# Patient Record
Sex: FEMALE | Race: WHITE | NOT HISPANIC OR LATINO | Employment: OTHER | URBAN - METROPOLITAN AREA
[De-identification: names, ages, dates, MRNs, and addresses within clinical notes are randomized per-mention and may not be internally consistent; named-entity substitution may affect disease eponyms.]

---

## 2017-01-12 ENCOUNTER — TELEPHONE (OUTPATIENT)
Dept: ORTHOPEDIC SURGERY | Facility: CLINIC | Age: 72
End: 2017-01-12

## 2017-01-12 RX ORDER — TRIAMCINOLONE ACETONIDE 55 UG/1
SPRAY, METERED NASAL
Refills: 3 | COMMUNITY
Start: 2017-01-03 | End: 2017-01-18 | Stop reason: SDDI

## 2017-01-17 ENCOUNTER — TRANSCRIBE ORDERS (OUTPATIENT)
Dept: ADMINISTRATIVE | Facility: HOSPITAL | Age: 72
End: 2017-01-17

## 2017-01-17 DIAGNOSIS — R06.02 SOB (SHORTNESS OF BREATH) ON EXERTION: ICD-10-CM

## 2017-01-17 DIAGNOSIS — N18.30 ANEMIA DUE TO STAGE 3 CHRONIC KIDNEY DISEASE TREATED WITH ERYTHROPOIETIN (HCC): Primary | ICD-10-CM

## 2017-01-17 DIAGNOSIS — R06.02 SOB (SHORTNESS OF BREATH): Primary | ICD-10-CM

## 2017-01-17 DIAGNOSIS — R09.02 OXYGEN DECREASE: ICD-10-CM

## 2017-01-17 DIAGNOSIS — R07.9 CHEST PAIN, UNSPECIFIED TYPE: ICD-10-CM

## 2017-01-17 DIAGNOSIS — D63.1 ANEMIA DUE TO STAGE 3 CHRONIC KIDNEY DISEASE TREATED WITH ERYTHROPOIETIN (HCC): Primary | ICD-10-CM

## 2017-01-18 ENCOUNTER — APPOINTMENT (OUTPATIENT)
Dept: ONCOLOGY | Facility: HOSPITAL | Age: 72
End: 2017-01-18

## 2017-01-18 ENCOUNTER — OFFICE VISIT (OUTPATIENT)
Dept: ONCOLOGY | Facility: CLINIC | Age: 72
End: 2017-01-18

## 2017-01-18 ENCOUNTER — LAB (OUTPATIENT)
Dept: ONCOLOGY | Facility: HOSPITAL | Age: 72
End: 2017-01-18

## 2017-01-18 VITALS
HEIGHT: 65 IN | WEIGHT: 138.4 LBS | BODY MASS INDEX: 23.06 KG/M2 | DIASTOLIC BLOOD PRESSURE: 61 MMHG | OXYGEN SATURATION: 96 % | TEMPERATURE: 97.9 F | SYSTOLIC BLOOD PRESSURE: 112 MMHG | HEART RATE: 83 BPM | RESPIRATION RATE: 18 BRPM

## 2017-01-18 DIAGNOSIS — D50.9 IRON DEFICIENCY ANEMIA, UNSPECIFIED IRON DEFICIENCY ANEMIA TYPE: ICD-10-CM

## 2017-01-18 DIAGNOSIS — N18.30 ANEMIA DUE TO STAGE 3 CHRONIC KIDNEY DISEASE TREATED WITH ERYTHROPOIETIN (HCC): ICD-10-CM

## 2017-01-18 DIAGNOSIS — D63.1 ANEMIA DUE TO STAGE 3 CHRONIC KIDNEY DISEASE TREATED WITH ERYTHROPOIETIN (HCC): ICD-10-CM

## 2017-01-18 DIAGNOSIS — C50.412 BREAST CANCER OF UPPER-OUTER QUADRANT OF LEFT FEMALE BREAST (HCC): Primary | ICD-10-CM

## 2017-01-18 LAB
BASOPHILS # BLD AUTO: 0.08 10*3/MM3 (ref 0–0.2)
BASOPHILS NFR BLD AUTO: 1.3 % (ref 0–1.5)
DEPRECATED RDW RBC AUTO: 47.4 FL (ref 37–54)
EOSINOPHIL # BLD AUTO: 0.17 10*3/MM3 (ref 0–0.7)
EOSINOPHIL NFR BLD AUTO: 2.7 % (ref 0.3–6.2)
ERYTHROCYTE [DISTWIDTH] IN BLOOD BY AUTOMATED COUNT: 14.2 % (ref 11.7–13)
HCT VFR BLD AUTO: 33.2 % (ref 35.6–45.5)
HGB BLD-MCNC: 11.3 G/DL (ref 11.9–15.5)
HGB RETIC QN: 34.4 PG (ref 32.7–38.6)
IMM GRANULOCYTES # BLD: 0.07 10*3/MM3 (ref 0–0.03)
IMM GRANULOCYTES NFR BLD: 1.1 % (ref 0–0.5)
IMM RETICS NFR: 11 % (ref 0.7–13.7)
LYMPHOCYTES # BLD AUTO: 1.58 10*3/MM3 (ref 0.9–4.8)
LYMPHOCYTES NFR BLD AUTO: 25.4 % (ref 19.6–45.3)
MCH RBC QN AUTO: 31 PG (ref 26.9–32)
MCHC RBC AUTO-ENTMCNC: 34 G/DL (ref 32.4–36.3)
MCV RBC AUTO: 91.2 FL (ref 80.5–98.2)
MONOCYTES # BLD AUTO: 0.61 10*3/MM3 (ref 0.2–1.2)
MONOCYTES NFR BLD AUTO: 9.8 % (ref 5–12)
NEUTROPHILS # BLD AUTO: 3.7 10*3/MM3 (ref 1.9–8.1)
NEUTROPHILS NFR BLD AUTO: 59.7 % (ref 42.7–76)
NRBC BLD MANUAL-RTO: 0.5 /100 WBC (ref 0–0)
PLATELET # BLD AUTO: 188 10*3/MM3 (ref 140–500)
PMV BLD AUTO: 10.5 FL (ref 6–12)
RBC # BLD AUTO: 3.64 10*6/MM3 (ref 3.9–5.2)
RETICS/RBC NFR AUTO: 2.09 % (ref 0.5–1.5)
WBC NRBC COR # BLD: 6.21 10*3/MM3 (ref 4.5–10.7)

## 2017-01-18 PROCEDURE — 85025 COMPLETE CBC W/AUTO DIFF WBC: CPT | Performed by: INTERNAL MEDICINE

## 2017-01-18 PROCEDURE — 99214 OFFICE O/P EST MOD 30 MIN: CPT | Performed by: INTERNAL MEDICINE

## 2017-01-18 PROCEDURE — 36415 COLL VENOUS BLD VENIPUNCTURE: CPT

## 2017-01-18 PROCEDURE — 85046 RETICYTE/HGB CONCENTRATE: CPT | Performed by: INTERNAL MEDICINE

## 2017-01-18 RX ORDER — NEBIVOLOL 5 MG/1
5 TABLET ORAL DAILY
COMMUNITY
End: 2017-02-28

## 2017-01-18 RX ORDER — BUDESONIDE AND FORMOTEROL FUMARATE DIHYDRATE 80; 4.5 UG/1; UG/1
2 AEROSOL RESPIRATORY (INHALATION) AS NEEDED
COMMUNITY
End: 2017-04-04 | Stop reason: HOSPADM

## 2017-01-18 NOTE — PROGRESS NOTES
Subjective .     REASONS FOR FOLLOWUP:  Breast cancer, anemia    HISTORY OF PRESENT ILLNESS:  The patient is a 71 y.o. year old female  who is here for follow-up with the above-mentioned history.    Continues to have issues with shortness of breath.  States yesterday oxygen saturation 73% after walking into her PCP office.  Activities are limited due to shortness of breath.  States her PCP called Dr. Nguyen and Dr. Nguyen recommended a bubble study.  He recommended no follow-up with him.  She states her PCP recommended she get evaluated at either the [facility] or the [facility].  Does not want to drive that far.  However, she is frustrated with her continued shortness of breath.    No complaints of chest pain or bleeding.    Past Medical History   Diagnosis Date   • Anemia      Of chronic renal insufficiency   • Atrial fibrillation    • Mcguire's esophagus    • Breast cancer    • Chronic renal insufficiency      Stage 3   • MCCRAY (dyspnea on exertion)    • GERD (gastroesophageal reflux disease)    • H/O electrocardiogram    • Hx of being hospitalized       in 09/2010 and 10/2010 for atrial fibrillation, Dr. Perla Hurtado   • Hyperlipidemia    • Hypertension    • Hypertension    • IBS (irritable bowel syndrome)    • Lower extremity edema    • Lump or mass in breast    • Osteoarthritis    • Osteopenia    • Paroxysmal atrial fibrillation    • Seizure      AS A TEENAGER, NOT CURRENTLY     Past Surgical History   Procedure Laterality Date   • Foot surgery     • Other surgical history       LYMPHATIC SURGERY   • Hand arthroplasty Right    • Cardiac catheterization Left 4/27/2016     Procedure: Cardiac catheterization;  Surgeon: Kevin Guillen MD;  Location: [facility] INVASIVE LOCATION;  Service:    • Knee arthroscopy     • Breast lumpectomy  1990     For LCIS       HEMATOLOGIC/ONCOLOGIC HISTORY:  (History from previous dates can be found in the separate  document.)    MEDICATIONS    Current Outpatient Prescriptions:   •  alosetron (LOTRONEX) 0.5 MG tablet, Take 0.05 mg by mouth daily., Disp: , Rfl:   •  ALPRAZolam (XANAX) 1 MG tablet, Take 1 mg by mouth every night., Disp: , Rfl:   •  amiodarone (PACERONE) 200 MG tablet, Take 1 tablet by mouth Daily. PRN, Disp: 90 tablet, Rfl: 1  •  bacitracin 500 UNIT/GM ophthalmic ointment, APPLY 1/4 INCH TO BOTH EYELIDS QHS, Disp: , Rfl: 4  •  budesonide-formoterol (SYMBICORT) 80-4.5 MCG/ACT inhaler, Inhale 2 puffs 2 (Two) Times a Day., Disp: , Rfl:   •  bumetanide (BUMEX) 1 MG tablet, Take 1 mg by mouth Daily As Needed., Disp: , Rfl:   •  cetirizine (ZyrTEC) 10 MG tablet, Take 1 tablet by mouth daily., Disp: , Rfl:   •  Chlorhexidine Gluconate (HIBICLENS EX), Apply  topically. AS DIRECTED PREOP, Disp: , Rfl:   •  dabigatran etexilate (PRADAXA) 150 MG capsu, Take 1 capsule by mouth Every 12 (Twelve) Hours., Disp: 180 capsule, Rfl: 3  •  desonide (DESOWEN) 0.05 % ointment, Apply  topically daily., Disp: , Rfl:   •  esomeprazole (NexIUM) 40 MG capsule, Take 40 mg by mouth 2 (two) times a day., Disp: , Rfl:   •  fluconazole (DIFLUCAN) 100 MG tablet, TK 1 T PO AS ONE TIME DOSE AS DIRECTED, Disp: , Rfl: 12  •  glucosamine-chondroitin 500-400 MG per tablet, Take 1 tablet by mouth., Disp: , Rfl:   •  ibuprofen (ADVIL,MOTRIN) 800 MG tablet, , Disp: , Rfl:   •  methocarbamol (ROBAXIN) 750 MG tablet, Take 750 mg by mouth. PRN, Disp: , Rfl:   •  metoprolol tartrate (LOPRESSOR) 25 MG tablet, TK 1 T PO BID, Disp: , Rfl: 3  •  montelukast (SINGULAIR) 10 MG tablet, Take 1 tablet by mouth daily., Disp: , Rfl:   •  Multiple Vitamin (MULTIVITAMIN) tablet, Take 1 tablet by mouth., Disp: , Rfl:   •  nebivolol (BYSTOLIC) 5 MG tablet, Take 5 mg by mouth Daily., Disp: , Rfl:   •  nystatin-triamcinolone (MYCOLOG II) 902826-3.1 UNIT/GM-% cream, , Disp: , Rfl:   •  pseudoephedrine-guaifenesin (MUCINEX D)  MG per 12 hr tablet, Take 1 tablet by mouth  Every 12 (Twelve) Hours., Disp: , Rfl:   •  raloxifene (EVISTA) 60 MG tablet, Take 1 tablet by mouth daily., Disp: , Rfl:   •  RESTASIS 0.05 % ophthalmic emulsion, , Disp: , Rfl:   •  simvastatin (ZOCOR) 40 MG tablet, Take 1 tablet by mouth daily., Disp: , Rfl:     ALLERGIES:     Allergies   Allergen Reactions   • Atropine Hives   • Penicillins      Reactions: syncope and seizures   • Sulfa Antibiotics Nausea Only     STATES SHE DOES FINE WITH CERTAIN SULFA DRUGS   • Epinephrine Palpitations       SOCIAL HISTORY:       Social History     Social History   • Marital status:      Spouse name: Cuate   • Number of children: 1   • Years of education: 1 year of college     Occupational History   •  Retired     Social History Main Topics   • Smoking status: Former Smoker     Quit date: 6/9/1975   • Smokeless tobacco: Never Used      Comment: QUIT SMOKING IN LATE 1970s   • Alcohol use Yes      Comment: social drinker   • Drug use: No   • Sexual activity: Defer     Other Topics Concern   • Not on file     Social History Narrative         FAMILY HISTORY:  Family History   Problem Relation Age of Onset   • Heart disease Other    • Stroke Other    • Stroke Mother    • Heart disease Mother    • Hypertension Mother    • Leukemia Father      Mid 60s   • Hypertension Brother    • Cancer Paternal Aunt    • Cancer Paternal Grandfather        REVIEW OF SYSTEMS:  GENERAL: No change in appetite or weight;   No fevers, chills, sweats.    SKIN: No nonhealing lesions.   No rashes.  HEME/LYMPH: No easy bruising, bleeding.   No swollen nodes.   EYES: No vision changes or diplopia.   ENT: No tinnitus, hearing loss, gum bleeding, epistaxis, hoarseness or dysphagia.   RESPIRATORY: see HPI   CVS: No chest pain, palpitations, orthopnea, dyspnea on exertion or PND.   GI: No melena or hematochezia.   No abdominal pain.  No nausea, vomiting, constipation, diarrhea  : No lower tract obstructive symptoms, dysuria or hematuria.  "  MUSCULOSKELETAL: No bone pain.  No joint stiffness.   NEUROLOGICAL: No global weakness, loss of consciousness or seizures.   PSYCHIATRIC: No increased nervousness, mood changes or depression.          Objective    Vitals:    01/18/17 1014   BP: 112/61   Pulse: 83   Resp: 18   Temp: 97.9 °F (36.6 °C)   SpO2: 96%   Weight: 138 lb 6.4 oz (62.8 kg)   Height: 65\" (165.1 cm)   PainSc: 0-No pain     Current Status 1/18/2017   ECOG score 0      PHYSICAL EXAM:    GENERAL:  Well-developed, well-nourished in no acute distress.   SKIN:  Warm, dry without rashes, purpura or petechiae.  HEAD:  Normocephalic.  EYES:  Pupils equal, round and reactive to light.  EOMs intact.  Conjunctivae normal.  EARS:  Hearing intact.  NOSE:  Septum midline.  No excoriations or nasal discharge.  MOUTH:  Tongue is well-papillated; no stomatitis or ulcers.  Lips normal.  THROAT:  Oropharynx without lesions or exudates.  NECK:  Supple with good range of motion; no thyromegaly or masses, no JVD.  LYMPHATICS:  No cervical, supraclavicular, axillary or inguinal adenopathy.  CHEST:  Lungs clear to percussion and auscultation. Good airflow.  BREAST: no masses by palpation or inspection  Status post left lumpectomy.  Left nipple has been inverted since lumpectomy per patient's report.  Fibrocystic changes noted.  CARDIAC:  Regular rate and rhythm without murmurs, rubs or gallops. Normal S1,S2.  ABDOMEN:  Soft, nontender with no organomegaly or masses.  EXTREMITIES:  No clubbing, cyanosis or edema.  NEUROLOGICAL:  Cranial Nerves II-XII grossly intact.  No focal neurological deficits.  PSYCHIATRIC:  Normal affect and mood.      RECENT LABS:        WBC   Date/Time Value Ref Range Status   01/18/2017 10:02 AM 6.21 4.50 - 10.70 10*3/mm3 Final   03/02/2016 10:51 AM 4.22 (L) 4.50 - 10.70 K/Cumm Final     HEMOGLOBIN   Date/Time Value Ref Range Status   01/18/2017 10:02 AM 11.3 (L) 11.9 - 15.5 g/dL Final   03/02/2016 10:51 AM 11.2 (L) 11.9 - 15.5 g/dL Final "     PLATELETS   Date/Time Value Ref Range Status   01/18/2017 10:02  140 - 500 10*3/mm3 Final   03/02/2016 10:51  140 - 500 K/Cumm Final       Assessment/Plan     ASSESSMENT:  1. Stage IB ([id number]) left breast cancer. Grade 2, 1.9 cm, 1 mm micrometastasis in 1 out of 2 sentinel nodes. ER positive, HER-2 negative. OncoType DX: low risk category.   Tamoxifen 10/11/2013 through November 2013. Just a few days or so of Aromasin in January 2014. Could not tolerate hormonal therapy due to vaginal dryness and irritation and declined any more hormonal therapy.   Evista started for significant osteopenia by Dr. Perla Garza July 2014.  She states Dr. Garza plans to Fosamax.    2. Atrial fibrillation, on Pradaxa through Dr. Perla Hurtado.     3. Anemia of stage 3 chronic renal insufficiency (Dr. Rell Shannon is her nephrologist whom she follows up with). (Evaluation negative for B12 or folate deficiency, hemolysis or multiple myeloma. She has not had a bone marrow biopsy. She responds well to Procrit).   She received Procrit 12/2014 and 1 dose 06/2015 and 1 dose 09/2015 and 1 dose on 10/28/2015.   She responds to Procrit.     4.  Iron deficiency anemia. does not tolerate oral iron due to significant abdominal pain and diarrhea. She refuses any further Benadryl as a pre-medicine because it made her feel very sleepy for over 24-hours.  Await iron labs from today.    5. Fibrocystic changes in both breasts.     6.  Hypoxia with exertion.  Oxygen saturations fall 2 around 73% with walking.  This prompted a hospitalization in early December 2016.  Workup was unremarkable.  I reviewed those hospital records.  If her anemia is contributing to this, I feel it would only be a small contributing factor.   She states her PCP recommended evaluation at the [facility] or [facility].  I told her I agree with this and encouraged her to go.  She is reluctant but frustrated at the continued shortness of  breath.      PLAN:   · Iron labs today  · CBC and iron labs every 2 months  · Procrit if hemoglobin less than 10  · M.D. lab Procrit 6 months  · Last mammogram 7/26/16, BI-RADS 2.

## 2017-01-23 ENCOUNTER — TELEPHONE (OUTPATIENT)
Dept: CARDIOLOGY | Facility: CLINIC | Age: 72
End: 2017-01-23

## 2017-01-23 DIAGNOSIS — R09.02 HYPOXIA: ICD-10-CM

## 2017-01-23 DIAGNOSIS — R06.09 DYSPNEA ON EXERTION: Primary | ICD-10-CM

## 2017-01-23 NOTE — TELEPHONE ENCOUNTER
Pt called, she said that she is feeling like before when you admitted her in the hosp with low O2 and SOA. She said that her primary ordered an echo and she wants to discuss this and some other things with you. C/b is [phone].

## 2017-01-27 ENCOUNTER — LAB (OUTPATIENT)
Dept: LAB | Facility: HOSPITAL | Age: 72
End: 2017-01-27
Attending: OBSTETRICS & GYNECOLOGY

## 2017-01-27 ENCOUNTER — TRANSCRIBE ORDERS (OUTPATIENT)
Dept: ADMINISTRATIVE | Facility: HOSPITAL | Age: 72
End: 2017-01-27

## 2017-01-27 ENCOUNTER — LAB (OUTPATIENT)
Dept: LAB | Facility: HOSPITAL | Age: 72
End: 2017-01-27
Attending: INTERNAL MEDICINE

## 2017-01-27 DIAGNOSIS — D63.0 ANEMIA IN NEOPLASTIC DISEASE: ICD-10-CM

## 2017-01-27 DIAGNOSIS — E20.8 PTH-RELATED FAMILIAL ISOLATED HYPOPARATHYROIDISM, AUTOSOMAL DOMINANT (HCC): ICD-10-CM

## 2017-01-27 DIAGNOSIS — N25.81 SECONDARY HYPERPARATHYROIDISM OF RENAL ORIGIN (HCC): ICD-10-CM

## 2017-01-27 DIAGNOSIS — N18.30 CHRONIC KIDNEY DISEASE, STAGE III (MODERATE) (HCC): ICD-10-CM

## 2017-01-27 DIAGNOSIS — E20.8 PTH-RELATED FAMILIAL ISOLATED HYPOPARATHYROIDISM, AUTOSOMAL DOMINANT (HCC): Primary | ICD-10-CM

## 2017-01-27 DIAGNOSIS — N18.30 CHRONIC KIDNEY DISEASE, STAGE III (MODERATE) (HCC): Primary | ICD-10-CM

## 2017-01-27 LAB
25(OH)D3 SERPL-MCNC: 30.9 NG/ML (ref 30–100)
ANION GAP SERPL CALCULATED.3IONS-SCNC: 12.7 MMOL/L
BACTERIA UR QL AUTO: ABNORMAL /HPF
BASOPHILS # BLD AUTO: 0.04 10*3/MM3 (ref 0–0.2)
BASOPHILS NFR BLD AUTO: 1 % (ref 0–1.5)
BILIRUB UR QL STRIP: NEGATIVE
BUN BLD-MCNC: 26 MG/DL (ref 8–23)
BUN/CREAT SERPL: 20.6 (ref 7–25)
CALCIUM SPEC-SCNC: 8.9 MG/DL (ref 8.6–10.5)
CHLORIDE SERPL-SCNC: 105 MMOL/L (ref 98–107)
CLARITY UR: CLEAR
CO2 SERPL-SCNC: 21.3 MMOL/L (ref 22–29)
COLOR UR: YELLOW
CREAT BLD-MCNC: 1.26 MG/DL (ref 0.57–1)
DEPRECATED RDW RBC AUTO: 52.3 FL (ref 37–54)
EOSINOPHIL # BLD AUTO: 0.12 10*3/MM3 (ref 0–0.7)
EOSINOPHIL NFR BLD AUTO: 3.1 % (ref 0.3–6.2)
ERYTHROCYTE [DISTWIDTH] IN BLOOD BY AUTOMATED COUNT: 14.6 % (ref 11.7–13)
FERRITIN SERPL-MCNC: 71.38 NG/ML (ref 13–150)
GFR SERPL CREATININE-BSD FRML MDRD: 42 ML/MIN/1.73
GLUCOSE BLD-MCNC: 87 MG/DL (ref 65–99)
GLUCOSE UR STRIP-MCNC: NEGATIVE MG/DL
HCT VFR BLD AUTO: 33.5 % (ref 35.6–45.5)
HGB BLD-MCNC: 10.5 G/DL (ref 11.9–15.5)
HGB UR QL STRIP.AUTO: NEGATIVE
HYALINE CASTS UR QL AUTO: ABNORMAL /LPF
IMM GRANULOCYTES # BLD: 0 10*3/MM3 (ref 0–0.03)
IMM GRANULOCYTES NFR BLD: 0 % (ref 0–0.5)
IRON 24H UR-MRATE: 70 MCG/DL (ref 37–145)
IRON SATN MFR SERPL: 20 % (ref 20–50)
KETONES UR QL STRIP: NEGATIVE
LEUKOCYTE ESTERASE UR QL STRIP.AUTO: ABNORMAL
LYMPHOCYTES # BLD AUTO: 1.18 10*3/MM3 (ref 0.9–4.8)
LYMPHOCYTES NFR BLD AUTO: 30.7 % (ref 19.6–45.3)
MCH RBC QN AUTO: 30.9 PG (ref 26.9–32)
MCHC RBC AUTO-ENTMCNC: 31.3 G/DL (ref 32.4–36.3)
MCV RBC AUTO: 98.5 FL (ref 80.5–98.2)
MONOCYTES # BLD AUTO: 0.32 10*3/MM3 (ref 0.2–1.2)
MONOCYTES NFR BLD AUTO: 8.3 % (ref 5–12)
NEUTROPHILS # BLD AUTO: 2.18 10*3/MM3 (ref 1.9–8.1)
NEUTROPHILS NFR BLD AUTO: 56.9 % (ref 42.7–76)
NITRITE UR QL STRIP: NEGATIVE
PH UR STRIP.AUTO: 6 [PH] (ref 5–8)
PHOSPHATE SERPL-MCNC: 3.4 MG/DL (ref 2.5–4.5)
PLATELET # BLD AUTO: 181 10*3/MM3 (ref 140–500)
PMV BLD AUTO: 10.4 FL (ref 6–12)
POTASSIUM BLD-SCNC: 4.1 MMOL/L (ref 3.5–5.2)
PROT UR QL STRIP: NEGATIVE
PTH-INTACT SERPL-MCNC: 63.3 PG/ML (ref 15–65)
RBC # BLD AUTO: 3.4 10*6/MM3 (ref 3.9–5.2)
RBC # UR: ABNORMAL /HPF
REF LAB TEST METHOD: ABNORMAL
SODIUM BLD-SCNC: 139 MMOL/L (ref 136–145)
SP GR UR STRIP: 1.02 (ref 1–1.03)
SQUAMOUS #/AREA URNS HPF: ABNORMAL /HPF
TIBC SERPL-MCNC: 356 MCG/DL (ref 298–536)
TRANSFERRIN SERPL-MCNC: 239 MG/DL (ref 200–360)
UROBILINOGEN UR QL STRIP: ABNORMAL
WBC NRBC COR # BLD: 3.84 10*3/MM3 (ref 4.5–10.7)
WBC UR QL AUTO: ABNORMAL /HPF

## 2017-01-27 PROCEDURE — 85025 COMPLETE CBC W/AUTO DIFF WBC: CPT | Performed by: INTERNAL MEDICINE

## 2017-01-27 PROCEDURE — 83970 ASSAY OF PARATHORMONE: CPT | Performed by: INTERNAL MEDICINE

## 2017-01-27 PROCEDURE — 84466 ASSAY OF TRANSFERRIN: CPT | Performed by: INTERNAL MEDICINE

## 2017-01-27 PROCEDURE — 84100 ASSAY OF PHOSPHORUS: CPT | Performed by: INTERNAL MEDICINE

## 2017-01-27 PROCEDURE — 81001 URINALYSIS AUTO W/SCOPE: CPT | Performed by: INTERNAL MEDICINE

## 2017-01-27 PROCEDURE — 83540 ASSAY OF IRON: CPT | Performed by: INTERNAL MEDICINE

## 2017-01-27 PROCEDURE — 82728 ASSAY OF FERRITIN: CPT | Performed by: INTERNAL MEDICINE

## 2017-01-27 PROCEDURE — 82306 VITAMIN D 25 HYDROXY: CPT | Performed by: INTERNAL MEDICINE

## 2017-01-27 PROCEDURE — 36415 COLL VENOUS BLD VENIPUNCTURE: CPT

## 2017-01-27 PROCEDURE — 80048 BASIC METABOLIC PNL TOTAL CA: CPT | Performed by: INTERNAL MEDICINE

## 2017-02-06 ENCOUNTER — HOSPITAL ENCOUNTER (OUTPATIENT)
Dept: CARDIOLOGY | Facility: HOSPITAL | Age: 72
Discharge: HOME | End: 2017-02-06
Attending: INTERNAL MEDICINE

## 2017-02-06 ENCOUNTER — HOSPITAL ENCOUNTER (OUTPATIENT)
Dept: CARDIOLOGY | Facility: HOSPITAL | Age: 72
Discharge: HOME | End: 2017-02-06
Attending: INTERNAL MEDICINE | Admitting: INTERNAL MEDICINE

## 2017-02-06 VITALS
HEIGHT: 65 IN | SYSTOLIC BLOOD PRESSURE: 120 MMHG | BODY MASS INDEX: 22.99 KG/M2 | DIASTOLIC BLOOD PRESSURE: 64 MMHG | WEIGHT: 138 LBS | HEART RATE: 64 BPM

## 2017-02-06 VITALS — WEIGHT: 138 LBS | BODY MASS INDEX: 22.99 KG/M2 | HEIGHT: 65 IN

## 2017-02-06 DIAGNOSIS — R09.02 HYPOXIA: ICD-10-CM

## 2017-02-06 DIAGNOSIS — R06.09 DYSPNEA ON EXERTION: ICD-10-CM

## 2017-02-06 LAB
BH CV ECHO MEAS - BSA(HAYCOCK): 1.7 M^2
BH CV ECHO MEAS - BSA: 1.7 M^2
BH CV ECHO MEAS - BZI_BMI: 23 KILOGRAMS/M^2
BH CV ECHO MEAS - BZI_METRIC_HEIGHT: 165.1 CM
BH CV ECHO MEAS - BZI_METRIC_WEIGHT: 62.6 KG

## 2017-02-06 PROCEDURE — 94621 CARDIOPULM EXERCISE TESTING: CPT | Performed by: INTERNAL MEDICINE

## 2017-02-06 PROCEDURE — 93308 TTE F-UP OR LMTD: CPT

## 2017-02-06 PROCEDURE — 93018 CV STRESS TEST I&R ONLY: CPT | Performed by: INTERNAL MEDICINE

## 2017-02-06 PROCEDURE — 94621 CARDIOPULM EXERCISE TESTING: CPT

## 2017-02-06 PROCEDURE — 93308 TTE F-UP OR LMTD: CPT | Performed by: INTERNAL MEDICINE

## 2017-02-07 ENCOUNTER — TELEPHONE (OUTPATIENT)
Dept: CARDIOLOGY | Facility: CLINIC | Age: 72
End: 2017-02-07

## 2017-02-14 ENCOUNTER — TELEPHONE (OUTPATIENT)
Dept: CARDIOLOGY | Facility: CLINIC | Age: 72
End: 2017-02-14

## 2017-02-14 LAB
BH CV SE - AT - BREATHING RESERVE: 52.9
BH CV SE - AT - P ETCO2: 119
BH CV SE - AT - PEAK METS: 4.4
BH CV SE - AT - PEAK RER: 1
BH CV SE - AT - PEAK VO2: 15.5
BH CV SE - AT - RESPIRATORY RATE: 36
BH CV SE - AT - RESTING O2 PULSE: 98
BH CV SE - REST - BREATHING RESERVE: 90
BH CV SE - REST - P ETCO2: 114
BH CV SE - REST - PEAK METS: 1
BH CV SE - REST - PEAK RER: 0.81
BH CV SE - REST - PEAK VO2: 3.6
BH CV SE - REST - RESPIRATORY RATE: 13
BH CV SE - REST - RESTING O2 PULSE: 100
BH CV SE - VO2 MAX - BREATHING RESERVE: 47
BH CV SE - VO2 MAX - P ETCO2: 120
BH CV SE - VO2 MAX - PEAK METS: 4.8
BH CV SE - VO2 MAX - PEAK RER: 1
BH CV SE - VO2 MAX - RESPIRATORY RATE: 41
BH CV SE - VO2 MAX - RESTING O2 PULSE: 98
BH CV STRESS BP STAGE 1: NORMAL
BH CV STRESS BP STAGE 2: NORMAL
BH CV STRESS BP STAGE 3: NORMAL
BH CV STRESS DURATION MIN STAGE 1: 3
BH CV STRESS DURATION MIN STAGE 2: 3
BH CV STRESS DURATION MIN STAGE 3: 2
BH CV STRESS DURATION SEC STAGE 1: 0
BH CV STRESS DURATION SEC STAGE 2: 0
BH CV STRESS DURATION SEC STAGE 3: 39
BH CV STRESS HR STAGE 1: 70
BH CV STRESS HR STAGE 2: 85
BH CV STRESS HR STAGE 3: 103
BH CV STRESS O2 STAGE 1: 98
BH CV STRESS O2 STAGE 2: 98
BH CV STRESS O2 STAGE 3: 98
BH CV STRESS PROTOCOL 1: NORMAL
BH CV STRESS STAGE 1: 1
BH CV STRESS STAGE 2: 2
BH CV STRESS STAGE 3: 3
BHC CV SE - VO2 MAX - PEAK VO2: 16.9
MAXIMAL PREDICTED HEART RATE: 148 BPM
PERCENT MAX PREDICTED HR: 69.59 %
STRESS BASELINE BP: NORMAL MMHG
STRESS BASELINE HR: 64 BPM
STRESS O2 SAT REST: 99 %
STRESS PERCENT HR: 82 %
STRESS POST EXERCISE DUR MIN: 8 MIN
STRESS POST EXERCISE DUR SEC: 39 SEC
STRESS POST O2 SAT PEAK: 98 %
STRESS POST PEAK BP: NORMAL MMHG
STRESS POST PEAK HR: 103 BPM
STRESS TARGET HR: 126 BPM

## 2017-02-14 NOTE — TELEPHONE ENCOUNTER
Patient called saying no one had told her of the possible delay with having pulmonary read her test.  I spoke with RM and she says that she received the results today and would be contacting patient.

## 2017-02-16 ENCOUNTER — TRANSCRIBE ORDERS (OUTPATIENT)
Dept: ADMINISTRATIVE | Facility: HOSPITAL | Age: 72
End: 2017-02-16

## 2017-02-16 DIAGNOSIS — R01.1 NEWLY RECOGNIZED HEART MURMUR: ICD-10-CM

## 2017-02-16 DIAGNOSIS — R06.09 DYSPNEA ON EXERTION: Primary | ICD-10-CM

## 2017-02-27 ENCOUNTER — HOSPITAL ENCOUNTER (OUTPATIENT)
Dept: CARDIOLOGY | Facility: HOSPITAL | Age: 72
Discharge: HOME | End: 2017-02-27
Admitting: INTERNAL MEDICINE

## 2017-02-27 VITALS
HEIGHT: 65 IN | WEIGHT: 138 LBS | DIASTOLIC BLOOD PRESSURE: 49 MMHG | SYSTOLIC BLOOD PRESSURE: 122 MMHG | BODY MASS INDEX: 22.99 KG/M2

## 2017-02-27 DIAGNOSIS — R06.09 DYSPNEA ON EXERTION: ICD-10-CM

## 2017-02-27 DIAGNOSIS — R01.1 NEWLY RECOGNIZED HEART MURMUR: ICD-10-CM

## 2017-02-27 LAB
ASCENDING AORTA: 2.4 CM
BH CV ECHO MEAS - ACS: 1.4 CM
BH CV ECHO MEAS - AO MEAN PG (FULL): 4 MMHG
BH CV ECHO MEAS - AO MEAN PG: 8 MMHG
BH CV ECHO MEAS - AO ROOT AREA (BSA CORRECTED): 1.4
BH CV ECHO MEAS - AO ROOT AREA: 4.2 CM^2
BH CV ECHO MEAS - AO ROOT DIAM: 2.3 CM
BH CV ECHO MEAS - AO V2 MAX: 195 CM/SEC
BH CV ECHO MEAS - AO V2 MEAN: 127 CM/SEC
BH CV ECHO MEAS - AO V2 VTI: 47.9 CM
BH CV ECHO MEAS - ASC AORTA: 2.4 CM
BH CV ECHO MEAS - AVA(I,A): 2.2 CM^2
BH CV ECHO MEAS - AVA(I,D): 2.2 CM^2
BH CV ECHO MEAS - BSA(HAYCOCK): 1.7 M^2
BH CV ECHO MEAS - BSA: 1.7 M^2
BH CV ECHO MEAS - BZI_BMI: 23 KILOGRAMS/M^2
BH CV ECHO MEAS - BZI_METRIC_HEIGHT: 165.1 CM
BH CV ECHO MEAS - BZI_METRIC_WEIGHT: 62.6 KG
BH CV ECHO MEAS - CONTRAST EF (2CH): 67.9 ML/M^2
BH CV ECHO MEAS - CONTRAST EF 4CH: 66.7 ML/M^2
BH CV ECHO MEAS - EDV(CUBED): 97.3 ML
BH CV ECHO MEAS - EDV(MOD-SP2): 56 ML
BH CV ECHO MEAS - EDV(MOD-SP4): 57 ML
BH CV ECHO MEAS - EDV(TEICH): 97.3 ML
BH CV ECHO MEAS - EF(CUBED): 79.8 %
BH CV ECHO MEAS - EF(MOD-SP2): 67.9 %
BH CV ECHO MEAS - EF(MOD-SP4): 66.7 %
BH CV ECHO MEAS - EF(TEICH): 72.2 %
BH CV ECHO MEAS - ESV(CUBED): 19.7 ML
BH CV ECHO MEAS - ESV(MOD-SP2): 18 ML
BH CV ECHO MEAS - ESV(MOD-SP4): 19 ML
BH CV ECHO MEAS - ESV(TEICH): 27 ML
BH CV ECHO MEAS - FS: 41.3 %
BH CV ECHO MEAS - IVS/LVPW: 1.1
BH CV ECHO MEAS - IVSD: 0.8 CM
BH CV ECHO MEAS - LAT PEAK E' VEL: 10 CM/SEC
BH CV ECHO MEAS - LV DIASTOLIC VOL/BSA (35-75): 33.7 ML/M^2
BH CV ECHO MEAS - LV MASS(C)D: 108.5 GRAMS
BH CV ECHO MEAS - LV MASS(C)DI: 64.2 GRAMS/M^2
BH CV ECHO MEAS - LV MEAN PG: 4 MMHG
BH CV ECHO MEAS - LV SYSTOLIC VOL/BSA (12-30): 11.2 ML/M^2
BH CV ECHO MEAS - LV V1 MAX: 156 CM/SEC
BH CV ECHO MEAS - LV V1 MEAN: 93.4 CM/SEC
BH CV ECHO MEAS - LV V1 VTI: 37.6 CM
BH CV ECHO MEAS - LVIDD: 4.6 CM
BH CV ECHO MEAS - LVIDS: 2.7 CM
BH CV ECHO MEAS - LVLD AP2: 7.1 CM
BH CV ECHO MEAS - LVLD AP4: 7.2 CM
BH CV ECHO MEAS - LVLS AP2: 5.7 CM
BH CV ECHO MEAS - LVLS AP4: 5.8 CM
BH CV ECHO MEAS - LVOT AREA (M): 2.8 CM^2
BH CV ECHO MEAS - LVOT AREA: 2.8 CM^2
BH CV ECHO MEAS - LVOT DIAM: 1.9 CM
BH CV ECHO MEAS - LVPWD: 0.7 CM
BH CV ECHO MEAS - MED PEAK E' VEL: 8 CM/SEC
BH CV ECHO MEAS - MV A DUR: 0.17 SEC
BH CV ECHO MEAS - MV A MAX VEL: 85.9 CM/SEC
BH CV ECHO MEAS - MV DEC SLOPE: 516.5 CM/SEC^2
BH CV ECHO MEAS - MV DEC TIME: 0.26 SEC
BH CV ECHO MEAS - MV E MAX VEL: 86.4 CM/SEC
BH CV ECHO MEAS - MV E/A: 1
BH CV ECHO MEAS - MV MEAN PG: 1 MMHG
BH CV ECHO MEAS - MV P1/2T MAX VEL: 113 CM/SEC
BH CV ECHO MEAS - MV P1/2T: 64.1 MSEC
BH CV ECHO MEAS - MV V2 MEAN: 48.2 CM/SEC
BH CV ECHO MEAS - MV V2 VTI: 42.3 CM
BH CV ECHO MEAS - MVA P1/2T LCG: 1.9 CM^2
BH CV ECHO MEAS - MVA(P1/2T): 3.4 CM^2
BH CV ECHO MEAS - MVA(VTI): 2.5 CM^2
BH CV ECHO MEAS - PA ACC SLOPE: 22.7 CM/SEC^2
BH CV ECHO MEAS - PA ACC TIME: 0.18 SEC
BH CV ECHO MEAS - PA MAX PG (FULL): 1.9 MMHG
BH CV ECHO MEAS - PA MAX PG: 3.2 MMHG
BH CV ECHO MEAS - PA PR(ACCEL): -2 MMHG
BH CV ECHO MEAS - PA V2 MAX: 89.6 CM/SEC
BH CV ECHO MEAS - PULM A REVS DUR: 0.13 SEC
BH CV ECHO MEAS - PULM A REVS VEL: 30.1 CM/SEC
BH CV ECHO MEAS - PULM DIAS VEL: 51.8 CM/SEC
BH CV ECHO MEAS - PULM S/D: 1.3
BH CV ECHO MEAS - PULM SYS VEL: 67.1 CM/SEC
BH CV ECHO MEAS - PVA(V,A): 3.7 CM^2
BH CV ECHO MEAS - PVA(V,D): 3.7 CM^2
BH CV ECHO MEAS - QP/QS: 0.78
BH CV ECHO MEAS - RAP SYSTOLE: 3 MMHG
BH CV ECHO MEAS - RV MAX PG: 1.3 MMHG
BH CV ECHO MEAS - RV MEAN PG: 1 MMHG
BH CV ECHO MEAS - RV V1 MAX: 57.9 CM/SEC
BH CV ECHO MEAS - RV V1 MEAN: 36.4 CM/SEC
BH CV ECHO MEAS - RV V1 VTI: 14.5 CM
BH CV ECHO MEAS - RVOT AREA: 5.7 CM^2
BH CV ECHO MEAS - RVOT DIAM: 2.7 CM
BH CV ECHO MEAS - RVSP: 33.9 MMHG
BH CV ECHO MEAS - SI(AO): 117.8 ML/M^2
BH CV ECHO MEAS - SI(CUBED): 46 ML/M^2
BH CV ECHO MEAS - SI(LVOT): 63.1 ML/M^2
BH CV ECHO MEAS - SI(MOD-SP2): 22.5 ML/M^2
BH CV ECHO MEAS - SI(MOD-SP4): 22.5 ML/M^2
BH CV ECHO MEAS - SI(TEICH): 41.6 ML/M^2
BH CV ECHO MEAS - SV(AO): 199 ML
BH CV ECHO MEAS - SV(CUBED): 77.7 ML
BH CV ECHO MEAS - SV(LVOT): 106.6 ML
BH CV ECHO MEAS - SV(MOD-SP2): 38 ML
BH CV ECHO MEAS - SV(MOD-SP4): 38 ML
BH CV ECHO MEAS - SV(RVOT): 83 ML
BH CV ECHO MEAS - SV(TEICH): 70.3 ML
BH CV ECHO MEAS - TAPSE (>1.6): 2.1 CM2
BH CV ECHO MEAS - TR MAX VEL: 278 CM/SEC
BH CV XLRA - RV BASE: 3.2 CM
BH CV XLRA - TDI S': 11 CM/SEC
E/E' RATIO: 10
LEFT ATRIUM VOLUME INDEX: 35 ML/M2

## 2017-02-27 PROCEDURE — 93306 TTE W/DOPPLER COMPLETE: CPT

## 2017-02-27 PROCEDURE — 93306 TTE W/DOPPLER COMPLETE: CPT | Performed by: INTERNAL MEDICINE

## 2017-02-28 ENCOUNTER — OFFICE VISIT (OUTPATIENT)
Dept: CARDIOLOGY | Facility: CLINIC | Age: 72
End: 2017-02-28

## 2017-02-28 VITALS
BODY MASS INDEX: 23.63 KG/M2 | HEART RATE: 51 BPM | DIASTOLIC BLOOD PRESSURE: 60 MMHG | WEIGHT: 138.4 LBS | HEIGHT: 64 IN | SYSTOLIC BLOOD PRESSURE: 110 MMHG

## 2017-02-28 DIAGNOSIS — C50.412 BREAST CANCER OF UPPER-OUTER QUADRANT OF LEFT FEMALE BREAST (HCC): ICD-10-CM

## 2017-02-28 DIAGNOSIS — E78.5 HYPERLIPIDEMIA, UNSPECIFIED HYPERLIPIDEMIA TYPE: ICD-10-CM

## 2017-02-28 DIAGNOSIS — I48.0 PAROXYSMAL ATRIAL FIBRILLATION (HCC): Primary | ICD-10-CM

## 2017-02-28 DIAGNOSIS — I10 ESSENTIAL HYPERTENSION: ICD-10-CM

## 2017-02-28 PROCEDURE — 99214 OFFICE O/P EST MOD 30 MIN: CPT | Performed by: INTERNAL MEDICINE

## 2017-02-28 PROCEDURE — 93000 ELECTROCARDIOGRAM COMPLETE: CPT | Performed by: INTERNAL MEDICINE

## 2017-02-28 RX ORDER — LOSARTAN POTASSIUM 50 MG/1
50 TABLET ORAL 2 TIMES DAILY
Qty: 60 TABLET | Refills: 3 | Status: SHIPPED | OUTPATIENT
Start: 2017-02-28 | End: 2017-04-14

## 2017-02-28 RX ORDER — DILTIAZEM HYDROCHLORIDE 120 MG/1
120 CAPSULE, EXTENDED RELEASE ORAL DAILY
Qty: 90 CAPSULE | Refills: 3 | Status: SHIPPED | OUTPATIENT
Start: 2017-02-28 | End: 2017-03-07 | Stop reason: SDUPTHER

## 2017-02-28 RX ORDER — ALENDRONATE SODIUM 70 MG/1
70 TABLET ORAL
Refills: 12 | COMMUNITY
Start: 2017-02-20

## 2017-02-28 NOTE — PROGRESS NOTES
Date of Office Visit: 2017  Encounter Provider: Perla Hurtado MD  Place of Service: [facility] CARDIOLOGY  Patient Name: Mary Kay Saab  :1945      Patient ID:  Mary Kay Saab is a 71 y.o. female is here for  followup for dyspnea and hypoxia.         History of Present Illness  I first saw her in consultation at [facility] for atrial fibrillation in  2010. She converted to normal sinus rhythm with amiodarone. She had a stress  echocardiogram in our office. The stress images were poor, but the rest of the  echocardiogram was normal. She had dual isotope nuclear perfusion study showing no  Ischemia.      She continued to have intermittent atrial fibrillation on amiodarone but then had problems  with bradycardia into the forties. Her blood pressure was high. Her TSH and free T4 were  normal. She was using Mucinex-D which we stopped, but she continued to still have episodes  of intermittent atrial fibrillation.      She came in for preoperative evaluation in 2012 for surgery on her knee. She had had  a normal PET stress study done on 2012, for fatigue and dyspnea. She had an  echocardiogram done on the same day which showed an ejection fraction of 64% with grade 1  diastolic dysfunction and normal heart valves. I did clear her for surgery.      She saw Dr. Vizcarra for PAF and he felt that she would be a good  candidate for atrial fibrillation ablation. She was scheduled to have this done but her  , Ovidio, had a bad fall with an intracranial bleed so she canceled  that procedure.       She had cataract surgery on 2014 with Dr. Mahmood at the [facility] [place]. She had  right knee surgery, a replacement with Dr. Jeremías Mosley on 2014. She has had breast surgery for breast  cancer done with Dr. Ramirez and she is doing well with that. She follows with Dr. Cornejo  for anemia.       She had a pretty complete  workup for palpitations and dyspnea. She had a carotid duplex study  performed on 07/07/2014 which was normal. She had an echocardiogram performed on  07/07/2014 which revealed an ejection fraction of 57%, grade II diastolic dysfunction,  moderate left atrial dilation and normal mitral and aortic valves. She had a Holter  monitor on 04/11/2014 which showed no evidence of atrial fibrillation and really was  Unremarkable.      At her last visit, she was having a lot of short-windedness and fatigue, and then began developing some intermittent chest discomfort. We set her up for several studies. She had a stress nuclear perfusion study done in 04/2016 which revealed a small to medium sized area of severe ischemia in the inferior wall and septal wall. She had a Holter recording done on 04/05/2016 which showed sinus rhythm but she said she had no symptoms while she wore it. She had an echocardiogram which was done on 04/05/2016 which revealed an ejection fraction of 53%, normal diastolic function, mildly dilated left atrium, mild-to-moderate tricuspid insufficiency. She had a carotid duplex study on 04/08/2016 for a right carotid bruit. This revealed mild atherosclerosis in the right internal carotid artery and less than 15% stenosis in the left internal carotid artery. She then went on to have a cardiac catheterization which was done on 05/16/2016. Her coronary arteries were normal. The stress study was deemed a false positive.       She follows with Dr. Kevin Daily for her kidney dysfunction and Dr. Mario Cornejo for anemia.       She was admitted in 12/2016 for hypoxia with walking and dyspnea.  Her evaluation there was unremarkable as noted below.            She had a limited echo study with saline contrast today which showed no evidence of intracardiac shunting.  She had a cardiopulmonary stress study done 02/06/2017 which just showed the patient did not reach her maximal effort and was suboptimal for diagnosis.  It  appeared she was mostly deconditioned.   She had pulmonary function testing done 12/07/2016 which showed minimal airway obstruction in the peripheral airways.  This was all done in response to dyspnea and hypoxia for which she was admitted to the hospital.  During her admission to the hospital, she had inpatient consultation with Hematology/Oncology, and that was negative.    Her metahemoglobin and her sulfhemoglobin were pending.   Her carboxyhemoglobin was mildly elevated at 5.4%.  She did have her home checked and there was no issue with carbon monoxide in her home.  She recently had her PTH checked which was normal. Her phosphorous level was normal. Her vitamin D level was normal.  Her iron saturation studies were normal.  This was all ordered by Dr. Cornejo.  He saw her 01/18/2017.  Her felt that her anemia was mostly due to her chronic kidney disease, for which she follows with Dr. Shannon.      Her oxygen saturations seem to fall when she walks.  The lowest she fell was 72%.  Her primary care doctor, Dr. Rojas, has recommended she go to the [facility] for further workup for this.       She had an echocardiogram done 02/27/2017 which showed ejection fraction 67% with normal diastolic function, mild left atrial dilation, aortic valve sclerosis without stenosis or insufficiency.  All the other valves appeared normal.        At this time, she still has short-windedness with activity.  She has had no further hypoxia.  They went out to her house to try and check her oxygen level, and it was not low enough to require a 24 hour recording oximetry test.  Her blood pressure is still fairly labile, and gets high in the afternoon, up to 150s-160s   She feels her heart skipping more and not quite as much atrial fibrillation.  She has had no syncope or falls.  She just does not feel 100%, and is not sure why.            Past Medical History   Diagnosis Date   • Anemia      Of chronic renal insufficiency   • Atrial  fibrillation    • Mcguire's esophagus    • Breast cancer    • Chronic renal insufficiency      Stage 3   • MCCRAY (dyspnea on exertion)    • Dyspnea    • GERD (gastroesophageal reflux disease)    • H/O electrocardiogram    • Hx of being hospitalized      [facility] in 09/2010 and 10/2010 for atrial fibrillation, Dr. Perla Hurtado   • Hyperlipidemia    • Hypertension    • Hypoxia    • IBS (irritable bowel syndrome)    • Kidney dysfunction    • Lower extremity edema    • Lump or mass in breast    • Malignant neoplasm of breast    • Osteoarthritis    • Osteopenia    • Paroxysmal atrial fibrillation    • Seizure      AS A TEENAGER, NOT CURRENTLY   • SOB (shortness of breath)          Past Surgical History   Procedure Laterality Date   • Foot surgery     • Other surgical history       LYMPHATIC SURGERY   • Hand arthroplasty Right    • Cardiac catheterization Left 4/27/2016     Procedure: Cardiac catheterization;  Surgeon: Kevin Guillen MD;  Location: [facility] INVASIVE LOCATION;  Service:    • Knee arthroscopy     • Breast lumpectomy  1990     For LCIS       Current Outpatient Prescriptions on File Prior to Visit   Medication Sig Dispense Refill   • alosetron (LOTRONEX) 0.5 MG tablet Take 0.05 mg by mouth daily.     • ALPRAZolam (XANAX) 1 MG tablet Take 1 mg by mouth every night.     • amiodarone (PACERONE) 200 MG tablet Take 1 tablet by mouth Daily. PRN 90 tablet 1   • bacitracin 500 UNIT/GM ophthalmic ointment APPLY 1/4 INCH TO BOTH EYELIDS QHS  4   • budesonide-formoterol (SYMBICORT) 80-4.5 MCG/ACT inhaler Inhale 2 puffs As Needed.     • bumetanide (BUMEX) 1 MG tablet Take 1 mg by mouth Daily As Needed.     • cetirizine (ZyrTEC) 10 MG tablet Take 1 tablet by mouth daily.     • dabigatran etexilate (PRADAXA) 150 MG capsu Take 1 capsule by mouth Every 12 (Twelve) Hours. 180 capsule 3   • desonide (DESOWEN) 0.05 % ointment Apply  topically daily.     • esomeprazole (NexIUM) 40 MG capsule Take 40 mg by  mouth 2 (two) times a day.     • fluconazole (DIFLUCAN) 100 MG tablet TK 1 T PO AS ONE TIME DOSE AS DIRECTED  12   • glucosamine-chondroitin 500-400 MG per tablet Take 1 tablet by mouth.     • ibuprofen (ADVIL,MOTRIN) 800 MG tablet      • methocarbamol (ROBAXIN) 750 MG tablet Take 750 mg by mouth. PRN     • montelukast (SINGULAIR) 10 MG tablet Take 1 tablet by mouth daily.     • Multiple Vitamin (MULTIVITAMIN) tablet Take 1 tablet by mouth.     • nystatin-triamcinolone (MYCOLOG II) 984847-6.1 UNIT/GM-% cream      • pseudoephedrine-guaifenesin (MUCINEX D)  MG per 12 hr tablet Take 1 tablet by mouth Every 12 (Twelve) Hours.     • raloxifene (EVISTA) 60 MG tablet Take 1 tablet by mouth daily.     • RESTASIS 0.05 % ophthalmic emulsion      • simvastatin (ZOCOR) 40 MG tablet Take 1 tablet by mouth daily.     • [DISCONTINUED] Chlorhexidine Gluconate (HIBICLENS EX) Apply  topically. AS DIRECTED PREOP     • [DISCONTINUED] metoprolol tartrate (LOPRESSOR) 25 MG tablet TK 1 T PO BID  3   • [DISCONTINUED] nebivolol (BYSTOLIC) 5 MG tablet Take 5 mg by mouth Daily.       No current facility-administered medications on file prior to visit.        Social History     Social History   • Marital status:      Spouse name: Cuate   • Number of children: 1   • Years of education: 1 year of college     Occupational History   •  Retired     Social History Main Topics   • Smoking status: Former Smoker     Quit date: 6/9/1975   • Smokeless tobacco: Never Used      Comment: QUIT SMOKING IN LATE 1970s   • Alcohol use Yes      Comment: social drinker   • Drug use: No   • Sexual activity: Defer     Other Topics Concern   • Not on file     Social History Narrative           ROS    Procedures    ECG 12 Lead  Date/Time: 2/28/2017 12:59 PM  Performed by: GAVINO CRAMER  Authorized by: GAVINO CRAMER   Comparison: compared with previous ECG   Similar to previous ECG  Rhythm: sinus rhythm  Clinical impression: normal  "ECG               Objective:      Vitals:    02/28/17 1242   BP: 110/60   BP Location: Right arm   Patient Position: Sitting   Pulse: 51   Weight: 138 lb 6.4 oz (62.8 kg)   Height: 64\" (162.6 cm)     Body mass index is 23.76 kg/(m^2).    Physical Exam   Constitutional: She is oriented to person, place, and time. She appears well-developed and well-nourished. No distress.   HENT:   Head: Normocephalic and atraumatic.   Eyes: Conjunctivae are normal. No scleral icterus.   Neck: Neck supple. No JVD present. Carotid bruit is not present. No thyromegaly present.   Cardiovascular: Normal rate, regular rhythm, S1 normal, S2 normal and intact distal pulses.   No extrasystoles are present. PMI is not displaced.  Exam reveals no gallop.    Murmur heard.   Harsh midsystolic murmur is present with a grade of 3/6  at the upper right sternal border radiating to the neck  Pulses:       Carotid pulses are 2+ on the right side, and 2+ on the left side.       Radial pulses are 2+ on the right side, and 2+ on the left side.        Dorsalis pedis pulses are 2+ on the right side, and 2+ on the left side.        Posterior tibial pulses are 2+ on the right side, and 2+ on the left side.   Pulmonary/Chest: Effort normal and breath sounds normal. No respiratory distress. She has no wheezes. She has no rhonchi. She has no rales. She exhibits no tenderness.   Abdominal: Soft. Bowel sounds are normal. She exhibits no distension, no abdominal bruit and no mass. There is no tenderness.   Musculoskeletal: She exhibits no edema or deformity.   Lymphadenopathy:     She has no cervical adenopathy.   Neurological: She is alert and oriented to person, place, and time. No cranial nerve deficit.   Skin: Skin is warm and dry. No rash noted. She is not diaphoretic. No cyanosis. No pallor. Nails show no clubbing.   Psychiatric: She has a normal mood and affect. Judgment normal.   Vitals reviewed.      Lab Review:       Assessment:      Diagnosis Plan   1. " Paroxysmal atrial fibrillation     2. Essential hypertension     3. Hyperlipidemia, unspecified hyperlipidemia type     4. Breast cancer of upper-outer quadrant of left female breast          1. Paroxysmal atrial fibrillation. Currently, she is in sinus rhythm and has been  controlling it with Toprol and she is on Pradaxa and prn amiodarone  2. Labile blood pressure. Her blood pressure has been under better control and now is low  3. Normal echocardiogram done in 3/2016.  4. Dyspnea on exertion, severe with hypoxia  5. Hyperlipidemia on statin therapy.  6. S/p breast cancer.  7. Anemia, follows with Dr. Cornejo.  8. CKD, follows with Dr. Shannon - will get labs.   9. Normal coronaries on cath 5/16/16.   10. Right carotid bruit - mild disease 4/2016  11. Edema - likely due to amlodipine. She is on Bumex for this.      Plan:       Stop amiodarone and amlodipine, start diltiazem.      See back in 1 month.    Atrial Fibrillation and Atrial Flutter  Assessment  • The patient has paroxysmal atrial fibrillation  • This is non-valvular in etiology  • The patient's CHADS2-VASc score is 3  • A [id number] score of 2 or more is considered a high risk for a thromboembolic event  • Dabigatran prescribed    Plan  • Attempt to maintain sinus rhythm  • Continue dabigatran for antithrombotic therapy, bleeding issues discussed  • Add beta blocker for rhythm control  • Add diltiazem for rate control

## 2017-03-07 ENCOUNTER — TELEPHONE (OUTPATIENT)
Dept: CARDIOLOGY | Facility: CLINIC | Age: 72
End: 2017-03-07

## 2017-03-07 ENCOUNTER — TRANSCRIBE ORDERS (OUTPATIENT)
Dept: ADMINISTRATIVE | Facility: HOSPITAL | Age: 72
End: 2017-03-07

## 2017-03-07 DIAGNOSIS — R06.02 SOB (SHORTNESS OF BREATH): Primary | ICD-10-CM

## 2017-03-07 RX ORDER — DILTIAZEM HYDROCHLORIDE 120 MG/1
120 CAPSULE, EXTENDED RELEASE ORAL 2 TIMES DAILY
Qty: 90 CAPSULE | Refills: 3 | Status: SHIPPED | OUTPATIENT
Start: 2017-03-07 | End: 2017-04-04 | Stop reason: HOSPADM

## 2017-03-07 NOTE — TELEPHONE ENCOUNTER
Pt called and states that the diltiazem that she's been taking for a week now help her with irregularity but her BP sometimes elevated. Her BP reading averaging in 150's in the afternoon diastolic number are in normal range HR:45-55 BPM. Pt tried to take losartan 50 mg AM and losartan 50 mg with diltiazem 150mg at nigh. Didn't help then pt tried to take it losartan 50mg am and diltiazem 150 mg at 5PM then losartan at bedtime did not help either. Pt states diltiazem lowered her BP for an hour and goes up again. Pt wants to know what you want her to do next? Please advise      Thanks  Sujata JOHN

## 2017-03-14 ENCOUNTER — HOSPITAL ENCOUNTER (OUTPATIENT)
Dept: MRI IMAGING | Facility: HOSPITAL | Age: 72
End: 2017-03-14

## 2017-03-15 ENCOUNTER — APPOINTMENT (OUTPATIENT)
Dept: ONCOLOGY | Facility: HOSPITAL | Age: 72
End: 2017-03-15

## 2017-03-15 ENCOUNTER — LAB (OUTPATIENT)
Dept: OTHER | Facility: HOSPITAL | Age: 72
End: 2017-03-15

## 2017-03-15 ENCOUNTER — INFUSION (OUTPATIENT)
Dept: ONCOLOGY | Facility: HOSPITAL | Age: 72
End: 2017-03-15

## 2017-03-15 DIAGNOSIS — D63.1 ANEMIA DUE TO STAGE 3 CHRONIC KIDNEY DISEASE TREATED WITH ERYTHROPOIETIN (HCC): Primary | ICD-10-CM

## 2017-03-15 DIAGNOSIS — N18.30 ANEMIA DUE TO STAGE 3 CHRONIC KIDNEY DISEASE TREATED WITH ERYTHROPOIETIN (HCC): Primary | ICD-10-CM

## 2017-03-15 LAB
BASOPHILS # BLD AUTO: 0.06 10*3/MM3 (ref 0–0.2)
BASOPHILS NFR BLD AUTO: 1 % (ref 0–1.5)
DEPRECATED RDW RBC AUTO: 52 FL (ref 37–54)
EOSINOPHIL # BLD AUTO: 0.12 10*3/MM3 (ref 0–0.7)
EOSINOPHIL NFR BLD AUTO: 2.1 % (ref 0.3–6.2)
ERYTHROCYTE [DISTWIDTH] IN BLOOD BY AUTOMATED COUNT: 14.8 % (ref 11.7–13)
FERRITIN SERPL-MCNC: 61.1 NG/ML (ref 13–150)
HCT VFR BLD AUTO: 31.8 % (ref 35.6–45.5)
HGB BLD-MCNC: 10.3 G/DL (ref 11.9–15.5)
HGB RETIC QN: 33.8 PG (ref 32.7–38.6)
HOLD SPECIMEN: NORMAL
IMM GRANULOCYTES # BLD: 0.03 10*3/MM3 (ref 0–0.03)
IMM GRANULOCYTES NFR BLD: 0.5 % (ref 0–0.5)
IMM RETICS NFR: 12.5 % (ref 0.7–13.7)
IRON 24H UR-MRATE: 78 MCG/DL (ref 37–145)
IRON SATN MFR SERPL: 20 % (ref 20–50)
LYMPHOCYTES # BLD AUTO: 1.84 10*3/MM3 (ref 0.9–4.8)
LYMPHOCYTES NFR BLD AUTO: 31.6 % (ref 19.6–45.3)
MCH RBC QN AUTO: 30.9 PG (ref 26.9–32)
MCHC RBC AUTO-ENTMCNC: 32.4 G/DL (ref 32.4–36.3)
MCV RBC AUTO: 95.5 FL (ref 80.5–98.2)
MONOCYTES # BLD AUTO: 0.46 10*3/MM3 (ref 0.2–1.2)
MONOCYTES NFR BLD AUTO: 7.9 % (ref 5–12)
NEUTROPHILS # BLD AUTO: 3.32 10*3/MM3 (ref 1.9–8.1)
NEUTROPHILS NFR BLD AUTO: 56.9 % (ref 42.7–76)
NRBC BLD MANUAL-RTO: 0 /100 WBC (ref 0–0)
PLATELET # BLD AUTO: 184 10*3/MM3 (ref 140–500)
PMV BLD AUTO: 10 FL (ref 6–12)
RBC # BLD AUTO: 3.33 10*6/MM3 (ref 3.9–5.2)
RETICS/RBC NFR AUTO: 2.1 % (ref 0.5–1.5)
TIBC SERPL-MCNC: 389 MCG/DL (ref 298–536)
TRANSFERRIN SERPL-MCNC: 261 MG/DL (ref 200–360)
WBC NRBC COR # BLD: 5.83 10*3/MM3 (ref 4.5–10.7)

## 2017-03-15 PROCEDURE — 83540 ASSAY OF IRON: CPT | Performed by: INTERNAL MEDICINE

## 2017-03-15 PROCEDURE — 36415 COLL VENOUS BLD VENIPUNCTURE: CPT

## 2017-03-15 PROCEDURE — 82728 ASSAY OF FERRITIN: CPT | Performed by: INTERNAL MEDICINE

## 2017-03-15 PROCEDURE — 84466 ASSAY OF TRANSFERRIN: CPT | Performed by: INTERNAL MEDICINE

## 2017-03-15 PROCEDURE — 85025 COMPLETE CBC W/AUTO DIFF WBC: CPT | Performed by: INTERNAL MEDICINE

## 2017-03-15 PROCEDURE — 85046 RETICYTE/HGB CONCENTRATE: CPT | Performed by: INTERNAL MEDICINE

## 2017-04-04 ENCOUNTER — OFFICE VISIT (OUTPATIENT)
Dept: CARDIOLOGY | Facility: CLINIC | Age: 72
End: 2017-04-04

## 2017-04-04 VITALS
BODY MASS INDEX: 23.73 KG/M2 | WEIGHT: 139 LBS | DIASTOLIC BLOOD PRESSURE: 84 MMHG | SYSTOLIC BLOOD PRESSURE: 146 MMHG | HEART RATE: 56 BPM | HEIGHT: 64 IN

## 2017-04-04 DIAGNOSIS — I48.0 PAROXYSMAL ATRIAL FIBRILLATION (HCC): Primary | ICD-10-CM

## 2017-04-04 DIAGNOSIS — I10 ESSENTIAL HYPERTENSION: ICD-10-CM

## 2017-04-04 DIAGNOSIS — R61 DIAPHORESIS: ICD-10-CM

## 2017-04-04 DIAGNOSIS — E78.5 HYPERLIPIDEMIA, UNSPECIFIED HYPERLIPIDEMIA TYPE: ICD-10-CM

## 2017-04-04 PROCEDURE — 93000 ELECTROCARDIOGRAM COMPLETE: CPT | Performed by: INTERNAL MEDICINE

## 2017-04-04 PROCEDURE — 99214 OFFICE O/P EST MOD 30 MIN: CPT | Performed by: INTERNAL MEDICINE

## 2017-04-04 RX ORDER — AMLODIPINE BESYLATE 5 MG/1
5 TABLET ORAL DAILY
Qty: 90 TABLET | Refills: 3 | Status: SHIPPED | OUTPATIENT
Start: 2017-04-04 | End: 2017-06-20

## 2017-04-04 NOTE — PROGRESS NOTES
Date of Office Visit: 2017  Encounter Provider: Perla Hurtado MD  Place of Service: [facility] CARDIOLOGY  Patient Name: Mary Kay Saab  :1945      Patient ID:  Mary Kay Saab is a 72 y.o. female is here for  followup for         History of Present Illness  I first saw her in consultation at [facility] for atrial fibrillation in  2010. She converted to normal sinus rhythm with amiodarone. She had a stress  echocardiogram in our office. The stress images were poor, but the rest of the  echocardiogram was normal. She had dual isotope nuclear perfusion study showing no  Ischemia.      She continued to have intermittent atrial fibrillation on amiodarone but then had problems  with bradycardia into the forties. Her blood pressure was high. Her TSH and free T4 were  normal. She was using Mucinex-D which we stopped, but she continued to still have episodes  of intermittent atrial fibrillation.      She came in for preoperative evaluation in 2012 for surgery on her knee. She had had  a normal PET stress study done on 2012, for fatigue and dyspnea. She had an  echocardiogram done on the same day which showed an ejection fraction of 64% with grade 1  diastolic dysfunction and normal heart valves. I did clear her for surgery.      She saw Dr. Vizcarra for PAF and he felt that she would be a good  candidate for atrial fibrillation ablation. She was scheduled to have this done but her  , Ovidio, had a bad fall with an intracranial bleed so she canceled  that procedure.       She had cataract surgery on 2014 with Dr. Mahmood at the [facility]. She had  right knee surgery, a replacement with Dr. Jeremías Mosley on 2014. She has had breast surgery for breast  cancer done with Dr. Ramirez and she is doing well with that. She follows with Dr. Cornejo  for anemia.       She had a pretty complete workup for palpitations  and dyspnea. She had a carotid duplex study  performed on 07/07/2014 which was normal. She had an echocardiogram performed on  07/07/2014 which revealed an ejection fraction of 57%, grade II diastolic dysfunction,  moderate left atrial dilation and normal mitral and aortic valves. She had a Holter  monitor on 04/11/2014 which showed no evidence of atrial fibrillation and really was  Unremarkable.      She had a stress nuclear perfusion study done in 04/2016 which revealed a small to medium sized area of severe ischemia in the inferior wall and septal wall. She had a Holter recording done on 04/05/2016 which showed sinus rhythm but she said she had no symptoms while she wore it. She had an echocardiogram which was done on 04/05/2016 which revealed an ejection fraction of 53%, normal diastolic function, mildly dilated left atrium, mild-to-moderate tricuspid insufficiency. She had a carotid duplex study on 04/08/2016 for a right carotid bruit. This revealed mild atherosclerosis in the right internal carotid artery and less than 15% stenosis in the left internal carotid artery. She then went on to have a cardiac catheterization which was done on 05/16/2016. Her coronary arteries were normal. The stress study was deemed a false positive.       She follows with Dr. Kevin Daily for her kidney dysfunction and Dr. Mario Cornejo for anemia.       She was admitted in 12/2016 for hypoxia with walking and dyspnea. Her evaluation there was unremarkable as noted below.             She had a limited echo study with saline contrast today which showed no evidence of intracardiac shunting. She had a cardiopulmonary stress study done 02/06/2017 which just showed the patient did not reach her maximal effort and was suboptimal for diagnosis. It appeared she was mostly deconditioned. She had pulmonary function testing done 12/07/2016 which showed minimal airway obstruction in the peripheral airways. This was all done in response to dyspnea and  hypoxia for which she was admitted to the hospital. During her admission to the hospital, she had inpatient consultation with Hematology/Oncology, and that was negative. Her metahemoglobin and her sulfhemoglobin were pending. Her carboxyhemoglobin was mildly elevated at 5.4%. She did have her home checked and there was no issue with carbon monoxide in her home. She recently had her PTH checked which was normal. Her phosphorous level was normal. Her vitamin D level was normal. Her iron saturation studies were normal. This was all ordered by Dr. Cornejo. He saw her 01/18/2017. Her felt that her anemia was mostly due to her chronic kidney disease, for which she follows with Dr. Shannon.      Her oxygen saturations seem to fall when she walks. The lowest she fell was 72%. Her primary care doctor, Dr. Rojas, has recommended she go to the [facility] for further workup for this.      She had an echocardiogram done 02/27/2017 which showed ejection fraction 67% with normal diastolic function, mild left atrial dilation, aortic valve sclerosis without stenosis or insufficiency. All the other valves appeared normal.       Her blood pressure has been very labile.  In fact, on Friday, she said it was 190/80 mmHg.  She said, all of a sudden, she will be hot and sweating and flushing and her blood pressure will be high.  She is having no chest pain or difficulty breathing but she just does not feel well.  She said that within a couple of hours of taking her blood pressure medications this seems to happen, but the only blood pressure medication she is on currently is losartan.  She has had no tachycardia, dizziness, or syncope.  She has no chest pain or pressure.            Past Medical History:   Diagnosis Date   • Anemia     Of chronic renal insufficiency   • Atrial fibrillation    • Mcguire's esophagus    • Breast cancer    • Chronic renal insufficiency     Stage 3   • MCCRAY (dyspnea on exertion)    • Dyspnea    • GERD  (gastroesophageal reflux disease)    • H/O electrocardiogram    • Hx of being hospitalized     [facility] in 09/2010 and 10/2010 for atrial fibrillation, Dr. Perla Hurtado   • Hyperlipidemia    • Hypertension    • Hypoxia    • IBS (irritable bowel syndrome)    • Kidney dysfunction    • Lower extremity edema    • Lump or mass in breast    • Malignant neoplasm of breast    • Osteoarthritis    • Osteopenia    • Paroxysmal atrial fibrillation    • Seizure     AS A TEENAGER, NOT CURRENTLY   • SOB (shortness of breath)          Past Surgical History:   Procedure Laterality Date   • BREAST LUMPECTOMY  1990    For LCIS   • CARDIAC CATHETERIZATION Left 4/27/2016    Procedure: Cardiac catheterization;  Surgeon: Kevin Guillen MD;  Location: [facility] INVASIVE LOCATION;  Service:    • FOOT SURGERY     • HAND ARTHROPLASTY Right    • KNEE ARTHROSCOPY     • OTHER SURGICAL HISTORY      LYMPHATIC SURGERY       Current Outpatient Prescriptions on File Prior to Visit   Medication Sig Dispense Refill   • alendronate (FOSAMAX) 70 MG tablet Take 1 tablet by mouth Every 7 (Seven) Days.  12   • alosetron (LOTRONEX) 0.5 MG tablet Take 0.05 mg by mouth daily.     • ALPRAZolam (XANAX) 1 MG tablet Take 1 mg by mouth every night.     • bacitracin 500 UNIT/GM ophthalmic ointment APPLY 1/4 INCH TO BOTH EYELIDS QHS  4   • bumetanide (BUMEX) 1 MG tablet Take 1 mg by mouth Daily As Needed.     • cetirizine (ZyrTEC) 10 MG tablet Take 1 tablet by mouth daily.     • dabigatran etexilate (PRADAXA) 150 MG capsu Take 1 capsule by mouth Every 12 (Twelve) Hours. 180 capsule 3   • desonide (DESOWEN) 0.05 % ointment Apply  topically daily.     • esomeprazole (NexIUM) 40 MG capsule Take 40 mg by mouth 2 (two) times a day.     • fluconazole (DIFLUCAN) 100 MG tablet TK 1 T PO AS ONE TIME DOSE AS DIRECTED  12   • glucosamine-chondroitin 500-400 MG per tablet Take 1 tablet by mouth.     • ibuprofen (ADVIL,MOTRIN) 800 MG tablet      •  losartan (COZAAR) 50 MG tablet Take 1 tablet by mouth 2 (Two) Times a Day. 60 tablet 3   • methocarbamol (ROBAXIN) 750 MG tablet Take 750 mg by mouth. PRN     • montelukast (SINGULAIR) 10 MG tablet Take 1 tablet by mouth daily.     • Multiple Vitamin (MULTIVITAMIN) tablet Take 1 tablet by mouth.     • nystatin-triamcinolone (MYCOLOG II) 057326-4.1 UNIT/GM-% cream      • pseudoephedrine-guaifenesin (MUCINEX D)  MG per 12 hr tablet Take 1 tablet by mouth Every 12 (Twelve) Hours.     • raloxifene (EVISTA) 60 MG tablet Take 1 tablet by mouth daily.     • RESTASIS 0.05 % ophthalmic emulsion      • simvastatin (ZOCOR) 40 MG tablet Take 1 tablet by mouth daily.     • [DISCONTINUED] budesonide-formoterol (SYMBICORT) 80-4.5 MCG/ACT inhaler Inhale 2 puffs As Needed.     • [DISCONTINUED] diltiazem XR (DILACOR XR) 120 MG 24 hr capsule Take 1 capsule by mouth 2 (Two) Times a Day. 90 capsule 3     No current facility-administered medications on file prior to visit.        Social History     Social History   • Marital status:      Spouse name: Cuate   • Number of children: 1   • Years of education: 1 year of college     Occupational History   •  Retired     Social History Main Topics   • Smoking status: Former Smoker     Quit date: 6/9/1975   • Smokeless tobacco: Never Used      Comment: QUIT SMOKING IN LATE 1970s   • Alcohol use Yes      Comment: social drinker   • Drug use: No   • Sexual activity: Defer     Other Topics Concern   • Not on file     Social History Narrative           Review of Systems   Constitution: Positive for malaise/fatigue.   HENT: Negative for congestion and headaches.    Eyes: Negative for blurred vision, vision loss in left eye and vision loss in right eye.   Cardiovascular: Positive for orthopnea and palpitations. Negative for chest pain and paroxysmal nocturnal dyspnea.   Respiratory: Positive for shortness of breath. Negative for cough, hemoptysis, sleep disturbances due to breathing,  "snoring, sputum production and wheezing.    Endocrine: Negative.    Hematologic/Lymphatic: Negative.    Skin: Negative for poor wound healing and rash.   Musculoskeletal: Negative for falls, gout, muscle cramps, myalgias and neck pain.   Gastrointestinal: Negative for abdominal pain, diarrhea, dysphagia, hematemesis, melena, nausea and vomiting.   Neurological: Negative for excessive daytime sleepiness, dizziness, light-headedness, loss of balance, seizures and vertigo.   Psychiatric/Behavioral: Negative for depression and substance abuse. The patient is not nervous/anxious.        Procedures    ECG 12 Lead  Date/Time: 4/4/2017 12:49 PM  Performed by: GAVINO CRAMER  Authorized by: GAVINO CRAMER   Comparison: compared with previous ECG   Similar to previous ECG  Rhythm: sinus rhythm  Clinical impression: normal ECG               Objective:      Vitals:    04/04/17 1238   BP: 146/84   BP Location: Left arm   Pulse: 56   Weight: 139 lb (63 kg)   Height: 64\" (162.6 cm)     Body mass index is 23.86 kg/(m^2).    Physical Exam   Constitutional: She is oriented to person, place, and time. She appears well-developed and well-nourished. No distress.   HENT:   Head: Normocephalic and atraumatic.   Eyes: Conjunctivae are normal. No scleral icterus.   Neck: Neck supple. No JVD present. Carotid bruit is not present. No thyromegaly present.   Cardiovascular: Normal rate, regular rhythm, S1 normal, S2 normal and intact distal pulses.   No extrasystoles are present. PMI is not displaced.  Exam reveals no gallop.    Murmur heard.   Medium-pitched holosystolic murmur is present with a grade of 2/6  at the apex  Pulses:       Carotid pulses are 2+ on the right side, and 2+ on the left side.       Radial pulses are 2+ on the right side, and 2+ on the left side.        Dorsalis pedis pulses are 2+ on the right side, and 2+ on the left side.        Posterior tibial pulses are 2+ on the right side, and 2+ on the left side. "   Pulmonary/Chest: Effort normal and breath sounds normal. No respiratory distress. She has no wheezes. She has no rhonchi. She has no rales. She exhibits no tenderness.   Abdominal: Soft. Bowel sounds are normal. She exhibits no distension, no abdominal bruit and no mass. There is no tenderness.   Musculoskeletal: She exhibits no edema or deformity.   Lymphadenopathy:     She has no cervical adenopathy.   Neurological: She is alert and oriented to person, place, and time. No cranial nerve deficit.   Skin: Skin is warm and dry. No rash noted. She is not diaphoretic. No cyanosis. No pallor. Nails show no clubbing.   Psychiatric: She has a normal mood and affect. Judgment normal.   Vitals reviewed.      Lab Review:       Assessment:      Diagnosis Plan   1. Paroxysmal atrial fibrillation     2. Essential hypertension     3. Hyperlipidemia, unspecified hyperlipidemia type       1. Paroxysmal atrial fibrillation. Currently, she is in sinus rhythm and has been  controlling it with Toprol and she is on Pradaxa and prn amiodarone  2. Labile blood pressure. Her blood pressure is still very labile.  She also has a lot of anxiety.   3. Normal echocardiogram done in 3/2016.  4. Dyspnea on exertion, severe with hypoxia  5. Hyperlipidemia on statin therapy.  6. S/p breast cancer.  7. Anemia, follows with Dr. Cornejo.  8. CKD, follows with Dr. Shannon - will get labs.   9. Normal coronaries on cath 5/16/16.   10. Right carotid bruit - mild disease 4/2016  11. Edema - likely due to amlodipine. She is on Bumex for this.      Plan:        I set up a 24-hour urine for VMA metanephrines, cortisol, and aldosterone.  She is having sudden increases in her blood pressure with sweating and flushing.  She is not having any diarrhea, but I am concerned that maybe this is something adrenal in nature.  In the meantime, I am going to put her on Bystolic 20 mg at night.  I really cannot use anything else to lower her heart rate because her heart  rate gets too low.  We will try amlodipine 5 mg in the morning and I want to see her back in the office in one months.          Answers for HPI/ROS submitted by the patient on 4/3/2017   Hypertension  Chronicity: recurrent  Onset: 1 to 4 weeks ago  Progression since onset: rapidly worsening  Condition status: uncontrolled  anxiety: No  peripheral edema: Yes  sweats: No  Agents associated with hypertension: decongestants  CAD risks: dyslipidemia, family history, post-menopausal state  Compliance problems: no compliance problems

## 2017-04-10 ENCOUNTER — TELEPHONE (OUTPATIENT)
Dept: CARDIOLOGY | Facility: CLINIC | Age: 72
End: 2017-04-10

## 2017-04-10 NOTE — TELEPHONE ENCOUNTER
Pt was seen on 4/4 and you put her on a new b/p regimen of amlodipine 5mg and bystolic 20. She states that  Her b/p is fine but her pulse has dropped to low 40's and high 30's. So on thurs she took 1/2 a bystolic and her pulse continued to stay low. She stopped the bystolic, continued on the amlodipine but her pulse is still low and making her feel terrible. Her b/p is normal. She wants you to call her regarding this. [phone]

## 2017-04-14 ENCOUNTER — OFFICE VISIT (OUTPATIENT)
Dept: ORTHOPEDIC SURGERY | Facility: CLINIC | Age: 72
End: 2017-04-14

## 2017-04-14 VITALS — WEIGHT: 135 LBS | TEMPERATURE: 97.6 F | HEIGHT: 64 IN | BODY MASS INDEX: 23.05 KG/M2

## 2017-04-14 DIAGNOSIS — M25.561 CHRONIC PAIN OF RIGHT KNEE: Primary | ICD-10-CM

## 2017-04-14 DIAGNOSIS — G89.29 HIP PAIN, CHRONIC, RIGHT: ICD-10-CM

## 2017-04-14 DIAGNOSIS — G89.29 CHRONIC PAIN OF RIGHT KNEE: Primary | ICD-10-CM

## 2017-04-14 DIAGNOSIS — M25.551 HIP PAIN, CHRONIC, RIGHT: ICD-10-CM

## 2017-04-14 PROCEDURE — 73502 X-RAY EXAM HIP UNI 2-3 VIEWS: CPT | Performed by: NURSE PRACTITIONER

## 2017-04-14 PROCEDURE — 99213 OFFICE O/P EST LOW 20 MIN: CPT | Performed by: NURSE PRACTITIONER

## 2017-04-14 PROCEDURE — 73562 X-RAY EXAM OF KNEE 3: CPT | Performed by: NURSE PRACTITIONER

## 2017-04-14 PROCEDURE — 20610 DRAIN/INJ JOINT/BURSA W/O US: CPT | Performed by: NURSE PRACTITIONER

## 2017-04-14 RX ORDER — METHYLPREDNISOLONE ACETATE 80 MG/ML
80 INJECTION, SUSPENSION INTRA-ARTICULAR; INTRALESIONAL; INTRAMUSCULAR; SOFT TISSUE
Status: COMPLETED | OUTPATIENT
Start: 2017-04-14 | End: 2017-04-14

## 2017-04-14 RX ORDER — BUPIVACAINE HYDROCHLORIDE 5 MG/ML
2 INJECTION, SOLUTION PERINEURAL
Status: COMPLETED | OUTPATIENT
Start: 2017-04-14 | End: 2017-04-14

## 2017-04-14 RX ORDER — LIDOCAINE HYDROCHLORIDE 10 MG/ML
2 INJECTION, SOLUTION INFILTRATION; PERINEURAL
Status: COMPLETED | OUTPATIENT
Start: 2017-04-14 | End: 2017-04-14

## 2017-04-14 RX ADMIN — LIDOCAINE HYDROCHLORIDE 2 ML: 10 INJECTION, SOLUTION INFILTRATION; PERINEURAL at 14:54

## 2017-04-14 RX ADMIN — BUPIVACAINE HYDROCHLORIDE 2 ML: 5 INJECTION, SOLUTION PERINEURAL at 14:54

## 2017-04-14 RX ADMIN — METHYLPREDNISOLONE ACETATE 80 MG: 80 INJECTION, SUSPENSION INTRA-ARTICULAR; INTRALESIONAL; INTRAMUSCULAR; SOFT TISSUE at 14:54

## 2017-04-14 NOTE — PROGRESS NOTES
Patient: Mary Kay Saab  YOB: 1945 72 y.o. female  Medical Record Number: [id number]    Chief Complaints:   Chief Complaint   Patient presents with   • Right Knee - Follow-up   • Right Hip - Follow-up       History of Present Illness:Mary Kay Saab is a 72 y.o. female who presentsFor follow-up of right hip and knee pain.  Patient has known bone-on-bone end-stage osteoarthritis of the right hip but is currently having some issues with hypertension and her heart.  Both her cardiologist in her primary care physician are working on some different medications to see if they can get that under control.  She is having increased right hip pain and would like to have hip replacement as soon as she is cleared.    Allergies:   Allergies   Allergen Reactions   • Atropine Hives   • Penicillins      Reactions: syncope and seizures   • Sulfa Antibiotics Nausea Only     STATES SHE DOES FINE WITH CERTAIN SULFA DRUGS   • Epinephrine Palpitations       Medications:   Current Outpatient Prescriptions   Medication Sig Dispense Refill   • alendronate (FOSAMAX) 70 MG tablet Take 1 tablet by mouth Every 7 (Seven) Days.  12   • alosetron (LOTRONEX) 0.5 MG tablet Take 0.05 mg by mouth daily.     • ALPRAZolam (XANAX) 1 MG tablet Take 1 mg by mouth every night.     • amLODIPine (NORVASC) 5 MG tablet Take 1 tablet by mouth Daily. 90 tablet 3   • bacitracin 500 UNIT/GM ophthalmic ointment APPLY 1/4 INCH TO BOTH EYELIDS QHS  4   • bumetanide (BUMEX) 1 MG tablet Take 1 mg by mouth Daily As Needed.     • cetirizine (ZyrTEC) 10 MG tablet Take 1 tablet by mouth daily.     • dabigatran etexilate (PRADAXA) 150 MG capsu Take 1 capsule by mouth Every 12 (Twelve) Hours. 180 capsule 3   • desonide (DESOWEN) 0.05 % ointment Apply  topically daily.     • esomeprazole (NexIUM) 40 MG capsule Take 40 mg by mouth 2 (two) times a day.     • fluconazole (DIFLUCAN) 100 MG tablet TK 1 T PO AS ONE TIME DOSE AS DIRECTED  12   •  "glucosamine-chondroitin 500-400 MG per tablet Take 1 tablet by mouth.     • ibuprofen (ADVIL,MOTRIN) 800 MG tablet      • methocarbamol (ROBAXIN) 750 MG tablet Take 750 mg by mouth. PRN     • montelukast (SINGULAIR) 10 MG tablet Take 1 tablet by mouth daily.     • Multiple Vitamin (MULTIVITAMIN) tablet Take 1 tablet by mouth.     • nystatin-triamcinolone (MYCOLOG II) 846919-2.1 UNIT/GM-% cream      • pseudoephedrine-guaifenesin (MUCINEX D)  MG per 12 hr tablet Take 1 tablet by mouth Every 12 (Twelve) Hours.     • raloxifene (EVISTA) 60 MG tablet Take 1 tablet by mouth daily.     • RESTASIS 0.05 % ophthalmic emulsion      • simvastatin (ZOCOR) 40 MG tablet Take 1 tablet by mouth daily.       No current facility-administered medications for this visit.          The following portions of the patient's history were reviewed and updated as appropriate: allergies, current medications, past family history, past medical history, past social history, past surgical history and problem list.    Review of Systems:   A 14 point review of systems was performed. All systems negative except pertinent positives/negative listed in HPI above    Physical Exam:   Vitals:    04/14/17 1425   Temp: 97.6 °F (36.4 °C)   TempSrc: Temporal Artery    Weight: 135 lb (61.2 kg)   Height: 64\" (162.6 cm)       General: A and O x 3, ASA, NAD    SCLERA:    Normal    DENTITION:   Normal  Right hip patient is very tender over right hip greater trochanteric bursa has pain upon internal/external rotation with a positive central positive logroll calf is soft and nontender right knee no appreciable effusion 115° flexion neutralextension with a negative Lockman Randy calf is soft nontender pedal pulses normal bilaterally her logic intact    Radiology:  Xrays 3views (ap,lateral, sunrise) right knee were ordered and reviewed today and show some mild arthritic changes.  In addition to views the right hip ordered and reviewed today show bone-on-bone " end-stage osteoarthritis with cyst and spur formation comparative views show definite progression in arthritis.    Assessment/Plan:  End-stage osteoarthritis of the right hip resulting in right hip bursitis as well as right knee pain    It appears as though the majority of the patient's symptoms are all related to her hip osteoarthritis.  She will let us know as soon as possible when she is cleared with can get her scheduled for right total hip replacement anterior approach.  She has discussed this previously with Dr. Mcguire.  In the meantime she would like a right hip bursa injection and we did do that today.  I did offer also right knee injection although I feel as though the majority of her symptoms are coming from her hip and it's referred pain which she declines any injection today.  She will try and office in 6 weeks for follow-up with Dr. Mcguire    Large Joint Arthrocentesis  Date/Time: 4/14/2017 2:54 PM  Consent given by: patient  Site marked: site marked  Timeout: Immediately prior to procedure a time out was called to verify the correct patient, procedure, equipment, support staff and site/side marked as required   Supporting Documentation  Indications: pain   Procedure Details  Location: hip - R greater trochanteric bursa  Preparation: Patient was prepped and draped in the usual sterile fashion  Needle size: 18 G  Approach: lateral  Medications administered: 2 mL lidocaine 1 %; 80 mg methylPREDNISolone acetate 80 MG/ML; 2 mL bupivacaine

## 2017-05-02 ENCOUNTER — TELEPHONE (OUTPATIENT)
Dept: CARDIOLOGY | Facility: CLINIC | Age: 72
End: 2017-05-02

## 2017-05-10 ENCOUNTER — APPOINTMENT (OUTPATIENT)
Dept: OTHER | Facility: HOSPITAL | Age: 72
End: 2017-05-10

## 2017-05-10 ENCOUNTER — APPOINTMENT (OUTPATIENT)
Dept: ONCOLOGY | Facility: HOSPITAL | Age: 72
End: 2017-05-10

## 2017-05-16 ENCOUNTER — LAB (OUTPATIENT)
Dept: OTHER | Facility: HOSPITAL | Age: 72
End: 2017-05-16

## 2017-05-16 ENCOUNTER — APPOINTMENT (OUTPATIENT)
Dept: ONCOLOGY | Facility: HOSPITAL | Age: 72
End: 2017-05-16

## 2017-05-16 ENCOUNTER — INFUSION (OUTPATIENT)
Dept: ONCOLOGY | Facility: HOSPITAL | Age: 72
End: 2017-05-16

## 2017-05-16 DIAGNOSIS — N18.30 ANEMIA DUE TO STAGE 3 CHRONIC KIDNEY DISEASE TREATED WITH ERYTHROPOIETIN (HCC): ICD-10-CM

## 2017-05-16 DIAGNOSIS — D63.1 ANEMIA DUE TO STAGE 3 CHRONIC KIDNEY DISEASE TREATED WITH ERYTHROPOIETIN (HCC): ICD-10-CM

## 2017-05-16 DIAGNOSIS — D50.9 IRON DEFICIENCY ANEMIA, UNSPECIFIED IRON DEFICIENCY ANEMIA TYPE: ICD-10-CM

## 2017-05-16 DIAGNOSIS — C50.412 BREAST CANCER OF UPPER-OUTER QUADRANT OF LEFT FEMALE BREAST (HCC): ICD-10-CM

## 2017-05-16 LAB
BASOPHILS # BLD AUTO: 0.05 10*3/MM3 (ref 0–0.2)
BASOPHILS NFR BLD AUTO: 1 % (ref 0–1.5)
DEPRECATED RDW RBC AUTO: 51.7 FL (ref 37–54)
EOSINOPHIL # BLD AUTO: 0.14 10*3/MM3 (ref 0–0.7)
EOSINOPHIL NFR BLD AUTO: 2.9 % (ref 0.3–6.2)
ERYTHROCYTE [DISTWIDTH] IN BLOOD BY AUTOMATED COUNT: 14.8 % (ref 11.7–13)
FERRITIN SERPL-MCNC: 61.2 NG/ML (ref 13–150)
HCT VFR BLD AUTO: 32.6 % (ref 35.6–45.5)
HGB BLD-MCNC: 10.7 G/DL (ref 11.9–15.5)
HGB RETIC QN: 33.3 PG (ref 32.7–38.6)
IMM GRANULOCYTES # BLD: 0.02 10*3/MM3 (ref 0–0.03)
IMM GRANULOCYTES NFR BLD: 0.4 % (ref 0–0.5)
IMM RETICS NFR: 11.4 % (ref 0.7–13.7)
IRON 24H UR-MRATE: 88 MCG/DL (ref 37–145)
IRON SATN MFR SERPL: 23 % (ref 20–50)
LYMPHOCYTES # BLD AUTO: 1.75 10*3/MM3 (ref 0.9–4.8)
LYMPHOCYTES NFR BLD AUTO: 36.2 % (ref 19.6–45.3)
MCH RBC QN AUTO: 31.4 PG (ref 26.9–32)
MCHC RBC AUTO-ENTMCNC: 32.8 G/DL (ref 32.4–36.3)
MCV RBC AUTO: 95.6 FL (ref 80.5–98.2)
MONOCYTES # BLD AUTO: 0.5 10*3/MM3 (ref 0.2–1.2)
MONOCYTES NFR BLD AUTO: 10.4 % (ref 5–12)
NEUTROPHILS # BLD AUTO: 2.37 10*3/MM3 (ref 1.9–8.1)
NEUTROPHILS NFR BLD AUTO: 49.1 % (ref 42.7–76)
NRBC BLD MANUAL-RTO: 0 /100 WBC (ref 0–0)
PLATELET # BLD AUTO: 173 10*3/MM3 (ref 140–500)
PMV BLD AUTO: 10.3 FL (ref 6–12)
RBC # BLD AUTO: 3.41 10*6/MM3 (ref 3.9–5.2)
RETICS/RBC NFR AUTO: 1.9 % (ref 0.5–1.5)
TIBC SERPL-MCNC: 386 MCG/DL (ref 298–536)
TRANSFERRIN SERPL-MCNC: 259 MG/DL (ref 200–360)
WBC NRBC COR # BLD: 4.83 10*3/MM3 (ref 4.5–10.7)

## 2017-05-16 PROCEDURE — 85046 RETICYTE/HGB CONCENTRATE: CPT | Performed by: INTERNAL MEDICINE

## 2017-05-16 PROCEDURE — 83540 ASSAY OF IRON: CPT | Performed by: INTERNAL MEDICINE

## 2017-05-16 PROCEDURE — 85025 COMPLETE CBC W/AUTO DIFF WBC: CPT | Performed by: INTERNAL MEDICINE

## 2017-05-16 PROCEDURE — 84466 ASSAY OF TRANSFERRIN: CPT | Performed by: INTERNAL MEDICINE

## 2017-05-16 PROCEDURE — 36415 COLL VENOUS BLD VENIPUNCTURE: CPT

## 2017-05-16 PROCEDURE — 82728 ASSAY OF FERRITIN: CPT | Performed by: INTERNAL MEDICINE

## 2017-05-18 ENCOUNTER — LAB (OUTPATIENT)
Dept: LAB | Facility: HOSPITAL | Age: 72
End: 2017-05-18

## 2017-05-18 DIAGNOSIS — R61 DIAPHORESIS: ICD-10-CM

## 2017-05-18 DIAGNOSIS — I10 ESSENTIAL HYPERTENSION: ICD-10-CM

## 2017-05-18 DIAGNOSIS — I48.0 PAROXYSMAL ATRIAL FIBRILLATION (HCC): ICD-10-CM

## 2017-05-18 PROCEDURE — 82530 CORTISOL FREE: CPT | Performed by: INTERNAL MEDICINE

## 2017-05-18 PROCEDURE — 84585 ASSAY OF URINE VMA: CPT | Performed by: INTERNAL MEDICINE

## 2017-05-18 PROCEDURE — 81050 URINALYSIS VOLUME MEASURE: CPT | Performed by: INTERNAL MEDICINE

## 2017-05-18 PROCEDURE — 83835 ASSAY OF METANEPHRINES: CPT | Performed by: INTERNAL MEDICINE

## 2017-05-24 LAB
CORTIS F 24H UR-MRATE: 8 UG/24 HR (ref 0–50)
CORTIS F UR-MCNC: 4 UG/L
METANEPHS 24H UR-MRATE: 40 UG/24 HR (ref 45–290)
METANEPHS UR-MCNC: 19 UG/L
NORMETANEPHRINE 24H UR-MCNC: 196 UG/L
NORMETANEPHRINE 24H UR-MRATE: 412 UG/24 HR (ref 82–500)
VMA 24H UR-MRATE: 1.5 MG/24 HR (ref 0–7.5)
VMA UR-MCNC: 0.7 MG/L

## 2017-05-25 ENCOUNTER — TELEPHONE (OUTPATIENT)
Dept: CARDIOLOGY | Facility: CLINIC | Age: 72
End: 2017-05-25

## 2017-05-25 LAB
ALDOST 24H UR-MRATE: <5.25 UG/24 HR (ref 0–19)
ALDOST UR-MCNC: <2.5 UG/L

## 2017-06-13 ENCOUNTER — TELEPHONE (OUTPATIENT)
Dept: CARDIOLOGY | Facility: CLINIC | Age: 72
End: 2017-06-13

## 2017-06-13 ENCOUNTER — OFFICE VISIT (OUTPATIENT)
Dept: ORTHOPEDIC SURGERY | Facility: CLINIC | Age: 72
End: 2017-06-13

## 2017-06-13 VITALS — HEIGHT: 65 IN | TEMPERATURE: 97.6 F | WEIGHT: 132 LBS | BODY MASS INDEX: 21.99 KG/M2

## 2017-06-13 DIAGNOSIS — M16.11 PRIMARY OSTEOARTHRITIS OF RIGHT HIP: Primary | ICD-10-CM

## 2017-06-13 PROCEDURE — 99214 OFFICE O/P EST MOD 30 MIN: CPT | Performed by: ORTHOPAEDIC SURGERY

## 2017-06-13 RX ORDER — VANCOMYCIN HYDROCHLORIDE 1 G/200ML
15 INJECTION, SOLUTION INTRAVENOUS ONCE
Status: CANCELLED | OUTPATIENT
Start: 2017-06-13 | End: 2017-06-13

## 2017-06-13 RX ORDER — PREGABALIN 25 MG/1
150 CAPSULE ORAL ONCE
Status: CANCELLED | OUTPATIENT
Start: 2017-06-13 | End: 2017-06-13

## 2017-06-13 RX ORDER — NEBIVOLOL 5 MG/1
5 TABLET ORAL DAILY
COMMUNITY
End: 2017-06-20

## 2017-06-13 NOTE — TELEPHONE ENCOUNTER
Pt called and states that she is having a hip surgery on July 12, 2017 and pt need a cardiac clearance. Pt last seen was 4/2017? I  Scheduled the  pt to come in on June 20, 2017 for surgical clearance.  Is that ok?    FYI: pt is taking Bystolic 20 mg at night  and Norvasc 5 mg am and she feels great...    Sujata JOHN

## 2017-06-13 NOTE — PROGRESS NOTES
Patient: Mary Kay Saab  YOB: 1945 72 y.o. female  Medical Record Number: [id number]    Chief Complaint:   Chief Complaint   Patient presents with   • Right Knee - Follow-up   • Right Hip - Follow-up, Pain       History of Present Illness:Mary Kay Saab is a 72 y.o. female who presents for follow-up of  C/o right hip pain Which is severe and constant in nature.  It is limiting her basic activities of daily living.  She can only walk short distances.  The pain has progressed.  She is still set to see Dr. hernandez her hematologist and also set to see Dr. Hurtado.    Allergies:   Allergies   Allergen Reactions   • Atropine Hives   • Penicillins      Reactions: syncope and seizures   • Sulfa Antibiotics Nausea Only     STATES SHE DOES FINE WITH CERTAIN SULFA DRUGS   • Epinephrine Palpitations       Medications:   Current Outpatient Prescriptions   Medication Sig Dispense Refill   • nebivolol (BYSTOLIC) 5 MG tablet Take 5 mg by mouth Daily.     • alendronate (FOSAMAX) 70 MG tablet Take 1 tablet by mouth Every 7 (Seven) Days.  12   • alosetron (LOTRONEX) 0.5 MG tablet Take 0.05 mg by mouth daily.     • ALPRAZolam (XANAX) 1 MG tablet Take 1 mg by mouth every night.     • amLODIPine (NORVASC) 5 MG tablet Take 1 tablet by mouth Daily. 90 tablet 3   • bacitracin 500 UNIT/GM ophthalmic ointment APPLY 1/4 INCH TO BOTH EYELIDS QHS  4   • bumetanide (BUMEX) 1 MG tablet Take 1 mg by mouth Daily As Needed.     • cetirizine (ZyrTEC) 10 MG tablet Take 1 tablet by mouth daily.     • dabigatran etexilate (PRADAXA) 150 MG capsu Take 1 capsule by mouth Every 12 (Twelve) Hours. 180 capsule 3   • desonide (DESOWEN) 0.05 % ointment Apply  topically daily.     • esomeprazole (NexIUM) 40 MG capsule Take 40 mg by mouth 2 (two) times a day.     • fluconazole (DIFLUCAN) 100 MG tablet TK 1 T PO AS ONE TIME DOSE AS DIRECTED  12   • glucosamine-chondroitin 500-400 MG per tablet Take 1 tablet by mouth.     • ibuprofen (ADVIL,MOTRIN)  "800 MG tablet      • methocarbamol (ROBAXIN) 750 MG tablet Take 750 mg by mouth. PRN     • montelukast (SINGULAIR) 10 MG tablet Take 1 tablet by mouth daily.     • Multiple Vitamin (MULTIVITAMIN) tablet Take 1 tablet by mouth.     • nystatin-triamcinolone (MYCOLOG II) 403951-9.1 UNIT/GM-% cream      • pseudoephedrine-guaifenesin (MUCINEX D)  MG per 12 hr tablet Take 1 tablet by mouth Every 12 (Twelve) Hours.     • raloxifene (EVISTA) 60 MG tablet Take 1 tablet by mouth daily.     • RESTASIS 0.05 % ophthalmic emulsion      • simvastatin (ZOCOR) 40 MG tablet Take 1 tablet by mouth daily.       No current facility-administered medications for this visit.          The following portions of the patient's history were reviewed and updated as appropriate: allergies, current medications, past family history, past medical history, past social history, past surgical history and problem list.    Review of Systems:   A 14 point review of systems was performed. All systems negative except pertinent positives/negative listed in HPI above    Physical Exam:   Vitals:    06/13/17 0809   Temp: 97.6 °F (36.4 °C)   Weight: 132 lb (59.9 kg)   Height: 64.5\" (163.8 cm)       General: A and O x 3, ASA, NAD    SCLERA:    Normal    DENTITION:   Normal  Hip:  right    LEG ALIGNMENT:     Neutral        LEG LENGTH DISCREPANCY   :    none    GAIT:     Antalgic    SKIN:     No abnormality    RANGE OF MOTION:     Limited by joint irritability    STRENGTH:     Limited by joint irratibility    DISTAL PULSES:    Paplable    DISTAL SENSATION :   Intact    LYMPHATICS:     No   lymphadenopathy    OTHER:          +   Stinchfeld test      -    log roll      -   Tenderness to palpation trochanteric bursa     Radiology:    Xrays 2views (AP bilateral hips and lateral hip) taken previously demonstrating advanced, end-satge osteoarthritis with bone on bone articluation, periarticular osteophytes, and subchondral cysts  Comparison views: todays xrays were " compared to previous xrays and show new findings as described above    Assessment/Plan:  Severe end stage right hip OA. Has failed the full compliment of conservative measures. Need cardiac clearance.  Continuation of conservative management vs. PREETHI discussed.  The patient wishes to proceed with total hip replacement.  At this point the patient has failed the full gamut of conservative treatment and stating complete understanding of the risks/benefits/ anternatives wishes to proceed with surgical treatment.    Risk and benefits of surgery were reviewed.  Including, but not limited to, blood clots, anesthesia risk, infection, leg length discrepancy, fracture, skin/leg numbness, failure of the implant, need for future surgeries, continued pain, hematoma, need for transfusion, and death, among others.  The patient understands and wishes to proceed.     The spectrum of treatment options were discussed with the patient in detail including both the nonoperative and operative treatment modalities and their respective risks and benefits.  After thorough discussion, the patient has elected to undergo surgical treatment.  The details of the surgical procedure were explained including the location of probable incisions and a description of the likely implants to be used.  Models and diagrams were used as educational resources. The patient understands the likely convalescence after surgery, as well as the rehabilitation required.  We thoroughly discussed the risks, benefits, and alternatives to surgery.  The risks include but are not limited to the risk of infection, joint stiffness, blood clots (including DVT and/or pulmonary embolus along with the risk of death), neurologic and/or vascular injury, fracture, dislocation, nonunion, malunion, need for further surgery including hardware failure requiring revision, and continued pain.  It was explained that if tissue has been repaired or reconstructed, there is also a chance of  failure which may require further management.  Following the completion of the discussion, the patient expressed understanding of this planned course of care, all their questions were answered and consent will be obtained preoperatively.    Operative Plan: Anterior approach Total Hip Replacement 2 day stay with home health rehab.  Her last hemoglobin was 10.7 and I am recommending that she discuss with Dr. Cornejo the possibility of Procrit injections prior to surgery.  I think this would lower the stress on her heart and markedly reduce her risk of transfusion which is high going into elective hip replacement surgery with a hemoglobin of 10.          Van Mcguire MD  6/13/2017

## 2017-06-20 ENCOUNTER — OFFICE VISIT (OUTPATIENT)
Dept: CARDIOLOGY | Facility: CLINIC | Age: 72
End: 2017-06-20

## 2017-06-20 VITALS
HEIGHT: 64 IN | DIASTOLIC BLOOD PRESSURE: 80 MMHG | BODY MASS INDEX: 23.22 KG/M2 | WEIGHT: 136 LBS | SYSTOLIC BLOOD PRESSURE: 110 MMHG | HEART RATE: 50 BPM

## 2017-06-20 DIAGNOSIS — I48.0 PAROXYSMAL ATRIAL FIBRILLATION (HCC): Primary | ICD-10-CM

## 2017-06-20 DIAGNOSIS — E78.5 HYPERLIPIDEMIA, UNSPECIFIED HYPERLIPIDEMIA TYPE: ICD-10-CM

## 2017-06-20 DIAGNOSIS — I10 ESSENTIAL HYPERTENSION: ICD-10-CM

## 2017-06-20 PROCEDURE — 93000 ELECTROCARDIOGRAM COMPLETE: CPT | Performed by: INTERNAL MEDICINE

## 2017-06-20 PROCEDURE — 99214 OFFICE O/P EST MOD 30 MIN: CPT | Performed by: INTERNAL MEDICINE

## 2017-06-20 RX ORDER — NEBIVOLOL 2.5 MG/1
2.5 TABLET ORAL NIGHTLY
Qty: 90 TABLET | Refills: 3 | Status: SHIPPED | OUTPATIENT
Start: 2017-06-20 | End: 2017-06-29 | Stop reason: SDUPTHER

## 2017-06-20 RX ORDER — NEBIVOLOL 5 MG/1
5 TABLET ORAL DAILY
Qty: 90 TABLET | Refills: 3 | Status: SHIPPED | OUTPATIENT
Start: 2017-06-20 | End: 2017-06-29 | Stop reason: SDUPTHER

## 2017-06-20 RX ORDER — NEBIVOLOL 5 MG/1
TABLET ORAL
COMMUNITY
End: 2017-06-20 | Stop reason: SDUPTHER

## 2017-06-20 NOTE — PROGRESS NOTES
Date of Office Visit: 2017  Encounter Provider: Perla Hurtado MD  Place of Service: [facility] CARDIOLOGY  Patient Name: Mary Kay Saab  :1945      Patient ID:  Mary Kay Saab is a 72 y.o. female is here for  followup for PAF and HTN.         History of Present Illness    I first saw her in consultation at [facility] for atrial fibrillation in  2010. She converted to normal sinus rhythm with amiodarone. She had a stress  echocardiogram in our office. The stress images were poor, but the rest of the  echocardiogram was normal. She had dual isotope nuclear perfusion study showing no  Ischemia.      She continued to have intermittent atrial fibrillation on amiodarone but then had problems  with bradycardia into the forties. Her blood pressure was high. Her TSH and free T4 were  normal. She was using Mucinex-D which we stopped, but she continued to still have episodes  of intermittent atrial fibrillation.      She came in for preoperative evaluation in 2012 for surgery on her knee. She had had  a normal PET stress study done on 2012, for fatigue and dyspnea. She had an  echocardiogram done on the same day which showed an ejection fraction of 64% with grade 1  diastolic dysfunction and normal heart valves. I did clear her for surgery.      She saw Dr. Vizcarra for PAF and he felt that she would be a good  candidate for atrial fibrillation ablation. She was scheduled to have this done but her  , Ovidio, had a bad fall with an intracranial bleed so she canceled  that procedure.       She had cataract surgery on 2014 with Dr. Mahmood at the [facility] [place]. She had  right knee surgery, a replacement with Dr. Jeremías Mosley on 2014. She has had breast surgery for breast  cancer done with Dr. Ramirez and she is doing well with that. She follows with Dr. Cornejo  for anemia.       She had a pretty complete workup for  palpitations and dyspnea. She had a carotid duplex study  performed on 07/07/2014 which was normal. She had an echocardiogram performed on  07/07/2014 which revealed an ejection fraction of 57%, grade II diastolic dysfunction,  moderate left atrial dilation and normal mitral and aortic valves. She had a Holter  monitor on 04/11/2014 which showed no evidence of atrial fibrillation and really was  Unremarkable.      She had a stress nuclear perfusion study done in 04/2016 which revealed a small to medium sized area of severe ischemia in the inferior wall and septal wall. She had a Holter recording done on 04/05/2016 which showed sinus rhythm but she said she had no symptoms while she wore it. She had an echocardiogram which was done on 04/05/2016 which revealed an ejection fraction of 53%, normal diastolic function, mildly dilated left atrium, mild-to-moderate tricuspid insufficiency. She had a carotid duplex study on 04/08/2016 for a right carotid bruit. This revealed mild atherosclerosis in the right internal carotid artery and less than 15% stenosis in the left internal carotid artery. She then went on to have a cardiac catheterization which was done on 05/16/2016. Her coronary arteries were normal. The stress study was deemed a false positive.       She follows with Dr. Kevin Daily for her kidney dysfunction and Dr. Mario Cornejo for anemia.       She was admitted in 12/2016 for hypoxia with walking and dyspnea. Her evaluation there was unremarkable as noted below.               She had a limited echo study with saline contrast today which showed no evidence of intracardiac shunting. She had a cardiopulmonary stress study done 02/06/2017 which just showed the patient did not reach her maximal effort and was suboptimal for diagnosis. It appeared she was mostly deconditioned. She had pulmonary function testing done 12/07/2016 which showed minimal airway obstruction in the peripheral airways. This was all done in response  to dyspnea and hypoxia for which she was admitted to the hospital. During her admission to the hospital, she had inpatient consultation with Hematology/Oncology, and that was negative. Her metahemoglobin and her sulfhemoglobin were pending. Her carboxyhemoglobin was mildly elevated at 5.4%. She did have her home checked and there was no issue with carbon monoxide in her home. She recently had her PTH checked which was normal. Her phosphorous level was normal. Her vitamin D level was normal. Her iron saturation studies were normal. This was all ordered by Dr. Cornejo. He saw her 01/18/2017. Her felt that her anemia was mostly due to her chronic kidney disease, for which she follows with Dr. Shannon.       Her oxygen saturations seem to fall when she walks. The lowest she fell was 72%. Her primary care doctor, Dr. Rojas, has recommended she go to the [facility] for further workup for this.       She had an echocardiogram done 02/27/2017 which showed ejection fraction 67% with normal diastolic function, mild left atrial dilation, aortic valve sclerosis without stenosis or insufficiency. All the other valves appeared normal.         Because of the patients labile blood pressure, we did check urinary studies looking for metanephrines, VMA, cortisol, and aldosterone, and all of these were normal.       Ms. Saab is doing much better at this time.  Her blood pressure is well controlled on Bystolic 5 mg in the morning and 2.5 mg in the evening.  She is scheduled to have right hip surgery with Dr. Mcguire in 07/2017.  She says her energy level is improved and her breathing is good.  She said the amlodipine did not work and she felt like she really was having a reaction to it.  Overall, she feels much, much better.          Past Medical History:   Diagnosis Date   • Anemia     Of chronic renal insufficiency   • Atrial fibrillation    • Mcguire's esophagus    • Breast cancer    • Chronic renal insufficiency     Stage 3    • MCCRAY (dyspnea on exertion)    • Dyspnea    • GERD (gastroesophageal reflux disease)    • H/O electrocardiogram    • Hx of being hospitalized     [facility] in 09/2010 and 10/2010 for atrial fibrillation, Dr. Perla Hurtado   • Hyperlipidemia    • Hypertension    • Hypoxia    • IBS (irritable bowel syndrome)    • Kidney dysfunction    • Lower extremity edema    • Lump or mass in breast    • Malignant neoplasm of breast    • Osteoarthritis    • Osteopenia    • Paroxysmal atrial fibrillation    • Seizure     AS A TEENAGER, NOT CURRENTLY   • SOB (shortness of breath)          Past Surgical History:   Procedure Laterality Date   • BREAST LUMPECTOMY  1990    For LCIS   • CARDIAC CATHETERIZATION Left 4/27/2016    Procedure: Cardiac catheterization;  Surgeon: Kevin Guillen MD;  Location: [facility] INVASIVE LOCATION;  Service:    • FOOT SURGERY     • HAND ARTHROPLASTY Right    • KNEE ARTHROSCOPY     • OTHER SURGICAL HISTORY      LYMPHATIC SURGERY       Current Outpatient Prescriptions on File Prior to Visit   Medication Sig Dispense Refill   • alendronate (FOSAMAX) 70 MG tablet Take 1 tablet by mouth Every 7 (Seven) Days.  12   • alosetron (LOTRONEX) 0.5 MG tablet Take 0.05 mg by mouth daily.     • ALPRAZolam (XANAX) 1 MG tablet Take 1 mg by mouth every night.     • bacitracin 500 UNIT/GM ophthalmic ointment APPLY 1/4 INCH TO BOTH EYELIDS QHS  4   • bumetanide (BUMEX) 1 MG tablet Take 1 mg by mouth Daily As Needed.     • cetirizine (ZyrTEC) 10 MG tablet Take 1 tablet by mouth daily.     • dabigatran etexilate (PRADAXA) 150 MG capsu Take 1 capsule by mouth Every 12 (Twelve) Hours. 180 capsule 3   • desonide (DESOWEN) 0.05 % ointment Apply  topically daily.     • esomeprazole (NexIUM) 40 MG capsule Take 40 mg by mouth 2 (two) times a day.     • fluconazole (DIFLUCAN) 100 MG tablet TK 1 T PO AS ONE TIME DOSE AS DIRECTED  12   • glucosamine-chondroitin 500-400 MG per tablet Take 1 tablet by mouth.     •  ibuprofen (ADVIL,MOTRIN) 800 MG tablet      • methocarbamol (ROBAXIN) 750 MG tablet Take 750 mg by mouth. PRN     • montelukast (SINGULAIR) 10 MG tablet Take 1 tablet by mouth daily.     • Multiple Vitamin (MULTIVITAMIN) tablet Take 1 tablet by mouth.     • nystatin-triamcinolone (MYCOLOG II) 681310-8.1 UNIT/GM-% cream      • pseudoephedrine-guaifenesin (MUCINEX D)  MG per 12 hr tablet Take 1 tablet by mouth As Needed.     • raloxifene (EVISTA) 60 MG tablet Take 1 tablet by mouth daily.     • RESTASIS 0.05 % ophthalmic emulsion      • simvastatin (ZOCOR) 40 MG tablet Take 1 tablet by mouth daily.     • [DISCONTINUED] amLODIPine (NORVASC) 5 MG tablet Take 1 tablet by mouth Daily. 90 tablet 3   • [DISCONTINUED] nebivolol (BYSTOLIC) 5 MG tablet Take 5 mg by mouth Daily.       No current facility-administered medications on file prior to visit.        Social History     Social History   • Marital status:      Spouse name: Cuate   • Number of children: 1   • Years of education: 1 year of college     Occupational History   •  Retired     Social History Main Topics   • Smoking status: Former Smoker     Quit date: 6/9/1975   • Smokeless tobacco: Never Used      Comment: QUIT SMOKING IN LATE 1970s   • Alcohol use Yes      Comment: social drinker   • Drug use: No   • Sexual activity: Defer     Other Topics Concern   • Not on file     Social History Narrative           Review of Systems   Constitution: Negative.   HENT: Negative for congestion and headaches.    Eyes: Negative for vision loss in left eye and vision loss in right eye.   Respiratory: Negative.  Negative for cough, hemoptysis, shortness of breath, sleep disturbances due to breathing, snoring, sputum production and wheezing.    Endocrine: Negative.    Hematologic/Lymphatic: Negative.    Skin: Negative for poor wound healing and rash.   Musculoskeletal: Positive for joint pain and joint swelling. Negative for falls, gout, muscle cramps and myalgias.  "  Gastrointestinal: Negative for abdominal pain, diarrhea, dysphagia, hematemesis, melena, nausea and vomiting.   Neurological: Negative for excessive daytime sleepiness, dizziness, light-headedness, loss of balance, seizures and vertigo.   Psychiatric/Behavioral: Negative for depression and substance abuse. The patient is not nervous/anxious.        Procedures    ECG 12 Lead  Date/Time: 6/20/2017 1:04 PM  Performed by: GAVINO CRAMER  Authorized by: GAVINO CRAMER   Comparison: compared with previous ECG   Similar to previous ECG  Rhythm: sinus rhythm  Clinical impression: normal ECG               Objective:      Vitals:    06/20/17 1248   BP: 110/80   BP Location: Right arm   Pulse: 50   Weight: 136 lb (61.7 kg)   Height: 64\" (162.6 cm)     Body mass index is 23.34 kg/(m^2).    Physical Exam   Constitutional: She is oriented to person, place, and time. She appears well-developed and well-nourished. No distress.   HENT:   Head: Normocephalic and atraumatic.   Eyes: Conjunctivae are normal. No scleral icterus.   Neck: Neck supple. No JVD present. Carotid bruit is not present. No thyromegaly present.   Cardiovascular: Normal rate, regular rhythm, S1 normal, S2 normal and intact distal pulses.   No extrasystoles are present. PMI is not displaced.  Exam reveals no gallop.    Murmur heard.   Medium-pitched systolic murmur is present with a grade of 2/6  at the upper right sternal border, upper left sternal border  Pulses:       Carotid pulses are 2+ on the right side, and 2+ on the left side.       Radial pulses are 2+ on the right side, and 2+ on the left side.        Dorsalis pedis pulses are 2+ on the right side, and 2+ on the left side.        Posterior tibial pulses are 2+ on the right side, and 2+ on the left side.   Pulmonary/Chest: Effort normal and breath sounds normal. No respiratory distress. She has no wheezes. She has no rhonchi. She has no rales. She exhibits no tenderness.   Abdominal: Soft. " Bowel sounds are normal. She exhibits no distension, no abdominal bruit and no mass. There is no tenderness.   Musculoskeletal: She exhibits no edema or deformity.   Lymphadenopathy:     She has no cervical adenopathy.   Neurological: She is alert and oriented to person, place, and time. No cranial nerve deficit.   Skin: Skin is warm and dry. No rash noted. She is not diaphoretic. No cyanosis. No pallor. Nails show no clubbing.   Psychiatric: She has a normal mood and affect. Judgment normal.   Vitals reviewed.      Lab Review:       Assessment:      Diagnosis Plan   1. Paroxysmal atrial fibrillation     2. Essential hypertension     3. Hyperlipidemia, unspecified hyperlipidemia type       1. Paroxysmal atrial fibrillation. Currently, she is in sinus rhythm and has been  controlling it with Toprol and she is on Pradaxa and prn amiodarone  2. Labile blood pressure. Her blood pressure is controlled on bystolic.   3. Normal echocardiogram done in 3/2016.  4. Dyspnea on exertion, severe with hypoxia  5. Hyperlipidemia on statin therapy.  6. S/p breast cancer.  7. Anemia, follows with Dr. Cornejo.  8. CKD, follows with Dr. Shannon - will get labs.   9. Normal coronaries on cath 5/16/16.   10. Right carotid bruit - mild disease 4/2016  11. Intolerance to amlodipine.      Plan:       See back in 6 months, she is low risk for right hip surgery.      Atrial Fibrillation and Atrial Flutter  Assessment  • The patient has paroxysmal atrial fibrillation  • This is non-valvular in etiology  • The patient's CHADS2-VASc score is 3  • A [id number]-VIGc score of 2 or more is considered a high risk for a thromboembolic event  • Dabigatran prescribed    Plan  • Attempt to maintain sinus rhythm  • Continue dabigatran for antithrombotic therapy, bleeding issues discussed  • Continue beta blocker for rate control

## 2017-06-21 ENCOUNTER — LAB (OUTPATIENT)
Dept: OTHER | Facility: HOSPITAL | Age: 72
End: 2017-06-21

## 2017-06-21 ENCOUNTER — OFFICE VISIT (OUTPATIENT)
Dept: ONCOLOGY | Facility: CLINIC | Age: 72
End: 2017-06-21

## 2017-06-21 ENCOUNTER — APPOINTMENT (OUTPATIENT)
Dept: ONCOLOGY | Facility: HOSPITAL | Age: 72
End: 2017-06-21

## 2017-06-21 VITALS
TEMPERATURE: 98 F | HEIGHT: 65 IN | OXYGEN SATURATION: 100 % | HEART RATE: 55 BPM | BODY MASS INDEX: 22.16 KG/M2 | DIASTOLIC BLOOD PRESSURE: 57 MMHG | SYSTOLIC BLOOD PRESSURE: 115 MMHG | RESPIRATION RATE: 12 BRPM | WEIGHT: 133 LBS

## 2017-06-21 DIAGNOSIS — D63.1 ANEMIA DUE TO STAGE 3 CHRONIC KIDNEY DISEASE TREATED WITH ERYTHROPOIETIN (HCC): ICD-10-CM

## 2017-06-21 DIAGNOSIS — C50.412 BREAST CANCER OF UPPER-OUTER QUADRANT OF LEFT FEMALE BREAST (HCC): Primary | ICD-10-CM

## 2017-06-21 DIAGNOSIS — D50.9 IRON DEFICIENCY ANEMIA, UNSPECIFIED IRON DEFICIENCY ANEMIA TYPE: ICD-10-CM

## 2017-06-21 DIAGNOSIS — N18.30 ANEMIA DUE TO STAGE 3 CHRONIC KIDNEY DISEASE TREATED WITH ERYTHROPOIETIN (HCC): ICD-10-CM

## 2017-06-21 DIAGNOSIS — C50.412 BREAST CANCER OF UPPER-OUTER QUADRANT OF LEFT FEMALE BREAST (HCC): ICD-10-CM

## 2017-06-21 LAB
BASOPHILS # BLD AUTO: 0.07 10*3/MM3 (ref 0–0.2)
BASOPHILS NFR BLD AUTO: 1.3 % (ref 0–1.5)
DEPRECATED RDW RBC AUTO: 49.2 FL (ref 37–54)
EOSINOPHIL # BLD AUTO: 0.18 10*3/MM3 (ref 0–0.7)
EOSINOPHIL NFR BLD AUTO: 3.3 % (ref 0.3–6.2)
ERYTHROCYTE [DISTWIDTH] IN BLOOD BY AUTOMATED COUNT: 14.3 % (ref 11.7–13)
FERRITIN SERPL-MCNC: 50.2 NG/ML (ref 13–150)
HCT VFR BLD AUTO: 33.1 % (ref 35.6–45.5)
HGB BLD-MCNC: 10.9 G/DL (ref 11.9–15.5)
HGB RETIC QN: 32.8 PG (ref 32.7–38.6)
IMM GRANULOCYTES # BLD: 0.02 10*3/MM3 (ref 0–0.03)
IMM GRANULOCYTES NFR BLD: 0.4 % (ref 0–0.5)
IMM RETICS NFR: 11 % (ref 0.7–13.7)
IRON 24H UR-MRATE: 76 MCG/DL (ref 37–145)
IRON SATN MFR SERPL: 19 % (ref 20–50)
LYMPHOCYTES # BLD AUTO: 1.99 10*3/MM3 (ref 0.9–4.8)
LYMPHOCYTES NFR BLD AUTO: 36.6 % (ref 19.6–45.3)
MCH RBC QN AUTO: 31.2 PG (ref 26.9–32)
MCHC RBC AUTO-ENTMCNC: 32.9 G/DL (ref 32.4–36.3)
MCV RBC AUTO: 94.8 FL (ref 80.5–98.2)
MONOCYTES # BLD AUTO: 0.5 10*3/MM3 (ref 0.2–1.2)
MONOCYTES NFR BLD AUTO: 9.2 % (ref 5–12)
NEUTROPHILS # BLD AUTO: 2.68 10*3/MM3 (ref 1.9–8.1)
NEUTROPHILS NFR BLD AUTO: 49.2 % (ref 42.7–76)
NRBC BLD MANUAL-RTO: 0 /100 WBC (ref 0–0)
PLATELET # BLD AUTO: 200 10*3/MM3 (ref 140–500)
PMV BLD AUTO: 10.3 FL (ref 6–12)
RBC # BLD AUTO: 3.49 10*6/MM3 (ref 3.9–5.2)
RETICS/RBC NFR AUTO: 1.77 % (ref 0.5–1.5)
TIBC SERPL-MCNC: 396 MCG/DL (ref 298–536)
TRANSFERRIN SERPL-MCNC: 266 MG/DL (ref 200–360)
WBC NRBC COR # BLD: 5.44 10*3/MM3 (ref 4.5–10.7)

## 2017-06-21 PROCEDURE — 36415 COLL VENOUS BLD VENIPUNCTURE: CPT

## 2017-06-21 PROCEDURE — 99215 OFFICE O/P EST HI 40 MIN: CPT | Performed by: INTERNAL MEDICINE

## 2017-06-21 PROCEDURE — 82728 ASSAY OF FERRITIN: CPT | Performed by: INTERNAL MEDICINE

## 2017-06-21 PROCEDURE — 83540 ASSAY OF IRON: CPT | Performed by: INTERNAL MEDICINE

## 2017-06-21 PROCEDURE — 84466 ASSAY OF TRANSFERRIN: CPT | Performed by: INTERNAL MEDICINE

## 2017-06-21 PROCEDURE — 85046 RETICYTE/HGB CONCENTRATE: CPT | Performed by: INTERNAL MEDICINE

## 2017-06-21 PROCEDURE — 85025 COMPLETE CBC W/AUTO DIFF WBC: CPT | Performed by: INTERNAL MEDICINE

## 2017-06-21 NOTE — PROGRESS NOTES
Subjective .     REASONS FOR FOLLOWUP:  Breast cancer, anemia    HISTORY OF PRESENT ILLNESS:  The patient is a 72 y.o. year old female  who is here for follow-up with the above-mentioned history.    She's here today earlier than initially planned.  She is planning a hip replacement and her orthopedic surgeon asked her to discuss additional Procrit to boost up her hemoglobin prior to surgery.    Denies chest pain.  Denies dizziness.  Denies weakness.  Denies bleeding from any location.      Past Medical History:   Diagnosis Date   • Anemia     Of chronic renal insufficiency   • Atrial fibrillation    • Mcguire's esophagus    • Breast cancer    • Chronic renal insufficiency     Stage 3   • MCCRAY (dyspnea on exertion)    • Dyspnea    • GERD (gastroesophageal reflux disease)    • H/O electrocardiogram    • Hx of being hospitalized     [facility] in 09/2010 and 10/2010 for atrial fibrillation, Dr. Perla Hurtado   • Hyperlipidemia    • Hypertension    • Hypoxia    • IBS (irritable bowel syndrome)    • Kidney dysfunction    • Lower extremity edema    • Lump or mass in breast    • Malignant neoplasm of breast    • Osteoarthritis    • Osteopenia    • Paroxysmal atrial fibrillation    • Seizure     AS A TEENAGER, NOT CURRENTLY   • SOB (shortness of breath)      Past Surgical History:   Procedure Laterality Date   • BREAST LUMPECTOMY  1990    For LCIS   • CARDIAC CATHETERIZATION Left 4/27/2016    Procedure: Cardiac catheterization;  Surgeon: Kevin Guillen MD;  Location: [facility] INVASIVE LOCATION;  Service:    • FOOT SURGERY     • HAND ARTHROPLASTY Right    • KNEE ARTHROSCOPY     • OTHER SURGICAL HISTORY      LYMPHATIC SURGERY       HEMATOLOGIC/ONCOLOGIC HISTORY:  (History from previous dates can be found in the separate document.)    MEDICATIONS    Current Outpatient Prescriptions:   •  alendronate (FOSAMAX) 70 MG tablet, Take 1 tablet by mouth Every 7 (Seven) Days., Disp: , Rfl: 12  •  alosetron  (LOTRONEX) 0.5 MG tablet, Take 0.05 mg by mouth daily., Disp: , Rfl:   •  ALPRAZolam (XANAX) 1 MG tablet, Take 1 mg by mouth every night., Disp: , Rfl:   •  bacitracin 500 UNIT/GM ophthalmic ointment, APPLY 1/4 INCH TO BOTH EYELIDS QHS, Disp: , Rfl: 4  •  bumetanide (BUMEX) 1 MG tablet, Take 1 mg by mouth Daily As Needed., Disp: , Rfl:   •  cetirizine (ZyrTEC) 10 MG tablet, Take 1 tablet by mouth daily., Disp: , Rfl:   •  dabigatran etexilate (PRADAXA) 150 MG capsu, Take 1 capsule by mouth Every 12 (Twelve) Hours., Disp: 180 capsule, Rfl: 3  •  desonide (DESOWEN) 0.05 % ointment, Apply  topically daily., Disp: , Rfl:   •  esomeprazole (NexIUM) 40 MG capsule, Take 40 mg by mouth 2 (two) times a day., Disp: , Rfl:   •  fluconazole (DIFLUCAN) 100 MG tablet, TK 1 T PO AS ONE TIME DOSE AS DIRECTED, Disp: , Rfl: 12  •  glucosamine-chondroitin 500-400 MG per tablet, Take 1 tablet by mouth., Disp: , Rfl:   •  ibuprofen (ADVIL,MOTRIN) 800 MG tablet, , Disp: , Rfl:   •  methocarbamol (ROBAXIN) 750 MG tablet, Take 750 mg by mouth. PRN, Disp: , Rfl:   •  montelukast (SINGULAIR) 10 MG tablet, Take 1 tablet by mouth daily., Disp: , Rfl:   •  Multiple Vitamin (MULTIVITAMIN) tablet, Take 1 tablet by mouth., Disp: , Rfl:   •  nebivolol (BYSTOLIC) 2.5 MG tablet, Take 1 tablet by mouth Every Night., Disp: 90 tablet, Rfl: 3  •  nebivolol (BYSTOLIC) 5 MG tablet, Take 1 tablet by mouth Daily., Disp: 90 tablet, Rfl: 3  •  nystatin-triamcinolone (MYCOLOG II) 233246-4.1 UNIT/GM-% cream, , Disp: , Rfl:   •  pseudoephedrine-guaifenesin (MUCINEX D)  MG per 12 hr tablet, Take 1 tablet by mouth As Needed., Disp: , Rfl:   •  raloxifene (EVISTA) 60 MG tablet, Take 1 tablet by mouth daily., Disp: , Rfl:   •  RESTASIS 0.05 % ophthalmic emulsion, , Disp: , Rfl:   •  simvastatin (ZOCOR) 40 MG tablet, Take 1 tablet by mouth daily., Disp: , Rfl:     ALLERGIES:     Allergies   Allergen Reactions   • Atropine Hives   • Penicillins      Reactions:  syncope and seizures   • Sulfa Antibiotics Nausea Only     STATES SHE DOES FINE WITH CERTAIN SULFA DRUGS   • Epinephrine Palpitations       SOCIAL HISTORY:       Social History     Social History   • Marital status:      Spouse name: Cuate   • Number of children: 1   • Years of education: 1 year of college     Occupational History   •  Retired     Social History Main Topics   • Smoking status: Former Smoker     Quit date: 6/9/1975   • Smokeless tobacco: Never Used      Comment: QUIT SMOKING IN LATE 1970s   • Alcohol use Yes      Comment: social drinker   • Drug use: No   • Sexual activity: Defer     Other Topics Concern   • Not on file     Social History Narrative         FAMILY HISTORY:  Family History   Problem Relation Age of Onset   • Heart disease Other    • Stroke Other    • Stroke Mother    • Heart disease Mother    • Hypertension Mother    • Leukemia Father      Mid 60s   • Hypertension Brother    • Cancer Paternal Aunt    • Cancer Paternal Grandfather        REVIEW OF SYSTEMS:    GENERAL: No change in appetite or weight;   No fevers, chills, sweats.    SKIN: No nonhealing lesions.   No rashes.  HEME/LYMPH: No easy bruising, bleeding.   No swollen nodes.   EYES: No vision changes or diplopia.   ENT: No tinnitus, hearing loss, gum bleeding, epistaxis, hoarseness or dysphagia.   RESPIRATORY: No cough, shortness of breath, hemoptysis or wheezing.   CVS: No chest pain, palpitations, orthopnea, dyspnea on exertion or PND.   GI: No melena or hematochezia.   No abdominal pain.  No nausea, vomiting, constipation, diarrhea  : No lower tract obstructive symptoms, dysuria or hematuria.   MUSCULOSKELETAL: No bone pain.  No joint stiffness.   NEUROLOGICAL: No global weakness, loss of consciousness or seizures.   PSYCHIATRIC: No increased nervousness, mood changes or depression.          Objective    Vitals:    06/21/17 0742   BP: 115/57   Pulse: 55   Resp: 12   Temp: 98 °F (36.7 °C)   SpO2: 100%   Weight: 133  "lb (60.3 kg)   Height: 65.35\" (166 cm)  Comment: new ht   PainSc:   6   PainLoc: Hip     Current Status 6/21/2017   ECOG score 0      PHYSICAL EXAM:    GENERAL:  Well-developed, well-nourished in no acute distress.   SKIN:  Warm, dry without rashes, purpura or petechiae.  HEAD:  Normocephalic.  EYES:  Pupils equal, round and reactive to light.  EOMs intact.  Conjunctivae normal.  EARS:  Hearing intact.  NOSE:  Septum midline.  No excoriations or nasal discharge.  MOUTH:  Tongue is well-papillated; no stomatitis or ulcers.  Lips normal.  THROAT:  Oropharynx without lesions or exudates.  NECK:  Supple with good range of motion; no thyromegaly or masses, no JVD.  LYMPHATICS:  No cervical, supraclavicular, axillary or inguinal adenopathy.  CHEST:  Lungs clear to percussion and auscultation. Good airflow.  BREAST: no masses by palpation or inspection  Status post left lumpectomy.  Left nipple has been inverted since lumpectomy per patient's report.  Fibrocystic changes noted.  CARDIAC:  Regular rate and rhythm without murmurs, rubs or gallops. Normal S1,S2.  ABDOMEN:  Soft, nontender with no organomegaly or masses.  EXTREMITIES:  No clubbing, cyanosis or edema.  NEUROLOGICAL:  Cranial Nerves II-XII grossly intact.  No focal neurological deficits.  PSYCHIATRIC:  Normal affect and mood.      RECENT LABS:        WBC   Date/Time Value Ref Range Status   05/16/2017 09:05 AM 4.83 4.50 - 10.70 10*3/mm3 Final     Hemoglobin   Date/Time Value Ref Range Status   05/16/2017 09:05 AM 10.7 (L) 11.9 - 15.5 g/dL Final     Platelets   Date/Time Value Ref Range Status   05/16/2017 09:05  140 - 500 10*3/mm3 Final       Assessment/Plan     ASSESSMENT:  1. Stage IB ([id number]) left breast cancer. Grade 2, 1.9 cm, 1 mm micrometastasis in 1 out of 2 sentinel nodes. ER positive, HER-2 negative. OncoType DX: low risk category.   Tamoxifen 10/11/2013 through November 2013. Just a few days or so of Aromasin in January 2014. Could not " tolerate hormonal therapy due to vaginal dryness and irritation and declined any more hormonal therapy.   Evista started for significant osteopenia by Dr. Perla Garza July 2014.  She states Dr. Garza plans to Fosamax.    2. Atrial fibrillation, on Pradaxa through Dr. Perla Hurtado.     3. Anemia of stage 3 chronic renal insufficiency (Dr. Rell Shannon is her nephrologist whom she follows up with). (Evaluation negative for B12 or folate deficiency, hemolysis or multiple myeloma. She has not had a bone marrow biopsy. She responds well to Procrit).   She received Procrit 12/2014 and 1 dose 06/2015 and 1 dose 09/2015 and 1 dose on 10/28/2015.   She responds to Procrit.     4.  Iron deficiency anemia. does not tolerate oral iron due to significant abdominal pain and diarrhea. She refuses any further Benadryl as a pre-medicine because it made her feel very sleepy for over 24-hours.  Await iron labs from today.    5. Fibrocystic changes in both breasts.     6.  On the 1/18/17 visit, she complained of Hypoxia with exertion.  See that note for details.  She did not complain of this today.    7.  Planning total hip replacement by Dr. Van Mcguire.  Notably Dr. Mcguire recommended Procrit injections prior to surgery to improve her Hb.  Her Hb is 10.9 today.  She receives Procrit if Hb <10.  I explained to the patient the purpose of Procrit is not to achieve a normal Hb, but rather to decrease transfusion requirements (i.e. maintain a hemoglobin in the nines or so).  I explained if Procrit is used to boost the Hb to a high level, it increases the risk of clotting events including strokes, heart attacks, DVTs.  Therefore, I do not think Procrit is appropriate in the setting as her hemoglobin is not below 10.  I explained she may require transfusion postoperatively, but no additional supplementation as indicated preoperatively.  This was a new issue for me to address today.      PLAN:   · CBC and iron labs every 2 months (she  requests the next check in 3 months to allow additional time to recover from her upcoming hip replacement)  · Procrit if hemoglobin less than 10  · M.D. lab Procrit 6 months  · Last mammogram 7/26/16, BI-RADS 2.    I discussed this case with Dr. Herberth Neal of our practice and he agrees with my thoughts.

## 2017-06-28 ENCOUNTER — TELEPHONE (OUTPATIENT)
Dept: ORTHOPEDIC SURGERY | Facility: CLINIC | Age: 72
End: 2017-06-28

## 2017-06-29 ENCOUNTER — APPOINTMENT (OUTPATIENT)
Dept: PREADMISSION TESTING | Facility: HOSPITAL | Age: 72
End: 2017-06-29

## 2017-06-29 ENCOUNTER — TELEPHONE (OUTPATIENT)
Dept: CARDIOLOGY | Facility: CLINIC | Age: 72
End: 2017-06-29

## 2017-06-29 VITALS
HEIGHT: 65 IN | BODY MASS INDEX: 22.42 KG/M2 | WEIGHT: 134.6 LBS | SYSTOLIC BLOOD PRESSURE: 136 MMHG | HEART RATE: 62 BPM | TEMPERATURE: 97.7 F | DIASTOLIC BLOOD PRESSURE: 55 MMHG | RESPIRATION RATE: 16 BRPM | OXYGEN SATURATION: 94 %

## 2017-06-29 DIAGNOSIS — M16.11 PRIMARY OSTEOARTHRITIS OF RIGHT HIP: ICD-10-CM

## 2017-06-29 LAB
ANION GAP SERPL CALCULATED.3IONS-SCNC: 12.8 MMOL/L
BILIRUB UR QL STRIP: NEGATIVE
BUN BLD-MCNC: 31 MG/DL (ref 8–23)
BUN/CREAT SERPL: 26.3 (ref 7–25)
CALCIUM SPEC-SCNC: 9.4 MG/DL (ref 8.6–10.5)
CHLORIDE SERPL-SCNC: 101 MMOL/L (ref 98–107)
CLARITY UR: CLEAR
CO2 SERPL-SCNC: 22.2 MMOL/L (ref 22–29)
COLOR UR: YELLOW
CREAT BLD-MCNC: 1.18 MG/DL (ref 0.57–1)
DEPRECATED RDW RBC AUTO: 48 FL (ref 37–54)
ERYTHROCYTE [DISTWIDTH] IN BLOOD BY AUTOMATED COUNT: 14.3 % (ref 11.7–13)
GFR SERPL CREATININE-BSD FRML MDRD: 45 ML/MIN/1.73
GLUCOSE BLD-MCNC: 89 MG/DL (ref 65–99)
GLUCOSE UR STRIP-MCNC: NEGATIVE MG/DL
HCT VFR BLD AUTO: 33 % (ref 35.6–45.5)
HGB BLD-MCNC: 11.1 G/DL (ref 11.9–15.5)
HGB UR QL STRIP.AUTO: NEGATIVE
KETONES UR QL STRIP: NEGATIVE
LEUKOCYTE ESTERASE UR QL STRIP.AUTO: NEGATIVE
MCH RBC QN AUTO: 30.7 PG (ref 26.9–32)
MCHC RBC AUTO-ENTMCNC: 33.6 G/DL (ref 32.4–36.3)
MCV RBC AUTO: 91.4 FL (ref 80.5–98.2)
NITRITE UR QL STRIP: NEGATIVE
PH UR STRIP.AUTO: 5.5 [PH] (ref 5–8)
PLATELET # BLD AUTO: 184 10*3/MM3 (ref 140–500)
PMV BLD AUTO: 10.3 FL (ref 6–12)
POTASSIUM BLD-SCNC: 4.3 MMOL/L (ref 3.5–5.2)
PROT UR QL STRIP: NEGATIVE
RBC # BLD AUTO: 3.61 10*6/MM3 (ref 3.9–5.2)
SODIUM BLD-SCNC: 136 MMOL/L (ref 136–145)
SP GR UR STRIP: 1.02 (ref 1–1.03)
UROBILINOGEN UR QL STRIP: NORMAL
WBC NRBC COR # BLD: 6.86 10*3/MM3 (ref 4.5–10.7)

## 2017-06-29 PROCEDURE — 36415 COLL VENOUS BLD VENIPUNCTURE: CPT

## 2017-06-29 PROCEDURE — 80048 BASIC METABOLIC PNL TOTAL CA: CPT | Performed by: ORTHOPAEDIC SURGERY

## 2017-06-29 PROCEDURE — 81003 URINALYSIS AUTO W/O SCOPE: CPT | Performed by: ORTHOPAEDIC SURGERY

## 2017-06-29 PROCEDURE — 85027 COMPLETE CBC AUTOMATED: CPT | Performed by: ORTHOPAEDIC SURGERY

## 2017-06-29 RX ORDER — RALOXIFENE HYDROCHLORIDE 60 MG/1
60 TABLET, FILM COATED ORAL DAILY
COMMUNITY
End: 2018-11-06

## 2017-06-29 RX ORDER — METOPROLOL SUCCINATE 25 MG
25 TABLET, EXTENDED RELEASE 24 HR ORAL DAILY PRN
COMMUNITY
End: 2018-01-10 | Stop reason: SDUPTHER

## 2017-06-29 RX ORDER — DABIGATRAN ETEXILATE 150 MG/1
150 CAPSULE ORAL 2 TIMES DAILY
COMMUNITY
End: 2017-10-23 | Stop reason: SDUPTHER

## 2017-06-29 RX ORDER — NEBIVOLOL 2.5 MG/1
2.5 TABLET ORAL NIGHTLY
COMMUNITY
End: 2017-11-21 | Stop reason: ALTCHOICE

## 2017-06-29 RX ORDER — NEBIVOLOL 5 MG/1
5 TABLET ORAL DAILY
COMMUNITY
End: 2017-11-21 | Stop reason: ALTCHOICE

## 2017-06-29 RX ORDER — CHLORHEXIDINE GLUCONATE 500 MG/1
CLOTH TOPICAL
COMMUNITY
End: 2017-07-13 | Stop reason: HOSPADM

## 2017-06-29 NOTE — TELEPHONE ENCOUNTER
Per RBB, he wanted me to give patient a call and see what questions she has  Patient needed a form filled out for her daughter to go and give blood in case Mrs. Saab needed it with her sx.  I have given the form to RBB to fill out and then I will call the patient back letting her know it is ready.

## 2017-06-29 NOTE — TELEPHONE ENCOUNTER
Pt called and wants to know when she can stop her pradaxa. The dr wants her to stop 5-7 days prior to her surgery...please advise      Baldo JOHN

## 2017-07-03 NOTE — PROGRESS NOTES
Pre-Operative Orthopedic Assessment      Patient: Mary Kay Saab    YOB: 1945      Age/Gender: 72 y.o. female  Medical Record Number: [id number]  Surgical Procedure Planned: TOTAL HIP ARTHROPLASTY ANTERIOR WITH HANA TABLE     Surgeon: Van Mcguire MD    Date of Surgery Planned: 7/12/2017    Question Value Score    Patient Score   1. What is your age group? 50-65 years  66-75 years  >75 years =2  =1  =0 1   2. Gender? Male  Female =2  =1 1   3. How far on average can you walk? (a block is 200 meters) Two blocks or more (+\- rest)  1-2 blocks (+\- rest)  Housebound (most of time) =2  =1  =0 1   4. Which gait aid to you use? (more often than not) None  Single-Point Stick  Crutches/Frame =2  =1  =0 2   5. Do you use community  supports? (home help, meals on wheels, district nursing) None or one per week  Two or more per week =1  =0 1   6. Will you live with someone who can care for you after your operation? Yes  No =3  =0 3      Your Score (out of 12)  9     Key Destination at Discharge from acute care predicted by score   Scores < 6  -- extended inpatient rehabilitation   Scores 6-9 -- additional intervention to discharge directly home (Rehabilitation in home)   Scores > 9 -- directly home         Discharge Disposition/Planning:     Patient Response   Discussed the Predicted discharge disposition needed based on RAPT Assessment with the patient.    yes   Patient selected discharge disposition:   home   Out Patient Rehabilitation Facility of Choice:    n/a   Home Health Services Preferred:   Prefers [facility]   Has the patient completed or been scheduled to complete pre-operative testing? Completed on 6/29/17   Post-Operative Care Giver Name and Phone Number:    Cuate () [phone] cell     Subacute Inpatient Rehabilitation:  Complete this section only if planning inpatient services at a Subacute Facility     Patient Response   Subacute Facility Preferred (Please  list 2 facilities:   n/a   Requires pre-certification for inpatient rehabilitation services?      n/a   Planned source of transportation to inpatient rehabilitation facility?   n/a    If choosing inpatient services at an Acute or Subacute Facility please list a subsequent back-up plan (in case patient fails to qualify for inpatient rehabilitation). Back-up plans should include caregiver (family member or friend) for first 24-48 post- -operatively.    n/a   Home Equipment Patient Response   Does patient have a walker for home use?    Has one regular walker, and one with wheels and a seat   Does patient have a 3 in 1 commode for home use?    no   Does patient have a shower chair for home use?    Built in bench, grab bars, and non slip floor   Does patient have an elevated commode seat for home use? Has higher commode   Does patient have a cane for home use?    no   Is there any other medical equipment in the home? If so,  List in comment section below no   Pre-Operative Class Attendance Patient Response   Attended or scheduled to attend the pre-operative class within 1 year of total joint replacement? Attended on day of PAT   Not planning to attend the pre-operative class    n/a   Has attended the pre-operative class > 1 year ago    n/a   Does not want to attend the class    n/a   Was misinformed that did not need to attend the class    n/a   Class time is not convenient    n/a   Other reasons for refusing to attend the pre-operative class (if yes, see comments below)   n/a   Patient Education  Completed   Expected time of discharge discussed yes   Encouraged to attend Pre-Operative Class    attended   Education re: Back-up plan for patients planning discharge to subacute facilities n/a   Education re: Predicted Discharge Disposition based on RAPT score    yes   Patient receptive and voiced understanding of information given    yes                                                                                                             Comments:    Spoke with patient Mary Kay Saab at [phone].  Verified home address to be correct on face sheet of [address]   [address]    [place] [address].  Lives in a 2nd level condo with spouse.  Has a flight of stairs to go up to get into the apartment, and once in apartment everything is one level.  She informs that she isn't currently using any assistive devices.  Stated that her spouse, dtr, sister in law and neighbor are all going to be available to help patient once [place].  She informs that she prefers to use [facility].  Patients current discharge plan is to return back home at discharge with assist of family/friends and prefers [facility] to follow.  Karley Rene RN                                           Signature: Karley Rene RN    Date:  7/3/2017

## 2017-07-06 ENCOUNTER — OFFICE VISIT (OUTPATIENT)
Dept: ORTHOPEDIC SURGERY | Facility: CLINIC | Age: 72
End: 2017-07-06

## 2017-07-06 VITALS
DIASTOLIC BLOOD PRESSURE: 60 MMHG | HEIGHT: 65 IN | SYSTOLIC BLOOD PRESSURE: 100 MMHG | BODY MASS INDEX: 22.96 KG/M2 | TEMPERATURE: 97.7 F | WEIGHT: 137.8 LBS

## 2017-07-06 DIAGNOSIS — M25.551 HIP PAIN, RIGHT: Primary | ICD-10-CM

## 2017-07-06 PROCEDURE — S0260 H&P FOR SURGERY: HCPCS | Performed by: NURSE PRACTITIONER

## 2017-07-06 RX ORDER — AMIODARONE HYDROCHLORIDE 200 MG/1
TABLET ORAL
Refills: 1 | COMMUNITY
Start: 2017-07-02 | End: 2018-11-06

## 2017-07-06 RX ORDER — CYCLOSPORINE 0.5 MG/ML
EMULSION OPHTHALMIC
Refills: 2 | COMMUNITY
Start: 2017-07-02 | End: 2018-11-06

## 2017-07-06 RX ORDER — TRIAMCINOLONE ACETONIDE 55 UG/1
2 SPRAY, METERED NASAL 2 TIMES DAILY
COMMUNITY
End: 2017-08-31

## 2017-07-06 NOTE — H&P
Patient: Mary Kay Saab    Date of Admission: 7/12/17    YOB: 1945    Medical Record Number: [id number]    Admitting Physician: Dr. Van Mcguire    Reason for Admission: End Stage Right Hip OA    History of Present Illness: 72 y.o. female presents with severe end stage hip osteoarthritis which has not been responsive to the full compliment of conservative measures. Despite conservative attempts, there is still severe, constant activity limiting hip pain. Given the severity of the pain, the patient has elected to proceed with hip replacement.    Allergies:   Allergies   Allergen Reactions   • Atropine Hives   • Penicillins      Reactions: syncope and seizures   • Sulfa Antibiotics Nausea Only     STATES SHE DOES FINE WITH CERTAIN SULFA DRUGS   • Epinephrine Palpitations         Current Medications:  Scheduled Meds:  PRN Meds:.    PMH:  Past Medical History:   Diagnosis Date   • Anemia     Of chronic renal insufficiency   • Atrial fibrillation    • Mcguire's esophagus    • Breast cancer    • Chronic renal insufficiency     Stage 3   • MCCRAY (dyspnea on exertion)    • Dyspnea    • GERD (gastroesophageal reflux disease)    • H/O electrocardiogram    • Hx of being hospitalized     [facility] in 09/2010 and 10/2010 for atrial fibrillation, Dr. Perla Hurtado   • Hyperlipidemia    • Hypertension    • Hypoxia    • IBS (irritable bowel syndrome)    • Kidney dysfunction    • Lower extremity edema    • Lump or mass in breast    • Malignant neoplasm of breast    • Osteoarthritis    • Osteopenia    • Paroxysmal atrial fibrillation    • Seizure     AS A TEENAGER, NOT CURRENTLY   • SOB (shortness of breath)         PSURGH:  Past Surgical History:   Procedure Laterality Date   • BREAST LUMPECTOMY  1990    For LCIS   • CARDIAC CATHETERIZATION Left 4/27/2016    Procedure: Cardiac catheterization;  Surgeon: Kevin Guillen MD;  Location: [facility] INVASIVE LOCATION;  Service:    • FOOT SURGERY     •  "HAND ARTHROPLASTY Right    • KNEE ARTHROSCOPY     • OTHER SURGICAL HISTORY      LYMPHATIC SURGERY       SocialHx:  Social History     Occupational History   •  Retired     Social History Main Topics   • Smoking status: Former Smoker     Quit date: 6/9/1975   • Smokeless tobacco: Never Used      Comment: QUIT SMOKING IN LATE 1970s   • Alcohol use Yes      Comment: social drinker   • Drug use: No   • Sexual activity: Defer    Social History     Social History Narrative       FamHx:  Family History   Problem Relation Age of Onset   • Heart disease Other    • Stroke Other    • Stroke Mother    • Heart disease Mother    • Hypertension Mother    • Leukemia Father      Mid 60s   • Hypertension Brother    • Cancer Paternal Aunt    • Cancer Paternal Grandfather    • Malig Hyperthermia Neg Hx          Review of Systems:   A 14 point review of systems was performed, pertinent positives discussed above, all other systems are negative    Physical Exam: 72 y.o. female  Vital Signs :   Vitals:    07/06/17 1447   BP: 100/60   BP Location: Left arm   Patient Position: Sitting   Temp: 97.7 °F (36.5 °C)   TempSrc: Temporal Artery    Weight: 137 lb 12.8 oz (62.5 kg)   Height: 65\" (165.1 cm)     General: Alert and Oriented x 3, No acute distress.  Psych: mood and affect appropriate; recent and remote memory intact  Eyes: conjunctiva clear; pupils equally round and reactive, sclera nonicteric  CV: no peripheral edema  Resp: normal respiratory effort  Skin: no rashes or wounds; normal turgor  Musculosketetal; pain with hip range of motion. Positive stinchfeld test. No trochanteric  Tenderness.  Vascular: palpable distal pulses    Labs:    Appointment on 06/29/2017   Component Date Value Ref Range Status   • Glucose 06/29/2017 89  65 - 99 mg/dL Final   • BUN 06/29/2017 31* 8 - 23 mg/dL Final   • Creatinine 06/29/2017 1.18* 0.57 - 1.00 mg/dL Final   • Sodium 06/29/2017 136  136 - 145 mmol/L Final   • Potassium 06/29/2017 4.3  3.5 - 5.2 " mmol/L Final   • Chloride 06/29/2017 101  98 - 107 mmol/L Final   • CO2 06/29/2017 22.2  22.0 - 29.0 mmol/L Final   • Calcium 06/29/2017 9.4  8.6 - 10.5 mg/dL Final   • eGFR Non African Amer 06/29/2017 45* >60 mL/min/1.73 Final   • BUN/Creatinine Ratio 06/29/2017 26.3* 7.0 - 25.0 Final   • Anion Gap 06/29/2017 12.8  mmol/L Final   • WBC 06/29/2017 6.86  4.50 - 10.70 10*3/mm3 Final   • RBC 06/29/2017 3.61* 3.90 - 5.20 10*6/mm3 Final   • Hemoglobin 06/29/2017 11.1* 11.9 - 15.5 g/dL Final   • Hematocrit 06/29/2017 33.0* 35.6 - 45.5 % Final   • MCV 06/29/2017 91.4  80.5 - 98.2 fL Final   • MCH 06/29/2017 30.7  26.9 - 32.0 pg Final   • MCHC 06/29/2017 33.6  32.4 - 36.3 g/dL Final   • RDW 06/29/2017 14.3* 11.7 - 13.0 % Final   • RDW-SD 06/29/2017 48.0  37.0 - 54.0 fl Final   • MPV 06/29/2017 10.3  6.0 - 12.0 fL Final   • Platelets 06/29/2017 184  140 - 500 10*3/mm3 Final   • Color, UA 06/29/2017 Yellow  Yellow, Straw Final   • Appearance, UA 06/29/2017 Clear  Clear Final   • pH, UA 06/29/2017 5.5  5.0 - 8.0 Final   • Specific Gravity, UA 06/29/2017 1.018  1.005 - 1.030 Final   • Glucose, UA 06/29/2017 Negative  Negative Final   • Ketones, UA 06/29/2017 Negative  Negative Final   • Bilirubin, UA 06/29/2017 Negative  Negative Final   • Blood, UA 06/29/2017 Negative  Negative Final   • Protein, UA 06/29/2017 Negative  Negative Final   • Leuk Esterase, UA 06/29/2017 Negative  Negative Final   • Nitrite, UA 06/29/2017 Negative  Negative Final   • Urobilinogen, UA 06/29/2017 0.2 E.U./dL  0.2 - 1.0 E.U./dL Final   Lab on 06/21/2017   Component Date Value Ref Range Status   • Immature Reticulocyte Fraction 06/21/2017 11.0  0.7 - 13.7 % Final   • Reticulocyte % 06/21/2017 1.77* 0.50 - 1.50 % Final   • Reticulocyte Hgb 06/21/2017 32.8  32.7 - 38.6 pg Final   • WBC 06/21/2017 5.44  4.50 - 10.70 10*3/mm3 Final   • RBC 06/21/2017 3.49* 3.90 - 5.20 10*6/mm3 Final   • Hemoglobin 06/21/2017 10.9* 11.9 - 15.5 g/dL Final   • Hematocrit  06/21/2017 33.1* 35.6 - 45.5 % Final   • MCV 06/21/2017 94.8  80.5 - 98.2 fL Final   • MCH 06/21/2017 31.2  26.9 - 32.0 pg Final   • MCHC 06/21/2017 32.9  32.4 - 36.3 g/dL Final   • RDW 06/21/2017 14.3* 11.7 - 13.0 % Final   • RDW-SD 06/21/2017 49.2  37.0 - 54.0 fl Final   • MPV 06/21/2017 10.3  6.0 - 12.0 fL Final   • Platelets 06/21/2017 200  140 - 500 10*3/mm3 Final   • Neutrophil % 06/21/2017 49.2  42.7 - 76.0 % Final   • Lymphocyte % 06/21/2017 36.6  19.6 - 45.3 % Final   • Monocyte % 06/21/2017 9.2  5.0 - 12.0 % Final   • Eosinophil % 06/21/2017 3.3  0.3 - 6.2 % Final   • Basophil % 06/21/2017 1.3  0.0 - 1.5 % Final   • Immature Grans % 06/21/2017 0.4  0.0 - 0.5 % Final   • Neutrophils, Absolute 06/21/2017 2.68  1.90 - 8.10 10*3/mm3 Final   • Lymphocytes, Absolute 06/21/2017 1.99  0.90 - 4.80 10*3/mm3 Final   • Monocytes, Absolute 06/21/2017 0.50  0.20 - 1.20 10*3/mm3 Final   • Eosinophils, Absolute 06/21/2017 0.18  0.00 - 0.70 10*3/mm3 Final   • Basophils, Absolute 06/21/2017 0.07  0.00 - 0.20 10*3/mm3 Final   • Immature Grans, Absolute 06/21/2017 0.02  0.00 - 0.03 10*3/mm3 Final   • nRBC 06/21/2017 0.0  0.0 - 0.0 /100 WBC Final     No results found.    Xrays:  Xrays AP pelvis and a lateral of the Right hip were reviewed demonstrating  End stage hip OA with bone on bone articulation, subchondral cysts and periarticular osteophytes.    Assessment:  End-stage Right hip osteoarthritis. Conservative measures have failed.      Plan:  The plan is to proceed with Right Total Hip Replacement. The patient voiced understanding of the risks, benefits, and alternative forms of treatment that were discussed with Dr Mcguire at the time of scheduling.  Patient is planning on going home with home health after 1-2 day stay.  She does have a history of atrial fibrillation and we will resume her pradaxa post operatively.  In addition BUN/creatinine and creatinine are elevated so no anti-inflammatories    Sabrina Vicente,  APRN  7/6/2017

## 2017-07-07 ENCOUNTER — OFFICE VISIT (OUTPATIENT)
Dept: ORTHOPEDIC SURGERY | Facility: CLINIC | Age: 72
End: 2017-07-07

## 2017-07-07 ENCOUNTER — TELEPHONE (OUTPATIENT)
Dept: ORTHOPEDIC SURGERY | Facility: CLINIC | Age: 72
End: 2017-07-07

## 2017-07-07 VITALS — WEIGHT: 135.8 LBS | BODY MASS INDEX: 22.63 KG/M2 | HEIGHT: 65 IN | TEMPERATURE: 98 F

## 2017-07-07 DIAGNOSIS — M79.674 TOE PAIN, RIGHT: Primary | ICD-10-CM

## 2017-07-07 DIAGNOSIS — M79.89 SWELLING OF TOE OF RIGHT FOOT: ICD-10-CM

## 2017-07-07 PROCEDURE — 73630 X-RAY EXAM OF FOOT: CPT | Performed by: ORTHOPAEDIC SURGERY

## 2017-07-07 PROCEDURE — 99212 OFFICE O/P EST SF 10 MIN: CPT | Performed by: ORTHOPAEDIC SURGERY

## 2017-07-07 RX ORDER — CEPHALEXIN 500 MG/1
500 CAPSULE ORAL EVERY 6 HOURS
Qty: 20 CAPSULE | Refills: 0 | Status: SHIPPED | OUTPATIENT
Start: 2017-07-07 | End: 2017-07-13 | Stop reason: HOSPADM

## 2017-07-07 NOTE — PROGRESS NOTES
Shannen came in today she has a hip replacement scheduled for next Wednesday.  She's had some issues with her toes over the years and is seen Dr. Huddleston for this.  She's develop some redness around the third and fourth toes where they overlap.  She took a Keflex and it's actually gotten a little bit better.  She denies any fevers.  The skin is mildly erythematous around the nailbed is not tender to palpation there is no fluctuance or proximal streaking she's able move her toes fine and can ambulate without pain.    Nailbed irritation I'm not sure if this is infection or just inflammation.  Given her upcoming surgery were going to be fairly aggressive in treating this.  I will place her on Keflex for the next 5 days and we'll plan on proceeding as scheduled on Wednesday however if the redness increases or she has increasing pain or fevers she's to call me.    I ordered and reviewed x-rays 3 views of the right foot AP lateral and oblique for evaluation of her foot problem.  She has some arthritis of the first MTP joint and some mild malalignment of her toes but otherwise I see no fracture or skeletal abnormality.  There are no previous films for comparison.

## 2017-07-12 ENCOUNTER — APPOINTMENT (OUTPATIENT)
Dept: GENERAL RADIOLOGY | Facility: HOSPITAL | Age: 72
End: 2017-07-12

## 2017-07-12 ENCOUNTER — ANESTHESIA EVENT (OUTPATIENT)
Dept: PERIOP | Facility: HOSPITAL | Age: 72
End: 2017-07-12

## 2017-07-12 ENCOUNTER — HOSPITAL ENCOUNTER (INPATIENT)
Facility: HOSPITAL | Age: 72
LOS: 1 days | Discharge: HOME HEALTH | End: 2017-07-13
Attending: ORTHOPAEDIC SURGERY | Admitting: ORTHOPAEDIC SURGERY

## 2017-07-12 ENCOUNTER — ANESTHESIA (OUTPATIENT)
Dept: PERIOP | Facility: HOSPITAL | Age: 72
End: 2017-07-12

## 2017-07-12 DIAGNOSIS — M16.11 PRIMARY OSTEOARTHRITIS OF RIGHT HIP: ICD-10-CM

## 2017-07-12 LAB
ABO GROUP BLD: NORMAL
BLD GP AB SCN SERPL QL: NEGATIVE
RH BLD: NEGATIVE

## 2017-07-12 PROCEDURE — 25010000002 FENTANYL CITRATE (PF) 100 MCG/2ML SOLUTION: Performed by: NURSE ANESTHETIST, CERTIFIED REGISTERED

## 2017-07-12 PROCEDURE — 86900 BLOOD TYPING SEROLOGIC ABO: CPT | Performed by: ORTHOPAEDIC SURGERY

## 2017-07-12 PROCEDURE — 76000 FLUOROSCOPY <1 HR PHYS/QHP: CPT

## 2017-07-12 PROCEDURE — 25010000002 FENTANYL CITRATE (PF) 100 MCG/2ML SOLUTION: Performed by: ANESTHESIOLOGY

## 2017-07-12 PROCEDURE — 0SR90JZ REPLACEMENT OF RIGHT HIP JOINT WITH SYNTHETIC SUBSTITUTE, OPEN APPROACH: ICD-10-PCS | Performed by: ORTHOPAEDIC SURGERY

## 2017-07-12 PROCEDURE — 25010000002 ONDANSETRON PER 1 MG: Performed by: NURSE ANESTHETIST, CERTIFIED REGISTERED

## 2017-07-12 PROCEDURE — 25010000002 NEOSTIGMINE PER 0.5 MG: Performed by: NURSE ANESTHETIST, CERTIFIED REGISTERED

## 2017-07-12 PROCEDURE — 27130 TOTAL HIP ARTHROPLASTY: CPT | Performed by: ORTHOPAEDIC SURGERY

## 2017-07-12 PROCEDURE — 25010000002 PHENYLEPHRINE PER 1 ML: Performed by: NURSE ANESTHETIST, CERTIFIED REGISTERED

## 2017-07-12 PROCEDURE — 73501 X-RAY EXAM HIP UNI 1 VIEW: CPT

## 2017-07-12 PROCEDURE — 25010000002 DEXAMETHASONE PER 1 MG: Performed by: NURSE ANESTHETIST, CERTIFIED REGISTERED

## 2017-07-12 PROCEDURE — 25010000002 PROPOFOL 10 MG/ML EMULSION: Performed by: NURSE ANESTHETIST, CERTIFIED REGISTERED

## 2017-07-12 PROCEDURE — 86901 BLOOD TYPING SEROLOGIC RH(D): CPT | Performed by: ORTHOPAEDIC SURGERY

## 2017-07-12 PROCEDURE — 86850 RBC ANTIBODY SCREEN: CPT | Performed by: ORTHOPAEDIC SURGERY

## 2017-07-12 PROCEDURE — 25010000002 MIDAZOLAM PER 1 MG: Performed by: ANESTHESIOLOGY

## 2017-07-12 PROCEDURE — 25010000002 VANCOMYCIN PER 500 MG: Performed by: ORTHOPAEDIC SURGERY

## 2017-07-12 PROCEDURE — C1776 JOINT DEVICE (IMPLANTABLE): HCPCS | Performed by: ORTHOPAEDIC SURGERY

## 2017-07-12 DEVICE — REFLECTION SPHERICAL HEAD SCREW 25MM
Type: IMPLANTABLE DEVICE | Site: HIP | Status: FUNCTIONAL
Brand: REFLECTION

## 2017-07-12 DEVICE — POLARSTEM STANDARD NON-CEMENTED                                    WITH TI/HA 1
Type: IMPLANTABLE DEVICE | Site: HIP | Status: FUNCTIONAL
Brand: POLARSTEM

## 2017-07-12 DEVICE — R3 0 DEGREE XLPE ACETABULAR LINER                                    36MM INNER DIAMETER X OUTER DIAMETER 52MM
Type: IMPLANTABLE DEVICE | Site: HIP | Status: FUNCTIONAL
Brand: R3

## 2017-07-12 DEVICE — IMPLANTABLE DEVICE: Type: IMPLANTABLE DEVICE | Site: HIP | Status: FUNCTIONAL

## 2017-07-12 DEVICE — OXINIUM FEMORAL HEAD 12/14 TAPER                                    36 MM +0
Type: IMPLANTABLE DEVICE | Site: HIP | Status: FUNCTIONAL
Brand: OXINIUM

## 2017-07-12 DEVICE — R3 3 HOLE ACETABULAR SHELL 52MM
Type: IMPLANTABLE DEVICE | Site: HIP | Status: FUNCTIONAL
Brand: R3 ACETABULAR

## 2017-07-12 RX ORDER — ACETAMINOPHEN 325 MG/1
650 TABLET ORAL EVERY 4 HOURS PRN
Status: DISCONTINUED | OUTPATIENT
Start: 2017-07-12 | End: 2017-07-13 | Stop reason: HOSPADM

## 2017-07-12 RX ORDER — OXYCODONE AND ACETAMINOPHEN 7.5; 325 MG/1; MG/1
1 TABLET ORAL ONCE AS NEEDED
Status: DISCONTINUED | OUTPATIENT
Start: 2017-07-12 | End: 2017-07-12 | Stop reason: HOSPADM

## 2017-07-12 RX ORDER — HYDROMORPHONE HYDROCHLORIDE 1 MG/ML
0.5 INJECTION, SOLUTION INTRAMUSCULAR; INTRAVENOUS; SUBCUTANEOUS
Status: DISCONTINUED | OUTPATIENT
Start: 2017-07-12 | End: 2017-07-12 | Stop reason: HOSPADM

## 2017-07-12 RX ORDER — RALOXIFENE HYDROCHLORIDE 60 MG/1
60 TABLET, FILM COATED ORAL DAILY
Status: DISCONTINUED | OUTPATIENT
Start: 2017-07-12 | End: 2017-07-13 | Stop reason: HOSPADM

## 2017-07-12 RX ORDER — PREGABALIN 25 MG/1
150 CAPSULE ORAL ONCE
Status: COMPLETED | OUTPATIENT
Start: 2017-07-12 | End: 2017-07-12

## 2017-07-12 RX ORDER — FENTANYL CITRATE 50 UG/ML
50 INJECTION, SOLUTION INTRAMUSCULAR; INTRAVENOUS
Status: DISCONTINUED | OUTPATIENT
Start: 2017-07-12 | End: 2017-07-12 | Stop reason: HOSPADM

## 2017-07-12 RX ORDER — SODIUM CHLORIDE 0.9 % (FLUSH) 0.9 %
1-10 SYRINGE (ML) INJECTION AS NEEDED
Status: DISCONTINUED | OUTPATIENT
Start: 2017-07-12 | End: 2017-07-12 | Stop reason: HOSPADM

## 2017-07-12 RX ORDER — ONDANSETRON 4 MG/1
4 TABLET, FILM COATED ORAL EVERY 6 HOURS PRN
Status: DISCONTINUED | OUTPATIENT
Start: 2017-07-12 | End: 2017-07-13 | Stop reason: HOSPADM

## 2017-07-12 RX ORDER — DABIGATRAN ETEXILATE 150 MG/1
150 CAPSULE ORAL 2 TIMES DAILY
Status: DISCONTINUED | OUTPATIENT
Start: 2017-07-13 | End: 2017-07-13 | Stop reason: HOSPADM

## 2017-07-12 RX ORDER — CLINDAMYCIN PHOSPHATE 600 MG/50ML
INJECTION INTRAVENOUS AS NEEDED
Status: DISCONTINUED | OUTPATIENT
Start: 2017-07-12 | End: 2017-07-12 | Stop reason: SURG

## 2017-07-12 RX ORDER — HYDRALAZINE HYDROCHLORIDE 20 MG/ML
5 INJECTION INTRAMUSCULAR; INTRAVENOUS
Status: DISCONTINUED | OUTPATIENT
Start: 2017-07-12 | End: 2017-07-12 | Stop reason: HOSPADM

## 2017-07-12 RX ORDER — VANCOMYCIN HYDROCHLORIDE 1 G/200ML
15 INJECTION, SOLUTION INTRAVENOUS ONCE
Status: COMPLETED | OUTPATIENT
Start: 2017-07-12 | End: 2017-07-12

## 2017-07-12 RX ORDER — NALOXONE HCL 0.4 MG/ML
0.2 VIAL (ML) INJECTION AS NEEDED
Status: DISCONTINUED | OUTPATIENT
Start: 2017-07-12 | End: 2017-07-12 | Stop reason: HOSPADM

## 2017-07-12 RX ORDER — LIDOCAINE HYDROCHLORIDE 20 MG/ML
INJECTION, SOLUTION INFILTRATION; PERINEURAL AS NEEDED
Status: DISCONTINUED | OUTPATIENT
Start: 2017-07-12 | End: 2017-07-12 | Stop reason: SURG

## 2017-07-12 RX ORDER — ACETAMINOPHEN 10 MG/ML
INJECTION, SOLUTION INTRAVENOUS AS NEEDED
Status: DISCONTINUED | OUTPATIENT
Start: 2017-07-12 | End: 2017-07-12 | Stop reason: SURG

## 2017-07-12 RX ORDER — EPHEDRINE SULFATE 50 MG/ML
INJECTION, SOLUTION INTRAVENOUS AS NEEDED
Status: DISCONTINUED | OUTPATIENT
Start: 2017-07-12 | End: 2017-07-12 | Stop reason: SURG

## 2017-07-12 RX ORDER — MONTELUKAST SODIUM 10 MG/1
10 TABLET ORAL DAILY
Status: DISCONTINUED | OUTPATIENT
Start: 2017-07-12 | End: 2017-07-13 | Stop reason: HOSPADM

## 2017-07-12 RX ORDER — MIDAZOLAM HYDROCHLORIDE 1 MG/ML
2 INJECTION INTRAMUSCULAR; INTRAVENOUS
Status: DISCONTINUED | OUTPATIENT
Start: 2017-07-12 | End: 2017-07-12 | Stop reason: HOSPADM

## 2017-07-12 RX ORDER — FLUMAZENIL 0.1 MG/ML
0.2 INJECTION INTRAVENOUS AS NEEDED
Status: DISCONTINUED | OUTPATIENT
Start: 2017-07-12 | End: 2017-07-12 | Stop reason: HOSPADM

## 2017-07-12 RX ORDER — NEBIVOLOL 5 MG/1
5 TABLET ORAL DAILY
Status: DISCONTINUED | OUTPATIENT
Start: 2017-07-12 | End: 2017-07-13 | Stop reason: HOSPADM

## 2017-07-12 RX ORDER — TRANEXAMIC ACID 100 MG/ML
INJECTION, SOLUTION INTRAVENOUS AS NEEDED
Status: DISCONTINUED | OUTPATIENT
Start: 2017-07-12 | End: 2017-07-12 | Stop reason: SURG

## 2017-07-12 RX ORDER — DIPHENHYDRAMINE HYDROCHLORIDE 50 MG/ML
12.5 INJECTION INTRAMUSCULAR; INTRAVENOUS
Status: DISCONTINUED | OUTPATIENT
Start: 2017-07-12 | End: 2017-07-12 | Stop reason: HOSPADM

## 2017-07-12 RX ORDER — POLYETHYLENE GLYCOL 3350 17 G/17G
17 POWDER, FOR SOLUTION ORAL 2 TIMES DAILY
Status: DISCONTINUED | OUTPATIENT
Start: 2017-07-12 | End: 2017-07-13 | Stop reason: HOSPADM

## 2017-07-12 RX ORDER — PANTOPRAZOLE SODIUM 40 MG/1
40 TABLET, DELAYED RELEASE ORAL
Status: DISCONTINUED | OUTPATIENT
Start: 2017-07-13 | End: 2017-07-13 | Stop reason: HOSPADM

## 2017-07-12 RX ORDER — PROPOFOL 10 MG/ML
VIAL (ML) INTRAVENOUS AS NEEDED
Status: DISCONTINUED | OUTPATIENT
Start: 2017-07-12 | End: 2017-07-12 | Stop reason: SURG

## 2017-07-12 RX ORDER — ONDANSETRON 2 MG/ML
4 INJECTION INTRAMUSCULAR; INTRAVENOUS EVERY 6 HOURS PRN
Status: DISCONTINUED | OUTPATIENT
Start: 2017-07-12 | End: 2017-07-13 | Stop reason: HOSPADM

## 2017-07-12 RX ORDER — DOCUSATE SODIUM 100 MG/1
100 CAPSULE, LIQUID FILLED ORAL 2 TIMES DAILY PRN
Status: DISCONTINUED | OUTPATIENT
Start: 2017-07-12 | End: 2017-07-13 | Stop reason: HOSPADM

## 2017-07-12 RX ORDER — PROMETHAZINE HYDROCHLORIDE 25 MG/1
25 TABLET ORAL ONCE AS NEEDED
Status: DISCONTINUED | OUTPATIENT
Start: 2017-07-12 | End: 2017-07-12 | Stop reason: HOSPADM

## 2017-07-12 RX ORDER — ROCURONIUM BROMIDE 10 MG/ML
INJECTION, SOLUTION INTRAVENOUS AS NEEDED
Status: DISCONTINUED | OUTPATIENT
Start: 2017-07-12 | End: 2017-07-12 | Stop reason: SURG

## 2017-07-12 RX ORDER — DEXAMETHASONE SODIUM PHOSPHATE 10 MG/ML
INJECTION INTRAMUSCULAR; INTRAVENOUS AS NEEDED
Status: DISCONTINUED | OUTPATIENT
Start: 2017-07-12 | End: 2017-07-12 | Stop reason: SURG

## 2017-07-12 RX ORDER — NEBIVOLOL 5 MG/1
2.5 TABLET ORAL NIGHTLY
Status: DISCONTINUED | OUTPATIENT
Start: 2017-07-12 | End: 2017-07-13 | Stop reason: HOSPADM

## 2017-07-12 RX ORDER — LABETALOL HYDROCHLORIDE 5 MG/ML
5 INJECTION, SOLUTION INTRAVENOUS
Status: DISCONTINUED | OUTPATIENT
Start: 2017-07-12 | End: 2017-07-12 | Stop reason: HOSPADM

## 2017-07-12 RX ORDER — MAGNESIUM HYDROXIDE 1200 MG/15ML
LIQUID ORAL AS NEEDED
Status: DISCONTINUED | OUTPATIENT
Start: 2017-07-12 | End: 2017-07-12 | Stop reason: HOSPADM

## 2017-07-12 RX ORDER — HYDROCODONE BITARTRATE AND ACETAMINOPHEN 7.5; 325 MG/1; MG/1
1 TABLET ORAL ONCE AS NEEDED
Status: DISCONTINUED | OUTPATIENT
Start: 2017-07-12 | End: 2017-07-12 | Stop reason: HOSPADM

## 2017-07-12 RX ORDER — PROMETHAZINE HYDROCHLORIDE 25 MG/1
12.5 TABLET ORAL ONCE AS NEEDED
Status: DISCONTINUED | OUTPATIENT
Start: 2017-07-12 | End: 2017-07-12 | Stop reason: HOSPADM

## 2017-07-12 RX ORDER — PROMETHAZINE HYDROCHLORIDE 25 MG/ML
12.5 INJECTION, SOLUTION INTRAMUSCULAR; INTRAVENOUS ONCE AS NEEDED
Status: DISCONTINUED | OUTPATIENT
Start: 2017-07-12 | End: 2017-07-12 | Stop reason: HOSPADM

## 2017-07-12 RX ORDER — MIDAZOLAM HYDROCHLORIDE 1 MG/ML
1 INJECTION INTRAMUSCULAR; INTRAVENOUS
Status: DISCONTINUED | OUTPATIENT
Start: 2017-07-12 | End: 2017-07-12 | Stop reason: HOSPADM

## 2017-07-12 RX ORDER — ONDANSETRON 2 MG/ML
INJECTION INTRAMUSCULAR; INTRAVENOUS AS NEEDED
Status: DISCONTINUED | OUTPATIENT
Start: 2017-07-12 | End: 2017-07-12 | Stop reason: SURG

## 2017-07-12 RX ORDER — SODIUM CHLORIDE, SODIUM LACTATE, POTASSIUM CHLORIDE, CALCIUM CHLORIDE 600; 310; 30; 20 MG/100ML; MG/100ML; MG/100ML; MG/100ML
9 INJECTION, SOLUTION INTRAVENOUS CONTINUOUS
Status: DISCONTINUED | OUTPATIENT
Start: 2017-07-12 | End: 2017-07-13 | Stop reason: HOSPADM

## 2017-07-12 RX ORDER — ONDANSETRON 2 MG/ML
4 INJECTION INTRAMUSCULAR; INTRAVENOUS ONCE AS NEEDED
Status: COMPLETED | OUTPATIENT
Start: 2017-07-12 | End: 2017-07-12

## 2017-07-12 RX ORDER — HYDROCODONE BITARTRATE AND ACETAMINOPHEN 7.5; 325 MG/1; MG/1
2 TABLET ORAL EVERY 4 HOURS PRN
Status: DISCONTINUED | OUTPATIENT
Start: 2017-07-12 | End: 2017-07-13

## 2017-07-12 RX ORDER — PROMETHAZINE HYDROCHLORIDE 25 MG/1
25 SUPPOSITORY RECTAL ONCE AS NEEDED
Status: DISCONTINUED | OUTPATIENT
Start: 2017-07-12 | End: 2017-07-12 | Stop reason: HOSPADM

## 2017-07-12 RX ORDER — EPHEDRINE SULFATE 50 MG/ML
5 INJECTION, SOLUTION INTRAVENOUS ONCE AS NEEDED
Status: DISCONTINUED | OUTPATIENT
Start: 2017-07-12 | End: 2017-07-12 | Stop reason: HOSPADM

## 2017-07-12 RX ORDER — HYDROCODONE BITARTRATE AND ACETAMINOPHEN 7.5; 325 MG/1; MG/1
1 TABLET ORAL EVERY 4 HOURS PRN
Status: DISCONTINUED | OUTPATIENT
Start: 2017-07-12 | End: 2017-07-13

## 2017-07-12 RX ORDER — GLYCOPYRROLATE 0.2 MG/ML
INJECTION INTRAMUSCULAR; INTRAVENOUS AS NEEDED
Status: DISCONTINUED | OUTPATIENT
Start: 2017-07-12 | End: 2017-07-12 | Stop reason: SURG

## 2017-07-12 RX ORDER — ONDANSETRON 4 MG/1
4 TABLET, ORALLY DISINTEGRATING ORAL EVERY 6 HOURS PRN
Status: DISCONTINUED | OUTPATIENT
Start: 2017-07-12 | End: 2017-07-13 | Stop reason: HOSPADM

## 2017-07-12 RX ORDER — METOPROLOL SUCCINATE 25 MG/1
25 TABLET, EXTENDED RELEASE ORAL DAILY PRN
Status: DISCONTINUED | OUTPATIENT
Start: 2017-07-12 | End: 2017-07-13 | Stop reason: HOSPADM

## 2017-07-12 RX ORDER — AMIODARONE HYDROCHLORIDE 200 MG/1
200 TABLET ORAL
Status: DISCONTINUED | OUTPATIENT
Start: 2017-07-12 | End: 2017-07-13 | Stop reason: HOSPADM

## 2017-07-12 RX ORDER — ALPRAZOLAM 0.5 MG/1
1 TABLET ORAL NIGHTLY
Status: DISCONTINUED | OUTPATIENT
Start: 2017-07-12 | End: 2017-07-13 | Stop reason: HOSPADM

## 2017-07-12 RX ORDER — ACETAMINOPHEN 325 MG/1
325 TABLET ORAL EVERY 4 HOURS PRN
Status: DISCONTINUED | OUTPATIENT
Start: 2017-07-12 | End: 2017-07-13 | Stop reason: HOSPADM

## 2017-07-12 RX ORDER — FAMOTIDINE 10 MG/ML
20 INJECTION, SOLUTION INTRAVENOUS ONCE
Status: COMPLETED | OUTPATIENT
Start: 2017-07-12 | End: 2017-07-12

## 2017-07-12 RX ADMIN — PROPOFOL 200 MG: 10 INJECTION, EMULSION INTRAVENOUS at 11:40

## 2017-07-12 RX ADMIN — SODIUM CHLORIDE, POTASSIUM CHLORIDE, SODIUM LACTATE AND CALCIUM CHLORIDE: 600; 310; 30; 20 INJECTION, SOLUTION INTRAVENOUS at 11:33

## 2017-07-12 RX ADMIN — EPHEDRINE SULFATE 10 MG: 50 INJECTION INTRAMUSCULAR; INTRAVENOUS; SUBCUTANEOUS at 12:15

## 2017-07-12 RX ADMIN — PHENYLEPHRINE HYDROCHLORIDE 100 MCG: 10 INJECTION INTRAVENOUS at 12:28

## 2017-07-12 RX ADMIN — VANCOMYCIN HYDROCHLORIDE 1000 MG: 1 INJECTION, SOLUTION INTRAVENOUS at 20:56

## 2017-07-12 RX ADMIN — PREGABALIN 150 MG: 25 CAPSULE ORAL at 10:01

## 2017-07-12 RX ADMIN — CLINDAMYCIN PHOSPHATE 600 MG: 12 INJECTION, SOLUTION INTRAVENOUS at 11:56

## 2017-07-12 RX ADMIN — TRANEXAMIC ACID 1000 MG: 100 INJECTION, SOLUTION INTRAVENOUS at 11:54

## 2017-07-12 RX ADMIN — ROCURONIUM BROMIDE 35 MG: 10 INJECTION INTRAVENOUS at 11:40

## 2017-07-12 RX ADMIN — MIDAZOLAM 2 MG: 1 INJECTION INTRAMUSCULAR; INTRAVENOUS at 10:05

## 2017-07-12 RX ADMIN — MUPIROCIN: 20 OINTMENT TOPICAL at 21:06

## 2017-07-12 RX ADMIN — FENTANYL CITRATE 50 MCG: 50 INJECTION INTRAMUSCULAR; INTRAVENOUS at 15:46

## 2017-07-12 RX ADMIN — ONDANSETRON 4 MG: 2 INJECTION INTRAMUSCULAR; INTRAVENOUS at 14:39

## 2017-07-12 RX ADMIN — SODIUM CHLORIDE, POTASSIUM CHLORIDE, SODIUM LACTATE AND CALCIUM CHLORIDE 9 ML/HR: 600; 310; 30; 20 INJECTION, SOLUTION INTRAVENOUS at 19:22

## 2017-07-12 RX ADMIN — ONDANSETRON 4 MG: 2 INJECTION INTRAMUSCULAR; INTRAVENOUS at 13:21

## 2017-07-12 RX ADMIN — GLYCOPYRROLATE 0.6 MG: 0.2 INJECTION INTRAMUSCULAR; INTRAVENOUS at 13:47

## 2017-07-12 RX ADMIN — ROCURONIUM BROMIDE 15 MG: 10 INJECTION INTRAVENOUS at 12:22

## 2017-07-12 RX ADMIN — ACETAMINOPHEN 1000 MG: 10 INJECTION, SOLUTION INTRAVENOUS at 11:48

## 2017-07-12 RX ADMIN — FENTANYL CITRATE 50 MCG: 50 INJECTION INTRAMUSCULAR; INTRAVENOUS at 10:08

## 2017-07-12 RX ADMIN — SODIUM CHLORIDE, POTASSIUM CHLORIDE, SODIUM LACTATE AND CALCIUM CHLORIDE 500 ML: 600; 310; 30; 20 INJECTION, SOLUTION INTRAVENOUS at 09:36

## 2017-07-12 RX ADMIN — PHENYLEPHRINE HYDROCHLORIDE 100 MCG: 10 INJECTION INTRAVENOUS at 11:57

## 2017-07-12 RX ADMIN — TRANEXAMIC ACID 1000 MG: 100 INJECTION, SOLUTION INTRAVENOUS at 13:31

## 2017-07-12 RX ADMIN — PHENYLEPHRINE HYDROCHLORIDE 0.01 MCG/KG/MIN: 10 INJECTION INTRAVENOUS at 12:39

## 2017-07-12 RX ADMIN — DEXAMETHASONE SODIUM PHOSPHATE 4 MG: 10 INJECTION INTRAMUSCULAR; INTRAVENOUS at 12:30

## 2017-07-12 RX ADMIN — HYDROCODONE BITARTRATE AND ACETAMINOPHEN 2 TABLET: 7.5; 325 TABLET ORAL at 21:06

## 2017-07-12 RX ADMIN — SODIUM CHLORIDE, POTASSIUM CHLORIDE, SODIUM LACTATE AND CALCIUM CHLORIDE: 600; 310; 30; 20 INJECTION, SOLUTION INTRAVENOUS at 11:59

## 2017-07-12 RX ADMIN — PHENYLEPHRINE HYDROCHLORIDE 100 MCG: 10 INJECTION INTRAVENOUS at 12:15

## 2017-07-12 RX ADMIN — FENTANYL CITRATE 50 MCG: 50 INJECTION INTRAMUSCULAR; INTRAVENOUS at 18:47

## 2017-07-12 RX ADMIN — FAMOTIDINE 20 MG: 10 INJECTION INTRAVENOUS at 10:08

## 2017-07-12 RX ADMIN — PHENYLEPHRINE HYDROCHLORIDE 100 MCG: 10 INJECTION INTRAVENOUS at 12:00

## 2017-07-12 RX ADMIN — FENTANYL CITRATE 50 MCG: 50 INJECTION INTRAMUSCULAR; INTRAVENOUS at 12:22

## 2017-07-12 RX ADMIN — EPHEDRINE SULFATE 10 MG: 50 INJECTION INTRAMUSCULAR; INTRAVENOUS; SUBCUTANEOUS at 11:45

## 2017-07-12 RX ADMIN — FENTANYL CITRATE 50 MCG: 50 INJECTION INTRAMUSCULAR; INTRAVENOUS at 11:11

## 2017-07-12 RX ADMIN — LIDOCAINE HYDROCHLORIDE 40 MG: 20 INJECTION, SOLUTION INFILTRATION; PERINEURAL at 11:40

## 2017-07-12 RX ADMIN — MIDAZOLAM 2 MG: 1 INJECTION INTRAMUSCULAR; INTRAVENOUS at 11:16

## 2017-07-12 RX ADMIN — PHENYLEPHRINE HYDROCHLORIDE 100 MCG: 10 INJECTION INTRAVENOUS at 11:45

## 2017-07-12 RX ADMIN — NEOSTIGMINE METHYLSULFATE 3 MG: 1 INJECTION INTRAMUSCULAR; INTRAVENOUS; SUBCUTANEOUS at 13:47

## 2017-07-12 RX ADMIN — VANCOMYCIN HYDROCHLORIDE 1000 MG: 1 INJECTION, SOLUTION INTRAVENOUS at 09:36

## 2017-07-12 RX ADMIN — SODIUM CHLORIDE, POTASSIUM CHLORIDE, SODIUM LACTATE AND CALCIUM CHLORIDE 9 ML/HR: 600; 310; 30; 20 INJECTION, SOLUTION INTRAVENOUS at 16:00

## 2017-07-12 NOTE — PERIOPERATIVE NURSING NOTE
Dr. Mcguire returned phone call, advised local maybe to close to nerve and may take a couple days to go away.

## 2017-07-12 NOTE — H&P (VIEW-ONLY)
Patient: Mary Kay Saab    Date of Admission: 7/12/17    YOB: 1945    Medical Record Number: [id number]    Admitting Physician: Dr. Van Mcguire    Reason for Admission: End Stage Right Hip OA    History of Present Illness: 72 y.o. female presents with severe end stage hip osteoarthritis which has not been responsive to the full compliment of conservative measures. Despite conservative attempts, there is still severe, constant activity limiting hip pain. Given the severity of the pain, the patient has elected to proceed with hip replacement.    Allergies:   Allergies   Allergen Reactions   • Atropine Hives   • Penicillins      Reactions: syncope and seizures   • Sulfa Antibiotics Nausea Only     STATES SHE DOES FINE WITH CERTAIN SULFA DRUGS   • Epinephrine Palpitations         Current Medications:  Scheduled Meds:  PRN Meds:.    PMH:  Past Medical History:   Diagnosis Date   • Anemia     Of chronic renal insufficiency   • Atrial fibrillation    • Mcguire's esophagus    • Breast cancer    • Chronic renal insufficiency     Stage 3   • MCCRAY (dyspnea on exertion)    • Dyspnea    • GERD (gastroesophageal reflux disease)    • H/O electrocardiogram    • Hx of being hospitalized     [facility] in 09/2010 and 10/2010 for atrial fibrillation, Dr. Perla Hurtado   • Hyperlipidemia    • Hypertension    • Hypoxia    • IBS (irritable bowel syndrome)    • Kidney dysfunction    • Lower extremity edema    • Lump or mass in breast    • Malignant neoplasm of breast    • Osteoarthritis    • Osteopenia    • Paroxysmal atrial fibrillation    • Seizure     AS A TEENAGER, NOT CURRENTLY   • SOB (shortness of breath)         PSURGH:  Past Surgical History:   Procedure Laterality Date   • BREAST LUMPECTOMY  1990    For LCIS   • CARDIAC CATHETERIZATION Left 4/27/2016    Procedure: Cardiac catheterization;  Surgeon: Kevin Guillen MD;  Location: [facility] INVASIVE LOCATION;  Service:    • FOOT SURGERY     •  "HAND ARTHROPLASTY Right    • KNEE ARTHROSCOPY     • OTHER SURGICAL HISTORY      LYMPHATIC SURGERY       SocialHx:  Social History     Occupational History   •  Retired     Social History Main Topics   • Smoking status: Former Smoker     Quit date: 6/9/1975   • Smokeless tobacco: Never Used      Comment: QUIT SMOKING IN LATE 1970s   • Alcohol use Yes      Comment: social drinker   • Drug use: No   • Sexual activity: Defer    Social History     Social History Narrative       FamHx:  Family History   Problem Relation Age of Onset   • Heart disease Other    • Stroke Other    • Stroke Mother    • Heart disease Mother    • Hypertension Mother    • Leukemia Father      Mid 60s   • Hypertension Brother    • Cancer Paternal Aunt    • Cancer Paternal Grandfather    • Malig Hyperthermia Neg Hx          Review of Systems:   A 14 point review of systems was performed, pertinent positives discussed above, all other systems are negative    Physical Exam: 72 y.o. female  Vital Signs :   Vitals:    07/06/17 1447   BP: 100/60   BP Location: Left arm   Patient Position: Sitting   Temp: 97.7 °F (36.5 °C)   TempSrc: Temporal Artery    Weight: 137 lb 12.8 oz (62.5 kg)   Height: 65\" (165.1 cm)     General: Alert and Oriented x 3, No acute distress.  Psych: mood and affect appropriate; recent and remote memory intact  Eyes: conjunctiva clear; pupils equally round and reactive, sclera nonicteric  CV: no peripheral edema  Resp: normal respiratory effort  Skin: no rashes or wounds; normal turgor  Musculosketetal; pain with hip range of motion. Positive stinchfeld test. No trochanteric  Tenderness.  Vascular: palpable distal pulses    Labs:    Appointment on 06/29/2017   Component Date Value Ref Range Status   • Glucose 06/29/2017 89  65 - 99 mg/dL Final   • BUN 06/29/2017 31* 8 - 23 mg/dL Final   • Creatinine 06/29/2017 1.18* 0.57 - 1.00 mg/dL Final   • Sodium 06/29/2017 136  136 - 145 mmol/L Final   • Potassium 06/29/2017 4.3  3.5 - 5.2 " mmol/L Final   • Chloride 06/29/2017 101  98 - 107 mmol/L Final   • CO2 06/29/2017 22.2  22.0 - 29.0 mmol/L Final   • Calcium 06/29/2017 9.4  8.6 - 10.5 mg/dL Final   • eGFR Non African Amer 06/29/2017 45* >60 mL/min/1.73 Final   • BUN/Creatinine Ratio 06/29/2017 26.3* 7.0 - 25.0 Final   • Anion Gap 06/29/2017 12.8  mmol/L Final   • WBC 06/29/2017 6.86  4.50 - 10.70 10*3/mm3 Final   • RBC 06/29/2017 3.61* 3.90 - 5.20 10*6/mm3 Final   • Hemoglobin 06/29/2017 11.1* 11.9 - 15.5 g/dL Final   • Hematocrit 06/29/2017 33.0* 35.6 - 45.5 % Final   • MCV 06/29/2017 91.4  80.5 - 98.2 fL Final   • MCH 06/29/2017 30.7  26.9 - 32.0 pg Final   • MCHC 06/29/2017 33.6  32.4 - 36.3 g/dL Final   • RDW 06/29/2017 14.3* 11.7 - 13.0 % Final   • RDW-SD 06/29/2017 48.0  37.0 - 54.0 fl Final   • MPV 06/29/2017 10.3  6.0 - 12.0 fL Final   • Platelets 06/29/2017 184  140 - 500 10*3/mm3 Final   • Color, UA 06/29/2017 Yellow  Yellow, Straw Final   • Appearance, UA 06/29/2017 Clear  Clear Final   • pH, UA 06/29/2017 5.5  5.0 - 8.0 Final   • Specific Gravity, UA 06/29/2017 1.018  1.005 - 1.030 Final   • Glucose, UA 06/29/2017 Negative  Negative Final   • Ketones, UA 06/29/2017 Negative  Negative Final   • Bilirubin, UA 06/29/2017 Negative  Negative Final   • Blood, UA 06/29/2017 Negative  Negative Final   • Protein, UA 06/29/2017 Negative  Negative Final   • Leuk Esterase, UA 06/29/2017 Negative  Negative Final   • Nitrite, UA 06/29/2017 Negative  Negative Final   • Urobilinogen, UA 06/29/2017 0.2 E.U./dL  0.2 - 1.0 E.U./dL Final   Lab on 06/21/2017   Component Date Value Ref Range Status   • Immature Reticulocyte Fraction 06/21/2017 11.0  0.7 - 13.7 % Final   • Reticulocyte % 06/21/2017 1.77* 0.50 - 1.50 % Final   • Reticulocyte Hgb 06/21/2017 32.8  32.7 - 38.6 pg Final   • WBC 06/21/2017 5.44  4.50 - 10.70 10*3/mm3 Final   • RBC 06/21/2017 3.49* 3.90 - 5.20 10*6/mm3 Final   • Hemoglobin 06/21/2017 10.9* 11.9 - 15.5 g/dL Final   • Hematocrit  06/21/2017 33.1* 35.6 - 45.5 % Final   • MCV 06/21/2017 94.8  80.5 - 98.2 fL Final   • MCH 06/21/2017 31.2  26.9 - 32.0 pg Final   • MCHC 06/21/2017 32.9  32.4 - 36.3 g/dL Final   • RDW 06/21/2017 14.3* 11.7 - 13.0 % Final   • RDW-SD 06/21/2017 49.2  37.0 - 54.0 fl Final   • MPV 06/21/2017 10.3  6.0 - 12.0 fL Final   • Platelets 06/21/2017 200  140 - 500 10*3/mm3 Final   • Neutrophil % 06/21/2017 49.2  42.7 - 76.0 % Final   • Lymphocyte % 06/21/2017 36.6  19.6 - 45.3 % Final   • Monocyte % 06/21/2017 9.2  5.0 - 12.0 % Final   • Eosinophil % 06/21/2017 3.3  0.3 - 6.2 % Final   • Basophil % 06/21/2017 1.3  0.0 - 1.5 % Final   • Immature Grans % 06/21/2017 0.4  0.0 - 0.5 % Final   • Neutrophils, Absolute 06/21/2017 2.68  1.90 - 8.10 10*3/mm3 Final   • Lymphocytes, Absolute 06/21/2017 1.99  0.90 - 4.80 10*3/mm3 Final   • Monocytes, Absolute 06/21/2017 0.50  0.20 - 1.20 10*3/mm3 Final   • Eosinophils, Absolute 06/21/2017 0.18  0.00 - 0.70 10*3/mm3 Final   • Basophils, Absolute 06/21/2017 0.07  0.00 - 0.20 10*3/mm3 Final   • Immature Grans, Absolute 06/21/2017 0.02  0.00 - 0.03 10*3/mm3 Final   • nRBC 06/21/2017 0.0  0.0 - 0.0 /100 WBC Final     No results found.    Xrays:  Xrays AP pelvis and a lateral of the Right hip were reviewed demonstrating  End stage hip OA with bone on bone articulation, subchondral cysts and periarticular osteophytes.    Assessment:  End-stage Right hip osteoarthritis. Conservative measures have failed.      Plan:  The plan is to proceed with Right Total Hip Replacement. The patient voiced understanding of the risks, benefits, and alternative forms of treatment that were discussed with Dr Mcguire at the time of scheduling.  Patient is planning on going home with home health after 1-2 day stay.  She does have a history of atrial fibrillation and we will resume her pradaxa post operatively.  In addition BUN/creatinine and creatinine are elevated so no anti-inflammatories    Sabrina Vicente,  APRN  7/6/2017

## 2017-07-12 NOTE — ANESTHESIA PROCEDURE NOTES
Airway  Urgency: elective    Date/Time: 7/12/2017 11:43 AM  Airway not difficult    General Information and Staff    Patient location during procedure: OR  Anesthesiologist: SUSAN ANDERSON  CRNA: FLORENCIA PEACOCK    Indications and Patient Condition  Indications for airway management: airway protection    Preoxygenated: yes  Mask difficulty assessment: 1 - vent by mask    Final Airway Details  Final airway type: endotracheal airway      Successful airway: ETT  Cuffed: yes   Successful intubation technique: direct laryngoscopy  Facilitating devices/methods: intubating stylet  Endotracheal tube insertion site: oral  Blade: Araujo  Blade size: #2  ETT size: 7.0 mm  Cormack-Lehane Classification: grade I - full view of glottis  Placement verified by: chest auscultation and capnometry   Cuff volume (mL): 6  Measured from: teeth  ETT to teeth (cm): 21  Number of attempts at approach: 1

## 2017-07-12 NOTE — OP NOTE
Name: Mary Kay Saba  YOB: 1945    DATE OF SURGERY: 7/12/2017    PREOPERATIVE DIAGNOSIS: Right hip end-stage osteoarthritis    POSTOPERATIVE DIAGNOSIS: Right hip end-stage osteoarthritis    PROCEDURE PERFORMED: Right anterior total hip replacement    SURGEON: Van Mcguire M.D.    ASSISTANT: JOSE DE JESUS RIOS    IMPLANTS: [employer] Polar stem, R3 cup:    Implant Name Type Inv. Item Serial No.  Lot No. LRB No. Used   SHLL ACET R3 3H STD 52MM - [id number] Implant SHLL ACET R3 3H STD 52MM  [employer] [id number] Right 1   LINER ACET R3 XLPE 0D [id number] - [id number] Implant LINER ACET R3 XLPE 0D [id number]  [employer] [id number] Right 1   SCRW SPH HD REFLECTION 6.5X25MM - [id number] Implant SCRW SPH HD REFLECTION 6.5X25MM  [employer] [id number] Right 2   HD FEM OXINIUM TPR 12 14 36MM PLS0 - [id number] Implant HD FEM OXINIUM TPR 12 14 36MM PLS0  [employer] [id number] Right 1   POLARSTEM STEM STANDARD W TI/HA    sz 1 std   [employer] [id number] Right 1       Estimated Blood Loss: 200cc  Specimens : none  Complications: none    DESCRIPTION OF PROCEDURE: The patient was taken to the operating room and placed in the supine position. A sequential compression device was carefully placed on the non-operative leg. Preoperative antibiotics were administered. Surgical time out was performed. After adequate induction of anesthesia, the feet were padded and placed in the [place] table boots. The patient ws then transferred onto the [place] table and positioned appropriately. C-arm image intensification was then used to take images of the pelvis and operative hip to be used for later comparison. The hip was then prepped and draped in the usual sterile fashion.   An incision was then made starting 2 cm lateral to the ASIS heading distal and lateral at approximately 30 degrees. The subcutaneous fat was then divided down to the fascia overlying the tensor fascia sterling (TFL) muscle. This was  "sharply divided staying a few cm lateral to the interval between the sartorius and the TFL muscle. This interval was then bluntly dissected. The circumflex vessels were then identified, cauterized, and divided. There was excellent hemostasis. Cobra retractors were then placed around the femoral neck capsule. The capsule was then divided using a \"T\" capsulotomy. The retractors were then placed intracapsular the the neck osteotomy was performed. A napkin ring neck fragment was then removed and then the head was removed using a corkscrew. There were end-stage arthritic findings.   The acetabulum was then exposed with \"number 7\" retractors. The labrum and pulvinar were excised. The starting reamer was then used to medialize the cup and then the acetabular reaming proceeded in 2mm increments. The cup was reamed line to line. The cup was then partially seated and c-arm was used to confirm the final cup position before final seating occurred. There was excellent position and stability of the cup.  The hip was then injected with anesthetic cocktail and the cup was anchored with 2 screws in the superior and posterior quadrants. The final liner was placed.   Attention was turned to the femur.Traction was removed form the hip and the leg was brought to the neutral position. The femoral elevator hook was placed. The leg was then moved to the external rotation, extension, and adduction position. Retractors were placed around the proximal femur and then the posterior capsule and conjoined tendon was the  Released. The box osteotome was then used to creat the starting hole. The femur was then prepared using the rat tail broach, followed by the chili-pepper broach and then we progressively broached up the final broach which fit nicely with excellent rotational and axial stability. The hip was then reduced and c-arm images were taken which confirmed appropriate fit and position on the implants. There were no complicating factors " noted, and flouro images were taken which confirmed proper restoration of leg length and offset. The trial components were removed, the hip was copiously irrigated and the final implants were then seated. C-arm images again confirmed appropriate anatomy restoration without complicating factors noted. The final head was then placed on a clean dry taper and the hip reduced.   The hip was then copiously irrigated.  There was excellent hemostasis. We placed a one-eighth inch Hemovac drain. We closed the hip in multiple layers in standard fashion. Sterile dressing were applied. At the end of the case, the sponge and needle counts were reported as being correct. There were no known complications. The patient was then transported to the recovery room.      Van Mcguire M.D.  7/12/2017

## 2017-07-12 NOTE — ANESTHESIA POSTPROCEDURE EVALUATION
Patient: Mary Kay Saab    Procedure Summary     Date Anesthesia Start Anesthesia Stop Room / Location    07/12/17 [id number]  [facility] /  [facility]       Procedure Diagnosis Surgeon Provider    TOTAL HIP ARTHROPLASTY ANTERIOR WITH HANA TABLE (Right Hip) Primary osteoarthritis of right hip  (Primary osteoarthritis of right hip [M16.11]) MD Theodore Sandra MD          Anesthesia Type: general  Last vitals  BP 98/42 (07/12/17 1600)    Temp      Pulse (!) 46 (07/12/17 1600)   Resp 12 (07/12/17 1600)    SpO2 100 % (07/12/17 1600)      Post Anesthesia Care and Evaluation    Patient location during evaluation: bedside  Patient participation: complete - patient participated  Level of consciousness: awake and alert  Pain management: adequate  Airway patency: patent  Anesthetic complications: No anesthetic complications    Cardiovascular status: acceptable  Respiratory status: acceptable  Hydration status: acceptable

## 2017-07-12 NOTE — ANESTHESIA PREPROCEDURE EVALUATION
Anesthesia Evaluation     Patient summary reviewed   NPO Solid Status: > 6 hours  NPO Liquid Status: > 6 hours     Airway   Mallampati: II  TM distance: >3 FB  Dental      Pulmonary    (+) shortness of breath,   Cardiovascular     Rhythm: regular  Rate: normal    (+) hypertension, dysrhythmias Atrial Fib, MCCRAY, hyperlipidemia      Neuro/Psych  GI/Hepatic/Renal/Endo    (+)  GERD, renal disease CRI,     Musculoskeletal     Abdominal    Substance History      OB/GYN          Other   (+) arthritis   history of cancer remission                                    Anesthesia Plan    ASA 3     general     intravenous induction   Anesthetic plan and risks discussed with patient.

## 2017-07-13 ENCOUNTER — TELEPHONE (OUTPATIENT)
Dept: ORTHOPEDIC SURGERY | Facility: CLINIC | Age: 72
End: 2017-07-13

## 2017-07-13 VITALS
BODY MASS INDEX: 21.05 KG/M2 | HEART RATE: 54 BPM | TEMPERATURE: 96.8 F | HEIGHT: 66 IN | RESPIRATION RATE: 16 BRPM | OXYGEN SATURATION: 99 % | SYSTOLIC BLOOD PRESSURE: 123 MMHG | DIASTOLIC BLOOD PRESSURE: 42 MMHG | WEIGHT: 131 LBS

## 2017-07-13 LAB
HCT VFR BLD AUTO: 27.8 % (ref 35.6–45.5)
HGB BLD-MCNC: 9.2 G/DL (ref 11.9–15.5)

## 2017-07-13 PROCEDURE — 97110 THERAPEUTIC EXERCISES: CPT

## 2017-07-13 PROCEDURE — 94799 UNLISTED PULMONARY SVC/PX: CPT

## 2017-07-13 PROCEDURE — 97161 PT EVAL LOW COMPLEX 20 MIN: CPT

## 2017-07-13 PROCEDURE — 97150 GROUP THERAPEUTIC PROCEDURES: CPT

## 2017-07-13 PROCEDURE — 85018 HEMOGLOBIN: CPT | Performed by: ORTHOPAEDIC SURGERY

## 2017-07-13 PROCEDURE — 85014 HEMATOCRIT: CPT | Performed by: ORTHOPAEDIC SURGERY

## 2017-07-13 RX ORDER — ACETAMINOPHEN 325 MG/1
650 TABLET ORAL EVERY 4 HOURS PRN
Qty: 60 TABLET | Refills: 0 | Status: SHIPPED | OUTPATIENT
Start: 2017-07-13 | End: 2017-07-28

## 2017-07-13 RX ORDER — ONDANSETRON 4 MG/1
4 TABLET, FILM COATED ORAL EVERY 6 HOURS PRN
Qty: 10 TABLET | Refills: 0 | Status: SHIPPED | OUTPATIENT
Start: 2017-07-13 | End: 2017-08-31

## 2017-07-13 RX ORDER — PSEUDOEPHEDRINE HCL 30 MG
100 TABLET ORAL 2 TIMES DAILY PRN
Qty: 25 CAPSULE | Refills: 0 | Status: SHIPPED | OUTPATIENT
Start: 2017-07-13 | End: 2017-07-26

## 2017-07-13 RX ORDER — RALOXIFENE HYDROCHLORIDE 60 MG/1
60 TABLET, FILM COATED ORAL ONCE
Status: DISCONTINUED | OUTPATIENT
Start: 2017-07-13 | End: 2017-07-13 | Stop reason: HOSPADM

## 2017-07-13 RX ORDER — TRAMADOL HYDROCHLORIDE 50 MG/1
50 TABLET ORAL EVERY 6 HOURS PRN
Status: DISCONTINUED | OUTPATIENT
Start: 2017-07-13 | End: 2017-07-13 | Stop reason: HOSPADM

## 2017-07-13 RX ORDER — TRAMADOL HYDROCHLORIDE 50 MG/1
50 TABLET ORAL EVERY 4 HOURS PRN
Qty: 60 TABLET | Refills: 0 | Status: SHIPPED | OUTPATIENT
Start: 2017-07-13 | End: 2017-08-31

## 2017-07-13 RX ORDER — POLYETHYLENE GLYCOL 3350 17 G/17G
17 POWDER, FOR SOLUTION ORAL 2 TIMES DAILY
Qty: 476 G | Refills: 0 | Status: SHIPPED | OUTPATIENT
Start: 2017-07-13 | End: 2017-07-27

## 2017-07-13 RX ADMIN — HYDROCODONE BITARTRATE AND ACETAMINOPHEN 2 TABLET: 7.5; 325 TABLET ORAL at 06:18

## 2017-07-13 RX ADMIN — DABIGATRAN ETEXILATE MESYLATE 150 MG: 150 CAPSULE ORAL at 09:03

## 2017-07-13 RX ADMIN — MONTELUKAST 10 MG: 10 TABLET, FILM COATED ORAL at 09:03

## 2017-07-13 RX ADMIN — PANTOPRAZOLE SODIUM 40 MG: 40 TABLET, DELAYED RELEASE ORAL at 06:21

## 2017-07-13 RX ADMIN — HYDROCODONE BITARTRATE AND ACETAMINOPHEN 2 TABLET: 7.5; 325 TABLET ORAL at 01:10

## 2017-07-13 RX ADMIN — TRAMADOL HYDROCHLORIDE 50 MG: 50 TABLET, FILM COATED ORAL at 12:20

## 2017-07-13 NOTE — PLAN OF CARE
Problem: Patient Care Overview (Adult)  Goal: Plan of Care Review    07/13/17 0900   Coping/Psychosocial Response Interventions   Plan Of Care Reviewed With patient   Outcome Evaluation   Outcome Summary/Follow up Plan Pt presents s/p R anterior PREETHI demonstrating impaired R hip strength, gait pattern, and balance. Ambulating 150' w/ RWX and contact assist. Anticipate ready for DC to home w/ home PT after PT this afternoon. No equipment needs.          Problem: Inpatient Physical Therapy  Goal: Transfer Training Goal 1 LTG- PT    07/13/17 0900   Transfer Training PT LTG   Transfer Training PT LTG, Date Established 07/13/17   Transfer Training PT LTG, Time to Achieve 1 wk   Transfer Training PT LTG, Activity Type sit to stand/stand to sit   Transfer Training PT LTG, [place] Level supervision required   Transfer Training PT LTG, Assist Device walker, rolling       Goal: Gait Training Goal LTG- PT    07/13/17 0900   Gait Training PT LTG   Gait Training Goal PT LTG, Date Established 07/13/17   Gait Training Goal PT LTG, Time to Achieve 1 wk   Gait Training Goal PT LTG, [place] Level supervision required   Gait Training Goal PT LTG, Assist Device walker, rolling   Gait Training Goal PT LTG, Distance to Achieve 150       Goal: Stair Training Goal LTG- PT    07/13/17 0900   Stair Training PT LTG   Stair Training Goal PT LTG, Date Established 07/13/17   Stair Training Goal PT LTG, Time to Achieve 1 wk   Stair Training Goal PT LTG, Number of Steps 14   Stair Training Goal PT LTG, [place] Level contact guard assist   Stair Training Goal PT LTG, Assist Device 2 handrails

## 2017-07-13 NOTE — PLAN OF CARE
Problem: Inpatient Physical Therapy  Goal: Transfer Training Goal 1 LTG- PT    07/13/17 1349   Transfer Training PT LTG   Transfer Training PT LTG, Outcome goal met       Goal: Gait Training Goal LTG- PT    07/13/17 1349   Gait Training PT LTG   Gait Training Goal PT LTG, Outcome goal met       Goal: Stair Training Goal LTG- PT    07/13/17 1349   Stair Training PT LTG   Stair Training Goal PT LTG, Outcome goal partially met   Stair Training Goal PT LTG, Reason Goal Not Met discharged from facility

## 2017-07-13 NOTE — THERAPY EVALUATION
Acute Care - Physical Therapy Initial Evaluation  [facility]     Patient Name: Mary Kay Saab  : 1945  MRN: [id number]  Today's Date: 2017   Onset of Illness/Injury or Date of Surgery Date: 17     Referring Physician: Maynor      Admit Date: 2017     Visit Dx:    ICD-10-CM ICD-9-CM   1. Primary osteoarthritis of right hip M16.11 715.15     Patient Active Problem List   Diagnosis   • Anemia due to stage 3 chronic kidney disease treated with erythropoietin   • Breast cancer of upper-outer quadrant of left female breast   • Atrial fibrillation   • Iron deficiency anemia   • Osteopenia   • Mcguire esophagus   • HLD (hyperlipidemia)   • BP (high blood pressure)   • Hypoxia   • Primary osteoarthritis of right hip     Past Medical History:   Diagnosis Date   • Anemia     Of chronic renal insufficiency   • Atrial fibrillation    • Mcguire's esophagus    • Breast cancer    • Chronic renal insufficiency     Stage 3   • MCCRAY (dyspnea on exertion)    • Dyspnea    • GERD (gastroesophageal reflux disease)    • H/O electrocardiogram    • Hx of being hospitalized     [facility] in 2010 and 10/2010 for atrial fibrillation, Dr. Perla Hurtado   • Hyperlipidemia    • Hypertension    • Hypoxia    • IBS (irritable bowel syndrome)    • Kidney dysfunction    • Lower extremity edema    • Lump or mass in breast    • Malignant neoplasm of breast    • Osteoarthritis    • Osteopenia    • Paroxysmal atrial fibrillation    • Seizure     AS A TEENAGER, NOT CURRENTLY   • SOB (shortness of breath)      Past Surgical History:   Procedure Laterality Date   • BREAST LUMPECTOMY      For LCIS   • CARDIAC CATHETERIZATION Left 2016    Procedure: Cardiac catheterization;  Surgeon: Kevin Guillen MD;  Location: [facility] INVASIVE LOCATION;  Service:    • FOOT SURGERY     • HAND ARTHROPLASTY Right    • KNEE ARTHROSCOPY     • OTHER SURGICAL HISTORY      LYMPHATIC SURGERY          PT ASSESSMENT (last  72 hours)      PT Evaluation       07/13/17 0816 07/13/17 0608    Rehab Evaluation    Document Type evaluation  -AR     Subjective Information agree to therapy  -AR     Patient Effort, Rehab Treatment good  -AR     Symptoms Noted During/After Treatment fatigue  -AR     General Information    Patient Profile Review yes  -AR     Onset of Illness/Injury or Date of Surgery Date 07/12/17  -AR     Referring Physician Brown  -AR     Precautions/Limitations fall precautions;anterior hip precautions- right  -AR     Prior Level of Function independent:  -AR     Equipment Currently Used at Home none  -AR none  -NW    Barriers to Rehab none identified  -AR     Living Environment    Lives With spouse  -AR     Living Arrangements condominium  -AR     Home Accessibility stairs to enter home  -AR     Number of Stairs to Enter Home 14  -AR     Stair Railings at Home inside, present at both sides  -AR     Clinical Impression    Patient/Family Goals Statement DC home tday  -AR     Criteria for Skilled Therapeutic Interventions Met yes  -AR     Pathology/Pathophysiology Noted (Describe Specifically for Each System) musculoskeletal  -AR     Impairments Found (describe specific impairments) aerobic capacity/endurance;gait, locomotion, and balance  -AR     Rehab Potential good, to achieve stated therapy goals  -AR     Vital Signs    O2 Delivery Pre Treatment room air  -AR     Pain Assessment    Pain Assessment 0-10  -AR     Pain Score 6  -AR     Post Pain Score 6  -AR     Pain Location Hip  -AR     Pain Orientation Right  -AR     Pain Intervention(s) Repositioned  -AR     Cognitive Assessment/Intervention    Current Cognitive/Communication Assessment functional  -AR     Orientation Status oriented x 4  -AR     Follows Commands/Answers Questions 100% of the time  -AR     Personal Safety WNL/WFL  -AR     Personal Safety Interventions fall prevention program maintained;gait belt;muscle strengthening facilitated  -AR     ROM (Range of Motion)     General ROM --   WFL except R hip  -AR     MMT (Manual Muscle Testing)    General MMT Assessment --   WFL except R hip  -AR     Bed Mobility, Assessment/Treatment    Bed Mob, Supine to Sit, [place] supervision required  -AR     Bed Mob, Sit to Supine, [place] not tested  -AR     Bed Mobility, Comment HOB flat, no bed rail  -AR     Transfer Assessment/Treatment    Transfers, Sit-Stand [place] contact guard assist  -AR     Transfers, Stand-Sit [place] contact guard assist  -AR     Transfers, Sit-Stand-Sit, Assist Device rolling walker  -AR     Toilet Transfer, [place] contact guard assist  -AR     Toilet Transfer, Assistive Device rolling walker  -AR     Gait Assessment/Treatment    Gait, [place] Level contact guard assist  -AR     Gait, Assistive Device rolling walker  -AR     Gait, Distance (Feet) 150  -AR     Gait, Gait Pattern Analysis swing-through gait  -AR     Gait, Gait Deviations sara decreased;decreased heel strike  -AR     Gait, Safety Issues step length decreased;weight-shifting ability decreased  -AR     Gait, Impairments strength decreased;impaired balance  -AR     Gait, Comment limited by report of dizziness  -AR     Therapy Exercises    Exercise Protocols total hip  -AR     Total Hip Exercises right:;10 reps;completed protocol  -AR     Positioning and Restraints    Pre-Treatment Position in bed  -AR     Post Treatment Position chair  -AR     In Chair sitting;call light within reach;encouraged to call for assist;exit alarm on;with family/caregiver;reclined  -AR       07/12/17 8995       General Information    Equipment Currently Used at Home none  -TG     Living Environment    Lives With spouse  -TG     Living Arrangements condominium  -TG     Home Accessibility no concerns  -TG     Stair Railings at Home none  -TG     Type of Financial/Environmental Concern none  -TG     Transportation Available car  -TG       User Key  (r) = Recorded By, (t) = Taken By, (c) =  Cosigned By    Initials Name Provider Type    TG Merlin Eldridge, RN Registered Nurse    NW Sarahi Baig RN Registered Nurse    ARACELI Russell PT Physical Therapist          Physical Therapy Education     Title: PT OT SLP Therapies (Active)     Topic: Physical Therapy (Active)     Point: Mobility training (Active)    Learning Progress Summary    Learner Readiness Method Response Comment Documented by Status   Patient Acceptance E NR  AR 07/13/17 0904 Active               Point: Home exercise program (Active)    Learning Progress Summary    Learner Readiness Method Response Comment Documented by Status   Patient Acceptance E NR  AR 07/13/17 0904 Active               Point: Body mechanics (Active)    Learning Progress Summary    Learner Readiness Method Response Comment Documented by Status   Patient Acceptance E NR  AR 07/13/17 0904 Active               Point: Precautions (Active)    Learning Progress Summary    Learner Readiness Method Response Comment Documented by Status   Patient Acceptance E NR  AR 07/13/17 0904 Active                      User Key     Initials Effective Dates Name Provider Type Discipline    AR 06/27/16 -  Deboarh Russell PT Physical Therapist PT                PT Recommendation and Plan  Anticipated Equipment Needs At Discharge:  (no equipment needs)  Anticipated Discharge Disposition: home with assist, home with home health  Planned Therapy Interventions: balance training, bed mobility training, gait training, home exercise program, patient/family education, ROM (Range of Motion), stair training, strengthening  PT Frequency: 2 times/day  Plan of Care Review  Plan Of Care Reviewed With: patient  Outcome Summary/Follow up Plan: Pt presents s/p R anterior PREETHI demonstrating impaired R hip strength, gait pattern, and balance.  Ambulating 150' w/ RWX and contact assist.  Anticipate ready for DC to home w/ home PT after PT this afternoon.  No equipment needs.            IP PT Goals        07/13/17 0900          Transfer Training PT LTG    Transfer Training PT LTG, Date Established 07/13/17  -AR      Transfer Training PT LTG, Time to Achieve 1 wk  -AR      Transfer Training PT LTG, Activity Type sit to stand/stand to sit  -AR      Transfer Training PT LTG, Nance Level supervision required  -AR      Transfer Training PT LTG, Assist Device walker, rolling  -AR      Gait Training PT LTG    Gait Training Goal PT LTG, Date Established 07/13/17  -AR      Gait Training Goal PT LTG, Time to Achieve 1 wk  -AR      Gait Training Goal PT LTG, Nance Level supervision required  -AR      Gait Training Goal PT LTG, Assist Device walker, rolling  -AR      Gait Training Goal PT LTG, Distance to Achieve 150  -AR      Stair Training PT LTG    Stair Training Goal PT LTG, Date Established 07/13/17  -AR      Stair Training Goal PT LTG, Time to Achieve 1 wk  -AR      Stair Training Goal PT LTG, Number of Steps 14  -AR      Stair Training Goal PT LTG, Nance Level contact guard assist  -AR      Stair Training Goal PT LTG, Assist Device 2 handrails  -AR        User Key  (r) = Recorded By, (t) = Taken By, (c) = Cosigned By    Initials Name Provider Type    AR Deborah Russell PT Physical Therapist                Outcome Measures       07/13/17 0900          How much help from another person do you currently need...    Turning from your back to your side while in flat bed without using bedrails? 4  -AR      Moving from lying on back to sitting on the side of a flat bed without bedrails? 4  -AR      Moving to and from a bed to a chair (including a wheelchair)? 3  -AR      Standing up from a chair using your arms (e.g., wheelchair, bedside chair)? 3  -AR      Climbing 3-5 steps with a railing? 3  -AR      To walk in hospital room? 3  -AR      AM-PAC 6 Clicks Score 20  -AR      Functional Assessment    Outcome Measure Options AM-PAC 6 Clicks Basic Mobility (PT)  -AR        User Key  (r) = Recorded By, (t) =  Taken By, (c) = Cosigned By    Initials Name Provider Type    AR Deborah Russell PT Physical Therapist           Time Calculation:         PT Charges       07/13/17 0904          Time Calculation    Start Time 0828  -AR      Stop Time 0905  -AR      Time Calculation (min) 37 min  -AR      PT Received On 07/13/17  -AR      PT - Next Appointment 07/13/17  -AR      PT Goal Re-Cert Due Date 07/20/17  -AR        User Key  (r) = Recorded By, (t) = Taken By, (c) = Cosigned By    Initials Name Provider Type    ARACELI Russell PT Physical Therapist          Therapy Charges for Today     Code Description Service Date Service Provider Modifiers Qty    66786638507 HC PT EVAL LOW COMPLEXITY 2 7/13/2017 Deborah Russell, PT GP 1    98773063054 HC PT THER PROC EA 15 MIN 7/13/2017 Deborah Russell PT GP 1          PT G-Codes  Outcome Measure Options: AM-PAC 6 Clicks Basic Mobility (PT)      Deborah Russell PT  7/13/2017

## 2017-07-13 NOTE — DISCHARGE SUMMARY
Patient Name: Mary Kay Saab  Patient YOB: 1945    Date of Admission:  7/12/2017  Date of Discharge:  7/13/2017  Discharge Diagnosis: TOTAL HIP ARTHROPLASTY ANTERIOR WITH HANA TABLE  Presenting Problem/History of Present Illness: Primary osteoarthritis of right hip [M16.11]  Primary osteoarthritis of right hip [M16.11]  Admitting Physician: Dr Van Mcguire  Consults:   Consults     No orders found for last 30 day(s).          DETAILS OF HOSPITAL STAY:  Patient is a 72 y.o. female was admitted to the floor following the above procedure and underwent an uncomplicated hospital stay.  Patient did well with physical therapy and was ambulating well without problems.  On the day of discharge the wound was clean, dry and intact and calf was soft and non tender and Homans sign was negative.  Patient was tolerating  without problems. She was changed from NOrco to Tramadol and tylenol.  Patient will be discharged home. We will check an H&H tomorrow with home health.    Condition on Discharge:  Stable    Vital Signs  Temp:  [94.5 °F (34.7 °C)-97.7 °F (36.5 °C)] 97.7 °F (36.5 °C)  Heart Rate:  [46-63] 56  Resp:  [10-20] 16  BP: ()/(27-61) 101/49    LABS:   Admission on 07/12/2017   Component Date Value Ref Range Status   • ABO Type 07/12/2017 A   Final   • RH type 07/12/2017 Negative   Final   • Antibody Screen 07/12/2017 Negative   Final   • Hemoglobin 07/13/2017 9.2* 11.9 - 15.5 g/dL Final   • Hematocrit 07/13/2017 27.8* 35.6 - 45.5 % Final       Xr Hip 1 View Without Pelvis Right (surgery Only)    Result Date: 7/12/2017  Narrative: STAT PORTABLE VIEW OF THE RIGHT HIP 07/12/2017  CLINICAL HISTORY: Postop right hip arthroplasty  FINDINGS: Single coned-down portable view of the right hip submitted for interpretation. There is total right hip arthroplasty prosthesis in place with good alignment of the acetabular cup and native acetabulum fixated by 2 vertical screws inferior right iliac bone, the femoral stem  is well aligned in the proximal femur and there is good alignment of the femoral and acetabular components right hip prosthesis. Cutaneous staples are in place and postoperative air is present adjacent to proximal right femur and right hip, no acute fracture or malalignment is seen.      Impression: Status post total right hip arthroplasty with good alignment of the prosthesis and no complicating features seen.  This report was finalized on 7/12/2017 6:34 PM by Dr. Quentin Sorensen MD.      Xr Foot 3+ View Right    Impression: Ordering physician's impression is located in the Encounter Note dated 07/07/17.     Fl C Arm During Surgery    Result Date: 7/12/2017  Narrative: This procedure was auto-finalized with no dictation required.    Xr Hip With Or Without Pelvis 1 View Right    Result Date: 7/12/2017  Narrative: 1 VIEW PORTABLE RIGHT HIP IN OR  HISTORY: Hip replacement for osteoarthritis  Imaging in the operating room was performed at the time of total hip replacement and the alignment appears satisfactory. 6 images were obtained and the fluoroscopy time measures 24 seconds.  This report was finalized on 7/12/2017 5:22 PM by Dr. Yobany Farah MD.            Discharge Medications   Mary Kay Saab   Home Medication Instructions YENNI:[id number]    Printed on:07/13/17 0820   Medication Information                      acetaminophen (TYLENOL) 325 MG tablet  Take 2 tablets by mouth Every 4 (Four) Hours As Needed for Mild Pain (1-3) for up to 15 days.             alendronate (FOSAMAX) 70 MG tablet  Take 70 mg by mouth Every 7 (Seven) Days.             alosetron (LOTRONEX) 0.5 MG tablet  Take 0.05 mg by mouth Daily As Needed.             ALPRAZolam (XANAX) 1 MG tablet  Take 1 mg by mouth every night.             amiodarone (PACERONE) 200 MG tablet  TK 1 T PO D PRN             bumetanide (BUMEX) 1 MG tablet  Take 1 mg by mouth Daily As Needed.             cetirizine (ZyrTEC) 10 MG tablet  Take 10 mg by mouth Daily.              dabigatran etexilate (PRADAXA) 150 MG capsu  Take 150 mg by mouth 2 (Two) Times a Day.             desonide (DESOWEN) 0.05 % ointment  Apply  topically daily.             docusate sodium 100 MG capsule  Take 100 mg by mouth 2 (Two) Times a Day As Needed for Constipation for up to 13 days.             esomeprazole (NexIUM) 40 MG capsule  Take 40 mg by mouth 2 (two) times a day.             metoprolol succinate XL (TOPROL-XL) 12.5 MG 24 hr half tablet  Take 25 mg by mouth Daily As Needed.             montelukast (SINGULAIR) 10 MG tablet  Take 10 mg by mouth Daily.             nebivolol (BYSTOLIC) 2.5 MG tablet  Take 2.5 mg by mouth Every Night.             nebivolol (BYSTOLIC) 5 MG tablet  Take 5 mg by mouth Daily. MID AFTERNOON             nystatin-triamcinolone (MYCOLOG II) 817158-6.1 UNIT/GM-% cream               ondansetron (ZOFRAN) 4 MG tablet  Take 1 tablet by mouth Every 6 (Six) Hours As Needed for Nausea or Vomiting for up to 10 doses.             polyethylene glycol (MIRALAX) packet  Take 17 g by mouth 2 (Two) Times a Day for 14 days. Indications: Treatment for the Prevention of Constipation             pseudoephedrine-guaifenesin (MUCINEX D)  MG per 12 hr tablet  Take 1 tablet by mouth Every 12 (Twelve) Hours.             raloxifene (EVISTA) 60 MG tablet  Take 60 mg by mouth Daily.             RESTASIS 0.05 % ophthalmic emulsion  INT 1 DROP IN BOTH EYES BID             simvastatin (ZOCOR) 40 MG tablet  Take 40 mg by mouth Every Night.             traMADol (ULTRAM) 50 MG tablet  Take 1 tablet by mouth Every 4 (Four) Hours As Needed for Moderate Pain (4-6).             Triamcinolone Acetonide (NASACORT) 55 MCG/ACT nasal inhaler  2 sprays into each nostril 2 (Two) Times a Day.                 Activity at Discharge:     Discharge Instructions:   1)  Patient is to continue with physical therapy exercises daily and continue working with the physical therapist as ordered.  2)  Follow NO hip precautions.    3)  Patient may weight bear as tolerated.   4)  Continue RIGO hose daily and ice regularly. Patient instructed on frequent calf pumping exercises.  Patient also instructed on incentive spirometer during hospitalization and encouraged to continue to use at home regularly.   5)  The dressing should be left in place. If waterproof dressing is intact the patient may shower immediately following discharge. If the dressing becomes disloged or saturated it should be changed and patient must wait until POD #5 to shower. If dressing is changed, apply dry sterile dressing after showering.  6)  Follow up appointment in 2 weeks - patient to call the office at [phone] to schedule. 7)  Patient will be discharged on Pradaxa    Complete Discharge Diagnosis:    Patient Active Problem List   Diagnosis   • Anemia due to stage 3 chronic kidney disease treated with erythropoietin   • Breast cancer of upper-outer quadrant of left female breast   • Atrial fibrillation   • Iron deficiency anemia   • Osteopenia   • Mcguire esophagus   • HLD (hyperlipidemia)   • BP (high blood pressure)   • Hypoxia   • Primary osteoarthritis of right hip           Follow-up Appointments  Future Appointments  Date Time Provider Department Center   7/27/2017 8:00 AM MD LADAN [facility] None   9/13/2017 9:00 AM LAB CHAIR [facility] None   9/13/2017 9:30 AM INJECTION CHAIR [facility]   11/8/2017 9:00 AM LAB CHAIR [facility] None   11/8/2017 9:30 AM INJECTION CHAIR [facility]   1/10/2018 9:00 AM LAB CHAIR [facility] None   1/10/2018 9:40 AM MD LADAN [facility]   1/10/2018 10:00 [facility]     Additional Instructions for the Follow-ups that You Need to Schedule     Referral to home health    As directed    Anterior hip replacement - no hip precautions   Face to Face Visit Date:  7/13/2017    Follow-up Provider for Plan of Care?:  I will be treating the patient on an ongoing basis.  Please send me the Plan of Care for signature.   Follow-up Provider:  LEVI MCGUIRE   Reason/Clinical Findings:  post op total hip replacement   Describe mobility limitations that make leaving home difficult:  pain, swelling, weakness, difficulty ambulating without assistive decices   Nursing/Therapeutic Services Requested:   Skilled Nursing  Physicial Therapy      Skilled nursing orders:   Other Comment - draw H&H in Am and call to [phone]  Cardiopulmonary assessments                         Levi Mcguire MD  07/13/17  8:20 AM

## 2017-07-13 NOTE — THERAPY DISCHARGE NOTE
Acute Care - Physical Therapy Treatment Note/Discharge  [facility]     Patient Name: Mary Kay Saab  : 1945  MRN: [id number]  Today's Date: 2017  Onset of Illness/Injury or Date of Surgery Date: 17     Referring Physician: Maynor    Admit Date: 2017    Visit Dx:    ICD-10-CM ICD-9-CM   1. Primary osteoarthritis of right hip M16.11 715.15     Patient Active Problem List   Diagnosis   • Anemia due to stage 3 chronic kidney disease treated with erythropoietin   • Breast cancer of upper-outer quadrant of left female breast   • Atrial fibrillation   • Iron deficiency anemia   • Osteopenia   • Mcguire esophagus   • HLD (hyperlipidemia)   • BP (high blood pressure)   • Hypoxia   • Primary osteoarthritis of right hip       Physical Therapy Education     Title: PT OT SLP Therapies (Resolved)     Topic: Physical Therapy (Resolved)     Point: Mobility training (Resolved)    Learning Progress Summary    Learner Readiness Method Response Comment Documented by Status   Patient Acceptance E NR  AR 17 0904 Active               Point: Home exercise program (Resolved)    Learning Progress Summary    Learner Readiness Method Response Comment Documented by Status   Patient Acceptance E NR  AR 17 0904 Active               Point: Body mechanics (Resolved)    Learning Progress Summary    Learner Readiness Method Response Comment Documented by Status   Patient Acceptance E NR  AR 17 0904 Active               Point: Precautions (Resolved)    Learning Progress Summary    Learner Readiness Method Response Comment Documented by Status   Patient Acceptance E NR  AR 17 0904 Active                      User Key     Initials Effective Dates Name Provider Type Discipline    AR 16 -  Deborah Russell PT Physical Therapist PT                    IP PT Goals       17 1349 17 0900       Transfer Training PT LTG    Transfer Training PT LTG, Date Established  17  -AR     Transfer  Training PT LTG, Time to Achieve  1 wk  -AR     Transfer Training PT LTG, Activity Type  sit to stand/stand to sit  -AR     Transfer Training PT LTG, Nolan Level  supervision required  -AR     Transfer Training PT LTG, Assist Device  walker, rolling  -AR     Transfer Training PT LTG, Outcome goal met  -MS      Gait Training PT LTG    Gait Training Goal PT LTG, Date Established  07/13/17  -AR     Gait Training Goal PT LTG, Time to Achieve  1 wk  -AR     Gait Training Goal PT LTG, Nolan Level  supervision required  -AR     Gait Training Goal PT LTG, Assist Device  walker, rolling  -AR     Gait Training Goal PT LTG, Distance to Achieve  150  -AR     Gait Training Goal PT LTG, Outcome goal met  -MS      Stair Training PT LTG    Stair Training Goal PT LTG, Date Established  07/13/17  -AR     Stair Training Goal PT LTG, Time to Achieve  1 wk  -AR     Stair Training Goal PT LTG, Number of Steps  14  -AR     Stair Training Goal PT LTG, Nolan Level  contact guard assist  -AR     Stair Training Goal PT LTG, Assist Device  2 handrails  -AR     Stair Training Goal PT LTG, Outcome goal partially met  -MS      Stair Training Goal PT LTG, Reason Goal Not Met discharged from facility  -MS        User Key  (r) = Recorded By, (t) = Taken By, (c) = Cosigned By    Initials Name Provider Type    MS Tip Mccall, PT Physical Therapist    ARACELI Russell, PT Physical Therapist              Adult Rehabilitation Note       07/13/17 1347          Rehab Assessment/Intervention    Discipline physical therapist  -MS      Document Type therapy note (daily note);discharge summary  -MS      Subjective Information agree to therapy;complains of;pain  -MS      Patient Effort, Rehab Treatment good  -MS      Precautions/Limitations anterior hip precautions- right  -MS      Recorded by [MS] Tip Mccall, PT      Pain Assessment    Pain Assessment 0-10  -MS      Pain Score 3  -MS      Post Pain Score 3  -MS      Pain Type  Acute pain;Surgical pain  -MS      Pain Location Hip  -MS      Pain Orientation Right  -MS      Recorded by [MS] Tip Mccall PT      Bed Mobility, Assessment/Treatment    Bed Mobility, Comment Up in chair this PM.  -MS      Recorded by [MS] Tip Mccall PT      Transfer Assessment/Treatment    Transfers, Sit-Stand [place] independent  -MS      Transfers, Stand-Sit [place] independent  -MS      Transfers, Sit-Stand-Sit, Assist Device rolling walker  -MS      Recorded by [MS] Tip Mccall PT      Gait Assessment/Treatment    Gait, [place] Level independent  -MS      Gait, Assistive Device rolling walker  -MS      Gait, Distance (Feet) 150  -MS      Gait, Gait Pattern Analysis swing-through gait  -MS      Gait, Gait Deviations right:;antalgic;sara decreased  -MS      Recorded by [MS] Tip Mccall PT      Stairs Assessment/Treatment    Number of Stairs 10  -MS      Stairs, Handrail Location both sides  -MS      Stairs, [place] Level supervision required  -MS      Recorded by [MS] Tip Mccall PT      Therapy Exercises    Exercise Protocols total hip  -MS      Total Hip Exercises right:;15 reps;completed protocol  -MS      Recorded by [MS] Tip Mccall PT      Positioning and Restraints    Pre-Treatment Position sitting in chair/recliner  -MS      Post Treatment Position chair  -MS      In Chair notified nsg;reclined;sitting;call light within reach;encouraged to call for assist;with family/caregiver  -MS      Recorded by [MS] Tip Mccall PT        User Key  (r) = Recorded By, (t) = Taken By, (c) = Cosigned By    Initials Name Effective Dates    MS Tip Mccall PT 12/01/15 -           PT Recommendation and Plan  Anticipated Equipment Needs At Discharge:  (no equipment needs)  Anticipated Discharge Disposition: home with assist, home with home health  Planned Therapy Interventions: balance training, bed mobility training, gait training, home exercise program,  patient/family education, ROM (Range of Motion), stair training, strengthening  PT Frequency: 2 times/day             Outcome Measures       07/13/17 1300 07/13/17 0900       How much help from another person do you currently need...    Turning from your back to your side while in flat bed without using bedrails? 4  -MS 4  -AR     Moving from lying on back to sitting on the side of a flat bed without bedrails? 4  -MS 4  -AR     Moving to and from a bed to a chair (including a wheelchair)? 4  -MS 3  -AR     Standing up from a chair using your arms (e.g., wheelchair, bedside chair)? 4  -MS 3  -AR     Climbing 3-5 steps with a railing? 3  -MS 3  -AR     To walk in hospital room? 4  -MS 3  -AR     AM-PAC 6 Clicks Score 23  -MS 20  -AR     Functional Assessment    Outcome Measure Options AM-PAC 6 Clicks Basic Mobility (PT)  -MS AM-PAC 6 Clicks Basic Mobility (PT)  -AR       User Key  (r) = Recorded By, (t) = Taken By, (c) = Cosigned By    Initials Name Provider Type    MS Tip DONATO Cal, PT Physical Therapist    AR Deborah Russell, PT Physical Therapist           Time Calculation:         PT Charges       07/13/17 1349 07/13/17 0904       Time Calculation    Start Time 1300  -MS 0828  -AR     Stop Time 1324  -MS 0905  -AR     Time Calculation (min) 24 min  -MS 37 min  -AR     PT Received On 07/13/17  -MS 07/13/17  -AR     PT - Next Appointment  07/13/17  -AR     PT Goal Re-Cert Due Date  07/20/17  -AR       User Key  (r) = Recorded By, (t) = Taken By, (c) = Cosigned By    Initials Name Provider Type    MS Tip KINGA Mccall, PT Physical Therapist    AR Deborah Russell, PT Physical Therapist          Therapy Charges for Today     Code Description Service Date Service Provider Modifiers Qty    51568764432 HC PT THER PROC EA 15 MIN 7/13/2017 Tip Mccall, PT GP 1    05842033114 HC PT THER PROC GROUP 7/13/2017 Tip Mccall, PT GP 1          PT G-Codes  Outcome Measure Options: AM-PAC 6 Clicks Basic Mobility  (PT)    PT Discharge Summary  Reason for Discharge: Discharge from facility  Outcomes Achieved: Refer to plan of care for updates on goals achieved  Discharge Destination: Home with assist, Home with home health    Tip Mccall, PT  7/13/2017

## 2017-07-13 NOTE — PLAN OF CARE
Problem: Patient Care Overview (Adult)  Goal: Plan of Care Review  Outcome: Ongoing (interventions implemented as appropriate)    07/13/17 0521   Coping/Psychosocial Response Interventions   Plan Of Care Reviewed With patient   Patient Care Overview   Progress improving   Outcome Evaluation   Outcome Summary/Follow up Plan Arrived from surgery last night. Pain controlled with pain meds. VSS Educated pt about BP controll and monitoring.  Will continue to monitor       Goal: Adult Individualization and Mutuality  Outcome: Ongoing (interventions implemented as appropriate)  Goal: Discharge Needs Assessment  Outcome: Ongoing (interventions implemented as appropriate)    Problem: Perioperative Period (Adult)  Goal: Signs and Symptoms of Listed Potential Problems Will be Absent or Manageable (Perioperative Period)  Outcome: Ongoing (interventions implemented as appropriate)    Problem: Pain, Acute (Adult)  Goal: Identify Related Risk Factors and Signs and Symptoms  Outcome: Ongoing (interventions implemented as appropriate)  Goal: Acceptable Pain Control/Comfort Level  Outcome: Ongoing (interventions implemented as appropriate)    Problem: Fall Risk (Adult)  Goal: Identify Related Risk Factors and Signs and Symptoms  Outcome: Outcome(s) achieved Date Met:  07/13/17  Goal: Absence of Falls  Outcome: Ongoing (interventions implemented as appropriate)    Problem: Arrhythmia/Dysrhythmia (Symptomatic) (Adult)  Goal: Signs and Symptoms of Listed Potential Problems Will be Absent or Manageable (Arrhythmia/Dysrhythmia)  Outcome: Ongoing (interventions implemented as appropriate)    Problem: Hypertensive Disease/Crisis (Arterial) (Adult)  Goal: Signs and Symptoms of Listed Potential Problems Will be Absent or Manageable (Hypertensive Disease/Crisis)  Outcome: Ongoing (interventions implemented as appropriate)

## 2017-07-14 ENCOUNTER — TELEPHONE (OUTPATIENT)
Dept: ORTHOPEDIC SURGERY | Facility: CLINIC | Age: 72
End: 2017-07-14

## 2017-07-14 ENCOUNTER — TELEPHONE (OUTPATIENT)
Dept: CARDIOLOGY | Facility: CLINIC | Age: 72
End: 2017-07-14

## 2017-07-14 ENCOUNTER — LAB REQUISITION (OUTPATIENT)
Dept: LAB | Facility: HOSPITAL | Age: 72
End: 2017-07-14

## 2017-07-14 DIAGNOSIS — M16.0 PRIMARY OSTEOARTHRITIS OF BOTH HIPS: ICD-10-CM

## 2017-07-14 LAB
HCT VFR BLD AUTO: 25.3 % (ref 37–47)
HGB BLD-MCNC: 8.3 G/DL (ref 12–16)

## 2017-07-14 PROCEDURE — 85014 HEMATOCRIT: CPT | Performed by: ORTHOPAEDIC SURGERY

## 2017-07-14 PROCEDURE — 85018 HEMOGLOBIN: CPT | Performed by: ORTHOPAEDIC SURGERY

## 2017-07-14 NOTE — TELEPHONE ENCOUNTER
Santa from formerly [facility] called and states that pt is taking metoprolol and bystolic. She just want to make sure we are aware of it. Called and informed Santa that in the last visit notes   1. Paroxysmal atrial fibrillation. Currently, she is in sinus rhythm and has been  controlling it with Toprol and she is on Pradaxa and prn amiodarone  2. Labile blood pressure. Her blood pressure is controlled on bystolic. Per ADRY JOHN

## 2017-07-14 NOTE — TELEPHONE ENCOUNTER
Received a call from [facility].  Patient's nurse has several questions regarding her blood pressure and cardiac meds.  She also wanted to report her hemoglobin was 8.3 hematocrit was 25.3.  Patient's hemoglobin yesterday was 9.2.  Preop hemoglobin was 10.9.  She does have a history of chronic renal insufficiency and anemia.  Patient is asymptomatic.  Have left orders to repeat the hemoglobin and hematocrit on Monday.  All questions and orders regarding her cardiac and blood pressure medicine should be at dressed to either her PCP or her cardiologist

## 2017-07-17 ENCOUNTER — APPOINTMENT (OUTPATIENT)
Dept: ONCOLOGY | Facility: HOSPITAL | Age: 72
End: 2017-07-17

## 2017-07-17 ENCOUNTER — APPOINTMENT (OUTPATIENT)
Dept: LAB | Facility: HOSPITAL | Age: 72
End: 2017-07-17

## 2017-07-17 ENCOUNTER — TELEPHONE (OUTPATIENT)
Dept: ONCOLOGY | Facility: CLINIC | Age: 72
End: 2017-07-17

## 2017-07-17 ENCOUNTER — TELEPHONE (OUTPATIENT)
Dept: ONCOLOGY | Facility: HOSPITAL | Age: 72
End: 2017-07-17

## 2017-07-17 ENCOUNTER — INFUSION (OUTPATIENT)
Dept: ONCOLOGY | Facility: HOSPITAL | Age: 72
End: 2017-07-17

## 2017-07-17 ENCOUNTER — TELEPHONE (OUTPATIENT)
Dept: GENERAL RADIOLOGY | Facility: HOSPITAL | Age: 72
End: 2017-07-17

## 2017-07-17 DIAGNOSIS — D50.9 IRON DEFICIENCY ANEMIA, UNSPECIFIED IRON DEFICIENCY ANEMIA TYPE: Primary | ICD-10-CM

## 2017-07-17 DIAGNOSIS — N18.30 ANEMIA DUE TO STAGE 3 CHRONIC KIDNEY DISEASE TREATED WITH ERYTHROPOIETIN (HCC): ICD-10-CM

## 2017-07-17 DIAGNOSIS — D63.1 ANEMIA DUE TO STAGE 3 CHRONIC KIDNEY DISEASE TREATED WITH ERYTHROPOIETIN (HCC): ICD-10-CM

## 2017-07-17 LAB
BASOPHILS # BLD AUTO: 0.03 10*3/MM3 (ref 0–0.1)
BASOPHILS NFR BLD AUTO: 0.3 % (ref 0–1.1)
DEPRECATED RDW RBC AUTO: 53 FL (ref 37–49)
EOSINOPHIL # BLD AUTO: 0.19 10*3/MM3 (ref 0–0.36)
EOSINOPHIL NFR BLD AUTO: 2.1 % (ref 1–5)
ERYTHROCYTE [DISTWIDTH] IN BLOOD BY AUTOMATED COUNT: 14.6 % (ref 11.7–14.5)
FERRITIN SERPL-MCNC: 233.4 NG/ML
HCT VFR BLD AUTO: 26.3 % (ref 34–45)
HGB BLD-MCNC: 8.3 G/DL (ref 11.5–14.9)
IMM GRANULOCYTES # BLD: 0.14 10*3/MM3 (ref 0–0.03)
IMM GRANULOCYTES NFR BLD: 1.5 % (ref 0–0.5)
IRON 24H UR-MRATE: 48 MCG/DL (ref 37–145)
IRON SATN MFR SERPL: 21 % (ref 14–48)
LYMPHOCYTES # BLD AUTO: 1.4 10*3/MM3 (ref 1–3.5)
LYMPHOCYTES NFR BLD AUTO: 15.3 % (ref 20–49)
MCH RBC QN AUTO: 31.6 PG (ref 27–33)
MCHC RBC AUTO-ENTMCNC: 31.6 G/DL (ref 32–35)
MCV RBC AUTO: 100 FL (ref 83–97)
MONOCYTES # BLD AUTO: 1.13 10*3/MM3 (ref 0.25–0.8)
MONOCYTES NFR BLD AUTO: 12.3 % (ref 4–12)
NEUTROPHILS # BLD AUTO: 6.27 10*3/MM3 (ref 1.5–7)
NEUTROPHILS NFR BLD AUTO: 68.5 % (ref 39–75)
NRBC BLD MANUAL-RTO: 0 /100 WBC (ref 0–0)
PLATELET # BLD AUTO: 182 10*3/MM3 (ref 150–375)
PMV BLD AUTO: 10.5 FL (ref 8.9–12.1)
RBC # BLD AUTO: 2.63 10*6/MM3 (ref 3.9–5)
TIBC SERPL-MCNC: 231 MCG/DL (ref 249–505)
TRANSFERRIN SERPL-MCNC: 165 MG/DL (ref 200–360)
WBC NRBC COR # BLD: 9.16 10*3/MM3 (ref 4–10)

## 2017-07-17 PROCEDURE — 63510000001 EPOETIN ALFA PER 1000 UNITS: Performed by: INTERNAL MEDICINE

## 2017-07-17 PROCEDURE — 84466 ASSAY OF TRANSFERRIN: CPT | Performed by: NURSE PRACTITIONER

## 2017-07-17 PROCEDURE — 36415 COLL VENOUS BLD VENIPUNCTURE: CPT | Performed by: NURSE PRACTITIONER

## 2017-07-17 PROCEDURE — 96372 THER/PROPH/DIAG INJ SC/IM: CPT | Performed by: INTERNAL MEDICINE

## 2017-07-17 PROCEDURE — 83540 ASSAY OF IRON: CPT | Performed by: NURSE PRACTITIONER

## 2017-07-17 PROCEDURE — 82728 ASSAY OF FERRITIN: CPT | Performed by: NURSE PRACTITIONER

## 2017-07-17 PROCEDURE — 85025 COMPLETE CBC W/AUTO DIFF WBC: CPT | Performed by: NURSE PRACTITIONER

## 2017-07-17 RX ADMIN — ERYTHROPOIETIN 20000 UNITS: 20000 INJECTION, SOLUTION INTRAVENOUS; SUBCUTANEOUS at 12:37

## 2017-07-17 NOTE — TELEPHONE ENCOUNTER
Pt calling because she had hip replacement on 7/12 and her Hgb was checked on Friday and it was 8.2. She has previously received EPO when her Hgb is low although she has not received it in over a year. She feels very fatigued and weak. She is wondering if she could come in to have her labs checked and see if she could get epo.     Reviewed with Keila Shrestha NP. Per Keila, pt can come in today for CBC, Ferritin and iron panel with RN review to see if she requires EPO. Informed pt and she v/u. Message sent to scheduling and orders placed.

## 2017-07-17 NOTE — TELEPHONE ENCOUNTER
----- Message from Heather Rudd RN sent at 7/17/2017  9:10 AM EDT -----  Please schedule pt for cbc, ferritin and iron and RN review today. Please call pt to schedule.

## 2017-07-17 NOTE — TELEPHONE ENCOUNTER
----- Message from Laura Shafer RN sent at 7/17/2017 12:36 PM EDT -----  Please call pt and schedule weekly cbc and poss procrit starting next week. thank

## 2017-07-24 ENCOUNTER — LAB (OUTPATIENT)
Dept: LAB | Facility: HOSPITAL | Age: 72
End: 2017-07-24

## 2017-07-24 ENCOUNTER — INFUSION (OUTPATIENT)
Dept: ONCOLOGY | Facility: HOSPITAL | Age: 72
End: 2017-07-24

## 2017-07-24 VITALS — TEMPERATURE: 98.2 F | WEIGHT: 137.8 LBS | BODY MASS INDEX: 22.24 KG/M2

## 2017-07-24 DIAGNOSIS — D63.1 ANEMIA DUE TO STAGE 3 CHRONIC KIDNEY DISEASE TREATED WITH ERYTHROPOIETIN (HCC): ICD-10-CM

## 2017-07-24 DIAGNOSIS — D63.1 ANEMIA DUE TO STAGE 3 CHRONIC KIDNEY DISEASE TREATED WITH ERYTHROPOIETIN (HCC): Primary | ICD-10-CM

## 2017-07-24 DIAGNOSIS — C50.412 BREAST CANCER OF UPPER-OUTER QUADRANT OF LEFT FEMALE BREAST (HCC): ICD-10-CM

## 2017-07-24 DIAGNOSIS — D50.9 IRON DEFICIENCY ANEMIA, UNSPECIFIED IRON DEFICIENCY ANEMIA TYPE: ICD-10-CM

## 2017-07-24 DIAGNOSIS — N18.30 ANEMIA DUE TO STAGE 3 CHRONIC KIDNEY DISEASE TREATED WITH ERYTHROPOIETIN (HCC): Primary | ICD-10-CM

## 2017-07-24 DIAGNOSIS — N18.30 ANEMIA DUE TO STAGE 3 CHRONIC KIDNEY DISEASE TREATED WITH ERYTHROPOIETIN (HCC): ICD-10-CM

## 2017-07-24 LAB
BASOPHILS # BLD AUTO: 0.08 10*3/MM3 (ref 0–0.1)
BASOPHILS NFR BLD AUTO: 0.6 % (ref 0–1.1)
DEPRECATED RDW RBC AUTO: 50.1 FL (ref 37–49)
EOSINOPHIL # BLD AUTO: 0.08 10*3/MM3 (ref 0–0.36)
EOSINOPHIL NFR BLD AUTO: 0.6 % (ref 1–5)
ERYTHROCYTE [DISTWIDTH] IN BLOOD BY AUTOMATED COUNT: 14.6 % (ref 11.7–14.5)
HCT VFR BLD AUTO: 28.1 % (ref 34–45)
HGB BLD-MCNC: 9.2 G/DL (ref 11.5–14.9)
IMM GRANULOCYTES # BLD: 0.18 10*3/MM3 (ref 0–0.03)
IMM GRANULOCYTES NFR BLD: 1.4 % (ref 0–0.5)
LYMPHOCYTES # BLD AUTO: 1.48 10*3/MM3 (ref 1–3.5)
LYMPHOCYTES NFR BLD AUTO: 11.4 % (ref 20–49)
MCH RBC QN AUTO: 31 PG (ref 27–33)
MCHC RBC AUTO-ENTMCNC: 32.7 G/DL (ref 32–35)
MCV RBC AUTO: 94.6 FL (ref 83–97)
MONOCYTES # BLD AUTO: 1.08 10*3/MM3 (ref 0.25–0.8)
MONOCYTES NFR BLD AUTO: 8.3 % (ref 4–12)
NEUTROPHILS # BLD AUTO: 10.1 10*3/MM3 (ref 1.5–7)
NEUTROPHILS NFR BLD AUTO: 77.7 % (ref 39–75)
NRBC BLD MANUAL-RTO: 0 /100 WBC (ref 0–0)
PLATELET # BLD AUTO: 256 10*3/MM3 (ref 150–375)
PMV BLD AUTO: 10.2 FL (ref 8.9–12.1)
RBC # BLD AUTO: 2.97 10*6/MM3 (ref 3.9–5)
WBC NRBC COR # BLD: 13 10*3/MM3 (ref 4–10)

## 2017-07-24 PROCEDURE — 96372 THER/PROPH/DIAG INJ SC/IM: CPT | Performed by: INTERNAL MEDICINE

## 2017-07-24 PROCEDURE — 36416 COLLJ CAPILLARY BLOOD SPEC: CPT

## 2017-07-24 PROCEDURE — 63510000001 EPOETIN ALFA PER 1000 UNITS: Performed by: INTERNAL MEDICINE

## 2017-07-24 PROCEDURE — 85025 COMPLETE CBC W/AUTO DIFF WBC: CPT

## 2017-07-24 RX ADMIN — ERYTHROPOIETIN 20000 UNITS: 20000 INJECTION, SOLUTION INTRAVENOUS; SUBCUTANEOUS at 12:23

## 2017-07-24 NOTE — PROGRESS NOTES
Pt here today for Procrit injection.  CBC reviewed with patient.  WBC 13.  Pt is post-op and follows up with her surgeon on Thursday.  Instructed patient to bring CBC results with her.  Temp 98.2 and pt denies s/s of infection.  Pt will receive Procrit today and return again in 1 week.  Pt v/u.

## 2017-07-26 ENCOUNTER — TELEPHONE (OUTPATIENT)
Dept: ORTHOPEDIC SURGERY | Facility: CLINIC | Age: 72
End: 2017-07-26

## 2017-07-27 ENCOUNTER — OFFICE VISIT (OUTPATIENT)
Dept: ORTHOPEDIC SURGERY | Facility: CLINIC | Age: 72
End: 2017-07-27

## 2017-07-27 ENCOUNTER — TELEPHONE (OUTPATIENT)
Dept: ORTHOPEDIC SURGERY | Facility: CLINIC | Age: 72
End: 2017-07-27

## 2017-07-27 VITALS — TEMPERATURE: 98.9 F | WEIGHT: 132 LBS | BODY MASS INDEX: 21.99 KG/M2 | HEIGHT: 65 IN

## 2017-07-27 DIAGNOSIS — Z96.641 S/P HIP REPLACEMENT, RIGHT: Primary | ICD-10-CM

## 2017-07-27 PROCEDURE — 73502 X-RAY EXAM HIP UNI 2-3 VIEWS: CPT | Performed by: ORTHOPAEDIC SURGERY

## 2017-07-27 PROCEDURE — 99024 POSTOP FOLLOW-UP VISIT: CPT | Performed by: ORTHOPAEDIC SURGERY

## 2017-07-27 NOTE — TELEPHONE ENCOUNTER
Ok to drive when she feels ready- needs to practice first. OK to do either OP or PT exercises on her own

## 2017-07-27 NOTE — PROGRESS NOTES
Mary Kay Saab : 1945 MRN: [id number] DATE: 2017    DIAGNOSIS: 2 week follow up right total hip     SUBJECTIVE:Patient returns today for 2 week follow up of right total hip replacement. Patient reports doing well with no unusual complaints. Appears to be progressing appropriately.    OBJECTIVE:   Exam:. The incision is healing appropriately. No sign of infection. Range of motion is progressing as expected. The calf is soft and nontender with a negative Homans sign.    DIAGNOSTIC STUDIES  Xrays: 2 views of the right hip (AP pelvis and lateral right hip) were ordered and reviewed for evaluation of recent hip replacement. They demonstrate a well positioned, well aligned hip replacement without complicating factors noted. In comparison with previous films there has been interval implant placement.    ASSESSMENT: 2 week status post right hip replacement.    PLAN: 1) Staples removed and steri strips applied   2) PT exercises   3) Discontinue RIGO hose   4) Continue ice PRN   5) WBAT   6) pradaxa for lifetime   7) Follow up in 6 weeks with repeat Xrays of right hip (2views)    Van Mcguire MD  2017

## 2017-07-31 ENCOUNTER — LAB (OUTPATIENT)
Dept: LAB | Facility: HOSPITAL | Age: 72
End: 2017-07-31

## 2017-07-31 ENCOUNTER — INFUSION (OUTPATIENT)
Dept: ONCOLOGY | Facility: HOSPITAL | Age: 72
End: 2017-07-31

## 2017-07-31 DIAGNOSIS — D50.9 IRON DEFICIENCY ANEMIA, UNSPECIFIED IRON DEFICIENCY ANEMIA TYPE: ICD-10-CM

## 2017-07-31 DIAGNOSIS — D63.1 ANEMIA DUE TO STAGE 3 CHRONIC KIDNEY DISEASE TREATED WITH ERYTHROPOIETIN (HCC): Primary | ICD-10-CM

## 2017-07-31 DIAGNOSIS — N18.30 ANEMIA DUE TO STAGE 3 CHRONIC KIDNEY DISEASE TREATED WITH ERYTHROPOIETIN (HCC): ICD-10-CM

## 2017-07-31 DIAGNOSIS — D63.1 ANEMIA DUE TO STAGE 3 CHRONIC KIDNEY DISEASE TREATED WITH ERYTHROPOIETIN (HCC): ICD-10-CM

## 2017-07-31 DIAGNOSIS — N18.30 ANEMIA DUE TO STAGE 3 CHRONIC KIDNEY DISEASE TREATED WITH ERYTHROPOIETIN (HCC): Primary | ICD-10-CM

## 2017-07-31 DIAGNOSIS — C50.412 BREAST CANCER OF UPPER-OUTER QUADRANT OF LEFT FEMALE BREAST (HCC): ICD-10-CM

## 2017-07-31 LAB
BASOPHILS # BLD AUTO: 0.07 10*3/MM3 (ref 0–0.1)
BASOPHILS NFR BLD AUTO: 1.1 % (ref 0–1.1)
DEPRECATED RDW RBC AUTO: 49 FL (ref 37–49)
EOSINOPHIL # BLD AUTO: 0.13 10*3/MM3 (ref 0–0.36)
EOSINOPHIL NFR BLD AUTO: 2 % (ref 1–5)
ERYTHROCYTE [DISTWIDTH] IN BLOOD BY AUTOMATED COUNT: 14.3 % (ref 11.7–14.5)
HCT VFR BLD AUTO: 30.2 % (ref 34–45)
HGB BLD-MCNC: 9.9 G/DL (ref 11.5–14.9)
IMM GRANULOCYTES # BLD: 0.07 10*3/MM3 (ref 0–0.03)
IMM GRANULOCYTES NFR BLD: 1.1 % (ref 0–0.5)
LYMPHOCYTES # BLD AUTO: 0.95 10*3/MM3 (ref 1–3.5)
LYMPHOCYTES NFR BLD AUTO: 14.4 % (ref 20–49)
MCH RBC QN AUTO: 30.5 PG (ref 27–33)
MCHC RBC AUTO-ENTMCNC: 32.8 G/DL (ref 32–35)
MCV RBC AUTO: 92.9 FL (ref 83–97)
MONOCYTES # BLD AUTO: 0.54 10*3/MM3 (ref 0.25–0.8)
MONOCYTES NFR BLD AUTO: 8.2 % (ref 4–12)
NEUTROPHILS # BLD AUTO: 4.85 10*3/MM3 (ref 1.5–7)
NEUTROPHILS NFR BLD AUTO: 73.2 % (ref 39–75)
NRBC BLD MANUAL-RTO: 0 /100 WBC (ref 0–0)
PLATELET # BLD AUTO: 293 10*3/MM3 (ref 150–375)
PMV BLD AUTO: 9.6 FL (ref 8.9–12.1)
RBC # BLD AUTO: 3.25 10*6/MM3 (ref 3.9–5)
WBC NRBC COR # BLD: 6.61 10*3/MM3 (ref 4–10)

## 2017-07-31 PROCEDURE — 96372 THER/PROPH/DIAG INJ SC/IM: CPT

## 2017-07-31 PROCEDURE — 63510000001 EPOETIN ALFA PER 1000 UNITS: Performed by: NURSE PRACTITIONER

## 2017-07-31 PROCEDURE — 36416 COLLJ CAPILLARY BLOOD SPEC: CPT | Performed by: INTERNAL MEDICINE

## 2017-07-31 PROCEDURE — 85025 COMPLETE CBC W/AUTO DIFF WBC: CPT | Performed by: INTERNAL MEDICINE

## 2017-07-31 RX ADMIN — ERYTHROPOIETIN 20000 UNITS: 20000 INJECTION, SOLUTION INTRAVENOUS; SUBCUTANEOUS at 12:41

## 2017-08-01 ENCOUNTER — APPOINTMENT (OUTPATIENT)
Dept: PHYSICAL THERAPY | Facility: HOSPITAL | Age: 72
End: 2017-08-01
Attending: ORTHOPAEDIC SURGERY

## 2017-08-01 ENCOUNTER — TELEPHONE (OUTPATIENT)
Dept: CARDIOLOGY | Facility: CLINIC | Age: 72
End: 2017-08-01

## 2017-08-01 NOTE — TELEPHONE ENCOUNTER
Pt called and states that her BP is elevated. Systolic 150-170 / 60's. Pt feels hot (flash)at night. Pt is taking bystolic 5mg at day time and 2.5 mg at night. Pt takes extra pill at middle at the night. Pt don't check it all the time but she check it 2-3 times daily. Pt no other symptoms. Pt wants to know what she can do to lower her BP.....please advise      Thanks  Sujata JOHN

## 2017-08-03 ENCOUNTER — TREATMENT (OUTPATIENT)
Dept: PHYSICAL THERAPY | Facility: CLINIC | Age: 72
End: 2017-08-03

## 2017-08-03 DIAGNOSIS — Z96.641 STATUS POST TOTAL REPLACEMENT OF RIGHT HIP: Primary | ICD-10-CM

## 2017-08-03 PROCEDURE — G8978 MOBILITY CURRENT STATUS: HCPCS | Performed by: PHYSICAL THERAPIST

## 2017-08-03 PROCEDURE — G8979 MOBILITY GOAL STATUS: HCPCS | Performed by: PHYSICAL THERAPIST

## 2017-08-03 PROCEDURE — 97110 THERAPEUTIC EXERCISES: CPT | Performed by: PHYSICAL THERAPIST

## 2017-08-03 PROCEDURE — 97161 PT EVAL LOW COMPLEX 20 MIN: CPT | Performed by: PHYSICAL THERAPIST

## 2017-08-03 NOTE — PROGRESS NOTES
Physical Therapy Initial Evaluation and Plan of Care    Patient: Mary Kay Saab   : 1945  Diagnosis/ICD-10 Code:  Status post total replacement of right hip [Z96.641]  Referring practitioner: Van Mcguire MD  Date of Initial Visit: 8/3/2017  Today's Date: 8/3/2017    Subjective Evaluation    History of Present Illness  Date of surgery: 2017  Mechanism of injury: Pt with right anterior approach 2017.  Had home health PT.  Currently using a cane some, no AD at home.  Is anxious to get back to active lifestyle.        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    Treatments  Previous treatment: physical therapy and medication  Current treatment: medication  Discharged from (in last 30 days): home health care  Patient Goals  Patient goals for therapy: decreased pain, independence with ADLs/IADLs, return to sport/leisure activities, increased strength, improved balance and return to work             Objective     Observations   Left Hip  Positive for incision. Negative for abrasion, atrophy, deformity, drainage and edema.     Additional Observation Details  Incision healing well    Lumbar Screen  Lumbar range of motion within normal limits.    Neurological Testing   Sensation     Hip   Left Hip   Intact: light touch    Right Hip   Intact: light touch    Active Range of Motion   Left Hip   Normal active range of motion    Right Hip   Flexion: 110 degrees   Abduction: 20 degrees     Strength/Myotome Testing     Left Hip   Planes of Motion   Flexion: 4+  Extension: 4  Abduction:  4  Adduction: 4+    Right Hip   Planes of Motion   Flexion: 3-  Extension: 3-  Abduction: 3-  Adduction: 3-    Tests     Additional Tests Details  Deferred secondary to post op status    Ambulation   Weight-Bearing Status   Weight-Bearing Status (Left): weight-bearing as tolerated   Weight-Bearing Status (Right): weight-bearing as tolerated    Assistive device used: single point cane (occasionally)    Ambulation: Level Surfaces   Ambulation with assistive device: independent  Ambulation without assistive device: independent    Observational Gait   Gait: antalgic and asymmetric   Increased left stance time and right swing time. Decreased walking speed, stride length, right stance time and left swing time.   Left foot contact pattern: heel to toe  Right foot contact pattern: heel to toe  Left arm swing: within functional limits  Right arm swing: within functional limits  Base of support: normal         Assessment & Plan     Assessment  Impairments: abnormal gait, abnormal or restricted ROM, activity intolerance, impaired balance, impaired physical strength, lacks appropriate home exercise program and pain with function  Assessment details: Patient will benefit from skilled PT services in order to address impairments and facilitate return to normal daily activities including ADL's, work and recreational activities.      Prognosis: good  Prognosis details: Short Term Goals: 2-4 weeks. Patient will:  1. Be independent with initial HEP  2. Be instructed in posture and body mechanics    Long Term Goals: 4-6 weeks. Patient will:  1. Demonstrate improved Bilateral lower extremity/core MMT of >/= 4+/5 to allow for return to recreational/daily activities without increased pain.  2. Demonstrate normal lower extremity flexibility to allow for walking/ADL's/performance of household tasks without pain.  3. Ambulate with normal, symmetrical gait for 10 minutes on treadmill at 2.0 MPH.  4. Demo improved balance as evidenced by SLS  >/= 20 seconds bilaterally for decreased risk of falls.  5. Report pain of </= 1 with all daily activities.  6. Perceived disability improved by 20% as measured on LEFS  7. Be independent with long term HEP    Plan  Therapy options: will be seen for skilled physical therapy services  Planned modality interventions: cryotherapy, thermotherapy (hydrocollator packs) and TENS  Planned therapy interventions: flexibility, functional ROM exercises, gait training, home exercise program, strengthening, stretching and neuromuscular re-education  Frequency: 3x week  Duration in weeks: 8  Treatment plan discussed with: patient  Functional Limitations: walking, uncomfortable because of pain and standing      Manual Therapy:    0     mins  71631;  Therapeutic Exercise:    20     mins  01726;     Neuromuscular Lenny:    0    mins  41793;    Therapeutic Activity:     0     mins  66900;     Gait Trainin     mins  01072;     Ultrasound:     0     mins  73621;    Electrical Stimulation:    0     mins  64138 ( );    Timed Treatment:   20   mins   Total Treatment:     60   mins    PT SIGNATURE: Mariah Dove PT, DPT          Physical Therapist                               [place] License #[id number]    DATE TREATMENT INITIATED: 8/3/2017    Initial Certification Certification Period: 2017  I certify that the therapy services are furnished while this patient is under my care.  The services outlined above are required by this patient, and will be reviewed every 90 days.     PHYSICIAN: Van Mcguire MD      DATE:     Please sign and return via fax to [phone].. Thank you, [facility].

## 2017-08-04 NOTE — PATIENT INSTRUCTIONS
Pt was educated regarding relevant anatomy/physiology and plan of care.  Advised pt to take short walks at home a couple times a day to increase endurance.

## 2017-08-07 ENCOUNTER — INFUSION (OUTPATIENT)
Dept: ONCOLOGY | Facility: HOSPITAL | Age: 72
End: 2017-08-07

## 2017-08-07 ENCOUNTER — TREATMENT (OUTPATIENT)
Dept: PHYSICAL THERAPY | Facility: CLINIC | Age: 72
End: 2017-08-07

## 2017-08-07 ENCOUNTER — LAB (OUTPATIENT)
Dept: OTHER | Facility: HOSPITAL | Age: 72
End: 2017-08-07

## 2017-08-07 ENCOUNTER — APPOINTMENT (OUTPATIENT)
Dept: LAB | Facility: HOSPITAL | Age: 72
End: 2017-08-07

## 2017-08-07 ENCOUNTER — APPOINTMENT (OUTPATIENT)
Dept: ONCOLOGY | Facility: HOSPITAL | Age: 72
End: 2017-08-07

## 2017-08-07 DIAGNOSIS — D63.1 ANEMIA DUE TO STAGE 3 CHRONIC KIDNEY DISEASE TREATED WITH ERYTHROPOIETIN (HCC): Primary | ICD-10-CM

## 2017-08-07 DIAGNOSIS — Z96.641 STATUS POST TOTAL REPLACEMENT OF RIGHT HIP: Primary | ICD-10-CM

## 2017-08-07 DIAGNOSIS — N18.30 ANEMIA DUE TO STAGE 3 CHRONIC KIDNEY DISEASE TREATED WITH ERYTHROPOIETIN (HCC): Primary | ICD-10-CM

## 2017-08-07 DIAGNOSIS — D50.9 IRON DEFICIENCY ANEMIA, UNSPECIFIED IRON DEFICIENCY ANEMIA TYPE: ICD-10-CM

## 2017-08-07 DIAGNOSIS — C50.412 BREAST CANCER OF UPPER-OUTER QUADRANT OF LEFT FEMALE BREAST (HCC): ICD-10-CM

## 2017-08-07 DIAGNOSIS — N18.30 ANEMIA DUE TO STAGE 3 CHRONIC KIDNEY DISEASE TREATED WITH ERYTHROPOIETIN (HCC): ICD-10-CM

## 2017-08-07 DIAGNOSIS — D63.1 ANEMIA DUE TO STAGE 3 CHRONIC KIDNEY DISEASE TREATED WITH ERYTHROPOIETIN (HCC): ICD-10-CM

## 2017-08-07 LAB
BASOPHILS # BLD AUTO: 0.04 10*3/MM3 (ref 0–0.2)
BASOPHILS NFR BLD AUTO: 0.8 % (ref 0–1.5)
DEPRECATED RDW RBC AUTO: 46.9 FL (ref 37–54)
EOSINOPHIL # BLD AUTO: 0.16 10*3/MM3 (ref 0–0.7)
EOSINOPHIL NFR BLD AUTO: 3.2 % (ref 0.3–6.2)
ERYTHROCYTE [DISTWIDTH] IN BLOOD BY AUTOMATED COUNT: 14.5 % (ref 11.7–13)
HCT VFR BLD AUTO: 30.4 % (ref 35.6–45.5)
HGB BLD-MCNC: 9.9 G/DL (ref 11.9–15.5)
IMM GRANULOCYTES # BLD: 0.07 10*3/MM3 (ref 0–0.03)
IMM GRANULOCYTES NFR BLD: 1.4 % (ref 0–0.5)
LYMPHOCYTES # BLD AUTO: 0.75 10*3/MM3 (ref 0.9–4.8)
LYMPHOCYTES NFR BLD AUTO: 15 % (ref 19.6–45.3)
MCH RBC QN AUTO: 28.9 PG (ref 26.9–32)
MCHC RBC AUTO-ENTMCNC: 32.6 G/DL (ref 32.4–36.3)
MCV RBC AUTO: 88.6 FL (ref 80.5–98.2)
MONOCYTES # BLD AUTO: 0.55 10*3/MM3 (ref 0.2–1.2)
MONOCYTES NFR BLD AUTO: 11 % (ref 5–12)
NEUTROPHILS # BLD AUTO: 3.44 10*3/MM3 (ref 1.9–8.1)
NEUTROPHILS NFR BLD AUTO: 68.6 % (ref 42.7–76)
NRBC BLD MANUAL-RTO: 0.4 /100 WBC (ref 0–0)
PLATELET # BLD AUTO: 239 10*3/MM3 (ref 140–500)
PMV BLD AUTO: 9.7 FL (ref 6–12)
RBC # BLD AUTO: 3.43 10*6/MM3 (ref 3.9–5.2)
WBC NRBC COR # BLD: 5.01 10*3/MM3 (ref 4.5–10.7)

## 2017-08-07 PROCEDURE — 85025 COMPLETE CBC W/AUTO DIFF WBC: CPT | Performed by: INTERNAL MEDICINE

## 2017-08-07 PROCEDURE — 63510000001 EPOETIN ALFA PER 1000 UNITS: Performed by: NURSE PRACTITIONER

## 2017-08-07 PROCEDURE — 97110 THERAPEUTIC EXERCISES: CPT | Performed by: PHYSICAL THERAPIST

## 2017-08-07 PROCEDURE — 96372 THER/PROPH/DIAG INJ SC/IM: CPT | Performed by: NURSE PRACTITIONER

## 2017-08-07 PROCEDURE — 36415 COLL VENOUS BLD VENIPUNCTURE: CPT

## 2017-08-07 RX ADMIN — ERYTHROPOIETIN 20000 UNITS: 20000 INJECTION, SOLUTION INTRAVENOUS; SUBCUTANEOUS at 12:15

## 2017-08-07 NOTE — PROGRESS NOTES
Physical Therapy Daily Progress Note    Visit #2    Subjective     Mary Kay Saab reports: she rolled over on her hip last night, pain increased.  Feels like it has gotten better since she has been up and walking.        Objective   See Exercise, Manual, and Modality Logs for complete treatment.   Mild swelling at greater trochanter    Assessment/Plan  Pt fatigues easily with exercise, but tolerated well overall without pain.  Advised pt to use ice on her hip again tonight before bed.  Progress per Plan of Care           Manual Therapy:    0     mins  60483;  Therapeutic Exercise:    30     mins  21526;     Neuromuscular Lenny:    0    mins  38742;    Therapeutic Activity:     0     mins  13138;     Gait Trainin     mins  39007;     Ultrasound:     0     mins  41473;    Electrical Stimulation:    0     mins  49897 ( );    Timed Treatment:   30   mins   Total Treatment:     40   mins    Mariah Dove PT, DPT  Physical Therapist  [place] License #[id number]

## 2017-08-11 ENCOUNTER — TREATMENT (OUTPATIENT)
Dept: PHYSICAL THERAPY | Facility: CLINIC | Age: 72
End: 2017-08-11

## 2017-08-11 DIAGNOSIS — Z96.641 STATUS POST TOTAL REPLACEMENT OF RIGHT HIP: Primary | ICD-10-CM

## 2017-08-11 PROCEDURE — 97110 THERAPEUTIC EXERCISES: CPT | Performed by: PHYSICAL THERAPIST

## 2017-08-11 NOTE — PROGRESS NOTES
" Physical Therapy Daily Progress Note    Visit #3    Subjective     Mary Kay Saab reports: she has been fatigued this week \"I think I'm trying to rush things\"      Objective   See Exercise, Manual, and Modality Logs for complete treatment.       Assessment/Plan  Did fatigue easily with exercise, but gait speed and hip strength are improving.  Progress per Plan of Care           Manual Therapy:    0     mins  63661;  Therapeutic Exercise:    30     mins  72071;     Neuromuscular Lenny:    0    mins  57519;    Therapeutic Activity:     0     mins  99291;     Gait Trainin     mins  64898;     Ultrasound:     0     mins  25183;    Electrical Stimulation:    0     mins  02635 ( );    Timed Treatment:   30   mins   Total Treatment:     45   mins    Mariah Dove PT, DPT  Physical Therapist  [place] License #[id number]                    "

## 2017-08-14 DIAGNOSIS — N18.30 ANEMIA DUE TO STAGE 3 CHRONIC KIDNEY DISEASE TREATED WITH ERYTHROPOIETIN (HCC): Primary | ICD-10-CM

## 2017-08-14 DIAGNOSIS — D63.1 ANEMIA DUE TO STAGE 3 CHRONIC KIDNEY DISEASE TREATED WITH ERYTHROPOIETIN (HCC): Primary | ICD-10-CM

## 2017-08-15 ENCOUNTER — LAB (OUTPATIENT)
Dept: LAB | Facility: HOSPITAL | Age: 72
End: 2017-08-15

## 2017-08-15 ENCOUNTER — INFUSION (OUTPATIENT)
Dept: ONCOLOGY | Facility: HOSPITAL | Age: 72
End: 2017-08-15

## 2017-08-15 DIAGNOSIS — D63.1 ANEMIA DUE TO STAGE 3 CHRONIC KIDNEY DISEASE TREATED WITH ERYTHROPOIETIN (HCC): ICD-10-CM

## 2017-08-15 DIAGNOSIS — N18.30 ANEMIA DUE TO STAGE 3 CHRONIC KIDNEY DISEASE TREATED WITH ERYTHROPOIETIN (HCC): ICD-10-CM

## 2017-08-15 LAB
BASOPHILS # BLD AUTO: 0.09 10*3/MM3 (ref 0–0.1)
BASOPHILS NFR BLD AUTO: 1.2 % (ref 0–1.1)
DEPRECATED RDW RBC AUTO: 46.5 FL (ref 37–49)
EOSINOPHIL # BLD AUTO: 0.13 10*3/MM3 (ref 0–0.36)
EOSINOPHIL NFR BLD AUTO: 1.7 % (ref 1–5)
ERYTHROCYTE [DISTWIDTH] IN BLOOD BY AUTOMATED COUNT: 14.5 % (ref 11.7–14.5)
HCT VFR BLD AUTO: 34.8 % (ref 34–45)
HGB BLD-MCNC: 11 G/DL (ref 11.5–14.9)
IMM GRANULOCYTES # BLD: 0.09 10*3/MM3 (ref 0–0.03)
IMM GRANULOCYTES NFR BLD: 1.2 % (ref 0–0.5)
LYMPHOCYTES # BLD AUTO: 1.09 10*3/MM3 (ref 1–3.5)
LYMPHOCYTES NFR BLD AUTO: 14.2 % (ref 20–49)
MCH RBC QN AUTO: 28.1 PG (ref 27–33)
MCHC RBC AUTO-ENTMCNC: 31.6 G/DL (ref 32–35)
MCV RBC AUTO: 88.8 FL (ref 83–97)
MONOCYTES # BLD AUTO: 0.73 10*3/MM3 (ref 0.25–0.8)
MONOCYTES NFR BLD AUTO: 9.5 % (ref 4–12)
NEUTROPHILS # BLD AUTO: 5.56 10*3/MM3 (ref 1.5–7)
NEUTROPHILS NFR BLD AUTO: 72.2 % (ref 39–75)
NRBC BLD MANUAL-RTO: 0 /100 WBC (ref 0–0)
PLATELET # BLD AUTO: 185 10*3/MM3 (ref 150–375)
PMV BLD AUTO: 10.9 FL (ref 8.9–12.1)
RBC # BLD AUTO: 3.92 10*6/MM3 (ref 3.9–5)
WBC NRBC COR # BLD: 7.69 10*3/MM3 (ref 4–10)

## 2017-08-15 PROCEDURE — 85025 COMPLETE CBC W/AUTO DIFF WBC: CPT | Performed by: INTERNAL MEDICINE

## 2017-08-15 PROCEDURE — 36416 COLLJ CAPILLARY BLOOD SPEC: CPT | Performed by: INTERNAL MEDICINE

## 2017-08-17 ENCOUNTER — TREATMENT (OUTPATIENT)
Dept: PHYSICAL THERAPY | Facility: CLINIC | Age: 72
End: 2017-08-17

## 2017-08-17 DIAGNOSIS — Z96.641 STATUS POST TOTAL REPLACEMENT OF RIGHT HIP: Primary | ICD-10-CM

## 2017-08-17 PROCEDURE — 97110 THERAPEUTIC EXERCISES: CPT | Performed by: PHYSICAL THERAPIST

## 2017-08-17 RX ORDER — CEPHALEXIN 500 MG/1
500 CAPSULE ORAL EVERY 6 HOURS
Qty: 40 CAPSULE | Refills: 0 | Status: SHIPPED | OUTPATIENT
Start: 2017-08-17 | End: 2017-08-27

## 2017-08-17 NOTE — PROGRESS NOTES
Physical Therapy Daily Progress Note    Visit #4    Subjective     Mary Kay Saab reports: she had pain in her Achilles earlier this week, so she didn't come to PT session.  Also has a sore on her inner thigh near her hip, was able to speak to MD during her 's appointment this morning and he has called in an antibiotic for her.      Objective   See Exercise, Manual, and Modality Logs for complete treatment.       Assessment/Plan  Attempted leg press, but pt reported pain in Achilles so this was D/C'd.  Overall hip strength is improving, dynamic balance and endurance still lacking.  Progress per Plan of Care           Manual Therapy:    0     mins  56707;  Therapeutic Exercise:    35     mins  64685;     Neuromuscular Lenny:    0    mins  41923;    Therapeutic Activity:     0     mins  18345;     Gait Trainin     mins  87767;     Ultrasound:     0     mins  44870;    Electrical Stimulation:    0     mins  25116 ( );    Timed Treatment:   35   mins   Total Treatment:     45   mins    Mariah Dove PT, DPT  Physical Therapist  [place] License #[id number]

## 2017-08-22 ENCOUNTER — LAB (OUTPATIENT)
Dept: LAB | Facility: HOSPITAL | Age: 72
End: 2017-08-22

## 2017-08-22 ENCOUNTER — INFUSION (OUTPATIENT)
Dept: ONCOLOGY | Facility: HOSPITAL | Age: 72
End: 2017-08-22

## 2017-08-22 DIAGNOSIS — N18.30 ANEMIA DUE TO STAGE 3 CHRONIC KIDNEY DISEASE TREATED WITH ERYTHROPOIETIN (HCC): ICD-10-CM

## 2017-08-22 DIAGNOSIS — D63.1 ANEMIA DUE TO STAGE 3 CHRONIC KIDNEY DISEASE TREATED WITH ERYTHROPOIETIN (HCC): ICD-10-CM

## 2017-08-22 LAB
BASOPHILS # BLD AUTO: 0.08 10*3/MM3 (ref 0–0.1)
BASOPHILS NFR BLD AUTO: 1.4 % (ref 0–1.1)
DEPRECATED RDW RBC AUTO: 47 FL (ref 37–49)
EOSINOPHIL # BLD AUTO: 0.15 10*3/MM3 (ref 0–0.36)
EOSINOPHIL NFR BLD AUTO: 2.7 % (ref 1–5)
ERYTHROCYTE [DISTWIDTH] IN BLOOD BY AUTOMATED COUNT: 14.7 % (ref 11.7–14.5)
HCT VFR BLD AUTO: 34.7 % (ref 34–45)
HGB BLD-MCNC: 11.3 G/DL (ref 11.5–14.9)
IMM GRANULOCYTES # BLD: 0.05 10*3/MM3 (ref 0–0.03)
IMM GRANULOCYTES NFR BLD: 0.9 % (ref 0–0.5)
LYMPHOCYTES # BLD AUTO: 1.18 10*3/MM3 (ref 1–3.5)
LYMPHOCYTES NFR BLD AUTO: 20.9 % (ref 20–49)
MCH RBC QN AUTO: 28.2 PG (ref 27–33)
MCHC RBC AUTO-ENTMCNC: 32.6 G/DL (ref 32–35)
MCV RBC AUTO: 86.5 FL (ref 83–97)
MONOCYTES # BLD AUTO: 0.54 10*3/MM3 (ref 0.25–0.8)
MONOCYTES NFR BLD AUTO: 9.6 % (ref 4–12)
NEUTROPHILS # BLD AUTO: 3.65 10*3/MM3 (ref 1.5–7)
NEUTROPHILS NFR BLD AUTO: 64.5 % (ref 39–75)
NRBC BLD MANUAL-RTO: 0 /100 WBC (ref 0–0)
PLATELET # BLD AUTO: 199 10*3/MM3 (ref 150–375)
PMV BLD AUTO: 10.7 FL (ref 8.9–12.1)
RBC # BLD AUTO: 4.01 10*6/MM3 (ref 3.9–5)
WBC NRBC COR # BLD: 5.65 10*3/MM3 (ref 4–10)

## 2017-08-22 PROCEDURE — 85025 COMPLETE CBC W/AUTO DIFF WBC: CPT | Performed by: INTERNAL MEDICINE

## 2017-08-22 PROCEDURE — 36416 COLLJ CAPILLARY BLOOD SPEC: CPT | Performed by: INTERNAL MEDICINE

## 2017-08-23 ENCOUNTER — APPOINTMENT (OUTPATIENT)
Dept: LAB | Facility: HOSPITAL | Age: 72
End: 2017-08-23

## 2017-08-23 ENCOUNTER — APPOINTMENT (OUTPATIENT)
Dept: ONCOLOGY | Facility: HOSPITAL | Age: 72
End: 2017-08-23

## 2017-08-24 ENCOUNTER — TELEPHONE (OUTPATIENT)
Dept: ORTHOPEDIC SURGERY | Facility: CLINIC | Age: 72
End: 2017-08-24

## 2017-08-24 ENCOUNTER — TREATMENT (OUTPATIENT)
Dept: PHYSICAL THERAPY | Facility: CLINIC | Age: 72
End: 2017-08-24

## 2017-08-24 DIAGNOSIS — Z96.641 STATUS POST TOTAL REPLACEMENT OF RIGHT HIP: Primary | ICD-10-CM

## 2017-08-24 PROCEDURE — 97110 THERAPEUTIC EXERCISES: CPT | Performed by: PHYSICAL THERAPIST

## 2017-08-24 NOTE — PROGRESS NOTES
" Physical Therapy Daily Progress Note    Visit #1    Subjective     Mary Kay Saab reports: a constant \"ache\" on the lateral aspect of her right hip that started last night. She doesn't report a WAYNE and reported being seated the entire day and part of today. She reported that she has to contact her physician regarding her status and will mention that the side of her hip appears swollen but there is no reported redness or warmth around the area.      Objective   See Exercise, Manual, and Modality Logs for complete treatment.       Assessment/Plan  Patient tolerated exercises well, despite taking frequent breaks due to hip pain. Patient reported that she cleaned up around the house this past Saturday and reported not taking frequent breaks, which is potentially the reason why her hip \"aches.\" She was educated on the importance of taking activity into moderation. Patient wanted to know if there are any exercises she could perform at home to help facilitate more hip flexion. Therefore, she was educated on an AAROM with a sheet to help facilitate more hip flexion at home. She was also educated on propping pillows on her right side so she can sleep somewhat turned on her right side.     Progress per Plan of Care           Manual Therapy:    0     mins  10581;  Therapeutic Exercise:    30     mins  27300;     Neuromuscular Lenny:    0    mins  00314;    Therapeutic Activity:     0     mins  10815;     Gait Trainin     mins  77648;     Ultrasound:     0     mins  38257;    Electrical Stimulation:    0     mins  97474 ( );    Timed Treatment:   30   mins   Total Treatment:     45   mins    Mariah Dove PT, DPT  Physical Therapist  [place] License #[id number]    "

## 2017-08-24 NOTE — PATIENT INSTRUCTIONS
Patient wanted to know if there are any exercises she could perform at home to help facilitate more hip flexion. Therefore, she was educated on an AAROM with a sheet to help facilitate more hip flexion at home. She was also educated on propping pillows on her right side so she can sleep somewhat turned on her right side. Finally, she was educated on the importance of taking activity into moderation.

## 2017-08-29 ENCOUNTER — INFUSION (OUTPATIENT)
Dept: ONCOLOGY | Facility: HOSPITAL | Age: 72
End: 2017-08-29

## 2017-08-29 ENCOUNTER — LAB (OUTPATIENT)
Dept: OTHER | Facility: HOSPITAL | Age: 72
End: 2017-08-29

## 2017-08-29 ENCOUNTER — TREATMENT (OUTPATIENT)
Dept: PHYSICAL THERAPY | Facility: CLINIC | Age: 72
End: 2017-08-29

## 2017-08-29 VITALS — BODY MASS INDEX: 22.1 KG/M2 | WEIGHT: 132.8 LBS

## 2017-08-29 DIAGNOSIS — N18.30 ANEMIA DUE TO STAGE 3 CHRONIC KIDNEY DISEASE TREATED WITH ERYTHROPOIETIN (HCC): ICD-10-CM

## 2017-08-29 DIAGNOSIS — Z96.641 STATUS POST TOTAL REPLACEMENT OF RIGHT HIP: Primary | ICD-10-CM

## 2017-08-29 DIAGNOSIS — D63.1 ANEMIA DUE TO STAGE 3 CHRONIC KIDNEY DISEASE TREATED WITH ERYTHROPOIETIN (HCC): ICD-10-CM

## 2017-08-29 LAB
BASOPHILS # BLD AUTO: 0.09 10*3/MM3 (ref 0–0.2)
BASOPHILS NFR BLD AUTO: 1.3 % (ref 0–1.5)
DEPRECATED RDW RBC AUTO: 49.1 FL (ref 37–54)
EOSINOPHIL # BLD AUTO: 0.16 10*3/MM3 (ref 0–0.7)
EOSINOPHIL NFR BLD AUTO: 2.3 % (ref 0.3–6.2)
ERYTHROCYTE [DISTWIDTH] IN BLOOD BY AUTOMATED COUNT: 15.5 % (ref 11.7–13)
HCT VFR BLD AUTO: 33.3 % (ref 35.6–45.5)
HGB BLD-MCNC: 10.8 G/DL (ref 11.9–15.5)
IMM GRANULOCYTES # BLD: 0.03 10*3/MM3 (ref 0–0.03)
IMM GRANULOCYTES NFR BLD: 0.4 % (ref 0–0.5)
LYMPHOCYTES # BLD AUTO: 1.28 10*3/MM3 (ref 0.9–4.8)
LYMPHOCYTES NFR BLD AUTO: 18.4 % (ref 19.6–45.3)
MCH RBC QN AUTO: 28.1 PG (ref 26.9–32)
MCHC RBC AUTO-ENTMCNC: 32.4 G/DL (ref 32.4–36.3)
MCV RBC AUTO: 86.7 FL (ref 80.5–98.2)
MONOCYTES # BLD AUTO: 0.5 10*3/MM3 (ref 0.2–1.2)
MONOCYTES NFR BLD AUTO: 7.2 % (ref 5–12)
NEUTROPHILS # BLD AUTO: 4.91 10*3/MM3 (ref 1.9–8.1)
NEUTROPHILS NFR BLD AUTO: 70.4 % (ref 42.7–76)
NRBC BLD MANUAL-RTO: 0.3 /100 WBC (ref 0–0)
PLATELET # BLD AUTO: 196 10*3/MM3 (ref 140–500)
PMV BLD AUTO: 11.1 FL (ref 6–12)
RBC # BLD AUTO: 3.84 10*6/MM3 (ref 3.9–5.2)
WBC NRBC COR # BLD: 6.97 10*3/MM3 (ref 4.5–10.7)

## 2017-08-29 PROCEDURE — 85025 COMPLETE CBC W/AUTO DIFF WBC: CPT | Performed by: INTERNAL MEDICINE

## 2017-08-29 PROCEDURE — 36415 COLL VENOUS BLD VENIPUNCTURE: CPT

## 2017-08-29 PROCEDURE — 97110 THERAPEUTIC EXERCISES: CPT | Performed by: PHYSICAL THERAPIST

## 2017-08-29 NOTE — PROGRESS NOTES
Physical Therapy Daily Progress Note    Visit #6    Subjective     Mary Kay Saab reports: that she crossed her leg (internal rotation of her thigh) in bed and since then she reports pain on the lateral aspect of the hip since doing that. She reported resting on Saturday and  but still reports pain in the hip. Patient is concerned she re injured her hip or shifted her prosthesis.      Objective   See Exercise, Manual, and Modality Logs for complete treatment.       Assessment/Plan   Patient tolerated exercise, however she reported hip pain with exercises such as the sit to stands and step ups on the 8 in step. She reports that she will see her physician next week. However, she returns to work next week and will be unable to report to therapy, therefore therapist incorporated a long term HEP she can do outside of therapy. Patient was issued a green theraband used to incorporate into her HEP.    Progress per Plan of Care           Manual Therapy:    0     mins  78597;  Therapeutic Exercise:    30     mins  48707;     Neuromuscular Lenny:    0    mins  88008;    Therapeutic Activity:     0     mins  67547;     Gait Trainin     mins  16219;     Ultrasound:     0     mins  75073;    Electrical Stimulation:    0     mins  20969 ( );    Timed Treatment:   30   mins   Total Treatment:     45   mins    Mariah Dove PT, DPT  Physical Therapist  [place] License #[id number]

## 2017-08-29 NOTE — PROGRESS NOTES
Hgb 10.8 Epo held  Pt was given a copy of her lab results.  Pt has no complaints or concerns.

## 2017-08-29 NOTE — PATIENT INSTRUCTIONS
She reports that she will see her physician next week. However, she returns to work next week and will be unable to report to therapy, therefore therapist incorporated a long term HEP she can do outside of therapy.  Patient was issued a green theraband used to incorporate into her HEP.

## 2017-08-30 ENCOUNTER — APPOINTMENT (OUTPATIENT)
Dept: LAB | Facility: HOSPITAL | Age: 72
End: 2017-08-30

## 2017-08-30 ENCOUNTER — APPOINTMENT (OUTPATIENT)
Dept: WOMENS IMAGING | Facility: HOSPITAL | Age: 72
End: 2017-08-30

## 2017-08-30 ENCOUNTER — APPOINTMENT (OUTPATIENT)
Dept: ONCOLOGY | Facility: HOSPITAL | Age: 72
End: 2017-08-30

## 2017-08-30 PROCEDURE — 77063 BREAST TOMOSYNTHESIS BI: CPT | Performed by: RADIOLOGY

## 2017-08-30 PROCEDURE — G0202 SCR MAMMO BI INCL CAD: HCPCS | Performed by: RADIOLOGY

## 2017-08-30 PROCEDURE — MDREVIEWSP: Performed by: RADIOLOGY

## 2017-08-31 ENCOUNTER — OFFICE VISIT (OUTPATIENT)
Dept: CARDIOLOGY | Facility: CLINIC | Age: 72
End: 2017-08-31

## 2017-08-31 VITALS
HEIGHT: 65 IN | WEIGHT: 134 LBS | DIASTOLIC BLOOD PRESSURE: 62 MMHG | HEART RATE: 49 BPM | RESPIRATION RATE: 18 BRPM | SYSTOLIC BLOOD PRESSURE: 132 MMHG | BODY MASS INDEX: 22.33 KG/M2

## 2017-08-31 DIAGNOSIS — E78.5 HYPERLIPIDEMIA, UNSPECIFIED HYPERLIPIDEMIA TYPE: ICD-10-CM

## 2017-08-31 DIAGNOSIS — I10 ESSENTIAL HYPERTENSION: Primary | ICD-10-CM

## 2017-08-31 DIAGNOSIS — I48.0 PAROXYSMAL ATRIAL FIBRILLATION (HCC): ICD-10-CM

## 2017-08-31 PROCEDURE — 99214 OFFICE O/P EST MOD 30 MIN: CPT | Performed by: INTERNAL MEDICINE

## 2017-08-31 PROCEDURE — 93000 ELECTROCARDIOGRAM COMPLETE: CPT | Performed by: INTERNAL MEDICINE

## 2017-09-05 ENCOUNTER — OFFICE VISIT (OUTPATIENT)
Dept: ORTHOPEDIC SURGERY | Facility: CLINIC | Age: 72
End: 2017-09-05

## 2017-09-05 VITALS — BODY MASS INDEX: 22.53 KG/M2 | HEIGHT: 65 IN | TEMPERATURE: 97.6 F | WEIGHT: 135.2 LBS

## 2017-09-05 DIAGNOSIS — Z96.641 STATUS POST RIGHT HIP REPLACEMENT: Primary | ICD-10-CM

## 2017-09-05 DIAGNOSIS — M25.561 RIGHT KNEE PAIN, UNSPECIFIED CHRONICITY: ICD-10-CM

## 2017-09-05 PROCEDURE — 73502 X-RAY EXAM HIP UNI 2-3 VIEWS: CPT | Performed by: ORTHOPAEDIC SURGERY

## 2017-09-05 PROCEDURE — 99213 OFFICE O/P EST LOW 20 MIN: CPT | Performed by: ORTHOPAEDIC SURGERY

## 2017-09-05 RX ORDER — AMLODIPINE BESYLATE 5 MG/1
TABLET ORAL
Refills: 3 | COMMUNITY
Start: 2017-07-09 | End: 2017-09-26 | Stop reason: HOSPADM

## 2017-09-05 NOTE — PROGRESS NOTES
Patient: Mary Kay Saab  YOB: 1945 72 y.o. female  Medical Record Number: [id number]    Chief Complaint:   Chief Complaint   Patient presents with   • Right Hip - Follow-up       History of Present Illness:Mary Kay Saab is a 72 y.o. female who presents for follow-up of  Right PREETHI - 2 months.Overall the right hip is progressing appropriately.  She does have some complaints of anterior and lateral psoas posterior soft tissue discomfort which I explained her feel is appropriate at this time out from surgery.  I answered a long list of questions regarding positioning and discomfort.  We also had discussions about the right knee.  She has had issues with the right knee ever since an accident in the 90s.  She has seen Dr. Jeremías Mosley for it and undergone cortisone injections and arthroscopy and reportedly was told that she had bone-on-bone findings and lateral joint.  Despite her hip being fixed she still has persistent knee symptoms.  She presents with an Ace wrap around her knee and still has symptoms which are limiting her quite a bit at this time.    Allergies:   Allergies   Allergen Reactions   • Atropine Hives   • Penicillins      Reactions: syncope and seizures   • Sulfa Antibiotics Nausea Only     STATES SHE DOES FINE WITH CERTAIN SULFA DRUGS   • Epinephrine Palpitations       Medications:   Current Outpatient Prescriptions   Medication Sig Dispense Refill   • alendronate (FOSAMAX) 70 MG tablet Take 70 mg by mouth Every 7 (Seven) Days.  12   • alosetron (LOTRONEX) 0.5 MG tablet Take 0.05 mg by mouth Daily As Needed.     • ALPRAZolam (XANAX) 1 MG tablet Take 1 mg by mouth every night.     • amiodarone (PACERONE) 200 MG tablet TK 1 T PO D PRN  1   • amLODIPine (NORVASC) 5 MG tablet TK 1 T PO D  3   • bumetanide (BUMEX) 1 MG tablet Take 1 mg by mouth Daily As Needed.     • cetirizine (ZyrTEC) 10 MG tablet Take 10 mg by mouth Daily.     • dabigatran etexilate (PRADAXA) 150 MG capsu Take 150 mg by  "mouth 2 (Two) Times a Day.     • desonide (DESOWEN) 0.05 % ointment Apply  topically daily.     • esomeprazole (NexIUM) 40 MG capsule Take 40 mg by mouth 2 (two) times a day.     • metoprolol succinate XL (TOPROL-XL) 12.5 MG 24 hr half tablet Take 25 mg by mouth Daily As Needed.     • montelukast (SINGULAIR) 10 MG tablet Take 10 mg by mouth Daily.     • nebivolol (BYSTOLIC) 2.5 MG tablet Take 2.5 mg by mouth Every Night.     • nebivolol (BYSTOLIC) 5 MG tablet Take 5 mg by mouth Daily. MID AFTERNOON     • nystatin-triamcinolone (MYCOLOG II) 803479-3.1 UNIT/GM-% cream      • pseudoephedrine-guaifenesin (MUCINEX D)  MG per 12 hr tablet Take 1 tablet by mouth Every 12 (Twelve) Hours.     • raloxifene (EVISTA) 60 MG tablet Take 60 mg by mouth Daily.     • RESTASIS 0.05 % ophthalmic emulsion INT 1 DROP IN BOTH EYES BID  2   • simvastatin (ZOCOR) 40 MG tablet Take 40 mg by mouth Every Night.       No current facility-administered medications for this visit.          The following portions of the patient's history were reviewed and updated as appropriate: allergies, current medications, past family history, past medical history, past social history, past surgical history and problem list.    Review of Systems:   A 14 point review of systems was performed. All systems negative except pertinent positives/negative listed in HPI above    Physical Exam:   Vitals:    09/05/17 0806   Temp: 97.6 °F (36.4 °C)   Weight: 135 lb 3.2 oz (61.3 kg)   Height: 65\" (165.1 cm)       General: A and O x 3, ASA, NAD    SCLERA:    Normal    DENTITION:   Normal  Hip:  right    LEG ALIGNMENT:     Neutral   ,    equal leg lengths    GAIT:     Nonantalgic    SKIN:     No abnormality  - well healed surgical incision    RANGE OF MOTION:      Full without joint irritability    STRENGTH:     5 / 5    hip flexion and abduction    DISTAL PULSES:    Paplable    DISTAL SENSATION :   Intact    LYMPHATICS:     No   lymphadenopathy    OTHER:          - " Negative Stinchfeld test      - Negative log roll      - No Tenderness to palpation trochanteric bursa      - Neg FADIR      - Neg ALYX      - No SI tenderness    Knee:  right    ALIGNMENT:     Slight Valgus      GAIT:    Antalgic    SKIN:    No abnormality    RANGE OF MOTION:   0  -  125   DEG    STRENGTH:   4  / 5    LIGAMENTS:    No varus / valgus instability.   Negative  Lachman.    MENISCUS:     Negative   Randy       DISTAL PULSES:    Paplable    DISTAL SENSATION :   Intact    LYMPHATICS:     No   lymphadenopathy    OTHER:          - no   effusion      - lateral Crepitance with ROM      Radiology:    Xrays 2views right hip  (AP bilateral hips and lateral hip) were ordered and reviewed for evaluation of hip pain demonstrating a well positioned total hip without evidence of wear, loosening, or osteoarthritis  Comparison views: todays xrays were compared to previous xrays and demonstrate no change.    X-rays 3 views of the right knee taken in April were reviewed which show some mild degenerative changes.  There is no obvious bone-on-bone finding    Assessment/Plan:  Right total hip replacement now 2 months out overall she is progressing appropriately.  I think she does have some symptoms related to postoperative soft tissue discomfort which should improve over time.  Her major issue right now is persistent right knee pain.  She has seen Dr. Jeremías Mosley in the past and is undergone arthroscopy years ago.  She was told years ago by Dr. Mosley that she had bone on bone articulation in the lateral joint and may go on to require knee replacement.  I cannot confirm this with our x-rays currently but based on symptoms I do have concerns.    I'm going to get an MRI of her right knee for further evaluation to see if there is no explanation of why her pain is worse than x-rays would suggest      Van Mcguire MD  9/5/2017

## 2017-09-06 ENCOUNTER — INFUSION (OUTPATIENT)
Dept: ONCOLOGY | Facility: HOSPITAL | Age: 72
End: 2017-09-06

## 2017-09-06 ENCOUNTER — LAB (OUTPATIENT)
Dept: LAB | Facility: HOSPITAL | Age: 72
End: 2017-09-06

## 2017-09-06 DIAGNOSIS — D63.1 ANEMIA DUE TO STAGE 3 CHRONIC KIDNEY DISEASE TREATED WITH ERYTHROPOIETIN (HCC): ICD-10-CM

## 2017-09-06 DIAGNOSIS — N18.30 ANEMIA DUE TO STAGE 3 CHRONIC KIDNEY DISEASE TREATED WITH ERYTHROPOIETIN (HCC): ICD-10-CM

## 2017-09-06 LAB
BASOPHILS # BLD AUTO: 0.07 10*3/MM3 (ref 0–0.1)
BASOPHILS NFR BLD AUTO: 0.9 % (ref 0–1.1)
DEPRECATED RDW RBC AUTO: 50.9 FL (ref 37–49)
EOSINOPHIL # BLD AUTO: 0.17 10*3/MM3 (ref 0–0.36)
EOSINOPHIL NFR BLD AUTO: 2.2 % (ref 1–5)
ERYTHROCYTE [DISTWIDTH] IN BLOOD BY AUTOMATED COUNT: 16.2 % (ref 11.7–14.5)
HCT VFR BLD AUTO: 34.2 % (ref 34–45)
HGB BLD-MCNC: 11.2 G/DL (ref 11.5–14.9)
IMM GRANULOCYTES # BLD: 0.08 10*3/MM3 (ref 0–0.03)
IMM GRANULOCYTES NFR BLD: 1 % (ref 0–0.5)
LYMPHOCYTES # BLD AUTO: 1.14 10*3/MM3 (ref 1–3.5)
LYMPHOCYTES NFR BLD AUTO: 14.6 % (ref 20–49)
MCH RBC QN AUTO: 28.4 PG (ref 27–33)
MCHC RBC AUTO-ENTMCNC: 32.7 G/DL (ref 32–35)
MCV RBC AUTO: 86.8 FL (ref 83–97)
MONOCYTES # BLD AUTO: 0.64 10*3/MM3 (ref 0.25–0.8)
MONOCYTES NFR BLD AUTO: 8.2 % (ref 4–12)
NEUTROPHILS # BLD AUTO: 5.71 10*3/MM3 (ref 1.5–7)
NEUTROPHILS NFR BLD AUTO: 73.1 % (ref 39–75)
NRBC BLD MANUAL-RTO: 0 /100 WBC (ref 0–0)
PLATELET # BLD AUTO: 200 10*3/MM3 (ref 150–375)
PMV BLD AUTO: 10.8 FL (ref 8.9–12.1)
RBC # BLD AUTO: 3.94 10*6/MM3 (ref 3.9–5)
WBC NRBC COR # BLD: 7.81 10*3/MM3 (ref 4–10)

## 2017-09-06 PROCEDURE — 36415 COLL VENOUS BLD VENIPUNCTURE: CPT | Performed by: INTERNAL MEDICINE

## 2017-09-06 PROCEDURE — 85025 COMPLETE CBC W/AUTO DIFF WBC: CPT | Performed by: INTERNAL MEDICINE

## 2017-09-11 ENCOUNTER — TELEPHONE (OUTPATIENT)
Dept: ORTHOPEDIC SURGERY | Facility: CLINIC | Age: 72
End: 2017-09-11

## 2017-09-11 NOTE — TELEPHONE ENCOUNTER
Have spoken with the patient.  She is now only about 9 weeks postop however she has a broken and painful tooth.  Patient is aware that she requires premedication however called her primary care physician over the weekend who started her on Keflex 500 mg 4 times a day and then instructed her to contact our office.  And has been on it Saturday and Sunday and has are he had 2 doses today.  She is scheduled for her appointment today at 2:30.  Have advised her that normally Dr. tidwell recommends 2 g of Keflex 1 hour prior to the procedure rather than taking it several days before the procedure.  Since she is already had 2 doses of the Keflex today have ask her to go ahead and take the last 2 remaining doses of Keflex 1 hour prior to her procedure.  Patient does not need any antibiotics after the dental appointment

## 2017-09-12 ENCOUNTER — TELEPHONE (OUTPATIENT)
Dept: ORTHOPEDIC SURGERY | Facility: CLINIC | Age: 72
End: 2017-09-12

## 2017-09-12 NOTE — TELEPHONE ENCOUNTER
Sorry, previous message said that she was having pain in her knee.  Would still recommend continued ice and rest as well as scheduled anti-inflammatories and if still having hip pain we would be happy to see her on Thursday

## 2017-09-12 NOTE — TELEPHONE ENCOUNTER
Would recommend ice, rest, anti-inflammatory scheduled and if knee is still bothering her, we can certainly see her since we have seen her previously for her knee.

## 2017-09-13 ENCOUNTER — APPOINTMENT (OUTPATIENT)
Dept: ONCOLOGY | Facility: HOSPITAL | Age: 72
End: 2017-09-13

## 2017-09-13 ENCOUNTER — APPOINTMENT (OUTPATIENT)
Dept: OTHER | Facility: HOSPITAL | Age: 72
End: 2017-09-13

## 2017-09-20 ENCOUNTER — INFUSION (OUTPATIENT)
Dept: ONCOLOGY | Facility: HOSPITAL | Age: 72
End: 2017-09-20

## 2017-09-20 ENCOUNTER — LAB (OUTPATIENT)
Dept: OTHER | Facility: HOSPITAL | Age: 72
End: 2017-09-20

## 2017-09-20 ENCOUNTER — TELEPHONE (OUTPATIENT)
Dept: ONCOLOGY | Facility: HOSPITAL | Age: 72
End: 2017-09-20

## 2017-09-20 ENCOUNTER — OFFICE VISIT (OUTPATIENT)
Dept: ORTHOPEDIC SURGERY | Facility: CLINIC | Age: 72
End: 2017-09-20

## 2017-09-20 VITALS — BODY MASS INDEX: 22.3 KG/M2 | WEIGHT: 134 LBS

## 2017-09-20 DIAGNOSIS — D63.1 ANEMIA DUE TO STAGE 3 CHRONIC KIDNEY DISEASE TREATED WITH ERYTHROPOIETIN (HCC): ICD-10-CM

## 2017-09-20 DIAGNOSIS — C50.412 BREAST CANCER OF UPPER-OUTER QUADRANT OF LEFT FEMALE BREAST (HCC): ICD-10-CM

## 2017-09-20 DIAGNOSIS — N18.30 ANEMIA DUE TO STAGE 3 CHRONIC KIDNEY DISEASE TREATED WITH ERYTHROPOIETIN (HCC): ICD-10-CM

## 2017-09-20 DIAGNOSIS — N18.30 ANEMIA DUE TO STAGE 3 CHRONIC KIDNEY DISEASE TREATED WITH ERYTHROPOIETIN (HCC): Primary | ICD-10-CM

## 2017-09-20 DIAGNOSIS — D50.9 IRON DEFICIENCY ANEMIA, UNSPECIFIED IRON DEFICIENCY ANEMIA TYPE: ICD-10-CM

## 2017-09-20 DIAGNOSIS — D63.1 ANEMIA DUE TO STAGE 3 CHRONIC KIDNEY DISEASE TREATED WITH ERYTHROPOIETIN (HCC): Primary | ICD-10-CM

## 2017-09-20 DIAGNOSIS — Z96.641 STATUS POST RIGHT HIP REPLACEMENT: ICD-10-CM

## 2017-09-20 LAB
BASOPHILS # BLD AUTO: 0.03 10*3/MM3 (ref 0–0.2)
BASOPHILS NFR BLD AUTO: 0.6 % (ref 0–1.5)
DEPRECATED RDW RBC AUTO: 56.2 FL (ref 37–54)
EOSINOPHIL # BLD AUTO: 0.15 10*3/MM3 (ref 0–0.7)
EOSINOPHIL NFR BLD AUTO: 3 % (ref 0.3–6.2)
ERYTHROCYTE [DISTWIDTH] IN BLOOD BY AUTOMATED COUNT: 17.2 % (ref 11.7–13)
FERRITIN SERPL-MCNC: 37.2 NG/ML (ref 13–150)
HCT VFR BLD AUTO: 31 % (ref 35.6–45.5)
HGB BLD-MCNC: 9.8 G/DL (ref 11.9–15.5)
IMM GRANULOCYTES # BLD: 0.02 10*3/MM3 (ref 0–0.03)
IMM GRANULOCYTES NFR BLD: 0.4 % (ref 0–0.5)
IRON 24H UR-MRATE: 56 MCG/DL (ref 37–145)
IRON SATN MFR SERPL: 15 % (ref 20–50)
LYMPHOCYTES # BLD AUTO: 1.1 10*3/MM3 (ref 0.9–4.8)
LYMPHOCYTES NFR BLD AUTO: 21.9 % (ref 19.6–45.3)
MCH RBC QN AUTO: 28 PG (ref 26.9–32)
MCHC RBC AUTO-ENTMCNC: 31.6 G/DL (ref 32.4–36.3)
MCV RBC AUTO: 88.6 FL (ref 80.5–98.2)
MONOCYTES # BLD AUTO: 0.44 10*3/MM3 (ref 0.2–1.2)
MONOCYTES NFR BLD AUTO: 8.7 % (ref 5–12)
NEUTROPHILS # BLD AUTO: 3.29 10*3/MM3 (ref 1.9–8.1)
NEUTROPHILS NFR BLD AUTO: 65.4 % (ref 42.7–76)
NRBC BLD MANUAL-RTO: 0 /100 WBC (ref 0–0)
PLATELET # BLD AUTO: 178 10*3/MM3 (ref 140–500)
PMV BLD AUTO: 10.1 FL (ref 6–12)
RBC # BLD AUTO: 3.5 10*6/MM3 (ref 3.9–5.2)
TIBC SERPL-MCNC: 362 MCG/DL (ref 298–536)
TRANSFERRIN SERPL-MCNC: 243 MG/DL (ref 200–360)
WBC NRBC COR # BLD: 5.03 10*3/MM3 (ref 4.5–10.7)

## 2017-09-20 PROCEDURE — 83540 ASSAY OF IRON: CPT | Performed by: INTERNAL MEDICINE

## 2017-09-20 PROCEDURE — 96372 THER/PROPH/DIAG INJ SC/IM: CPT

## 2017-09-20 PROCEDURE — 82728 ASSAY OF FERRITIN: CPT | Performed by: INTERNAL MEDICINE

## 2017-09-20 PROCEDURE — 36415 COLL VENOUS BLD VENIPUNCTURE: CPT

## 2017-09-20 PROCEDURE — 63510000001 EPOETIN ALFA PER 1000 UNITS: Performed by: NURSE PRACTITIONER

## 2017-09-20 PROCEDURE — 73721 MRI JNT OF LWR EXTRE W/O DYE: CPT | Performed by: ORTHOPAEDIC SURGERY

## 2017-09-20 PROCEDURE — 85025 COMPLETE CBC W/AUTO DIFF WBC: CPT | Performed by: INTERNAL MEDICINE

## 2017-09-20 PROCEDURE — 84466 ASSAY OF TRANSFERRIN: CPT | Performed by: INTERNAL MEDICINE

## 2017-09-20 RX ORDER — DIPHENHYDRAMINE HCL 25 MG
25 CAPSULE ORAL ONCE
Status: CANCELLED | OUTPATIENT
Start: 2017-09-22

## 2017-09-20 RX ORDER — SODIUM CHLORIDE 9 MG/ML
250 INJECTION, SOLUTION INTRAVENOUS ONCE
Status: CANCELLED | OUTPATIENT
Start: 2017-09-22

## 2017-09-20 RX ADMIN — ERYTHROPOIETIN 15000 UNITS: 20000 INJECTION, SOLUTION INTRAVENOUS; SUBCUTANEOUS at 10:03

## 2017-09-20 NOTE — TELEPHONE ENCOUNTER
Called and notified patient and she v/u. Pt states she can not take benadryl as a premed. Will message Dr Cornejo back and let him know to see if anything else to add. Message sent to scheduling and Gaye to set up.     ----- Message from Diego Cornejo II, MD sent at 9/20/2017  2:24 PM EDT -----   Please call patient and inform them iron is low and arrange:  2 doses of feraheme.    Thanks.

## 2017-09-21 DIAGNOSIS — D63.1 ANEMIA DUE TO STAGE 3 CHRONIC KIDNEY DISEASE TREATED WITH ERYTHROPOIETIN (HCC): Primary | ICD-10-CM

## 2017-09-21 DIAGNOSIS — N18.30 ANEMIA DUE TO STAGE 3 CHRONIC KIDNEY DISEASE TREATED WITH ERYTHROPOIETIN (HCC): Primary | ICD-10-CM

## 2017-09-21 NOTE — PROGRESS NOTES
"Per message SHANNON Romero RN verbal order Dr Cornejo patient needs Feraheme times 2 treatment. Patient requested no Benadryl due to stating she \"passed out\" the last time she received Benadryl.     Please note: due to insurance guideline patient cannot receive Procrit and Feraheme at the same time. Patient will need a STAT Ferritin and Iron Panel before receiving the next Procrit injection   "

## 2017-09-22 ENCOUNTER — TELEPHONE (OUTPATIENT)
Dept: ONCOLOGY | Facility: HOSPITAL | Age: 72
End: 2017-09-22

## 2017-09-22 ENCOUNTER — TELEPHONE (OUTPATIENT)
Dept: ORTHOPEDIC SURGERY | Facility: CLINIC | Age: 72
End: 2017-09-22

## 2017-09-22 DIAGNOSIS — IMO0002 ANTIBIOTIC PROPHYLAXIS FOR DENTAL PROCEDURE INDICATED DUE TO PRIOR JOINT REPLACEMENT: Primary | ICD-10-CM

## 2017-09-22 RX ORDER — CEPHALEXIN 500 MG/1
CAPSULE ORAL
Qty: 4 CAPSULE | Refills: 4 | Status: SHIPPED | OUTPATIENT
Start: 2017-09-22 | End: 2017-11-21

## 2017-09-22 NOTE — TELEPHONE ENCOUNTER
Pt calling to see if it would be okay to receive her flu shot from her PCP the same day as her feraheme, I informed her that this would be okay, she v/u

## 2017-09-22 NOTE — TELEPHONE ENCOUNTER
----- Message from YUSEF Crenshaw sent at 9/21/2017  5:22 PM EDT -----  Please let patient know that the MRI of her right knee shows significant arthritic changes.  Would recommend cortisone injection and physical therapy if still having pain

## 2017-09-26 ENCOUNTER — INFUSION (OUTPATIENT)
Dept: ONCOLOGY | Facility: HOSPITAL | Age: 72
End: 2017-09-26

## 2017-09-26 ENCOUNTER — APPOINTMENT (OUTPATIENT)
Dept: ONCOLOGY | Facility: HOSPITAL | Age: 72
End: 2017-09-26

## 2017-09-26 ENCOUNTER — CLINICAL SUPPORT (OUTPATIENT)
Dept: ORTHOPEDIC SURGERY | Facility: CLINIC | Age: 72
End: 2017-09-26

## 2017-09-26 VITALS
OXYGEN SATURATION: 100 % | BODY MASS INDEX: 22.3 KG/M2 | HEART RATE: 77 BPM | SYSTOLIC BLOOD PRESSURE: 128 MMHG | DIASTOLIC BLOOD PRESSURE: 66 MMHG | TEMPERATURE: 98 F | WEIGHT: 134 LBS

## 2017-09-26 VITALS — HEIGHT: 65 IN | TEMPERATURE: 97.8 F | WEIGHT: 135 LBS | BODY MASS INDEX: 22.49 KG/M2

## 2017-09-26 DIAGNOSIS — M25.561 RIGHT KNEE PAIN, UNSPECIFIED CHRONICITY: Primary | ICD-10-CM

## 2017-09-26 DIAGNOSIS — D50.9 IRON DEFICIENCY ANEMIA, UNSPECIFIED IRON DEFICIENCY ANEMIA TYPE: Primary | ICD-10-CM

## 2017-09-26 PROCEDURE — 20610 DRAIN/INJ JOINT/BURSA W/O US: CPT | Performed by: NURSE PRACTITIONER

## 2017-09-26 PROCEDURE — 99213 OFFICE O/P EST LOW 20 MIN: CPT | Performed by: NURSE PRACTITIONER

## 2017-09-26 PROCEDURE — 25010000002 FERUMOXYTOL 510 MG/17ML SOLUTION 510 MG VIAL: Performed by: INTERNAL MEDICINE

## 2017-09-26 PROCEDURE — 96375 TX/PRO/DX INJ NEW DRUG ADDON: CPT | Performed by: INTERNAL MEDICINE

## 2017-09-26 PROCEDURE — 96374 THER/PROPH/DIAG INJ IV PUSH: CPT | Performed by: INTERNAL MEDICINE

## 2017-09-26 RX ORDER — BUPIVACAINE HYDROCHLORIDE 5 MG/ML
2 INJECTION, SOLUTION EPIDURAL; INTRACAUDAL
Status: COMPLETED | OUTPATIENT
Start: 2017-09-26 | End: 2017-09-26

## 2017-09-26 RX ORDER — SODIUM CHLORIDE 9 MG/ML
250 INJECTION, SOLUTION INTRAVENOUS ONCE
Status: CANCELLED | OUTPATIENT
Start: 2017-09-26

## 2017-09-26 RX ORDER — SODIUM CHLORIDE 9 MG/ML
250 INJECTION, SOLUTION INTRAVENOUS ONCE
Status: COMPLETED | OUTPATIENT
Start: 2017-09-26 | End: 2017-09-26

## 2017-09-26 RX ORDER — METHYLPREDNISOLONE ACETATE 80 MG/ML
80 INJECTION, SUSPENSION INTRA-ARTICULAR; INTRALESIONAL; INTRAMUSCULAR; SOFT TISSUE
Status: COMPLETED | OUTPATIENT
Start: 2017-09-26 | End: 2017-09-26

## 2017-09-26 RX ADMIN — METHYLPREDNISOLONE ACETATE 80 MG: 80 INJECTION, SUSPENSION INTRA-ARTICULAR; INTRALESIONAL; INTRAMUSCULAR; SOFT TISSUE at 14:58

## 2017-09-26 RX ADMIN — FERUMOXYTOL 510 MG: 510 INJECTION INTRAVENOUS at 13:29

## 2017-09-26 RX ADMIN — SODIUM CHLORIDE 250 ML: 900 INJECTION, SOLUTION INTRAVENOUS at 13:06

## 2017-09-26 RX ADMIN — FAMOTIDINE 20 MG: 10 INJECTION, SOLUTION INTRAVENOUS at 13:13

## 2017-09-26 RX ADMIN — BUPIVACAINE HYDROCHLORIDE 2 ML: 5 INJECTION, SOLUTION EPIDURAL; INTRACAUDAL at 14:58

## 2017-09-26 NOTE — PROGRESS NOTES
Patient: Mary Kay Saab  YOB: 1945 72 y.o. female  Medical Record Number: [id number]    Chief Complaints:   Chief Complaint   Patient presents with   • Right Knee - Follow-up, Pain       History of Present Illness:Mary Kay Saab is a 72 y.o. female who presents for follow-up of  Right knee MRI.  The MRI does show significant arthritic changes.  We discussed cortisone injection and she would like to try that today.    Allergies:   Allergies   Allergen Reactions   • Atropine Hives   • Penicillins      Reactions: syncope and seizures   • Sulfa Antibiotics Nausea Only     STATES SHE DOES FINE WITH CERTAIN SULFA DRUGS   • Epinephrine Palpitations       Medications:   Current Outpatient Prescriptions   Medication Sig Dispense Refill   • alendronate (FOSAMAX) 70 MG tablet Take 70 mg by mouth Every 7 (Seven) Days.  12   • alosetron (LOTRONEX) 0.5 MG tablet Take 0.05 mg by mouth Daily As Needed.     • ALPRAZolam (XANAX) 1 MG tablet Take 1 mg by mouth every night.     • amiodarone (PACERONE) 200 MG tablet TK 1 T PO D PRN  1   • bumetanide (BUMEX) 1 MG tablet Take 1 mg by mouth Daily As Needed.     • cetirizine (ZyrTEC) 10 MG tablet Take 10 mg by mouth Daily.     • dabigatran etexilate (PRADAXA) 150 MG capsu Take 150 mg by mouth 2 (Two) Times a Day.     • esomeprazole (NexIUM) 40 MG capsule Take 40 mg by mouth 2 (two) times a day.     • montelukast (SINGULAIR) 10 MG tablet Take 10 mg by mouth Daily.     • nystatin-triamcinolone (MYCOLOG II) 856865-9.1 UNIT/GM-% cream      • pseudoephedrine-guaifenesin (MUCINEX D)  MG per 12 hr tablet Take 1 tablet by mouth Every 12 (Twelve) Hours.     • raloxifene (EVISTA) 60 MG tablet Take 60 mg by mouth Daily.     • RESTASIS 0.05 % ophthalmic emulsion INT 1 DROP IN BOTH EYES BID  2   • simvastatin (ZOCOR) 40 MG tablet Take 40 mg by mouth Every Night.     • cephalexin (KEFLEX) 500 MG capsule Take 4 tablets 1 hour prior to dental procedure. 4 capsule 4   • desonide  "(DESOWEN) 0.05 % ointment Apply  topically daily.     • metoprolol succinate XL (TOPROL-XL) 12.5 MG 24 hr half tablet Take 25 mg by mouth Daily As Needed.     • nebivolol (BYSTOLIC) 2.5 MG tablet Take 2.5 mg by mouth Every Night.     • nebivolol (BYSTOLIC) 5 MG tablet Take 5 mg by mouth Daily. MID AFTERNOON       No current facility-administered medications for this visit.          The following portions of the patient's history were reviewed and updated as appropriate: allergies, current medications, past family history, past medical history, past social history, past surgical history and problem list.    Review of Systems:   A 14 point review of systems was performed. All systems negative except pertinent positives/negative listed in HPI above    Physical Exam:   Vitals:    09/26/17 1436   Temp: 97.8 °F (36.6 °C)   TempSrc: Temporal Artery    Weight: 135 lb (61.2 kg)   Height: 65\" (165.1 cm)       General: A and O x 3, ASA, NAD    SCLERA:    Normal    DENTITION:   Normal  Skin clear no unusual lesions noted  Right knee no appreciable 110 degrees flexion neutral in extension with a positive Randy negative Lockman calf soft nontender    Assessment/Plan:  Right knee osteoarthritis    We will proceed with right knee cortisone injection and refer the patient outpatient physical therapy and we'll see her back as needed, prior to injection risks were discussed including pain, elevated blood sugars, infection.  Patient verbalized understanding would like to proceed    Large Joint Arthrocentesis  Date/Time: 9/26/2017 2:58 PM  Consent given by: patient  Site marked: site marked  Timeout: Immediately prior to procedure a time out was called to verify the correct patient, procedure, equipment, support staff and site/side marked as required   Supporting Documentation  Indications: pain and joint swelling   Procedure Details  Location: knee - R knee  Preparation: Patient was prepped and draped in the usual sterile " fashion  Needle size: 18 G  Approach: anterolateral  Medications administered: 80 mg methylPREDNISolone acetate 80 MG/ML; 2 mL bupivacaine (PF) 0.5 %

## 2017-09-27 ENCOUNTER — TELEPHONE (OUTPATIENT)
Dept: GENERAL RADIOLOGY | Facility: HOSPITAL | Age: 72
End: 2017-09-27

## 2017-09-27 NOTE — TELEPHONE ENCOUNTER
----- Message from Gaye Latif RN sent at 9/27/2017  9:40 AM EDT -----  Please cancel 9/28/17 appointment patient cannot receive Procrit due to receiving Feraheme on 9/26/17. Thanks

## 2017-09-28 ENCOUNTER — APPOINTMENT (OUTPATIENT)
Dept: OTHER | Facility: HOSPITAL | Age: 72
End: 2017-09-28

## 2017-09-28 ENCOUNTER — TELEPHONE (OUTPATIENT)
Dept: ONCOLOGY | Facility: HOSPITAL | Age: 72
End: 2017-09-28

## 2017-09-28 ENCOUNTER — APPOINTMENT (OUTPATIENT)
Dept: ONCOLOGY | Facility: HOSPITAL | Age: 72
End: 2017-09-28

## 2017-09-28 NOTE — TELEPHONE ENCOUNTER
Pt calling wanting to know why she cannot get EPO today (her apt was cancelled). Explained to pt that she is receiving feraheme infusions and she is not able to get EPO while getting feraheme. She v/u. Pt will return 10/3 for another feraheme infusion.

## 2017-10-02 ENCOUNTER — TELEPHONE (OUTPATIENT)
Dept: ONCOLOGY | Facility: HOSPITAL | Age: 72
End: 2017-10-02

## 2017-10-02 NOTE — TELEPHONE ENCOUNTER
----- Message from Diego Cornejo II, MD sent at 9/22/2017 11:20 AM EDT -----  We will do it without benadryl.  ----- Message -----     From: Heather Porter, RN     Sent: 9/20/2017   4:46 PM       To: Diego Cornejo II, MD, Gaye Latif, RN    FYI--pt states she can't take Benadryl. She said last time she had it she passed out in parking lot? She does not want it as a premed. Want to add anything else with first treatment other then the pepcid we will give? THanks     ----- Message -----     From: Diego Cornejo II, MD     Sent: 9/20/2017   2:24 PM       To: [facility] [place] [facility]     Please call patient and inform them iron is low and arrange:  2 doses of feraheme.    Thanks.

## 2017-10-03 ENCOUNTER — APPOINTMENT (OUTPATIENT)
Dept: ONCOLOGY | Facility: HOSPITAL | Age: 72
End: 2017-10-03

## 2017-10-03 ENCOUNTER — INFUSION (OUTPATIENT)
Dept: ONCOLOGY | Facility: HOSPITAL | Age: 72
End: 2017-10-03

## 2017-10-03 VITALS
TEMPERATURE: 98.7 F | WEIGHT: 132.8 LBS | SYSTOLIC BLOOD PRESSURE: 113 MMHG | BODY MASS INDEX: 22.1 KG/M2 | HEART RATE: 56 BPM | DIASTOLIC BLOOD PRESSURE: 65 MMHG

## 2017-10-03 DIAGNOSIS — D50.9 IRON DEFICIENCY ANEMIA, UNSPECIFIED IRON DEFICIENCY ANEMIA TYPE: Primary | ICD-10-CM

## 2017-10-03 LAB
BASOPHILS # BLD AUTO: 0.04 10*3/MM3 (ref 0–0.1)
BASOPHILS NFR BLD AUTO: 0.6 % (ref 0–1.1)
DEPRECATED RDW RBC AUTO: 60.4 FL (ref 37–49)
EOSINOPHIL # BLD AUTO: 0.25 10*3/MM3 (ref 0–0.36)
EOSINOPHIL NFR BLD AUTO: 3.8 % (ref 1–5)
ERYTHROCYTE [DISTWIDTH] IN BLOOD BY AUTOMATED COUNT: 18.3 % (ref 11.7–14.5)
HCT VFR BLD AUTO: 33.9 % (ref 34–45)
HGB BLD-MCNC: 10.5 G/DL (ref 11.5–14.9)
IMM GRANULOCYTES # BLD: 0.11 10*3/MM3 (ref 0–0.03)
IMM GRANULOCYTES NFR BLD: 1.7 % (ref 0–0.5)
LYMPHOCYTES # BLD AUTO: 1.32 10*3/MM3 (ref 1–3.5)
LYMPHOCYTES NFR BLD AUTO: 20 % (ref 20–49)
MCH RBC QN AUTO: 28.5 PG (ref 27–33)
MCHC RBC AUTO-ENTMCNC: 31 G/DL (ref 32–35)
MCV RBC AUTO: 91.9 FL (ref 83–97)
MONOCYTES # BLD AUTO: 0.42 10*3/MM3 (ref 0.25–0.8)
MONOCYTES NFR BLD AUTO: 6.4 % (ref 4–12)
NEUTROPHILS # BLD AUTO: 4.46 10*3/MM3 (ref 1.5–7)
NEUTROPHILS NFR BLD AUTO: 67.5 % (ref 39–75)
NRBC BLD MANUAL-RTO: 0 /100 WBC (ref 0–0)
PLATELET # BLD AUTO: 195 10*3/MM3 (ref 150–375)
PMV BLD AUTO: 9.6 FL (ref 8.9–12.1)
RBC # BLD AUTO: 3.69 10*6/MM3 (ref 3.9–5)
WBC NRBC COR # BLD: 6.6 10*3/MM3 (ref 4–10)

## 2017-10-03 PROCEDURE — 85025 COMPLETE CBC W/AUTO DIFF WBC: CPT

## 2017-10-03 PROCEDURE — 25010000002 FERUMOXYTOL 510 MG/17ML SOLUTION 510 MG VIAL: Performed by: INTERNAL MEDICINE

## 2017-10-03 PROCEDURE — 96375 TX/PRO/DX INJ NEW DRUG ADDON: CPT | Performed by: INTERNAL MEDICINE

## 2017-10-03 PROCEDURE — 96374 THER/PROPH/DIAG INJ IV PUSH: CPT | Performed by: INTERNAL MEDICINE

## 2017-10-03 RX ORDER — SODIUM CHLORIDE 9 MG/ML
250 INJECTION, SOLUTION INTRAVENOUS ONCE
Status: COMPLETED | OUTPATIENT
Start: 2017-10-03 | End: 2017-10-03

## 2017-10-03 RX ORDER — SODIUM CHLORIDE 9 MG/ML
250 INJECTION, SOLUTION INTRAVENOUS ONCE
Status: CANCELLED | OUTPATIENT
Start: 2017-10-03

## 2017-10-03 RX ADMIN — FAMOTIDINE 20 MG: 10 INJECTION INTRAVENOUS at 09:51

## 2017-10-03 RX ADMIN — SODIUM CHLORIDE 250 ML: 900 INJECTION, SOLUTION INTRAVENOUS at 09:53

## 2017-10-03 RX ADMIN — FERUMOXYTOL 510 MG: 510 INJECTION INTRAVENOUS at 10:27

## 2017-10-05 ENCOUNTER — APPOINTMENT (OUTPATIENT)
Dept: ONCOLOGY | Facility: HOSPITAL | Age: 72
End: 2017-10-05

## 2017-10-05 ENCOUNTER — APPOINTMENT (OUTPATIENT)
Dept: OTHER | Facility: HOSPITAL | Age: 72
End: 2017-10-05

## 2017-10-12 ENCOUNTER — TELEPHONE (OUTPATIENT)
Dept: ORTHOPEDIC SURGERY | Facility: CLINIC | Age: 72
End: 2017-10-12

## 2017-10-17 ENCOUNTER — APPOINTMENT (OUTPATIENT)
Dept: ONCOLOGY | Facility: HOSPITAL | Age: 72
End: 2017-10-17

## 2017-10-17 ENCOUNTER — APPOINTMENT (OUTPATIENT)
Dept: OTHER | Facility: HOSPITAL | Age: 72
End: 2017-10-17

## 2017-10-23 RX ORDER — ATENOLOL 100 MG/1
TABLET ORAL
Qty: 180 CAPSULE | Refills: 0 | Status: SHIPPED | OUTPATIENT
Start: 2017-10-23 | End: 2017-12-21 | Stop reason: SDUPTHER

## 2017-10-25 ENCOUNTER — INFUSION (OUTPATIENT)
Dept: ONCOLOGY | Facility: HOSPITAL | Age: 72
End: 2017-10-25

## 2017-10-25 ENCOUNTER — LAB (OUTPATIENT)
Dept: OTHER | Facility: HOSPITAL | Age: 72
End: 2017-10-25

## 2017-10-25 VITALS — BODY MASS INDEX: 21.77 KG/M2 | WEIGHT: 130.8 LBS

## 2017-10-25 DIAGNOSIS — N18.30 ANEMIA DUE TO STAGE 3 CHRONIC KIDNEY DISEASE TREATED WITH ERYTHROPOIETIN (HCC): ICD-10-CM

## 2017-10-25 DIAGNOSIS — C50.412 BREAST CANCER OF UPPER-OUTER QUADRANT OF LEFT FEMALE BREAST (HCC): ICD-10-CM

## 2017-10-25 DIAGNOSIS — D50.9 IRON DEFICIENCY ANEMIA, UNSPECIFIED IRON DEFICIENCY ANEMIA TYPE: ICD-10-CM

## 2017-10-25 DIAGNOSIS — D63.1 ANEMIA DUE TO STAGE 3 CHRONIC KIDNEY DISEASE TREATED WITH ERYTHROPOIETIN (HCC): ICD-10-CM

## 2017-10-25 LAB
BASOPHILS # BLD AUTO: 0.05 10*3/MM3 (ref 0–0.2)
BASOPHILS NFR BLD AUTO: 0.8 % (ref 0–1.5)
DEPRECATED RDW RBC AUTO: 62.4 FL (ref 37–54)
EOSINOPHIL # BLD AUTO: 0.14 10*3/MM3 (ref 0–0.7)
EOSINOPHIL NFR BLD AUTO: 2.4 % (ref 0.3–6.2)
ERYTHROCYTE [DISTWIDTH] IN BLOOD BY AUTOMATED COUNT: 19 % (ref 11.7–13)
FERRITIN SERPL-MCNC: 851.5 NG/ML (ref 13–150)
HCT VFR BLD AUTO: 33.7 % (ref 35.6–45.5)
HGB BLD-MCNC: 11.1 G/DL (ref 11.9–15.5)
IMM GRANULOCYTES # BLD: 0.07 10*3/MM3 (ref 0–0.03)
IMM GRANULOCYTES NFR BLD: 1.2 % (ref 0–0.5)
IRON 24H UR-MRATE: 98 MCG/DL (ref 37–145)
IRON SATN MFR SERPL: 34 % (ref 20–50)
LYMPHOCYTES # BLD AUTO: 1.11 10*3/MM3 (ref 0.9–4.8)
LYMPHOCYTES NFR BLD AUTO: 18.7 % (ref 19.6–45.3)
MCH RBC QN AUTO: 29.4 PG (ref 26.9–32)
MCHC RBC AUTO-ENTMCNC: 32.9 G/DL (ref 32.4–36.3)
MCV RBC AUTO: 89.4 FL (ref 80.5–98.2)
MONOCYTES # BLD AUTO: 0.49 10*3/MM3 (ref 0.2–1.2)
MONOCYTES NFR BLD AUTO: 8.3 % (ref 5–12)
NEUTROPHILS # BLD AUTO: 4.07 10*3/MM3 (ref 1.9–8.1)
NEUTROPHILS NFR BLD AUTO: 68.6 % (ref 42.7–76)
NRBC BLD MANUAL-RTO: 0 /100 WBC (ref 0–0)
PLATELET # BLD AUTO: 214 10*3/MM3 (ref 140–500)
PMV BLD AUTO: 9.6 FL (ref 6–12)
RBC # BLD AUTO: 3.77 10*6/MM3 (ref 3.9–5.2)
TIBC SERPL-MCNC: 291 MCG/DL (ref 298–536)
TRANSFERRIN SERPL-MCNC: 195 MG/DL (ref 200–360)
WBC NRBC COR # BLD: 5.93 10*3/MM3 (ref 4.5–10.7)

## 2017-10-25 PROCEDURE — 85025 COMPLETE CBC W/AUTO DIFF WBC: CPT | Performed by: INTERNAL MEDICINE

## 2017-10-25 PROCEDURE — 83540 ASSAY OF IRON: CPT | Performed by: INTERNAL MEDICINE

## 2017-10-25 PROCEDURE — 84466 ASSAY OF TRANSFERRIN: CPT | Performed by: INTERNAL MEDICINE

## 2017-10-25 PROCEDURE — 82728 ASSAY OF FERRITIN: CPT | Performed by: INTERNAL MEDICINE

## 2017-10-25 PROCEDURE — 36415 COLL VENOUS BLD VENIPUNCTURE: CPT

## 2017-10-25 NOTE — PROGRESS NOTES
Hgb 11.1 today and procrit held.  Pt denies complaints and next appt reviewed.  Pt to call with concerns.

## 2017-10-26 ENCOUNTER — TELEPHONE (OUTPATIENT)
Dept: ONCOLOGY | Facility: CLINIC | Age: 72
End: 2017-10-26

## 2017-10-26 ENCOUNTER — TELEPHONE (OUTPATIENT)
Dept: ONCOLOGY | Facility: HOSPITAL | Age: 72
End: 2017-10-26

## 2017-10-26 NOTE — TELEPHONE ENCOUNTER
Pt calling concerned that her Ferritin level is too high and since it's too high will it cause any GI issues. Informed patient it is elevated because of the Feraheme we recently gave her. Per Olivia HOLBROOK, the high ferritin levels would not cause GI upset from her feraheme. If she had any AE from the feraheme it probably would have been soon after infusion. Pt v/u.   She would like to cancel appt for next week because she feels that her hgb will not drop enough for her to need procrit. She will return the following week.

## 2017-11-01 ENCOUNTER — APPOINTMENT (OUTPATIENT)
Dept: ONCOLOGY | Facility: HOSPITAL | Age: 72
End: 2017-11-01

## 2017-11-01 ENCOUNTER — APPOINTMENT (OUTPATIENT)
Dept: OTHER | Facility: HOSPITAL | Age: 72
End: 2017-11-01

## 2017-11-07 ENCOUNTER — LAB (OUTPATIENT)
Dept: OTHER | Facility: HOSPITAL | Age: 72
End: 2017-11-07

## 2017-11-07 ENCOUNTER — INFUSION (OUTPATIENT)
Dept: ONCOLOGY | Facility: HOSPITAL | Age: 72
End: 2017-11-07

## 2017-11-07 DIAGNOSIS — N18.30 ANEMIA DUE TO STAGE 3 CHRONIC KIDNEY DISEASE TREATED WITH ERYTHROPOIETIN (HCC): ICD-10-CM

## 2017-11-07 DIAGNOSIS — D63.1 ANEMIA DUE TO STAGE 3 CHRONIC KIDNEY DISEASE TREATED WITH ERYTHROPOIETIN (HCC): ICD-10-CM

## 2017-11-07 LAB
BASOPHILS # BLD AUTO: 0.07 10*3/MM3 (ref 0–0.2)
BASOPHILS NFR BLD AUTO: 1 % (ref 0–1.5)
DEPRECATED RDW RBC AUTO: 63.9 FL (ref 37–54)
EOSINOPHIL # BLD AUTO: 0.13 10*3/MM3 (ref 0–0.7)
EOSINOPHIL NFR BLD AUTO: 1.8 % (ref 0.3–6.2)
ERYTHROCYTE [DISTWIDTH] IN BLOOD BY AUTOMATED COUNT: 19.6 % (ref 11.7–13)
HCT VFR BLD AUTO: 33.6 % (ref 35.6–45.5)
HGB BLD-MCNC: 11.2 G/DL (ref 11.9–15.5)
IMM GRANULOCYTES # BLD: 0.08 10*3/MM3 (ref 0–0.03)
IMM GRANULOCYTES NFR BLD: 1.1 % (ref 0–0.5)
LYMPHOCYTES # BLD AUTO: 1.33 10*3/MM3 (ref 0.9–4.8)
LYMPHOCYTES NFR BLD AUTO: 18.3 % (ref 19.6–45.3)
MCH RBC QN AUTO: 29.6 PG (ref 26.9–32)
MCHC RBC AUTO-ENTMCNC: 33.3 G/DL (ref 32.4–36.3)
MCV RBC AUTO: 88.9 FL (ref 80.5–98.2)
MONOCYTES # BLD AUTO: 0.72 10*3/MM3 (ref 0.2–1.2)
MONOCYTES NFR BLD AUTO: 9.9 % (ref 5–12)
NEUTROPHILS # BLD AUTO: 4.95 10*3/MM3 (ref 1.9–8.1)
NEUTROPHILS NFR BLD AUTO: 67.9 % (ref 42.7–76)
NRBC BLD MANUAL-RTO: 0 /100 WBC (ref 0–0)
PLATELET # BLD AUTO: 176 10*3/MM3 (ref 140–500)
PMV BLD AUTO: 10.9 FL (ref 6–12)
RBC # BLD AUTO: 3.78 10*6/MM3 (ref 3.9–5.2)
WBC NRBC COR # BLD: 7.28 10*3/MM3 (ref 4.5–10.7)

## 2017-11-07 PROCEDURE — 36415 COLL VENOUS BLD VENIPUNCTURE: CPT

## 2017-11-07 PROCEDURE — 85025 COMPLETE CBC W/AUTO DIFF WBC: CPT | Performed by: INTERNAL MEDICINE

## 2017-11-07 NOTE — PROGRESS NOTES
PT LEFT OFFICE FOLLOWING LAB DRAW. CALLED PT AND WENT OVER CBC RESULTS. HGB NOTED AT 11.2. PT WOULD NOT HAVE QUALIFIED FOR EPO TODAY ANYWAY. PT REPORTS FEELING GOOD. PT ASKING TO COME BACK IN 1 MONTH FOR REPEAT CBC AND POSSIBLE EPO. ALSO WANTING TO CHANGE APPT IN JAN SO THAT SHE COMES EARLIER FOR LABS. APPT DESK MADE AWARE AND CHANGES WILL BE MADE. PT V/U.

## 2017-11-08 ENCOUNTER — APPOINTMENT (OUTPATIENT)
Dept: OTHER | Facility: HOSPITAL | Age: 72
End: 2017-11-08

## 2017-11-08 ENCOUNTER — APPOINTMENT (OUTPATIENT)
Dept: ONCOLOGY | Facility: HOSPITAL | Age: 72
End: 2017-11-08

## 2017-11-21 ENCOUNTER — OFFICE VISIT (OUTPATIENT)
Dept: ORTHOPEDIC SURGERY | Facility: CLINIC | Age: 72
End: 2017-11-21

## 2017-11-21 VITALS — HEIGHT: 66 IN | BODY MASS INDEX: 21.21 KG/M2 | TEMPERATURE: 98 F | WEIGHT: 132 LBS

## 2017-11-21 DIAGNOSIS — S79.911A HIP INJURY, RIGHT, INITIAL ENCOUNTER: Primary | ICD-10-CM

## 2017-11-21 DIAGNOSIS — Z96.641 HISTORY OF TOTAL RIGHT HIP ARTHROPLASTY: ICD-10-CM

## 2017-11-21 PROCEDURE — 99213 OFFICE O/P EST LOW 20 MIN: CPT | Performed by: NURSE PRACTITIONER

## 2017-11-21 PROCEDURE — 73502 X-RAY EXAM HIP UNI 2-3 VIEWS: CPT | Performed by: NURSE PRACTITIONER

## 2017-11-21 PROCEDURE — 20610 DRAIN/INJ JOINT/BURSA W/O US: CPT | Performed by: NURSE PRACTITIONER

## 2017-11-21 RX ORDER — DILTIAZEM HYDROCHLORIDE 60 MG/1
CAPSULE, EXTENDED RELEASE ORAL
Refills: 3 | COMMUNITY
Start: 2017-10-23 | End: 2018-03-06 | Stop reason: DRUGHIGH

## 2017-11-21 RX ORDER — METHOCARBAMOL 750 MG/1
TABLET, FILM COATED ORAL
Refills: 1 | COMMUNITY
Start: 2017-10-02 | End: 2018-11-06

## 2017-11-21 RX ORDER — METHYLPREDNISOLONE ACETATE 80 MG/ML
80 INJECTION, SUSPENSION INTRA-ARTICULAR; INTRALESIONAL; INTRAMUSCULAR; SOFT TISSUE
Status: COMPLETED | OUTPATIENT
Start: 2017-11-21 | End: 2017-11-21

## 2017-11-21 RX ORDER — BUPIVACAINE HYDROCHLORIDE 5 MG/ML
2 INJECTION, SOLUTION EPIDURAL; INTRACAUDAL
Status: COMPLETED | OUTPATIENT
Start: 2017-11-21 | End: 2017-11-21

## 2017-11-21 RX ADMIN — BUPIVACAINE HYDROCHLORIDE 2 ML: 5 INJECTION, SOLUTION EPIDURAL; INTRACAUDAL at 10:28

## 2017-11-21 RX ADMIN — METHYLPREDNISOLONE ACETATE 80 MG: 80 INJECTION, SUSPENSION INTRA-ARTICULAR; INTRALESIONAL; INTRAMUSCULAR; SOFT TISSUE at 10:28

## 2017-11-21 NOTE — PROGRESS NOTES
Patient: Mary Kay Saab  YOB: 1945 72 y.o. female  Medical Record Number: [id number]    Chief Complaints:   Chief Complaint   Patient presents with   • Right Hip - Establish Care, Pain       History of Present Illness:Mary Kay Saab is a 72 y.o. female who presents With complaints of right hip pain.  Apparently the patient had a fall couple weeks ago in which she slipped out of the chair onto the floor.  She had some pain in the right groin but that is improved, she now has some right lateral hip pain which she's had before secondary to bursitis.  She is interested in a cortisone injection if that's possible.  She describes the right lateral hip pain as a moderate intermittent ache worse with walking better with rest.    Allergies:   Allergies   Allergen Reactions   • Atropine Hives   • Penicillins      Reactions: syncope and seizures   • Sulfa Antibiotics Nausea Only     STATES SHE DOES FINE WITH CERTAIN SULFA DRUGS   • Epinephrine Palpitations       Medications:   Current Outpatient Prescriptions   Medication Sig Dispense Refill   • alendronate (FOSAMAX) 70 MG tablet Take 70 mg by mouth Every 7 (Seven) Days.  12   • alosetron (LOTRONEX) 0.5 MG tablet Take 0.05 mg by mouth Daily As Needed.     • ALPRAZolam (XANAX) 1 MG tablet Take 1 mg by mouth every night.     • amiodarone (PACERONE) 200 MG tablet TK 1 T PO D PRN  1   • bumetanide (BUMEX) 1 MG tablet Take 1 mg by mouth Daily As Needed.     • cetirizine (ZyrTEC) 10 MG tablet Take 10 mg by mouth Daily.     • desonide (DESOWEN) 0.05 % ointment Apply  topically daily.     • diltiaZEM SR (CARDIZEM SR) 60 MG 12 hr capsule TK ONE C PO QAM  3   • esomeprazole (NexIUM) 40 MG capsule Take 40 mg by mouth 2 (two) times a day.     • methocarbamol (ROBAXIN) 750 MG tablet TK 1 T PO TID  1   • metoprolol succinate XL (TOPROL-XL) 12.5 MG 24 hr half tablet Take 25 mg by mouth Daily As Needed.     • montelukast (SINGULAIR) 10 MG tablet Take 10 mg by mouth Daily.    "  • nystatin-triamcinolone (MYCOLOG II) 471738-7.1 UNIT/GM-% cream      • PRADAXA 150 MG capsu TAKE 1 CAPSULE BY MOUTH EVERY 12 HOURS 180 capsule 0   • pseudoephedrine-guaifenesin (MUCINEX D)  MG per 12 hr tablet Take 1 tablet by mouth Every 12 (Twelve) Hours.     • raloxifene (EVISTA) 60 MG tablet Take 60 mg by mouth Daily.     • RESTASIS 0.05 % ophthalmic emulsion INT 1 DROP IN BOTH EYES BID  2   • simvastatin (ZOCOR) 40 MG tablet Take 40 mg by mouth Every Night.       No current facility-administered medications for this visit.          The following portions of the patient's history were reviewed and updated as appropriate: allergies, current medications, past family history, past medical history, past social history, past surgical history and problem list.    Review of Systems:   A 14 point review of systems was performed. All systems negative except pertinent positives/negative listed in HPI above    Physical Exam:   Vitals:    11/21/17 0925   Temp: 98 °F (36.7 °C)   TempSrc: Temporal Artery    Weight: 132 lb (59.9 kg)   Height: 65.5\" (166.4 cm)       General: A and O x 3, ASA, NAD    SCLERA:    Normal    DENTITION:   Normal  Skin clear no unusual lesions noted  Right hip patient is tender over right hip greater trochanteric bursa otherwise has good range of motion with no instability.  Calf is soft and nontender    Radiology:  Xrays 2 views of right hip were ordered and reviewed today secondary to recent fall show well-placed well-positioned right total hip replacement.  Comparative views are unchanged     Assessment/Plan:  Status post right PREETHI a stable  Right hip greater trochanteric bursitis    Right hip bursa injected with cortisone.  Prior to injection risks were discussed including pain, elevated blood sugars, infection.  Patient verbalized understanding would like to proceed with injection.  She will resume stretching exercises and will let us know if the pain does not completely " resolve.    Large Joint Arthrocentesis  Date/Time: 11/21/2017 10:28 AM  Consent given by: patient  Site marked: site marked  Timeout: Immediately prior to procedure a time out was called to verify the correct patient, procedure, equipment, support staff and site/side marked as required   Supporting Documentation  Indications: pain   Procedure Details  Location: hip - R greater trochanteric bursa  Preparation: Patient was prepped and draped in the usual sterile fashion  Needle size: 18 G  Approach: lateral  Medications administered: 2 mL bupivacaine (PF) 0.5 %; 80 mg methylPREDNISolone acetate 80 MG/ML  Patient tolerance: patient tolerated the procedure well with no immediate complications

## 2017-12-05 ENCOUNTER — LAB (OUTPATIENT)
Dept: OTHER | Facility: HOSPITAL | Age: 72
End: 2017-12-05

## 2017-12-05 ENCOUNTER — INFUSION (OUTPATIENT)
Dept: ONCOLOGY | Facility: HOSPITAL | Age: 72
End: 2017-12-05

## 2017-12-05 VITALS — WEIGHT: 129 LBS | BODY MASS INDEX: 21.14 KG/M2

## 2017-12-05 DIAGNOSIS — D63.1 ANEMIA DUE TO STAGE 3 CHRONIC KIDNEY DISEASE TREATED WITH ERYTHROPOIETIN (HCC): ICD-10-CM

## 2017-12-05 DIAGNOSIS — N18.30 ANEMIA DUE TO STAGE 3 CHRONIC KIDNEY DISEASE TREATED WITH ERYTHROPOIETIN (HCC): ICD-10-CM

## 2017-12-05 LAB
BASOPHILS # BLD AUTO: 0.1 10*3/MM3 (ref 0–0.2)
BASOPHILS NFR BLD AUTO: 1.3 % (ref 0–1.5)
DEPRECATED RDW RBC AUTO: 62 FL (ref 37–54)
EOSINOPHIL # BLD AUTO: 0.44 10*3/MM3 (ref 0–0.7)
EOSINOPHIL NFR BLD AUTO: 5.8 % (ref 0.3–6.2)
ERYTHROCYTE [DISTWIDTH] IN BLOOD BY AUTOMATED COUNT: 18.2 % (ref 11.7–13)
HCT VFR BLD AUTO: 32.4 % (ref 35.6–45.5)
HGB BLD-MCNC: 10.9 G/DL (ref 11.9–15.5)
IMM GRANULOCYTES # BLD: 0.07 10*3/MM3 (ref 0–0.03)
IMM GRANULOCYTES NFR BLD: 0.9 % (ref 0–0.5)
LYMPHOCYTES # BLD AUTO: 1.41 10*3/MM3 (ref 0.9–4.8)
LYMPHOCYTES NFR BLD AUTO: 18.5 % (ref 19.6–45.3)
MCH RBC QN AUTO: 31.1 PG (ref 26.9–32)
MCHC RBC AUTO-ENTMCNC: 33.6 G/DL (ref 32.4–36.3)
MCV RBC AUTO: 92.6 FL (ref 80.5–98.2)
MONOCYTES # BLD AUTO: 0.63 10*3/MM3 (ref 0.2–1.2)
MONOCYTES NFR BLD AUTO: 8.2 % (ref 5–12)
NEUTROPHILS # BLD AUTO: 4.99 10*3/MM3 (ref 1.9–8.1)
NEUTROPHILS NFR BLD AUTO: 65.3 % (ref 42.7–76)
NRBC BLD MANUAL-RTO: 0 /100 WBC (ref 0–0)
PLATELET # BLD AUTO: 197 10*3/MM3 (ref 140–500)
PMV BLD AUTO: 10.1 FL (ref 6–12)
RBC # BLD AUTO: 3.5 10*6/MM3 (ref 3.9–5.2)
WBC NRBC COR # BLD: 7.64 10*3/MM3 (ref 4.5–10.7)

## 2017-12-05 PROCEDURE — 85025 COMPLETE CBC W/AUTO DIFF WBC: CPT | Performed by: INTERNAL MEDICINE

## 2017-12-05 PROCEDURE — 36415 COLL VENOUS BLD VENIPUNCTURE: CPT

## 2017-12-22 RX ORDER — ATENOLOL 100 MG/1
TABLET ORAL
Qty: 180 CAPSULE | Refills: 0 | Status: SHIPPED | OUTPATIENT
Start: 2017-12-22 | End: 2018-11-06

## 2018-01-08 ENCOUNTER — LAB (OUTPATIENT)
Dept: OTHER | Facility: HOSPITAL | Age: 73
End: 2018-01-08

## 2018-01-08 DIAGNOSIS — C50.412 BREAST CANCER OF UPPER-OUTER QUADRANT OF LEFT FEMALE BREAST (HCC): ICD-10-CM

## 2018-01-08 DIAGNOSIS — N18.30 ANEMIA DUE TO STAGE 3 CHRONIC KIDNEY DISEASE TREATED WITH ERYTHROPOIETIN (HCC): ICD-10-CM

## 2018-01-08 DIAGNOSIS — D63.1 ANEMIA DUE TO STAGE 3 CHRONIC KIDNEY DISEASE TREATED WITH ERYTHROPOIETIN (HCC): ICD-10-CM

## 2018-01-08 DIAGNOSIS — D50.9 IRON DEFICIENCY ANEMIA, UNSPECIFIED IRON DEFICIENCY ANEMIA TYPE: ICD-10-CM

## 2018-01-08 LAB
BASOPHILS # BLD AUTO: 0.04 10*3/MM3 (ref 0–0.2)
BASOPHILS NFR BLD AUTO: 0.7 % (ref 0–1.5)
DEPRECATED RDW RBC AUTO: 52.8 FL (ref 37–54)
EOSINOPHIL # BLD AUTO: 0.15 10*3/MM3 (ref 0–0.7)
EOSINOPHIL NFR BLD AUTO: 2.8 % (ref 0.3–6.2)
ERYTHROCYTE [DISTWIDTH] IN BLOOD BY AUTOMATED COUNT: 14.6 % (ref 11.7–13)
FERRITIN SERPL-MCNC: 426.7 NG/ML (ref 13–150)
HCT VFR BLD AUTO: 33.7 % (ref 35.6–45.5)
HGB BLD-MCNC: 10.8 G/DL (ref 11.9–15.5)
IMM GRANULOCYTES # BLD: 0.02 10*3/MM3 (ref 0–0.03)
IMM GRANULOCYTES NFR BLD: 0.4 % (ref 0–0.5)
IRON 24H UR-MRATE: 107 MCG/DL (ref 37–145)
IRON SATN MFR SERPL: 31 % (ref 20–50)
LYMPHOCYTES # BLD AUTO: 1.32 10*3/MM3 (ref 0.9–4.8)
LYMPHOCYTES NFR BLD AUTO: 24.2 % (ref 19.6–45.3)
MCH RBC QN AUTO: 31.5 PG (ref 26.9–32)
MCHC RBC AUTO-ENTMCNC: 32 G/DL (ref 32.4–36.3)
MCV RBC AUTO: 98.3 FL (ref 80.5–98.2)
MONOCYTES # BLD AUTO: 0.47 10*3/MM3 (ref 0.2–1.2)
MONOCYTES NFR BLD AUTO: 8.6 % (ref 5–12)
NEUTROPHILS # BLD AUTO: 3.45 10*3/MM3 (ref 1.9–8.1)
NEUTROPHILS NFR BLD AUTO: 63.3 % (ref 42.7–76)
NRBC BLD MANUAL-RTO: 0 /100 WBC (ref 0–0)
PLATELET # BLD AUTO: 180 10*3/MM3 (ref 140–500)
PMV BLD AUTO: 9.6 FL (ref 6–12)
RBC # BLD AUTO: 3.43 10*6/MM3 (ref 3.9–5.2)
TIBC SERPL-MCNC: 346 MCG/DL (ref 298–536)
TRANSFERRIN SERPL-MCNC: 232 MG/DL (ref 200–360)
WBC NRBC COR # BLD: 5.45 10*3/MM3 (ref 4.5–10.7)

## 2018-01-08 PROCEDURE — 85025 COMPLETE CBC W/AUTO DIFF WBC: CPT | Performed by: INTERNAL MEDICINE

## 2018-01-08 PROCEDURE — 84466 ASSAY OF TRANSFERRIN: CPT | Performed by: INTERNAL MEDICINE

## 2018-01-08 PROCEDURE — 36415 COLL VENOUS BLD VENIPUNCTURE: CPT

## 2018-01-08 PROCEDURE — 83540 ASSAY OF IRON: CPT | Performed by: INTERNAL MEDICINE

## 2018-01-08 PROCEDURE — 82728 ASSAY OF FERRITIN: CPT | Performed by: INTERNAL MEDICINE

## 2018-01-10 ENCOUNTER — APPOINTMENT (OUTPATIENT)
Dept: ONCOLOGY | Facility: HOSPITAL | Age: 73
End: 2018-01-10

## 2018-01-10 ENCOUNTER — OFFICE VISIT (OUTPATIENT)
Dept: ONCOLOGY | Facility: CLINIC | Age: 73
End: 2018-01-10

## 2018-01-10 ENCOUNTER — APPOINTMENT (OUTPATIENT)
Dept: OTHER | Facility: HOSPITAL | Age: 73
End: 2018-01-10

## 2018-01-10 VITALS
HEART RATE: 51 BPM | WEIGHT: 133 LBS | DIASTOLIC BLOOD PRESSURE: 61 MMHG | BODY MASS INDEX: 21.38 KG/M2 | TEMPERATURE: 97.6 F | HEIGHT: 66 IN | SYSTOLIC BLOOD PRESSURE: 112 MMHG | OXYGEN SATURATION: 100 % | RESPIRATION RATE: 12 BRPM

## 2018-01-10 DIAGNOSIS — D50.9 IRON DEFICIENCY ANEMIA, UNSPECIFIED IRON DEFICIENCY ANEMIA TYPE: ICD-10-CM

## 2018-01-10 DIAGNOSIS — N18.30 ANEMIA DUE TO STAGE 3 CHRONIC KIDNEY DISEASE TREATED WITH ERYTHROPOIETIN (HCC): ICD-10-CM

## 2018-01-10 DIAGNOSIS — C50.412 MALIGNANT NEOPLASM OF UPPER-OUTER QUADRANT OF LEFT BREAST IN FEMALE, ESTROGEN RECEPTOR POSITIVE (HCC): Primary | ICD-10-CM

## 2018-01-10 DIAGNOSIS — D63.1 ANEMIA DUE TO STAGE 3 CHRONIC KIDNEY DISEASE TREATED WITH ERYTHROPOIETIN (HCC): ICD-10-CM

## 2018-01-10 DIAGNOSIS — Z17.0 MALIGNANT NEOPLASM OF UPPER-OUTER QUADRANT OF LEFT BREAST IN FEMALE, ESTROGEN RECEPTOR POSITIVE (HCC): Primary | ICD-10-CM

## 2018-01-10 PROCEDURE — G0463 HOSPITAL OUTPT CLINIC VISIT: HCPCS | Performed by: INTERNAL MEDICINE

## 2018-01-10 PROCEDURE — 99214 OFFICE O/P EST MOD 30 MIN: CPT | Performed by: INTERNAL MEDICINE

## 2018-01-10 RX ORDER — DILTIAZEM HYDROCHLORIDE 60 MG/1
TABLET, FILM COATED ORAL
Refills: 4 | COMMUNITY
Start: 2017-11-20 | End: 2018-01-10 | Stop reason: SDUPTHER

## 2018-01-10 RX ORDER — CLOBETASOL PROPIONATE 0.5 MG/G
AEROSOL, FOAM TOPICAL
Refills: 2 | COMMUNITY
Start: 2017-11-28 | End: 2018-11-06

## 2018-01-10 RX ORDER — DIPHENOXYLATE HYDROCHLORIDE AND ATROPINE SULFATE 2.5; .025 MG/1; MG/1
TABLET ORAL
Refills: 3 | COMMUNITY
Start: 2017-12-27 | End: 2018-11-06

## 2018-01-10 RX ORDER — CEPHALEXIN 500 MG/1
CAPSULE ORAL
Refills: 4 | COMMUNITY
Start: 2017-11-24 | End: 2018-07-24 | Stop reason: SDUPTHER

## 2018-01-10 RX ORDER — METHYLPREDNISOLONE 4 MG/1
TABLET ORAL
Refills: 3 | COMMUNITY
Start: 2017-12-21 | End: 2018-01-10

## 2018-01-10 RX ORDER — FLUCONAZOLE 100 MG/1
TABLET ORAL
Refills: 12 | COMMUNITY
Start: 2017-12-21 | End: 2018-11-06

## 2018-01-10 NOTE — PROGRESS NOTES
Subjective .     REASONS FOR FOLLOWUP:  Breast cancer, anemia    HISTORY OF PRESENT ILLNESS:  The patient is a 72 y.o. year old female  who is here for follow-up with the above-mentioned history.    States the hip replacement went very well.  However, was very weak as an outpatient after the hip replacement until Hb improved.  She received some weekly doses of Procrit and IV Feraheme.  Hb is now back up to baseline.    Denies bleeding, chest pain, shortness of air, dizziness.    Plans to have her knee replaced at some point in the future, but is trying to put this off as long as possible.    Past Medical History:   Diagnosis Date   • Anemia     Of chronic renal insufficiency   • Atrial fibrillation    • Mcguire's esophagus    • Breast cancer    • Chronic renal insufficiency     Stage 3   • MCCRAY (dyspnea on exertion)    • Dyspnea    • GERD (gastroesophageal reflux disease)    • H/O electrocardiogram    • Hx of being hospitalized     [facility] in 09/2010 and 10/2010 for atrial fibrillation, Dr. Perla Hurtado   • Hyperlipidemia    • Hypertension    • Hypoxia    • IBS (irritable bowel syndrome)    • Kidney dysfunction    • Lower extremity edema    • Lump or mass in breast    • Malignant neoplasm of breast    • Osteoarthritis    • Osteopenia    • Paroxysmal atrial fibrillation    • Seizure     AS A TEENAGER, NOT CURRENTLY   • SOB (shortness of breath)      Past Surgical History:   Procedure Laterality Date   • BREAST LUMPECTOMY  1990    For LCIS   • CARDIAC CATHETERIZATION Left 4/27/2016    Procedure: Cardiac catheterization;  Surgeon: Kevin Guillen MD;  Location: [facility] INVASIVE LOCATION;  Service:    • FOOT SURGERY     • HAND ARTHROPLASTY Right    • KNEE ARTHROSCOPY     • OTHER SURGICAL HISTORY      LYMPHATIC SURGERY   • TOTAL HIP ARTHROPLASTY Right 7/12/2017    Procedure: TOTAL HIP ARTHROPLASTY ANTERIOR WITH HANA TABLE;  Surgeon: Van Mcguire MD;  Location: [facility] OR;  Service:         HEMATOLOGIC/ONCOLOGIC HISTORY:  (History from previous dates can be found in the separate document.)    MEDICATIONS    Current Outpatient Prescriptions:   •  alendronate (FOSAMAX) 70 MG tablet, Take 70 mg by mouth Every 7 (Seven) Days., Disp: , Rfl: 12  •  alosetron (LOTRONEX) 0.5 MG tablet, Take 0.05 mg by mouth Daily As Needed., Disp: , Rfl:   •  ALPRAZolam (XANAX) 1 MG tablet, Take 1 mg by mouth every night., Disp: , Rfl:   •  amiodarone (PACERONE) 200 MG tablet, TK 1 T PO D PRN, Disp: , Rfl: 1  •  bumetanide (BUMEX) 1 MG tablet, Take 1 mg by mouth Daily As Needed., Disp: , Rfl:   •  cephalexin (KEFLEX) 500 MG capsule, DNC, Disp: , Rfl: 4  •  cetirizine (ZyrTEC) 10 MG tablet, Take 10 mg by mouth Daily., Disp: , Rfl:   •  clobetasol (OLUX) 0.05 % topical foam, BRY FOAM EXT AA BID, Disp: , Rfl: 2  •  desonide (DESOWEN) 0.05 % ointment, Apply  topically daily., Disp: , Rfl:   •  diltiaZEM SR (CARDIZEM SR) 60 MG 12 hr capsule, TK ONE C PO QAM, Disp: , Rfl: 3  •  diphenoxylate-atropine (LOMOTIL) 2.5-0.025 MG per tablet, TK 1 T PO WITH EACH LOOSE STOOL. MAX 5 TABLETS PER 24 HOURS, Disp: , Rfl: 3  •  esomeprazole (NexIUM) 40 MG capsule, Take 40 mg by mouth 2 (two) times a day., Disp: , Rfl:   •  fluconazole (DIFLUCAN) 100 MG tablet, take prn, Disp: , Rfl: 12  •  methocarbamol (ROBAXIN) 750 MG tablet, TK 1 T PO TID, Disp: , Rfl: 1  •  metoprolol tartrate (LOPRESSOR) 25 MG tablet, take one prn, Disp: , Rfl: 3  •  montelukast (SINGULAIR) 10 MG tablet, Take 10 mg by mouth Daily., Disp: , Rfl:   •  nystatin (MYCOSTATIN) 034362 UNIT/ML suspension, take prn, Disp: , Rfl: 4  •  PRADAXA 150 MG capsu, TAKE 1 CAPSULE BY MOUTH EVERY 12 HOURS, Disp: 180 capsule, Rfl: 0  •  pseudoephedrine-guaifenesin (MUCINEX D)  MG per 12 hr tablet, Take 1 tablet by mouth Every 12 (Twelve) Hours., Disp: , Rfl:   •  raloxifene (EVISTA) 60 MG tablet, Take 60 mg by mouth Daily., Disp: , Rfl:   •  RESTASIS 0.05 % ophthalmic emulsion, INT 1  DROP IN BOTH EYES BID, Disp: , Rfl: 2  •  simvastatin (ZOCOR) 40 MG tablet, Take 40 mg by mouth Every Night., Disp: , Rfl:     ALLERGIES:     Allergies   Allergen Reactions   • Atropine Hives   • Penicillins      Reactions: syncope and seizures   • Sulfa Antibiotics Nausea Only     STATES SHE DOES FINE WITH CERTAIN SULFA DRUGS   • Epinephrine Palpitations       SOCIAL HISTORY:       Social History     Social History   • Marital status:      Spouse name: Cuate   • Number of children: 1   • Years of education: 1 year of college     Occupational History   •  Retired     Social History Main Topics   • Smoking status: Former Smoker     Quit date: 6/9/1975   • Smokeless tobacco: Never Used      Comment: QUIT SMOKING IN LATE 1970s   • Alcohol use Yes      Comment: social drinker   • Drug use: No   • Sexual activity: Defer     Other Topics Concern   • Not on file     Social History Narrative         FAMILY HISTORY:  Family History   Problem Relation Age of Onset   • Heart disease Other    • Stroke Other    • Stroke Mother    • Heart disease Mother    • Hypertension Mother    • Leukemia Father      Mid 60s   • Hypertension Brother    • Cancer Paternal Aunt    • Cancer Paternal Grandfather    • Malig Hyperthermia Neg Hx        REVIEW OF SYSTEMS:    GENERAL: No change in appetite or weight;   No fevers, chills, sweats.    SKIN: No nonhealing lesions.   No rashes.  HEME/LYMPH: No easy bruising, bleeding.   No swollen nodes.   EYES: No vision changes or diplopia.   ENT: No tinnitus, hearing loss, gum bleeding, epistaxis, hoarseness or dysphagia.   RESPIRATORY: No cough, shortness of breath, hemoptysis or wheezing.   CVS: No chest pain, palpitations, orthopnea, dyspnea on exertion or PND.   GI: No melena or hematochezia.   No abdominal pain.  No nausea, vomiting, constipation, diarrhea  : No lower tract obstructive symptoms, dysuria or hematuria.   MUSCULOSKELETAL: No bone pain.  No joint stiffness.   NEUROLOGICAL: No  "global weakness, loss of consciousness or seizures.   PSYCHIATRIC: No increased nervousness, mood changes or depression.          Objective    Vitals:    01/10/18 1022   BP: 112/61   Pulse: 51   Resp: 12   Temp: 97.6 °F (36.4 °C)   TempSrc: Oral   SpO2: 100%   Weight: 60.3 kg (133 lb)   Height: 166.4 cm (65.51\")   PainSc: 0-No pain     Current Status 1/10/2018   ECOG score 0      PHYSICAL EXAM:    GENERAL:  Well-developed, well-nourished in no acute distress.   SKIN:  Warm, dry without rashes, purpura or petechiae.  HEAD:  Normocephalic.  EYES:  Pupils equal, round and reactive to light.  EOMs intact.  Conjunctivae normal.  EARS:  Hearing intact.  NOSE:  Septum midline.  No excoriations or nasal discharge.  MOUTH:  Tongue is well-papillated; no stomatitis or ulcers.  Lips normal.  THROAT:  Oropharynx without lesions or exudates.  NECK:  Supple with good range of motion; no thyromegaly or masses, no JVD.  LYMPHATICS:  No cervical, supraclavicular, axillary or inguinal adenopathy.  CHEST:  Lungs clear to percussion and auscultation. Good airflow.  BREAST: Declines, as she had a breast exam by Dr. Garza November 2017..  CARDIAC:  Regular rate and rhythm without murmurs, rubs or gallops. Normal S1,S2.  ABDOMEN:  Soft, nontender with no organomegaly or masses.  EXTREMITIES:  No clubbing, cyanosis or edema.  NEUROLOGICAL:  Cranial Nerves II-XII grossly intact.  No focal neurological deficits.  PSYCHIATRIC:  Normal affect and mood.      RECENT LABS:        WBC   Date/Time Value Ref Range Status   01/08/2018 08:57 AM 5.45 4.50 - 10.70 10*3/mm3 Final     Hemoglobin   Date/Time Value Ref Range Status   01/08/2018 08:57 AM 10.8 (L) 11.9 - 15.5 g/dL Final     Platelets   Date/Time Value Ref Range Status   01/08/2018 08:57  140 - 500 10*3/mm3 Final       Assessment/Plan     ASSESSMENT:  Malignant neoplasm of upper-outer quadrant of left breast in female, estrogen receptor positive  - Ferritin  - Iron Profile  - Retic With " IRF & RET-He  - CBC & Differential  - Ferritin  - Iron Profile  - Retic With IRF & RET-He  - CBC & Differential  - Ferritin  - Iron Profile  - Retic With IRF & RET-He  - CBC & Differential    Anemia due to stage 3 chronic kidney disease treated with erythropoietin  - Ferritin  - Iron Profile  - Retic With IRF & RET-He  - CBC & Differential  - Ferritin  - Iron Profile  - Retic With IRF & RET-He  - CBC & Differential  - Ferritin  - Iron Profile  - Retic With IRF & RET-He  - CBC & Differential    Iron deficiency anemia, unspecified iron deficiency anemia type  - Ferritin  - Iron Profile  - Retic With IRF & RET-He  - CBC & Differential  - Ferritin  - Iron Profile  - Retic With IRF & RET-He  - CBC & Differential  - Ferritin  - Iron Profile  - Retic With IRF & RET-He  - CBC & Differential      1. Stage IB ([id number]) left breast cancer. Grade 2, 1.9 cm, 1 mm micrometastasis in 1 out of 2 sentinel nodes. ER positive, HER-2 negative. OncoType DX: low risk category.   Tamoxifen 10/11/2013 through November 2013. Just a few days or so of Aromasin in January 2014. Could not tolerate hormonal therapy due to vaginal dryness and irritation and declined any more hormonal therapy.   On Fosamax and Evista through Dr. Perla Garza.    2. Atrial fibrillation, on Pradaxa through Dr. Perla Hurtado.     3. Anemia of stage 3 chronic renal insufficiency (Dr. Rell Shannon is her nephrologist whom she follows up with). (Evaluation negative for B12 or folate deficiency, hemolysis or multiple myeloma. She has not had a bone marrow biopsy. She responds well to Procrit).   She received Procrit 12/2014 and 1 dose 06/2015 and 1 dose 09/2015 and 1 dose on 10/28/2015 and some weekly doses of Procrit after hip replacement July 2017.  She responds to Procrit.     4.  Iron deficiency anemia. does not tolerate oral iron due to significant abdominal pain and diarrhea. She refuses any further Benadryl as a pre-medicine because it made her feel very  sleepy for over 24-hours.  Recent iron labs normal.    5. Fibrocystic changes in both breasts.     6.  On the 1/18/17 visit, she complained of Hypoxia with exertion.  See that note for details.  She did not complain of this today.    7.  Planning knee replacement at some point in the future.  With hip replacement in 2017, she was quite fatigued coming into the office as an outpatient for Procrit shots and later for IV iron.  She wants something to be done differently with knee replacement in the future.  I told her we cannot give Procrit until Hb <10 and we cannot give IV iron unless she is truly iron deficient.  She would like us to be consulted while she is an inpatient for her knee replacement for us to give a dose of Procrit before she leaves the hospital.  Then, she is hopeful that will help her to be able to make weekly trips into the office as an outpatient for additional treatments (Procrit, as needed IV iron).        PLAN:   · CBC and iron labs every 2 months   · Procrit if Hb < 10  · M.D. lab Procrit 6 months  · Last mammogram 8/30/17, BI-RADS 2.

## 2018-02-01 ENCOUNTER — TRANSCRIBE ORDERS (OUTPATIENT)
Dept: ADMINISTRATIVE | Facility: HOSPITAL | Age: 73
End: 2018-02-01

## 2018-02-01 ENCOUNTER — LAB (OUTPATIENT)
Dept: LAB | Facility: HOSPITAL | Age: 73
End: 2018-02-01

## 2018-02-01 DIAGNOSIS — E78.5 HYPERLIPIDEMIA, UNSPECIFIED HYPERLIPIDEMIA TYPE: ICD-10-CM

## 2018-02-01 DIAGNOSIS — E79.0 ELEVATED URIC ACID IN BLOOD: Primary | ICD-10-CM

## 2018-02-01 DIAGNOSIS — E79.0 ELEVATED URIC ACID IN BLOOD: ICD-10-CM

## 2018-02-01 DIAGNOSIS — I48.91 ATRIAL FIBRILLATION, UNSPECIFIED TYPE (HCC): ICD-10-CM

## 2018-02-01 DIAGNOSIS — E03.9 HYPOTHYROIDISM, UNSPECIFIED TYPE: ICD-10-CM

## 2018-02-01 LAB
ALBUMIN SERPL-MCNC: 4.3 G/DL (ref 3.5–5.2)
ALBUMIN/GLOB SERPL: 2.3 G/DL
ALP SERPL-CCNC: 72 U/L (ref 39–117)
ALT SERPL W P-5'-P-CCNC: 15 U/L (ref 1–33)
ANION GAP SERPL CALCULATED.3IONS-SCNC: 11.7 MMOL/L
AST SERPL-CCNC: 20 U/L (ref 1–32)
BILIRUB SERPL-MCNC: 0.3 MG/DL (ref 0.1–1.2)
BUN BLD-MCNC: 30 MG/DL (ref 8–23)
BUN/CREAT SERPL: 20 (ref 7–25)
CALCIUM SPEC-SCNC: 9.1 MG/DL (ref 8.6–10.5)
CHLORIDE SERPL-SCNC: 102 MMOL/L (ref 98–107)
CHOLEST SERPL-MCNC: 156 MG/DL (ref 0–200)
CO2 SERPL-SCNC: 25.3 MMOL/L (ref 22–29)
CREAT BLD-MCNC: 1.5 MG/DL (ref 0.57–1)
GFR SERPL CREATININE-BSD FRML MDRD: 34 ML/MIN/1.73
GLOBULIN UR ELPH-MCNC: 1.9 GM/DL
GLUCOSE BLD-MCNC: 78 MG/DL (ref 65–99)
HDLC SERPL-MCNC: 78 MG/DL (ref 40–60)
LDLC SERPL CALC-MCNC: 65 MG/DL (ref 0–100)
LDLC/HDLC SERPL: 0.84 {RATIO}
POTASSIUM BLD-SCNC: 5.2 MMOL/L (ref 3.5–5.2)
PROT SERPL-MCNC: 6.2 G/DL (ref 6–8.5)
SODIUM BLD-SCNC: 139 MMOL/L (ref 136–145)
TRIGL SERPL-MCNC: 63 MG/DL (ref 0–150)
TSH SERPL DL<=0.05 MIU/L-ACNC: 4.34 MIU/ML (ref 0.27–4.2)
URATE SERPL-MCNC: 5.8 MG/DL (ref 2.4–5.7)
VLDLC SERPL-MCNC: 12.6 MG/DL (ref 5–40)

## 2018-02-01 PROCEDURE — 80061 LIPID PANEL: CPT | Performed by: INTERNAL MEDICINE

## 2018-02-01 PROCEDURE — 84443 ASSAY THYROID STIM HORMONE: CPT | Performed by: INTERNAL MEDICINE

## 2018-02-01 PROCEDURE — 36415 COLL VENOUS BLD VENIPUNCTURE: CPT

## 2018-02-01 PROCEDURE — 84550 ASSAY OF BLOOD/URIC ACID: CPT | Performed by: INTERNAL MEDICINE

## 2018-02-01 PROCEDURE — 80053 COMPREHEN METABOLIC PANEL: CPT | Performed by: INTERNAL MEDICINE

## 2018-02-27 ENCOUNTER — LAB (OUTPATIENT)
Dept: LAB | Facility: HOSPITAL | Age: 73
End: 2018-02-27
Attending: INTERNAL MEDICINE

## 2018-02-27 ENCOUNTER — TRANSCRIBE ORDERS (OUTPATIENT)
Dept: ADMINISTRATIVE | Facility: HOSPITAL | Age: 73
End: 2018-02-27

## 2018-02-27 DIAGNOSIS — N18.30 CHRONIC KIDNEY DISEASE, STAGE III (MODERATE) (HCC): ICD-10-CM

## 2018-02-27 DIAGNOSIS — I13.10 BENIGN HYPERTENSIVE HEART AND RENAL DISEASE: ICD-10-CM

## 2018-02-27 DIAGNOSIS — I12.9 HYPERTENSIVE NEPHROPATHY: ICD-10-CM

## 2018-02-27 DIAGNOSIS — I12.9 HYPERTENSIVE NEPHROPATHY: Primary | ICD-10-CM

## 2018-02-27 LAB
AMORPH URATE CRY URNS QL MICRO: ABNORMAL /HPF
ANION GAP SERPL CALCULATED.3IONS-SCNC: 14.7 MMOL/L
BACTERIA UR QL AUTO: ABNORMAL /HPF
BASOPHILS # BLD AUTO: 0.04 10*3/MM3 (ref 0–0.2)
BASOPHILS NFR BLD AUTO: 0.6 % (ref 0–1.5)
BILIRUB UR QL STRIP: NEGATIVE
BUN BLD-MCNC: 39 MG/DL (ref 8–23)
BUN/CREAT SERPL: 20.9 (ref 7–25)
CALCIUM SPEC-SCNC: 9 MG/DL (ref 8.6–10.5)
CHLORIDE SERPL-SCNC: 101 MMOL/L (ref 98–107)
CLARITY UR: ABNORMAL
CO2 SERPL-SCNC: 22.3 MMOL/L (ref 22–29)
COLOR UR: YELLOW
CREAT BLD-MCNC: 1.87 MG/DL (ref 0.57–1)
DEPRECATED RDW RBC AUTO: 52.4 FL (ref 37–54)
EOSINOPHIL # BLD AUTO: 0.14 10*3/MM3 (ref 0–0.7)
EOSINOPHIL NFR BLD AUTO: 1.9 % (ref 0.3–6.2)
ERYTHROCYTE [DISTWIDTH] IN BLOOD BY AUTOMATED COUNT: 14.5 % (ref 11.7–13)
GFR SERPL CREATININE-BSD FRML MDRD: 26 ML/MIN/1.73
GLUCOSE BLD-MCNC: 115 MG/DL (ref 65–99)
GLUCOSE UR STRIP-MCNC: NEGATIVE MG/DL
HCT VFR BLD AUTO: 38.9 % (ref 35.6–45.5)
HGB BLD-MCNC: 12 G/DL (ref 11.9–15.5)
HGB UR QL STRIP.AUTO: NEGATIVE
HYALINE CASTS UR QL AUTO: ABNORMAL /LPF
IMM GRANULOCYTES # BLD: 0.02 10*3/MM3 (ref 0–0.03)
IMM GRANULOCYTES NFR BLD: 0.3 % (ref 0–0.5)
KETONES UR QL STRIP: NEGATIVE
LEUKOCYTE ESTERASE UR QL STRIP.AUTO: ABNORMAL
LYMPHOCYTES # BLD AUTO: 0.94 10*3/MM3 (ref 0.9–4.8)
LYMPHOCYTES NFR BLD AUTO: 13 % (ref 19.6–45.3)
MCH RBC QN AUTO: 30.5 PG (ref 26.9–32)
MCHC RBC AUTO-ENTMCNC: 30.8 G/DL (ref 32.4–36.3)
MCV RBC AUTO: 98.7 FL (ref 80.5–98.2)
MONOCYTES # BLD AUTO: 0.62 10*3/MM3 (ref 0.2–1.2)
MONOCYTES NFR BLD AUTO: 8.6 % (ref 5–12)
NEUTROPHILS # BLD AUTO: 5.47 10*3/MM3 (ref 1.9–8.1)
NEUTROPHILS NFR BLD AUTO: 75.6 % (ref 42.7–76)
NITRITE UR QL STRIP: NEGATIVE
PH UR STRIP.AUTO: 6 [PH] (ref 5–8)
PLATELET # BLD AUTO: 214 10*3/MM3 (ref 140–500)
PMV BLD AUTO: 11 FL (ref 6–12)
POTASSIUM BLD-SCNC: 4.3 MMOL/L (ref 3.5–5.2)
PROT UR QL STRIP: ABNORMAL
RBC # BLD AUTO: 3.94 10*6/MM3 (ref 3.9–5.2)
RBC # UR: ABNORMAL /HPF
REF LAB TEST METHOD: ABNORMAL
SODIUM BLD-SCNC: 138 MMOL/L (ref 136–145)
SP GR UR STRIP: 1.02 (ref 1–1.03)
SQUAMOUS #/AREA URNS HPF: ABNORMAL /HPF
UROBILINOGEN UR QL STRIP: ABNORMAL
WBC NRBC COR # BLD: 7.23 10*3/MM3 (ref 4.5–10.7)
WBC UR QL AUTO: ABNORMAL /HPF

## 2018-02-27 PROCEDURE — 36415 COLL VENOUS BLD VENIPUNCTURE: CPT

## 2018-02-27 PROCEDURE — 81001 URINALYSIS AUTO W/SCOPE: CPT | Performed by: INTERNAL MEDICINE

## 2018-02-27 PROCEDURE — 80048 BASIC METABOLIC PNL TOTAL CA: CPT | Performed by: INTERNAL MEDICINE

## 2018-02-27 PROCEDURE — 85025 COMPLETE CBC W/AUTO DIFF WBC: CPT | Performed by: INTERNAL MEDICINE

## 2018-03-06 ENCOUNTER — LAB (OUTPATIENT)
Dept: OTHER | Facility: HOSPITAL | Age: 73
End: 2018-03-06

## 2018-03-06 ENCOUNTER — OFFICE VISIT (OUTPATIENT)
Dept: ORTHOPEDIC SURGERY | Facility: CLINIC | Age: 73
End: 2018-03-06

## 2018-03-06 ENCOUNTER — INFUSION (OUTPATIENT)
Dept: ONCOLOGY | Facility: HOSPITAL | Age: 73
End: 2018-03-06

## 2018-03-06 VITALS — WEIGHT: 130 LBS | TEMPERATURE: 97.2 F | HEIGHT: 64 IN | BODY MASS INDEX: 22.2 KG/M2

## 2018-03-06 DIAGNOSIS — D50.9 IRON DEFICIENCY ANEMIA, UNSPECIFIED IRON DEFICIENCY ANEMIA TYPE: ICD-10-CM

## 2018-03-06 DIAGNOSIS — N18.30 ANEMIA DUE TO STAGE 3 CHRONIC KIDNEY DISEASE TREATED WITH ERYTHROPOIETIN (HCC): ICD-10-CM

## 2018-03-06 DIAGNOSIS — M17.11 PRIMARY OSTEOARTHRITIS OF RIGHT KNEE: ICD-10-CM

## 2018-03-06 DIAGNOSIS — D63.1 ANEMIA DUE TO STAGE 3 CHRONIC KIDNEY DISEASE TREATED WITH ERYTHROPOIETIN (HCC): ICD-10-CM

## 2018-03-06 DIAGNOSIS — G89.29 CHRONIC PAIN OF RIGHT KNEE: Primary | ICD-10-CM

## 2018-03-06 DIAGNOSIS — Z17.0 MALIGNANT NEOPLASM OF UPPER-OUTER QUADRANT OF LEFT BREAST IN FEMALE, ESTROGEN RECEPTOR POSITIVE (HCC): ICD-10-CM

## 2018-03-06 DIAGNOSIS — C50.412 MALIGNANT NEOPLASM OF UPPER-OUTER QUADRANT OF LEFT BREAST IN FEMALE, ESTROGEN RECEPTOR POSITIVE (HCC): ICD-10-CM

## 2018-03-06 DIAGNOSIS — M25.561 CHRONIC PAIN OF RIGHT KNEE: Primary | ICD-10-CM

## 2018-03-06 LAB
BASOPHILS # BLD AUTO: 0.07 10*3/MM3 (ref 0–0.2)
BASOPHILS NFR BLD AUTO: 0.9 % (ref 0–1.5)
DEPRECATED RDW RBC AUTO: 51 FL (ref 37–54)
EOSINOPHIL # BLD AUTO: 0.17 10*3/MM3 (ref 0–0.7)
EOSINOPHIL NFR BLD AUTO: 2.1 % (ref 0.3–6.2)
ERYTHROCYTE [DISTWIDTH] IN BLOOD BY AUTOMATED COUNT: 14.5 % (ref 11.7–13)
FERRITIN SERPL-MCNC: 315.5 NG/ML (ref 13–150)
HCT VFR BLD AUTO: 39.4 % (ref 35.6–45.5)
HGB BLD-MCNC: 12.9 G/DL (ref 11.9–15.5)
HGB RETIC QN: 33.7 PG (ref 32.7–38.6)
IMM GRANULOCYTES # BLD: 0.05 10*3/MM3 (ref 0–0.03)
IMM GRANULOCYTES NFR BLD: 0.6 % (ref 0–0.5)
IMM RETICS NFR: 12.4 % (ref 0.7–13.7)
IRON 24H UR-MRATE: 86 MCG/DL (ref 37–145)
IRON SATN MFR SERPL: 22 % (ref 20–50)
LYMPHOCYTES # BLD AUTO: 1.54 10*3/MM3 (ref 0.9–4.8)
LYMPHOCYTES NFR BLD AUTO: 18.8 % (ref 19.6–45.3)
MCH RBC QN AUTO: 31.1 PG (ref 26.9–32)
MCHC RBC AUTO-ENTMCNC: 32.7 G/DL (ref 32.4–36.3)
MCV RBC AUTO: 94.9 FL (ref 80.5–98.2)
MONOCYTES # BLD AUTO: 0.73 10*3/MM3 (ref 0.2–1.2)
MONOCYTES NFR BLD AUTO: 8.9 % (ref 5–12)
NEUTROPHILS # BLD AUTO: 5.62 10*3/MM3 (ref 1.9–8.1)
NEUTROPHILS NFR BLD AUTO: 68.7 % (ref 42.7–76)
NRBC BLD MANUAL-RTO: 0 /100 WBC (ref 0–0)
PLATELET # BLD AUTO: 233 10*3/MM3 (ref 140–500)
PMV BLD AUTO: 10.1 FL (ref 6–12)
RBC # BLD AUTO: 4.15 10*6/MM3 (ref 3.9–5.2)
RETICS/RBC NFR AUTO: 2.3 % (ref 0.5–1.5)
TIBC SERPL-MCNC: 389 MCG/DL (ref 298–536)
TRANSFERRIN SERPL-MCNC: 261 MG/DL (ref 200–360)
WBC NRBC COR # BLD: 8.18 10*3/MM3 (ref 4.5–10.7)

## 2018-03-06 PROCEDURE — 84466 ASSAY OF TRANSFERRIN: CPT | Performed by: INTERNAL MEDICINE

## 2018-03-06 PROCEDURE — 99213 OFFICE O/P EST LOW 20 MIN: CPT | Performed by: ORTHOPAEDIC SURGERY

## 2018-03-06 PROCEDURE — 83540 ASSAY OF IRON: CPT | Performed by: INTERNAL MEDICINE

## 2018-03-06 PROCEDURE — 36415 COLL VENOUS BLD VENIPUNCTURE: CPT

## 2018-03-06 PROCEDURE — 82728 ASSAY OF FERRITIN: CPT | Performed by: INTERNAL MEDICINE

## 2018-03-06 PROCEDURE — 73562 X-RAY EXAM OF KNEE 3: CPT | Performed by: ORTHOPAEDIC SURGERY

## 2018-03-06 PROCEDURE — 85046 RETICYTE/HGB CONCENTRATE: CPT | Performed by: INTERNAL MEDICINE

## 2018-03-06 PROCEDURE — 85025 COMPLETE CBC W/AUTO DIFF WBC: CPT | Performed by: INTERNAL MEDICINE

## 2018-03-06 RX ORDER — DILTIAZEM HYDROCHLORIDE 60 MG/1
TABLET, FILM COATED ORAL
Refills: 3 | COMMUNITY
Start: 2018-02-07 | End: 2018-11-06

## 2018-03-06 NOTE — PROGRESS NOTES
Patient: Mary Kay Saab  YOB: 1945 73 y.o. female  Medical Record Number: [id number]    Chief Complaints:   Chief Complaint   Patient presents with   • Right Knee - Follow-up, Pain       History of Present Illness:Mary Kay Saab is a 73 y.o. female who presents for follow-up of  Right knee.  She has moderate discomfort within the knee.  She has known osteoarthritis.  It's not bad enough to slow her down but is an annoyance or irritation.  She also has some occasional intermittent symptoms about the right hip with certain positions or certain activities but overall she is happy and the hip is doing well.    Allergies:   Allergies   Allergen Reactions   • Penicillins Unknown (See Comments)     Reactions: syncope and seizures   • Atropine Hives   • Sulfa Antibiotics Nausea Only     STATES SHE DOES FINE WITH CERTAIN SULFA DRUGS   • Epinephrine Palpitations     Patient states will go into A-Fib       Medications:   Current Outpatient Prescriptions   Medication Sig Dispense Refill   • alendronate (FOSAMAX) 70 MG tablet Take 70 mg by mouth Every 7 (Seven) Days.  12   • alosetron (LOTRONEX) 0.5 MG tablet Take 0.05 mg by mouth Daily As Needed.     • ALPRAZolam (XANAX) 1 MG tablet Take 1 mg by mouth every night.     • amiodarone (PACERONE) 200 MG tablet TK 1 T PO D PRN  1   • bumetanide (BUMEX) 1 MG tablet Take 1 mg by mouth Daily As Needed.     • cetirizine (ZyrTEC) 10 MG tablet Take 10 mg by mouth Daily.     • clobetasol (OLUX) 0.05 % topical foam BRY FOAM EXT AA BID  2   • desonide (DESOWEN) 0.05 % ointment Apply  topically daily.     • diltiaZEM (CARDIZEM) 60 MG tablet TK 1 T PO BID  3   • diphenoxylate-atropine (LOMOTIL) 2.5-0.025 MG per tablet TK 1 T PO WITH EACH LOOSE STOOL. MAX 5 TABLETS PER 24 HOURS  3   • esomeprazole (NexIUM) 40 MG capsule Take 40 mg by mouth 2 (two) times a day.     • fluconazole (DIFLUCAN) 100 MG tablet take prn  12   • methocarbamol (ROBAXIN) 750 MG tablet TK 1 T PO TID  1   •  "metoprolol tartrate (LOPRESSOR) 25 MG tablet take one prn  3   • montelukast (SINGULAIR) 10 MG tablet Take 10 mg by mouth Daily.     • nystatin (MYCOSTATIN) 174380 UNIT/ML suspension take prn  4   • PRADAXA 150 MG capsu TAKE 1 CAPSULE BY MOUTH EVERY 12 HOURS 180 capsule 0   • pseudoephedrine-guaifenesin (MUCINEX D)  MG per 12 hr tablet Take 1 tablet by mouth Every 12 (Twelve) Hours.     • raloxifene (EVISTA) 60 MG tablet Take 60 mg by mouth Daily.     • RESTASIS 0.05 % ophthalmic emulsion INT 1 DROP IN BOTH EYES BID  2   • simvastatin (ZOCOR) 40 MG tablet Take 40 mg by mouth Every Night.     • cephalexin (KEFLEX) 500 MG capsule DN  4     No current facility-administered medications for this visit.          The following portions of the patient's history were reviewed and updated as appropriate: allergies, current medications, past family history, past medical history, past social history, past surgical history and problem list.    Review of Systems:   A 14 point review of systems was performed. All systems negative except pertinent positives/negative listed in HPI above    Physical Exam:   Vitals:    03/06/18 0816   Temp: 97.2 °F (36.2 °C)   TempSrc: Temporal Artery    Weight: 59 kg (130 lb)   Height: 162.6 cm (64\")       General: A and O x 3, ASA, NAD    SCLERA:    Normal    DENTITION:   Normal  Knee:  right    ALIGNMENT:     Neutral  ,   Patella tracks   midline    GAIT:     Slight antalgic    SKIN:    No abnormality    RANGE OF MOTION:   0  -  135   DEG    STRENGTH:   4+ / 5    LIGAMENTS:    No varus / valgus instability.   Negative  Lachman.    MENISCUS:     Negative   Randy       DISTAL PULSES:    Paplable    DISTAL SENSATION :   Intact    LYMPHATICS:     No   lymphadenopathy    OTHER:          - No  effusion      - No crepitance with ROM      - No Swelling /  tenderness to palpation pes anserine bursa     Radiology:  Xrays 3views right knee (ap,lateral, sunrise) were ordered and reviewed for " evaluation of knee pain demonstrating moderate joint space narrowing  todays xrays were compared to previous xrays and demonstrate no change    Assessment/Plan:  Right knee osteophyte arthritis currently fairly well controlled I am going to send her back for some physical therapy exercises.  It's not bad enough to consider injections and certainly not bad enough to consider surgery although she does understand that there is a possibility that down the road we will need to go that route.  I would like to see her back in July for annual visit for her hip.  We'll x-ray her hip at that point and also examine her knee

## 2018-03-07 ENCOUNTER — APPOINTMENT (OUTPATIENT)
Dept: ONCOLOGY | Facility: HOSPITAL | Age: 73
End: 2018-03-07

## 2018-03-07 ENCOUNTER — APPOINTMENT (OUTPATIENT)
Dept: OTHER | Facility: HOSPITAL | Age: 73
End: 2018-03-07

## 2018-05-01 ENCOUNTER — APPOINTMENT (OUTPATIENT)
Dept: ONCOLOGY | Facility: HOSPITAL | Age: 73
End: 2018-05-01

## 2018-05-01 ENCOUNTER — APPOINTMENT (OUTPATIENT)
Dept: OTHER | Facility: HOSPITAL | Age: 73
End: 2018-05-01

## 2018-05-08 ENCOUNTER — LAB (OUTPATIENT)
Dept: OTHER | Facility: HOSPITAL | Age: 73
End: 2018-05-08

## 2018-05-08 ENCOUNTER — INFUSION (OUTPATIENT)
Dept: ONCOLOGY | Facility: HOSPITAL | Age: 73
End: 2018-05-08

## 2018-05-08 DIAGNOSIS — Z17.0 MALIGNANT NEOPLASM OF UPPER-OUTER QUADRANT OF LEFT BREAST IN FEMALE, ESTROGEN RECEPTOR POSITIVE (HCC): ICD-10-CM

## 2018-05-08 DIAGNOSIS — C50.412 MALIGNANT NEOPLASM OF UPPER-OUTER QUADRANT OF LEFT BREAST IN FEMALE, ESTROGEN RECEPTOR POSITIVE (HCC): ICD-10-CM

## 2018-05-08 DIAGNOSIS — N18.30 ANEMIA DUE TO STAGE 3 CHRONIC KIDNEY DISEASE TREATED WITH ERYTHROPOIETIN (HCC): ICD-10-CM

## 2018-05-08 DIAGNOSIS — D63.1 ANEMIA DUE TO STAGE 3 CHRONIC KIDNEY DISEASE TREATED WITH ERYTHROPOIETIN (HCC): ICD-10-CM

## 2018-05-08 DIAGNOSIS — D50.9 IRON DEFICIENCY ANEMIA, UNSPECIFIED IRON DEFICIENCY ANEMIA TYPE: ICD-10-CM

## 2018-05-08 LAB
BASOPHILS # BLD AUTO: 0.05 10*3/MM3 (ref 0–0.2)
BASOPHILS NFR BLD AUTO: 0.7 % (ref 0–1.5)
DEPRECATED RDW RBC AUTO: 49.6 FL (ref 37–54)
EOSINOPHIL # BLD AUTO: 0.11 10*3/MM3 (ref 0–0.7)
EOSINOPHIL NFR BLD AUTO: 1.5 % (ref 0.3–6.2)
ERYTHROCYTE [DISTWIDTH] IN BLOOD BY AUTOMATED COUNT: 14.4 % (ref 11.7–13)
FERRITIN SERPL-MCNC: 203.9 NG/ML (ref 13–150)
HCT VFR BLD AUTO: 37 % (ref 35.6–45.5)
HGB BLD-MCNC: 12.4 G/DL (ref 11.9–15.5)
HGB RETIC QN: 34.1 PG (ref 32.7–38.6)
IMM GRANULOCYTES # BLD: 0.07 10*3/MM3 (ref 0–0.03)
IMM GRANULOCYTES NFR BLD: 1 % (ref 0–0.5)
IMM RETICS NFR: 9.9 % (ref 0.7–13.7)
IRON 24H UR-MRATE: 106 MCG/DL (ref 37–145)
IRON SATN MFR SERPL: 29 % (ref 20–50)
LYMPHOCYTES # BLD AUTO: 1.73 10*3/MM3 (ref 0.9–4.8)
LYMPHOCYTES NFR BLD AUTO: 23.6 % (ref 19.6–45.3)
MCH RBC QN AUTO: 31.4 PG (ref 26.9–32)
MCHC RBC AUTO-ENTMCNC: 33.5 G/DL (ref 32.4–36.3)
MCV RBC AUTO: 93.7 FL (ref 80.5–98.2)
MONOCYTES # BLD AUTO: 0.64 10*3/MM3 (ref 0.2–1.2)
MONOCYTES NFR BLD AUTO: 8.7 % (ref 5–12)
NEUTROPHILS # BLD AUTO: 4.74 10*3/MM3 (ref 1.9–8.1)
NEUTROPHILS NFR BLD AUTO: 64.5 % (ref 42.7–76)
NRBC BLD MANUAL-RTO: 0 /100 WBC (ref 0–0)
PLATELET # BLD AUTO: 214 10*3/MM3 (ref 140–500)
PMV BLD AUTO: 10.1 FL (ref 6–12)
RBC # BLD AUTO: 3.95 10*6/MM3 (ref 3.9–5.2)
RETICS/RBC NFR AUTO: 2.43 % (ref 0.5–1.5)
TIBC SERPL-MCNC: 361 MCG/DL (ref 298–536)
TRANSFERRIN SERPL-MCNC: 242 MG/DL (ref 200–360)
WBC NRBC COR # BLD: 7.34 10*3/MM3 (ref 4.5–10.7)

## 2018-05-08 PROCEDURE — 85025 COMPLETE CBC W/AUTO DIFF WBC: CPT | Performed by: INTERNAL MEDICINE

## 2018-05-08 PROCEDURE — 82728 ASSAY OF FERRITIN: CPT | Performed by: INTERNAL MEDICINE

## 2018-05-08 PROCEDURE — 36415 COLL VENOUS BLD VENIPUNCTURE: CPT

## 2018-05-08 PROCEDURE — 83540 ASSAY OF IRON: CPT | Performed by: INTERNAL MEDICINE

## 2018-05-08 PROCEDURE — 85046 RETICYTE/HGB CONCENTRATE: CPT | Performed by: INTERNAL MEDICINE

## 2018-05-08 PROCEDURE — 84466 ASSAY OF TRANSFERRIN: CPT | Performed by: INTERNAL MEDICINE

## 2018-05-08 NOTE — PROGRESS NOTES
Pt Hgb 12.4.  Pt left before labs resulted due to another appt.  Called and left message that pt does not require procrit.  Next appt reviewed in the message and instructed pt to call with concerns.

## 2018-05-29 ENCOUNTER — OFFICE VISIT (OUTPATIENT)
Dept: ORTHOPEDIC SURGERY | Facility: CLINIC | Age: 73
End: 2018-05-29

## 2018-05-29 VITALS — WEIGHT: 130 LBS | TEMPERATURE: 98.2 F | HEIGHT: 65 IN | BODY MASS INDEX: 21.66 KG/M2

## 2018-05-29 DIAGNOSIS — Z96.641 STATUS POST TOTAL REPLACEMENT OF RIGHT HIP: ICD-10-CM

## 2018-05-29 DIAGNOSIS — M25.561 ACUTE PAIN OF RIGHT KNEE: Primary | ICD-10-CM

## 2018-05-29 PROCEDURE — 99213 OFFICE O/P EST LOW 20 MIN: CPT | Performed by: ORTHOPAEDIC SURGERY

## 2018-05-29 PROCEDURE — 73562 X-RAY EXAM OF KNEE 3: CPT | Performed by: ORTHOPAEDIC SURGERY

## 2018-05-29 PROCEDURE — 20610 DRAIN/INJ JOINT/BURSA W/O US: CPT | Performed by: ORTHOPAEDIC SURGERY

## 2018-05-29 PROCEDURE — 73502 X-RAY EXAM HIP UNI 2-3 VIEWS: CPT | Performed by: ORTHOPAEDIC SURGERY

## 2018-05-29 RX ORDER — METHYLPREDNISOLONE ACETATE 80 MG/ML
80 INJECTION, SUSPENSION INTRA-ARTICULAR; INTRALESIONAL; INTRAMUSCULAR; SOFT TISSUE
Status: COMPLETED | OUTPATIENT
Start: 2018-05-29 | End: 2018-05-29

## 2018-05-29 RX ORDER — BUPIVACAINE HYDROCHLORIDE 5 MG/ML
2 INJECTION, SOLUTION EPIDURAL; INTRACAUDAL
Status: COMPLETED | OUTPATIENT
Start: 2018-05-29 | End: 2018-05-29

## 2018-05-29 RX ADMIN — BUPIVACAINE HYDROCHLORIDE 2 ML: 5 INJECTION, SOLUTION EPIDURAL; INTRACAUDAL at 14:40

## 2018-05-29 RX ADMIN — METHYLPREDNISOLONE ACETATE 80 MG: 80 INJECTION, SUSPENSION INTRA-ARTICULAR; INTRALESIONAL; INTRAMUSCULAR; SOFT TISSUE at 14:40

## 2018-05-29 NOTE — PROGRESS NOTES
Patient: Mary Kay Saab  YOB: 1945 73 y.o. female  Medical Record Number: [id number]    Chief Complaints:   Chief Complaint   Patient presents with   • Right Knee - Follow-up, Pain   • Right Hip - Post-op, Follow-up       History of Present Illness:Mary Kay Saab is a 73 y.o. female who presents for follow-up of  Right lateral knee pain - stabbing, aching 8/10, worse with WB. Has progressed over the last 6 months    Allergies:   Allergies   Allergen Reactions   • Penicillins Unknown (See Comments)     Reactions: syncope and seizures   • Atropine Hives   • Sulfa Antibiotics Nausea Only     STATES SHE DOES FINE WITH CERTAIN SULFA DRUGS   • Epinephrine Palpitations     Patient states will go into A-Fib       Medications:   Current Outpatient Prescriptions   Medication Sig Dispense Refill   • alendronate (FOSAMAX) 70 MG tablet Take 70 mg by mouth Every 7 (Seven) Days.  12   • alosetron (LOTRONEX) 0.5 MG tablet Take 0.05 mg by mouth Daily As Needed.     • ALPRAZolam (XANAX) 1 MG tablet Take 1 mg by mouth every night.     • amiodarone (PACERONE) 200 MG tablet TK 1 T PO D PRN  1   • bumetanide (BUMEX) 1 MG tablet Take 1 mg by mouth Daily As Needed.     • cephalexin (KEFLEX) 500 MG capsule DNC  4   • cetirizine (ZyrTEC) 10 MG tablet Take 10 mg by mouth Daily.     • clobetasol (OLUX) 0.05 % topical foam BRY FOAM EXT AA BID  2   • desonide (DESOWEN) 0.05 % ointment Apply  topically daily.     • diltiaZEM (CARDIZEM) 60 MG tablet TK 1 T PO BID  3   • diphenoxylate-atropine (LOMOTIL) 2.5-0.025 MG per tablet TK 1 T PO WITH EACH LOOSE STOOL. MAX 5 TABLETS PER 24 HOURS  3   • esomeprazole (NexIUM) 40 MG capsule Take 40 mg by mouth 2 (two) times a day.     • fluconazole (DIFLUCAN) 100 MG tablet take prn  12   • methocarbamol (ROBAXIN) 750 MG tablet TK 1 T PO TID  1   • metoprolol tartrate (LOPRESSOR) 25 MG tablet take one prn  3   • montelukast (SINGULAIR) 10 MG tablet Take 10 mg by mouth Daily.     • nystatin  "(MYCOSTATIN) 176715 UNIT/ML suspension take prn  4   • PRADAXA 150 MG capsu TAKE 1 CAPSULE BY MOUTH EVERY 12 HOURS 180 capsule 0   • pseudoephedrine-guaifenesin (MUCINEX D)  MG per 12 hr tablet Take 1 tablet by mouth Every 12 (Twelve) Hours.     • raloxifene (EVISTA) 60 MG tablet Take 60 mg by mouth Daily.     • RESTASIS 0.05 % ophthalmic emulsion INT 1 DROP IN BOTH EYES BID  2   • simvastatin (ZOCOR) 40 MG tablet Take 40 mg by mouth Every Night.       No current facility-administered medications for this visit.          The following portions of the patient's history were reviewed and updated as appropriate: allergies, current medications, past family history, past medical history, past social history, past surgical history and problem list.    Review of Systems:   A 14 point review of systems was performed. All systems negative except pertinent positives/negative listed in HPI above    Physical Exam:   Vitals:    05/29/18 1352   Temp: 98.2 °F (36.8 °C)   Weight: 59 kg (130 lb)   Height: 165.1 cm (65\")   PainSc:   8       General: A and O x 3, ASA, NAD    SCLERA:    Normal    DENTITION:   Normal  Knee:  right    ALIGNMENT:     Valgus      GAIT:    Antalgic    SKIN:    No abnormality    RANGE OF MOTION:   0  -  120   DEG    STRENGTH:   4  / 5    LIGAMENTS:    No varus / valgus instability.   Negative  Lachman.    MENISCUS:     Negative   Randy       DISTAL PULSES:    Paplable    DISTAL SENSATION :   Intact    LYMPHATICS:     No   lymphadenopathy    OTHER:          - Positive   effusion      - Crepitance with ROM     Radiology:  Xrays 3views right knee (ap,lateral, sunrise) were ordered and reviewed for evaluation of knee pain demonstratingadvanced valgus osteoarthritis with bone on bone articulation, subchondral cysts, and periarticular osteophytes  todays xrays were compared to previous xrays and show new findings as described above    Xrays 2 views - hip  -  (AP bilateral and  lateral) were ordered and " reviewed for evaluation of right hip replacement which demonstrates - well positioned hip replacement without wear, loosening, or osteolysis. In comparison to previous films, there are no changes.    Assessment/Plan:  Right knee arthritis with persistent lateral and posterior knee pain.  She does have advanced arthritis based on today's x-rays.  We injected the right knee today as below.  She may very well go on to require knee replacement.  If she doesn't achieve significant relief from the injection she'll call back and we can get her set up for right total knee replacement which would be done with an overnight stay.  Large Joint Arthrocentesis  Date/Time: 5/29/2018 2:40 PM  Consent given by: patient  Site marked: site marked  Timeout: Immediately prior to procedure a time out was called to verify the correct patient, procedure, equipment, support staff and site/side marked as required   Supporting Documentation  Indications: pain and joint swelling   Procedure Details  Location: knee - R knee  Preparation: Patient was prepped and draped in the usual sterile fashion  Needle size: 22 G  Approach: anterolateral  Medications administered: 80 mg methylPREDNISolone acetate 80 MG/ML; 2 mL bupivacaine (PF) 0.5 %  Patient tolerance: patient tolerated the procedure well with no immediate complications
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
## 2018-06-06 ENCOUNTER — TREATMENT (OUTPATIENT)
Dept: PHYSICAL THERAPY | Facility: CLINIC | Age: 73
End: 2018-06-06

## 2018-06-06 DIAGNOSIS — M25.561 ACUTE PAIN OF RIGHT KNEE: Primary | ICD-10-CM

## 2018-06-06 PROCEDURE — 97161 PT EVAL LOW COMPLEX 20 MIN: CPT | Performed by: PHYSICAL THERAPIST

## 2018-06-06 PROCEDURE — G8979 MOBILITY GOAL STATUS: HCPCS | Performed by: PHYSICAL THERAPIST

## 2018-06-06 PROCEDURE — 97110 THERAPEUTIC EXERCISES: CPT | Performed by: PHYSICAL THERAPIST

## 2018-06-06 PROCEDURE — 97035 APP MDLTY 1+ULTRASOUND EA 15: CPT | Performed by: PHYSICAL THERAPIST

## 2018-06-06 PROCEDURE — G8978 MOBILITY CURRENT STATUS: HCPCS | Performed by: PHYSICAL THERAPIST

## 2018-06-06 PROCEDURE — G0283 ELEC STIM OTHER THAN WOUND: HCPCS | Performed by: PHYSICAL THERAPIST

## 2018-06-06 NOTE — PATIENT INSTRUCTIONS
Access Code: [email or web address]   URL: [email or web address]   Date: 06/06/2018   Prepared by: Melvi Garg     Exercises   Seated Long Arc Quad - 10 reps - 2 sets - 5 hold - 1x daily   Seated Ankle Dorsiflexion AROM - 20 reps - 1 sets - 5 hold - 1x daily   Seated Heel Raise - 20 reps - 1 sets - 3 hold - 1x daily   Seated Hip Abduction with Resistance - 10 reps - 2 sets - 3 hold - 1x daily   Supine Knee Extension Strengthening - 10 reps - 2 sets - 3 hold - 1x daily   Supine Hamstring Stretch with Strap - 2 reps - 1 sets - 20 hold - 2x daily   Standing Hamstring Stretch with Step - 2 reps - 1 sets - 20 hold - 1x daily

## 2018-06-08 ENCOUNTER — TELEPHONE (OUTPATIENT)
Dept: ORTHOPEDIC SURGERY | Facility: CLINIC | Age: 73
End: 2018-06-08

## 2018-06-08 RX ORDER — CEPHALEXIN 500 MG/1
500 CAPSULE ORAL EVERY 6 HOURS
Qty: 28 CAPSULE | Refills: 0 | Status: SHIPPED | OUTPATIENT
Start: 2018-06-08 | End: 2018-11-06

## 2018-06-08 NOTE — TELEPHONE ENCOUNTER
Please a patient I sent a prescription for Keflex to her pharmacy and she should start taking that immediately for the next week.

## 2018-06-12 ENCOUNTER — TELEPHONE (OUTPATIENT)
Dept: ORTHOPEDIC SURGERY | Facility: CLINIC | Age: 73
End: 2018-06-12

## 2018-06-15 ENCOUNTER — TREATMENT (OUTPATIENT)
Dept: PHYSICAL THERAPY | Facility: CLINIC | Age: 73
End: 2018-06-15

## 2018-06-15 DIAGNOSIS — M25.561 ACUTE PAIN OF RIGHT KNEE: Primary | ICD-10-CM

## 2018-06-15 PROCEDURE — G0283 ELEC STIM OTHER THAN WOUND: HCPCS | Performed by: PHYSICAL THERAPIST

## 2018-06-15 PROCEDURE — 97110 THERAPEUTIC EXERCISES: CPT | Performed by: PHYSICAL THERAPIST

## 2018-06-15 PROCEDURE — 97035 APP MDLTY 1+ULTRASOUND EA 15: CPT | Performed by: PHYSICAL THERAPIST

## 2018-06-15 NOTE — PROGRESS NOTES
Physical Therapy Daily Progress Note        Patient: Mary Kay Saab   : 1945  Diagnosis/ICD-10 Code:  Acute pain of right knee [M25.561]  Referring practitioner: Van Mcguire MD  Date of Initial Visit: Type: THERAPY  Noted: 2018  Today's Date: 6/15/2018  Patient seen for 2 sessions             Subjective Pt states the estim and US helped the knee feel a lot better.     Objective   See Exercise, Manual, and Modality Logs for complete treatment.     Assessment/Plan Pt needed cues for hold time on HS stretch.      Manual Therapy:    0     mins  57420;  Therapeutic Exercise:    18     mins  69637;     Neuromuscular Lenny:    0    mins  28783;    Therapeutic Activity:     0     mins  10354;     Gait Trainin     mins  30775;     Ultrasound:     8     mins  07032;    Electrical Stimulation:    15     mins  73567 ( );  Dry Needling     0     mins self-pay    Timed Treatment:   26   mins   Total Treatment:     45   mins    Perla Pang, PT  Physical Therapist

## 2018-06-27 ENCOUNTER — TELEPHONE (OUTPATIENT)
Dept: ORTHOPEDIC SURGERY | Facility: CLINIC | Age: 73
End: 2018-06-27

## 2018-06-27 NOTE — TELEPHONE ENCOUNTER
Patient asking for RBB to work her in the pm of 6/28 after 1:30 or Friday 6/29 because of transportation. She says her right knee is very painful and she can hardly walk. Not swollen. She thinks it's time for surgery. She scheduled appt at 10:40 on 6/28 but not sure she can get a ride.

## 2018-06-28 ENCOUNTER — OFFICE VISIT (OUTPATIENT)
Dept: ORTHOPEDIC SURGERY | Facility: CLINIC | Age: 73
End: 2018-06-28

## 2018-06-28 VITALS — BODY MASS INDEX: 22.39 KG/M2 | TEMPERATURE: 97.5 F | WEIGHT: 134.4 LBS | HEIGHT: 65 IN

## 2018-06-28 DIAGNOSIS — M25.561 ACUTE PAIN OF RIGHT KNEE: Primary | ICD-10-CM

## 2018-06-28 DIAGNOSIS — M17.11 PRIMARY OSTEOARTHRITIS OF RIGHT KNEE: ICD-10-CM

## 2018-06-28 PROCEDURE — 73562 X-RAY EXAM OF KNEE 3: CPT | Performed by: ORTHOPAEDIC SURGERY

## 2018-06-28 PROCEDURE — 99213 OFFICE O/P EST LOW 20 MIN: CPT | Performed by: ORTHOPAEDIC SURGERY

## 2018-06-28 RX ORDER — HYDROCHLOROTHIAZIDE 12.5 MG/1
CAPSULE, GELATIN COATED ORAL
COMMUNITY
End: 2018-11-06

## 2018-06-28 NOTE — PROGRESS NOTES
Patient: Mary Kay Saab  YOB: 1945 73 y.o. female  Medical Record Number: [id number]    Chief Complaints:   Chief Complaint   Patient presents with   • Right Knee - Follow-up, Pain       History of Present Illness:Mary Kay Saab is a 73 y.o. female who presents with Complaints of right medial knee pain.  She says she twisted her knee yesterday nearly trapped.  She's had considerable increase in her pain.  She did have baseline arthritis within the knee and already had some soreness but it has worsened since this occurred yesterday.  Scrubs a stabbing type pain about the medial aspect of the knee    Allergies:   Allergies   Allergen Reactions   • Penicillins Unknown (See Comments)     Reactions: syncope and seizures   • Atropine Hives   • Epinephrine Palpitations     Patient states will go into A-Fib   • Sulfa Antibiotics Nausea Only     STATES SHE DOES FINE WITH CERTAIN SULFA DRUGS       Medications:   Current Outpatient Prescriptions   Medication Sig Dispense Refill   • alendronate (FOSAMAX) 70 MG tablet Take 70 mg by mouth Every 7 (Seven) Days.  12   • alosetron (LOTRONEX) 0.5 MG tablet Take 0.05 mg by mouth Daily As Needed.     • ALPRAZolam (XANAX) 1 MG tablet Take 1 mg by mouth every night.     • amiodarone (PACERONE) 200 MG tablet TK 1 T PO D PRN  1   • bumetanide (BUMEX) 1 MG tablet Take 1 mg by mouth Daily As Needed.     • cetirizine (ZyrTEC) 10 MG tablet Take 10 mg by mouth Daily.     • diltiaZEM (CARDIZEM) 60 MG tablet TK 1 T PO BID  3   • diphenoxylate-atropine (LOMOTIL) 2.5-0.025 MG per tablet TK 1 T PO WITH EACH LOOSE STOOL. MAX 5 TABLETS PER 24 HOURS  3   • esomeprazole (NexIUM) 40 MG capsule Take 40 mg by mouth 2 (two) times a day.     • hydrochlorothiazide (MICROZIDE) 12.5 MG capsule hydrochlorothiazide 12.5 mg capsule   Take 1 capsule every day by oral route.     • methocarbamol (ROBAXIN) 750 MG tablet TK 1 T PO TID  1   • metoprolol tartrate (LOPRESSOR) 25 MG tablet take one prn  3  "  • montelukast (SINGULAIR) 10 MG tablet Take 10 mg by mouth Daily.     • nystatin (MYCOSTATIN) 231838 UNIT/ML suspension take prn  4   • PRADAXA 150 MG capsu TAKE 1 CAPSULE BY MOUTH EVERY 12 HOURS 180 capsule 0   • pseudoephedrine-guaifenesin (MUCINEX D)  MG per 12 hr tablet Take 1 tablet by mouth Every 12 (Twelve) Hours.     • raloxifene (EVISTA) 60 MG tablet Take 60 mg by mouth Daily.     • RESTASIS 0.05 % ophthalmic emulsion INT 1 DROP IN BOTH EYES BID  2   • simvastatin (ZOCOR) 40 MG tablet Take 40 mg by mouth Every Night.     • cephalexin (KEFLEX) 500 MG capsule DNC  4   • cephalexin (KEFLEX) 500 MG capsule Take 1 capsule by mouth Every 6 (Six) Hours. 28 capsule 0   • clobetasol (OLUX) 0.05 % topical foam BRY FOAM EXT AA BID  2   • desonide (DESOWEN) 0.05 % ointment Apply  topically daily.     • fluconazole (DIFLUCAN) 100 MG tablet take prn  12     No current facility-administered medications for this visit.          The following portions of the patient's history were reviewed and updated as appropriate: allergies, current medications, past family history, past medical history, past social history, past surgical history and problem list.    Review of Systems:   A 14 point review of systems was performed. All systems negative except pertinent positives/negative listed in HPI above    Physical Exam:   Vitals:    06/28/18 1119   Temp: 97.5 °F (36.4 °C)   TempSrc: Temporal Artery    Weight: 61 kg (134 lb 6.4 oz)   Height: 163.8 cm (64.5\")   PainSc:   5   PainLoc: Knee  Comment: left knee       General: A and O x 3, ASA, NAD    SCLERA:    Normal    DENTITION:   Normal   Knee:  right    ALIGNMENT:     Valgus      GAIT:    Antalgic    SKIN:    No abnormality    RANGE OF MOTION:   5  -  95   DEG    STRENGTH:   4  / 5    LIGAMENTS:    No varus / valgus instability.   Negative  Lachman.    MENISCUS:     Negative   Randy       DISTAL PULSES:    Paplable    DISTAL SENSATION :   Intact    LYMPHATICS:     No   " lymphadenopathy    OTHER:          - Positive   effusion      - Crepitance with ROM       Radiology:  Xrays 3views right knee  (ap,lateral, sunrise) were ordered and reviewed for evaluation of knee pain demonstrating advanced valgus osteoarthritis with bone on bone articulation, subchondral cysts, and periarticular osteophytes. In comparison to previous films there are no changes    Assessment/Plan: Right greater than left knee advanced osteoarthritis.  Unfortunately she's had insult injury with recent fall.  She is using Ace wraps.  She will likely go on to knee replacement in the relatively near future and I will see her back when necessary.      Vna Mcguire MD  6/28/2018

## 2018-07-02 ENCOUNTER — TELEPHONE (OUTPATIENT)
Dept: ORTHOPEDIC SURGERY | Facility: CLINIC | Age: 73
End: 2018-07-02

## 2018-07-02 DIAGNOSIS — M25.561 CHRONIC PAIN OF RIGHT KNEE: Primary | ICD-10-CM

## 2018-07-02 DIAGNOSIS — G89.29 CHRONIC PAIN OF RIGHT KNEE: Primary | ICD-10-CM

## 2018-07-19 ENCOUNTER — LAB (OUTPATIENT)
Dept: OTHER | Facility: HOSPITAL | Age: 73
End: 2018-07-19

## 2018-07-19 ENCOUNTER — INFUSION (OUTPATIENT)
Dept: ONCOLOGY | Facility: HOSPITAL | Age: 73
End: 2018-07-19

## 2018-07-19 VITALS — BODY MASS INDEX: 22.88 KG/M2 | TEMPERATURE: 97.5 F | WEIGHT: 135.4 LBS

## 2018-07-19 DIAGNOSIS — D63.1 ANEMIA DUE TO STAGE 3 CHRONIC KIDNEY DISEASE TREATED WITH ERYTHROPOIETIN (HCC): ICD-10-CM

## 2018-07-19 DIAGNOSIS — D50.9 IRON DEFICIENCY ANEMIA, UNSPECIFIED IRON DEFICIENCY ANEMIA TYPE: ICD-10-CM

## 2018-07-19 DIAGNOSIS — Z17.0 MALIGNANT NEOPLASM OF UPPER-OUTER QUADRANT OF LEFT BREAST IN FEMALE, ESTROGEN RECEPTOR POSITIVE (HCC): ICD-10-CM

## 2018-07-19 DIAGNOSIS — C50.412 MALIGNANT NEOPLASM OF UPPER-OUTER QUADRANT OF LEFT BREAST IN FEMALE, ESTROGEN RECEPTOR POSITIVE (HCC): ICD-10-CM

## 2018-07-19 DIAGNOSIS — N18.30 ANEMIA DUE TO STAGE 3 CHRONIC KIDNEY DISEASE TREATED WITH ERYTHROPOIETIN (HCC): ICD-10-CM

## 2018-07-19 LAB
BASOPHILS # BLD AUTO: 0.06 10*3/MM3 (ref 0–0.2)
BASOPHILS NFR BLD AUTO: 1.3 % (ref 0–1.5)
DEPRECATED RDW RBC AUTO: 48.8 FL (ref 37–54)
EOSINOPHIL # BLD AUTO: 0.14 10*3/MM3 (ref 0–0.7)
EOSINOPHIL NFR BLD AUTO: 3 % (ref 0.3–6.2)
ERYTHROCYTE [DISTWIDTH] IN BLOOD BY AUTOMATED COUNT: 14.6 % (ref 11.7–13)
FERRITIN SERPL-MCNC: 194.5 NG/ML (ref 13–150)
HCT VFR BLD AUTO: 35.1 % (ref 35.6–45.5)
HGB BLD-MCNC: 11.9 G/DL (ref 11.9–15.5)
HGB RETIC QN: 33.8 PG (ref 32.7–38.6)
IMM GRANULOCYTES # BLD: 0.04 10*3/MM3 (ref 0–0.03)
IMM GRANULOCYTES NFR BLD: 0.8 % (ref 0–0.5)
IMM RETICS NFR: 9.1 % (ref 0.7–13.7)
IRON 24H UR-MRATE: 84 MCG/DL (ref 37–145)
IRON SATN MFR SERPL: 24 % (ref 20–50)
LYMPHOCYTES # BLD AUTO: 1.22 10*3/MM3 (ref 0.9–4.8)
LYMPHOCYTES NFR BLD AUTO: 25.8 % (ref 19.6–45.3)
MCH RBC QN AUTO: 31.2 PG (ref 26.9–32)
MCHC RBC AUTO-ENTMCNC: 33.9 G/DL (ref 32.4–36.3)
MCV RBC AUTO: 91.9 FL (ref 80.5–98.2)
MONOCYTES # BLD AUTO: 0.44 10*3/MM3 (ref 0.2–1.2)
MONOCYTES NFR BLD AUTO: 9.3 % (ref 5–12)
NEUTROPHILS # BLD AUTO: 2.82 10*3/MM3 (ref 1.9–8.1)
NEUTROPHILS NFR BLD AUTO: 59.8 % (ref 42.7–76)
NRBC BLD MANUAL-RTO: 0 /100 WBC (ref 0–0)
PLATELET # BLD AUTO: 210 10*3/MM3 (ref 140–500)
PMV BLD AUTO: 9.7 FL (ref 6–12)
RBC # BLD AUTO: 3.82 10*6/MM3 (ref 3.9–5.2)
RETICS/RBC NFR AUTO: 1.81 % (ref 0.5–1.5)
TIBC SERPL-MCNC: 349 MCG/DL (ref 298–536)
TRANSFERRIN SERPL-MCNC: 234 MG/DL (ref 200–360)
WBC NRBC COR # BLD: 4.72 10*3/MM3 (ref 4.5–10.7)

## 2018-07-19 PROCEDURE — 36415 COLL VENOUS BLD VENIPUNCTURE: CPT

## 2018-07-19 PROCEDURE — 83540 ASSAY OF IRON: CPT | Performed by: INTERNAL MEDICINE

## 2018-07-19 PROCEDURE — 84466 ASSAY OF TRANSFERRIN: CPT | Performed by: INTERNAL MEDICINE

## 2018-07-19 PROCEDURE — 82728 ASSAY OF FERRITIN: CPT | Performed by: INTERNAL MEDICINE

## 2018-07-19 PROCEDURE — 85046 RETICYTE/HGB CONCENTRATE: CPT | Performed by: INTERNAL MEDICINE

## 2018-07-19 PROCEDURE — 85025 COMPLETE CBC W/AUTO DIFF WBC: CPT | Performed by: INTERNAL MEDICINE

## 2018-07-20 ENCOUNTER — TREATMENT (OUTPATIENT)
Dept: PHYSICAL THERAPY | Facility: CLINIC | Age: 73
End: 2018-07-20

## 2018-07-20 DIAGNOSIS — M25.561 CHRONIC PAIN OF RIGHT KNEE: Primary | ICD-10-CM

## 2018-07-20 DIAGNOSIS — M17.0 PRIMARY OSTEOARTHRITIS OF BOTH KNEES: ICD-10-CM

## 2018-07-20 DIAGNOSIS — G89.29 CHRONIC PAIN OF RIGHT KNEE: Primary | ICD-10-CM

## 2018-07-20 PROCEDURE — G8979 MOBILITY GOAL STATUS: HCPCS | Performed by: PHYSICAL THERAPIST

## 2018-07-20 PROCEDURE — G0283 ELEC STIM OTHER THAN WOUND: HCPCS | Performed by: PHYSICAL THERAPIST

## 2018-07-20 PROCEDURE — 97110 THERAPEUTIC EXERCISES: CPT | Performed by: PHYSICAL THERAPIST

## 2018-07-20 PROCEDURE — G8978 MOBILITY CURRENT STATUS: HCPCS | Performed by: PHYSICAL THERAPIST

## 2018-07-20 PROCEDURE — 97161 PT EVAL LOW COMPLEX 20 MIN: CPT | Performed by: PHYSICAL THERAPIST

## 2018-07-20 NOTE — PROGRESS NOTES
Physical Therapy Initial Evaluation and Plan of Care      Patient: Mary Kay Saab   : 1945  Diagnosis/ICD-10 Code:  Chronic pain of right knee [M25.561, G89.29]  Referring practitioner: Van Mcguire MD  Date of Initial Visit: 2018  Today's Date: 2018  Patient seen for 1 sessions           Subjective Questionnaire: LEFS: 25/100      Subjective Evaluation    History of Present Illness  Mechanism of injury: Pt last seen by PT on  and 6/15 for R knee pain. Pt states she planned to continue independently with HEP. She had increased pain, so she returned to Dr. Mcguire and he encouraged her to return to PT.   X-ray 18 showed advanced valgus osteoarthritis with bone on bone articulation, subchondral cysts, and periarticular osteophytes.   Pt plans to schedule TKA for November.  Takes Motrin and uses Bengay prn. Brace and ace wrap are helpful.  Pt reports L knee pain is increasing. Also having increased pain in R hip bursa.  Pt reports her  fractured his foot, so her activity level has increased.      Patient Occupation: retired Quality of life: good    Pain  Current pain ratin  Location: R lateral, posterior knee  Quality: sharp  Relieving factors: ice and medications (Bengay)  Aggravating factors: ambulation, standing, stairs and squatting  Progression: worsening    Social Support  Lives in: condominium (2nd floor, no elevator)  Lives with: spouse    Diagnostic Tests  X-ray: abnormal    Treatments  No previous or current treatments  Previous treatment: physical therapy and medication  Current treatment: medication and physical therapy  Patient Goals  Patient goals for therapy: decreased pain, improved balance, decreased edema, increased motion, increased strength and independence with ADLs/IADLs             Objective       Tenderness     Right Knee   Tenderness in the inferior patella, lateral joint line and medial joint line.     Active Range of Motion     Right Knee   Flexion: 116  degrees with pain  Extension: 9 degrees with pain    Strength/Myotome Testing     Right Knee   Flexion: 4  Extension: 4  Quadriceps contraction: fair    Tests     Additional Tests Details  Deffered due to increased pain level         Assessment & Plan     Assessment  Impairments: abnormal gait, abnormal muscle tone, abnormal or restricted ROM, activity intolerance, impaired balance, impaired physical strength, lacks appropriate home exercise program, pain with function and weight-bearing intolerance  Assessment details: S/s consistent w/ R knee OA. Pt will benefit from skilled PT services in order to address listed impairments and increase tolerance to normal daily activities including ADLs and recreational activities.    Prognosis: good  Functional Limitations: lifting, walking, uncomfortable because of pain, moving in bed, sitting, standing and stooping  Goals  Plan Goals: Plan Goals: Short Term Goals: 2 weeks. Patient will:  1. Be independent with initial HEP  2. Be instructed in posture and body mechanics  3. Have min to no pain w/ ambulation in clinic    Long Term Goals: 8-12 weeks. Patient will:  1. Ambulate 0.25 miles w/o significant pain, limitation, or deviation in gait mechanics  2. Ascend/descend 4 stairs using reciprocal steps and 1 rail for safety  3. Perform body weight squat w/o significant knee valgus, demonstrating adequate strength for ADLs  4. Demonstrate normal R knee extension ROM (0deg) to allow for walking/ADLs/performance of household tasks without pain.  5. Perform SL balance >10sec demonstrating balance required for navigating uneven terrain  6. Perceived disability </= 15% as measured on LEFS  7. Be independent with long term HEP    Plan  Therapy options: will be seen for skilled physical therapy services  Planned modality interventions: cryotherapy, microcurrent electrical stimulation, ultrasound and thermotherapy (hydrocollator packs)  Planned therapy interventions: abdominal trunk  stabilization, balance/weight-bearing training, body mechanics training, ADL retraining, flexibility, functional ROM exercises, gait training, home exercise program, joint mobilization, manual therapy, neuromuscular re-education, soft tissue mobilization, strengthening, stretching and therapeutic activities  Frequency: 2x week  Duration in weeks: 12  Treatment plan discussed with: patient        Manual Therapy:    0     mins  83129;  Therapeutic Exercise:    20     mins  86955;     Neuromuscular Lenny:    0    mins  01329;    Therapeutic Activity:     0     mins  58890;     Gait Trainin     mins  20408;     Ultrasound:     0     mins  72778;    Electrical Stimulation:    0     mins  87593 ( );  Dry Needling     0     mins self-pay    Timed Treatment:   20   mins   Total Treatment:     60   mins    PT SIGNATURE: Lisa Cuellar, AMANDA   DATE TREATMENT INITIATED: 2018    Initial Certification  Certification Period: 10/18/2018  I certify that the therapy services are furnished while this patient is under my care.  The services outlined above are required by this patient, and will be reviewed every 90 days.     PHYSICIAN: Van Mcguire MD      DATE:     Please sign and return via fax to [phone].. Thank you, [facility].

## 2018-07-24 ENCOUNTER — OFFICE VISIT (OUTPATIENT)
Dept: ONCOLOGY | Facility: CLINIC | Age: 73
End: 2018-07-24

## 2018-07-24 ENCOUNTER — APPOINTMENT (OUTPATIENT)
Dept: OTHER | Facility: HOSPITAL | Age: 73
End: 2018-07-24

## 2018-07-24 VITALS
TEMPERATURE: 97.5 F | RESPIRATION RATE: 16 BRPM | HEART RATE: 49 BPM | BODY MASS INDEX: 22.36 KG/M2 | DIASTOLIC BLOOD PRESSURE: 69 MMHG | WEIGHT: 131 LBS | SYSTOLIC BLOOD PRESSURE: 122 MMHG | OXYGEN SATURATION: 100 % | HEIGHT: 64 IN

## 2018-07-24 DIAGNOSIS — D50.9 IRON DEFICIENCY ANEMIA, UNSPECIFIED IRON DEFICIENCY ANEMIA TYPE: ICD-10-CM

## 2018-07-24 DIAGNOSIS — Z17.0 MALIGNANT NEOPLASM OF UPPER-OUTER QUADRANT OF LEFT BREAST IN FEMALE, ESTROGEN RECEPTOR POSITIVE (HCC): Primary | ICD-10-CM

## 2018-07-24 DIAGNOSIS — N18.30 ANEMIA DUE TO STAGE 3 CHRONIC KIDNEY DISEASE TREATED WITH ERYTHROPOIETIN (HCC): ICD-10-CM

## 2018-07-24 DIAGNOSIS — C50.412 MALIGNANT NEOPLASM OF UPPER-OUTER QUADRANT OF LEFT BREAST IN FEMALE, ESTROGEN RECEPTOR POSITIVE (HCC): Primary | ICD-10-CM

## 2018-07-24 DIAGNOSIS — D63.1 ANEMIA DUE TO STAGE 3 CHRONIC KIDNEY DISEASE TREATED WITH ERYTHROPOIETIN (HCC): ICD-10-CM

## 2018-07-24 PROCEDURE — 99214 OFFICE O/P EST MOD 30 MIN: CPT | Performed by: INTERNAL MEDICINE

## 2018-07-24 PROCEDURE — G0463 HOSPITAL OUTPT CLINIC VISIT: HCPCS | Performed by: INTERNAL MEDICINE

## 2018-07-24 RX ORDER — METRONIDAZOLE 7.5 MG/G
LOTION TOPICAL
Refills: 3 | COMMUNITY
Start: 2018-07-13 | End: 2018-11-06

## 2018-07-24 RX ORDER — ALOSETRON HYDROCHLORIDE 0.5 MG/1
0.5 TABLET, FILM COATED ORAL
COMMUNITY
Start: 2018-07-05 | End: 2018-11-06

## 2018-07-24 NOTE — PROGRESS NOTES
Subjective .     REASONS FOR FOLLOWUP:  Breast cancer, anemia    HISTORY OF PRESENT ILLNESS:  The patient is a 73 y.o. year old female  who is here for follow-up with the above-mentioned history.    Planning to have knee replacement surgery 11/19/18.  She is concerned her hemoglobin may drop after this.  She states her orthopedic surgeon is planning home health labs.    No complaints of bleeding, shortness of air, pain other than knee pain.    Past Medical History:   Diagnosis Date   • Anemia     Of chronic renal insufficiency   • Atrial fibrillation (CMS/HCC)    • Mcguire's esophagus    • Breast cancer (CMS/HCC)    • Chronic renal insufficiency     Stage 3   • MCCRAY (dyspnea on exertion)    • Dyspnea    • GERD (gastroesophageal reflux disease)    • H/O electrocardiogram    • Hx of being hospitalized     [facility] in 09/2010 and 10/2010 for atrial fibrillation, Dr. Perla Hurtado   • Hyperlipidemia    • Hypertension    • Hypoxia    • IBS (irritable bowel syndrome)    • Kidney dysfunction    • Lower extremity edema    • Lump or mass in breast    • Malignant neoplasm of breast (CMS/HCC)    • Osteoarthritis    • Osteopenia    • Paroxysmal atrial fibrillation (CMS/HCC)    • Seizure (CMS/HCC)     AS A TEENAGER, NOT CURRENTLY   • SOB (shortness of breath)      Past Surgical History:   Procedure Laterality Date   • BREAST LUMPECTOMY  1990    For LCIS   • CARDIAC CATHETERIZATION Left 4/27/2016    Procedure: Cardiac catheterization;  Surgeon: Kevin Guillen MD;  Location: [facility] INVASIVE LOCATION;  Service:    • FOOT SURGERY     • HAND ARTHROPLASTY Right    • KNEE ARTHROSCOPY     • OTHER SURGICAL HISTORY      LYMPHATIC SURGERY   • TOTAL HIP ARTHROPLASTY Right 7/12/2017    Procedure: TOTAL HIP ARTHROPLASTY ANTERIOR WITH HANA TABLE;  Surgeon: Van Mcguire MD;  Location: [facility] OR;  Service:        HEMATOLOGIC/ONCOLOGIC HISTORY:  (History from previous dates can be found in the separate  document.)    MEDICATIONS    Current Outpatient Prescriptions:   •  alendronate (FOSAMAX) 70 MG tablet, Take 70 mg by mouth Every 7 (Seven) Days., Disp: , Rfl: 12  •  alosetron (LOTRONEX) 0.5 MG tablet, Take 0.05 mg by mouth Daily As Needed., Disp: , Rfl:   •  alosetron (LOTRONEX) 0.5 MG tablet, Take 0.5 mg by mouth., Disp: , Rfl:   •  ALPRAZolam (XANAX) 1 MG tablet, Take 1 mg by mouth every night., Disp: , Rfl:   •  amiodarone (PACERONE) 200 MG tablet, TK 1 T PO D PRN, Disp: , Rfl: 1  •  bumetanide (BUMEX) 1 MG tablet, Take 1 mg by mouth Daily As Needed., Disp: , Rfl:   •  cephalexin (KEFLEX) 500 MG capsule, Take 1 capsule by mouth Every 6 (Six) Hours., Disp: 28 capsule, Rfl: 0  •  cetirizine (ZyrTEC) 10 MG tablet, Take 10 mg by mouth Daily., Disp: , Rfl:   •  clobetasol (OLUX) 0.05 % topical foam, BRY FOAM EXT AA BID, Disp: , Rfl: 2  •  desonide (DESOWEN) 0.05 % ointment, Apply  topically daily., Disp: , Rfl:   •  diltiaZEM (CARDIZEM) 60 MG tablet, TK 1 T PO BID, Disp: , Rfl: 3  •  diphenoxylate-atropine (LOMOTIL) 2.5-0.025 MG per tablet, PRN, Disp: , Rfl: 3  •  esomeprazole (NexIUM) 40 MG capsule, Take 40 mg by mouth 2 (two) times a day., Disp: , Rfl:   •  fluconazole (DIFLUCAN) 100 MG tablet, take prn, Disp: , Rfl: 12  •  hydrochlorothiazide (MICROZIDE) 12.5 MG capsule, hydrochlorothiazide 12.5 mg capsule  Take 1 capsule every day by oral route., Disp: , Rfl:   •  methocarbamol (ROBAXIN) 750 MG tablet, TK 1 T PO TID, Disp: , Rfl: 1  •  metoprolol tartrate (LOPRESSOR) 25 MG tablet, take one prn, Disp: , Rfl: 3  •  metroNIDAZOLE (FLAGYL) 0.75 % lotion lotion, USE 1 APPLICATION ON THE SKIN TWICE DAILY, Disp: , Rfl: 3  •  montelukast (SINGULAIR) 10 MG tablet, Take 10 mg by mouth Daily., Disp: , Rfl:   •  nystatin (MYCOSTATIN) [id number] UNIT/ML suspension, take prn, Disp: , Rfl: 4  •  PRADAXA 150 MG capsu, TAKE 1 CAPSULE BY MOUTH EVERY 12 HOURS, Disp: 180 capsule, Rfl: 0  •  pseudoephedrine-guaifenesin (MUCINEX D)   MG per 12 hr tablet, Take 1 tablet by mouth Every 12 (Twelve) Hours., Disp: , Rfl:   •  raloxifene (EVISTA) 60 MG tablet, Take 60 mg by mouth Daily., Disp: , Rfl:   •  RESTASIS 0.05 % ophthalmic emulsion, INT 1 DROP IN BOTH EYES BID, Disp: , Rfl: 2  •  simvastatin (ZOCOR) 40 MG tablet, Take 40 mg by mouth Every Night., Disp: , Rfl:     ALLERGIES:     Allergies   Allergen Reactions   • Penicillins Unknown (See Comments)     Reactions: syncope and seizures   • Atropine Hives   • Epinephrine Palpitations     Patient states will go into A-Fib   • Sulfa Antibiotics Nausea Only     STATES SHE DOES FINE WITH CERTAIN SULFA DRUGS       SOCIAL HISTORY:       Social History     Social History   • Marital status:      Spouse name: Cuate   • Number of children: 1   • Years of education: 1 year of college     Occupational History   •  Retired     Social History Main Topics   • Smoking status: Former Smoker     Quit date: 6/9/1975   • Smokeless tobacco: Never Used      Comment: QUIT SMOKING IN LATE 1970s   • Alcohol use Yes      Comment: social drinker   • Drug use: No   • Sexual activity: Defer     Other Topics Concern   • Not on file     Social History Narrative   • No narrative on file         FAMILY HISTORY:  Family History   Problem Relation Age of Onset   • Heart disease Other    • Stroke Other    • Stroke Mother    • Heart disease Mother    • Hypertension Mother    • Leukemia Father         Mid 60s   • Hypertension Brother    • Cancer Paternal Aunt    • Cancer Paternal Grandfather    • Malig Hyperthermia Neg Hx        REVIEW OF SYSTEMS:    Review of Systems   Constitutional: Negative for activity change.   HENT: Negative for nosebleeds and trouble swallowing.    Respiratory: Negative for shortness of breath and wheezing.    Cardiovascular: Negative for chest pain and palpitations.   Gastrointestinal: Negative for constipation, diarrhea and nausea.   Genitourinary: Negative for dysuria and hematuria.  "  Musculoskeletal: Negative for arthralgias and myalgias.   Skin: Negative for rash and wound.   Neurological: Negative for seizures and syncope.   Hematological: Negative for adenopathy. Does not bruise/bleed easily.   Psychiatric/Behavioral: Negative for confusion.           Objective    Vitals:    07/24/18 1017   BP: 122/69   Pulse: (!) 49   Resp: 16   Temp: 97.5 °F (36.4 °C)   SpO2: 100%   Weight: 59.4 kg (131 lb)   Height: 163.8 cm (64.49\")   PainSc: 0-No pain     Current Status 7/24/2018   ECOG score 0      PHYSICAL EXAM:    CONSTITUTIONAL:  Vital signs reviewed.  No distress, looks comfortable.  EYES:  Conjunctiva and lids unremarkable.  PERRLA  EARS,NOSE,MOUTH,THROAT:  Ears and nose appear unremarkable.  Lips, teeth, gums appear unremarkable.  RESPIRATORY:  Normal respiratory effort.  Lungs clear to auscultation bilaterally.  CARDIOVASCULAR:  Normal S1, S2.  No murmurs rubs or gallops.  No significant lower extremity edema.  BREAST: no masses by palpation or inspection   GASTROINTESTINAL: Abdomen appears unremarkable.  Nontender.  No hepatomegaly.  No splenomegaly.  LYMPHATIC:  No cervical, supraclavicular, axillary lymphadenopathy.  SKIN:  Warm.  No rashes.  PSYCHIATRIC:  Normal judgment and insight.  Normal mood and affect.     RECENT LABS:        WBC   Date/Time Value Ref Range Status   07/19/2018 10:14 AM 4.72 4.50 - 10.70 10*3/mm3 Final     Hemoglobin   Date/Time Value Ref Range Status   07/19/2018 10:14 AM 11.9 11.9 - 15.5 g/dL Final     Platelets   Date/Time Value Ref Range Status   07/19/2018 10:14  140 - 500 10*3/mm3 Final       Assessment/Plan     ASSESSMENT:  Malignant neoplasm of upper-outer quadrant of left breast in female, estrogen receptor positive (CMS/HCC)  - Ferritin  - Iron Profile  - Retic With IRF & RET-He  - CBC & Differential  - Ferritin  - Iron Profile  - Retic With IRF & RET-He  - CBC & Differential  - Ferritin  - Iron Profile  - Retic With IRF & RET-He  - CBC & " Differential    Anemia due to stage 3 chronic kidney disease treated with erythropoietin  - Ferritin  - Iron Profile  - Retic With IRF & RET-He  - CBC & Differential  - Ferritin  - Iron Profile  - Retic With IRF & RET-He  - CBC & Differential  - Ferritin  - Iron Profile  - Retic With IRF & RET-He  - CBC & Differential    Iron deficiency anemia, unspecified iron deficiency anemia type  - Ferritin  - Iron Profile  - Retic With IRF & RET-He  - CBC & Differential  - Ferritin  - Iron Profile  - Retic With IRF & RET-He  - CBC & Differential  - Ferritin  - Iron Profile  - Retic With IRF & RET-He  - CBC & Differential      *Stage IB ([id number]) left breast cancer. Grade 2, 1.9 cm, 1 mm micrometastasis in 1 out of 2 sentinel nodes. ER positive, HER-2 negative. OncoType DX: low risk category.   Tamoxifen 10/11/2013 through November 2013. Just a few days or so of Aromasin in January 2014. Could not tolerate hormonal therapy due to vaginal dryness and irritation and declined any more hormonal therapy.   On Fosamax and Evista through Dr. Perla Garza.  Remains in remission.    * Atrial fibrillation, on Pradaxa through Dr. Perla Hurtado.     *Anemia of stage 3 chronic renal insufficiency (Dr. Rell Shannon is her nephrologist whom she follows up with). (Evaluation negative for B12 or folate deficiency, hemolysis or multiple myeloma. She has not had a bone marrow biopsy. She responds well to Procrit).   She received Procrit 12/2014 and 1 dose 06/2015 and 1 dose 09/2015 and 1 dose on 10/28/2015 and some weekly doses of Procrit after hip replacement July 2017.  She responds to Procrit.     * Iron deficiency anemia. does not tolerate oral iron due to significant abdominal pain and diarrhea. She refuses any further Benadryl as a pre-medicine because it made her feel very sleepy for over 24-hours.  Recent iron labs normal.    *Fibrocystic changes in both breasts.     * On the 1/18/17 visit, she complained of Hypoxia with exertion.   See that note for details.  She did not complain of this today.    *Planning knee replacement 11/19/18.   With hip replacement in 2017, she was quite fatigued coming into the office as an outpatient for Procrit shots and later for IV iron.    I have explained we cannot give Procrit or IV iron and less Hb less than 10 for Procrit and cannot give IV iron unless she is truly iron deficient.  Currently Hb and iron labs normal.  She states her orthopedic surgeon is going to consult our practice so she can be seen postoperatively while an inpatient.  She would like her iron labs checked again after surgery.  She states her orthopedic surgeon is planning home health labs.    *Patient states she has a nodule below her chin on the right side.  On careful palpation, I detect a slight prominence on the right side.  No discrete mass.  She states this area is been enlarging over the past 8 months.  She states she has seen Dr. Jan Irby and plans to follow-up with him regarding this.  She states Dr. Irby did not feel any imaging studies or biopsies were indicated but told her to follow-up in his office.  This was a new issue for me to address today.    PLAN:   · CBC and iron labs every 3, 5, 7 months   · (Previously 2, 4, 6 months.  We'll do the above timing to correspond to her 11/19/18 knee replacement surgery.)  · Procrit if Hb < 10  · M.D. 7 months  · Last mammogram 8/30/17, BI-RADS 2.    Send this note to Dr. Jan Irby

## 2018-07-30 ENCOUNTER — TREATMENT (OUTPATIENT)
Dept: PHYSICAL THERAPY | Facility: CLINIC | Age: 73
End: 2018-07-30

## 2018-07-30 DIAGNOSIS — M17.0 PRIMARY OSTEOARTHRITIS OF BOTH KNEES: ICD-10-CM

## 2018-07-30 DIAGNOSIS — M25.561 CHRONIC PAIN OF RIGHT KNEE: Primary | ICD-10-CM

## 2018-07-30 DIAGNOSIS — G89.29 CHRONIC PAIN OF RIGHT KNEE: Primary | ICD-10-CM

## 2018-07-30 PROCEDURE — 97110 THERAPEUTIC EXERCISES: CPT | Performed by: PHYSICAL THERAPIST

## 2018-07-30 PROCEDURE — G0283 ELEC STIM OTHER THAN WOUND: HCPCS | Performed by: PHYSICAL THERAPIST

## 2018-07-30 NOTE — PROGRESS NOTES
Physical Therapy Daily Progress Note    Subjective   Pt reports her knee is feeling a little better. She did heavy housework over the weekend, and her quadriceps and hamstrings were sore.    Objective   See Exercise, Manual, and Modality Logs for complete treatment.       Assessment/Plan  Pt declined using NuStep for warm-up due to pain. Tolerated new strengthening well. Updated HEP. Progress per POC.                 Manual Therapy:    5     mins  83818;  Therapeutic Exercise:    30     mins  70493;     Neuromuscular Lenny:    0    mins  00779;    Therapeutic Activity:     0     mins  41178;     Gait Trainin     mins  19686;     Ultrasound:     0     mins  85044;    Work Hardening           0      mins 28605  Iontophoresis               0   mins 50087    Timed Treatment:   35   mins   Total Treatment:     55   mins    Lisa Cuellar, PT  Physical Therapist

## 2018-08-02 ENCOUNTER — TREATMENT (OUTPATIENT)
Dept: PHYSICAL THERAPY | Facility: CLINIC | Age: 73
End: 2018-08-02

## 2018-08-02 DIAGNOSIS — M25.561 CHRONIC PAIN OF RIGHT KNEE: Primary | ICD-10-CM

## 2018-08-02 DIAGNOSIS — G89.29 CHRONIC PAIN OF RIGHT KNEE: Primary | ICD-10-CM

## 2018-08-02 DIAGNOSIS — M17.0 PRIMARY OSTEOARTHRITIS OF BOTH KNEES: ICD-10-CM

## 2018-08-02 PROCEDURE — 97110 THERAPEUTIC EXERCISES: CPT | Performed by: PHYSICAL THERAPIST

## 2018-08-02 PROCEDURE — G0283 ELEC STIM OTHER THAN WOUND: HCPCS | Performed by: PHYSICAL THERAPIST

## 2018-08-02 NOTE — PROGRESS NOTES
Physical Therapy Daily Progress Note      Subjective   Pt reports she was more painful after last visit.    Objective   See Exercise, Manual, and Modality Logs for complete treatment.       Assessment/Plan  Deferred ankle weight and manual knee joint distraction as pt states these were painful. Tolerated the remainder of exercises well. Progress per POC.                 Manual Therapy:    0     mins  57925;  Therapeutic Exercise:    30     mins  92896;     Neuromuscular Lenny:    0    mins  30249;    Therapeutic Activity:     0     mins  31827;     Gait Trainin     mins  10805;     Ultrasound:     0     mins  17471;    Work Hardening           0      mins 17974  Iontophoresis               0   mins 21760    Timed Treatment:   30   mins   Total Treatment:     50   mins    Lisa Cuellar, PT  Physical Therapist

## 2018-08-06 ENCOUNTER — HOSPITAL ENCOUNTER (OUTPATIENT)
Dept: GENERAL RADIOLOGY | Facility: HOSPITAL | Age: 73
Discharge: HOME | End: 2018-08-06
Admitting: INTERNAL MEDICINE

## 2018-08-06 ENCOUNTER — TRANSCRIBE ORDERS (OUTPATIENT)
Dept: ADMINISTRATIVE | Facility: HOSPITAL | Age: 73
End: 2018-08-06

## 2018-08-06 DIAGNOSIS — R05.9 COUGH: Primary | ICD-10-CM

## 2018-08-06 PROCEDURE — 71046 X-RAY EXAM CHEST 2 VIEWS: CPT

## 2018-08-27 ENCOUNTER — TRANSCRIBE ORDERS (OUTPATIENT)
Dept: ADMINISTRATIVE | Facility: HOSPITAL | Age: 73
End: 2018-08-27

## 2018-08-27 ENCOUNTER — TREATMENT (OUTPATIENT)
Dept: PHYSICAL THERAPY | Facility: CLINIC | Age: 73
End: 2018-08-27

## 2018-08-27 DIAGNOSIS — G89.29 CHRONIC PAIN OF RIGHT KNEE: Primary | ICD-10-CM

## 2018-08-27 DIAGNOSIS — E34.9 ELEVATED PARATHYROID HORMONE: ICD-10-CM

## 2018-08-27 DIAGNOSIS — M25.561 CHRONIC PAIN OF RIGHT KNEE: Primary | ICD-10-CM

## 2018-08-27 DIAGNOSIS — M17.0 PRIMARY OSTEOARTHRITIS OF BOTH KNEES: ICD-10-CM

## 2018-08-27 DIAGNOSIS — E04.1 THYROID NODULE: Primary | ICD-10-CM

## 2018-08-27 PROCEDURE — G0283 ELEC STIM OTHER THAN WOUND: HCPCS | Performed by: PHYSICAL THERAPIST

## 2018-08-27 PROCEDURE — 97110 THERAPEUTIC EXERCISES: CPT | Performed by: PHYSICAL THERAPIST

## 2018-08-27 NOTE — PROGRESS NOTES
"Physical Therapy Daily Progress Note    Subjective   Pt reports she has been absent from PT for 3 weeks, because she hurt her tailbone. She went to sit on her step, missed the step, and landed on her bottom. She has been sitting on cushion and resting. She has not been evaluated by a doctor.   Knee is doing better. Pt notes improved bend and less pain. \"I think PT has been very helpful.\" She does not plan to schedule more PT visits at this time.     Objective       Active Range of Motion     Right Knee   Flexion: 123 degrees with pain  Extension: 5 degrees with pain     See Exercise, Manual, and Modality Logs for complete treatment.       Assessment/Plan  Pt tolerates [place] strengthening and hamstring stretching well. Demonstrates improved AROM and independence with HEP. Reports pain relief with electrical stimulation and ice post-session. Pt is prepared to continue independently with HEP at this time. She is welcome to follow up if needed.             Manual Therapy:    0     mins  54765;  Therapeutic Exercise:    30     mins  75698;     Neuromuscular Lenny:    0    mins  09492;    Therapeutic Activity:     0     mins  52116;     Gait Trainin     mins  62007;     Ultrasound:     0     mins  03528;    Work Hardening           0      mins 53372  Iontophoresis               0   mins 41090    Timed Treatment:   30   mins   Total Treatment:     50   mins    Lisa Cuellar, PT  Physical Therapist  "

## 2018-08-31 ENCOUNTER — APPOINTMENT (OUTPATIENT)
Dept: WOMENS IMAGING | Facility: HOSPITAL | Age: 73
End: 2018-08-31

## 2018-08-31 ENCOUNTER — TELEPHONE (OUTPATIENT)
Dept: ORTHOPEDIC SURGERY | Facility: CLINIC | Age: 73
End: 2018-08-31

## 2018-08-31 PROCEDURE — 77067 SCR MAMMO BI INCL CAD: CPT | Performed by: RADIOLOGY

## 2018-08-31 PROCEDURE — MDREVIEWSP: Performed by: RADIOLOGY

## 2018-08-31 PROCEDURE — 77063 BREAST TOMOSYNTHESIS BI: CPT | Performed by: RADIOLOGY

## 2018-09-06 DIAGNOSIS — M17.11 PRIMARY OSTEOARTHRITIS OF RIGHT KNEE: Primary | ICD-10-CM

## 2018-09-06 RX ORDER — CLINDAMYCIN PHOSPHATE 900 MG/50ML
900 INJECTION INTRAVENOUS ONCE
Status: CANCELLED | OUTPATIENT
Start: 2018-11-19 | End: 2018-11-19

## 2018-09-06 RX ORDER — VANCOMYCIN HYDROCHLORIDE 1 G/200ML
15 INJECTION, SOLUTION INTRAVENOUS ONCE
Status: CANCELLED | OUTPATIENT
Start: 2018-11-19 | End: 2018-11-19

## 2018-09-06 RX ORDER — PREGABALIN 75 MG/1
150 CAPSULE ORAL ONCE
Status: CANCELLED | OUTPATIENT
Start: 2018-11-19 | End: 2018-11-19

## 2018-09-06 RX ORDER — MELOXICAM 15 MG/1
15 TABLET ORAL ONCE
Status: CANCELLED | OUTPATIENT
Start: 2018-11-19 | End: 2018-11-19

## 2018-09-11 PROBLEM — M17.11 PRIMARY OSTEOARTHRITIS OF RIGHT KNEE: Status: ACTIVE | Noted: 2018-09-11

## 2018-09-18 ENCOUNTER — APPOINTMENT (OUTPATIENT)
Dept: NUCLEAR MEDICINE | Facility: HOSPITAL | Age: 73
End: 2018-09-18

## 2018-09-18 ENCOUNTER — TRANSCRIBE ORDERS (OUTPATIENT)
Dept: ADMINISTRATIVE | Facility: HOSPITAL | Age: 73
End: 2018-09-18

## 2018-09-18 ENCOUNTER — HOSPITAL ENCOUNTER (OUTPATIENT)
Dept: ULTRASOUND IMAGING | Facility: HOSPITAL | Age: 73
Discharge: HOME | End: 2018-09-18
Admitting: INTERNAL MEDICINE

## 2018-09-18 DIAGNOSIS — E04.1 THYROID NODULE: ICD-10-CM

## 2018-09-18 DIAGNOSIS — R79.89 ELEVATED PARATHYROID HORMONE RELATED PEPTIDE LEVEL: Primary | ICD-10-CM

## 2018-09-18 PROCEDURE — 76536 US EXAM OF HEAD AND NECK: CPT

## 2018-09-19 ENCOUNTER — APPOINTMENT (OUTPATIENT)
Dept: NUCLEAR MEDICINE | Facility: HOSPITAL | Age: 73
End: 2018-09-19

## 2018-09-21 ENCOUNTER — HOSPITAL ENCOUNTER (OUTPATIENT)
Dept: NUCLEAR MEDICINE | Facility: HOSPITAL | Age: 73
Discharge: HOME | End: 2018-09-21

## 2018-09-21 DIAGNOSIS — R79.89 ELEVATED PARATHYROID HORMONE RELATED PEPTIDE LEVEL: ICD-10-CM

## 2018-09-21 PROCEDURE — A9500 TC99M SESTAMIBI: HCPCS | Performed by: INTERNAL MEDICINE

## 2018-09-21 PROCEDURE — 78070 PARATHYROID PLANAR IMAGING: CPT

## 2018-09-21 PROCEDURE — 0 TECHNETIUM SESTAMIBI: Performed by: INTERNAL MEDICINE

## 2018-09-21 RX ADMIN — TECHNETIUM TC 99M SESTAMIBI 1 DOSE: 1 INJECTION INTRAVENOUS at 09:06

## 2018-09-25 ENCOUNTER — CLINICAL SUPPORT (OUTPATIENT)
Dept: ORTHOPEDIC SURGERY | Facility: CLINIC | Age: 73
End: 2018-09-25

## 2018-09-25 VITALS — BODY MASS INDEX: 22.49 KG/M2 | TEMPERATURE: 97.5 F | WEIGHT: 135 LBS | HEIGHT: 65 IN

## 2018-09-25 DIAGNOSIS — M25.561 CHRONIC PAIN OF RIGHT KNEE: Primary | ICD-10-CM

## 2018-09-25 DIAGNOSIS — G89.29 CHRONIC PAIN OF RIGHT KNEE: Primary | ICD-10-CM

## 2018-09-25 PROCEDURE — 20610 DRAIN/INJ JOINT/BURSA W/O US: CPT | Performed by: NURSE PRACTITIONER

## 2018-09-25 RX ORDER — LIDOCAINE HYDROCHLORIDE 10 MG/ML
4 INJECTION, SOLUTION EPIDURAL; INFILTRATION; INTRACAUDAL; PERINEURAL
Status: COMPLETED | OUTPATIENT
Start: 2018-09-25 | End: 2018-09-25

## 2018-09-25 RX ORDER — METHYLPREDNISOLONE ACETATE 80 MG/ML
80 INJECTION, SUSPENSION INTRA-ARTICULAR; INTRALESIONAL; INTRAMUSCULAR; SOFT TISSUE
Status: COMPLETED | OUTPATIENT
Start: 2018-09-25 | End: 2018-09-25

## 2018-09-25 RX ADMIN — LIDOCAINE HYDROCHLORIDE 4 ML: 10 INJECTION, SOLUTION EPIDURAL; INFILTRATION; INTRACAUDAL; PERINEURAL at 10:30

## 2018-09-25 RX ADMIN — METHYLPREDNISOLONE ACETATE 80 MG: 80 INJECTION, SUSPENSION INTRA-ARTICULAR; INTRALESIONAL; INTRAMUSCULAR; SOFT TISSUE at 10:30

## 2018-09-25 NOTE — PROGRESS NOTES
Large Joint Arthrocentesis  Date/Time: 9/25/2018 10:30 AM  Consent given by: patient  Site marked: site marked  Timeout: Immediately prior to procedure a time out was called to verify the correct patient, procedure, equipment, support staff and site/side marked as required   Supporting Documentation  Indications: pain   Procedure Details  Location: knee - R knee  Needle size: 25 G  Approach: anteromedial  Medications administered: 4 mL lidocaine PF 1% 1 %; 80 mg methylPREDNISolone acetate 80 MG/ML  Patient tolerance: patient tolerated the procedure well with no immediate complications        Patient is here today for right knee cortisone injection.  She has known bone-on-bone end-stage osteoarthritis.  She has had injections previously without any complications.  Prior to injection risks were discussed including pain, infection, elevated blood sugar.  Patient verbalized understanding would like to proceed with injection    Sabrina Vicente APRN  9/25/18

## 2018-10-01 ENCOUNTER — TRANSCRIBE ORDERS (OUTPATIENT)
Dept: ADMINISTRATIVE | Facility: HOSPITAL | Age: 73
End: 2018-10-01

## 2018-10-01 DIAGNOSIS — R22.1 MASS OF RIGHT SIDE OF NECK: Primary | ICD-10-CM

## 2018-10-03 ENCOUNTER — HOSPITAL ENCOUNTER (OUTPATIENT)
Dept: CT IMAGING | Facility: HOSPITAL | Age: 73
Discharge: HOME | End: 2018-10-03
Admitting: INTERNAL MEDICINE

## 2018-10-03 DIAGNOSIS — R22.1 MASS OF RIGHT SIDE OF NECK: ICD-10-CM

## 2018-10-03 PROCEDURE — 70490 CT SOFT TISSUE NECK W/O DYE: CPT

## 2018-10-05 ENCOUNTER — TRANSCRIBE ORDERS (OUTPATIENT)
Dept: ADMINISTRATIVE | Facility: HOSPITAL | Age: 73
End: 2018-10-05

## 2018-10-05 DIAGNOSIS — D16.9 OSTEOCHONDROMA: Primary | ICD-10-CM

## 2018-10-12 ENCOUNTER — LAB (OUTPATIENT)
Dept: LAB | Facility: HOSPITAL | Age: 73
End: 2018-10-12

## 2018-10-12 ENCOUNTER — HOSPITAL ENCOUNTER (OUTPATIENT)
Dept: MRI IMAGING | Facility: HOSPITAL | Age: 73
Discharge: HOME | End: 2018-10-12
Admitting: INTERNAL MEDICINE

## 2018-10-12 ENCOUNTER — TRANSCRIBE ORDERS (OUTPATIENT)
Dept: ADMINISTRATIVE | Facility: HOSPITAL | Age: 73
End: 2018-10-12

## 2018-10-12 DIAGNOSIS — E78.5 HYPERLIPIDEMIA, UNSPECIFIED HYPERLIPIDEMIA TYPE: Primary | ICD-10-CM

## 2018-10-12 DIAGNOSIS — E78.5 HYPERLIPIDEMIA, UNSPECIFIED HYPERLIPIDEMIA TYPE: ICD-10-CM

## 2018-10-12 DIAGNOSIS — D16.9 OSTEOCHONDROMA: ICD-10-CM

## 2018-10-12 LAB
CHOLEST SERPL-MCNC: 166 MG/DL (ref 0–200)
HDLC SERPL-MCNC: 82 MG/DL (ref 40–60)
LDLC SERPL CALC-MCNC: 70 MG/DL (ref 0–100)
LDLC/HDLC SERPL: 0.86 {RATIO}
TRIGL SERPL-MCNC: 68 MG/DL (ref 0–150)
VLDLC SERPL-MCNC: 13.6 MG/DL (ref 5–40)

## 2018-10-12 PROCEDURE — 80061 LIPID PANEL: CPT | Performed by: INTERNAL MEDICINE

## 2018-10-12 PROCEDURE — 70540 MRI ORBIT/FACE/NECK W/O DYE: CPT

## 2018-10-12 PROCEDURE — 36415 COLL VENOUS BLD VENIPUNCTURE: CPT

## 2018-10-24 ENCOUNTER — TELEPHONE (OUTPATIENT)
Dept: ORTHOPEDIC SURGERY | Facility: CLINIC | Age: 73
End: 2018-10-24

## 2018-10-24 DIAGNOSIS — IMO0002 PROPHYLACTIC ANTIBIOTIC FOR DENTAL PROCEDURE INDICATED DUE TO PRIOR JOINT REPLACEMENT: Primary | ICD-10-CM

## 2018-10-24 NOTE — TELEPHONE ENCOUNTER
"Call return to the patient.  Have instructed her that Dr. tidwell prefers that she not have any elective dental work done a month prior to her surgery and for 3 months after surgery.  Patient states that she does have \"bad gums\" has to have dental cleaning every 3 months.  Patient will try to reschedule her appointment for October 29 which would be 3 weeks prior to surgery.  I've advised her I will need to make sure that that is okay with Dr. tidwell will get back with her tomorrow  "

## 2018-10-24 NOTE — TELEPHONE ENCOUNTER
Patient is having R TKA 11/19/18 - asking how long it will be before she needs to schedule 1st PO appt? Same as PREETHI (2 weeks)?     Secondly, when next PO appt would be (1 month after 1st PO so 6 wk PO)?

## 2018-10-24 NOTE — TELEPHONE ENCOUNTER
Patient had R PREETHI 7/12/17 - has dental cleaning scheduled in 2 weeks (prior to R TKA scheduled for 11/19/18). Asking about antibiotic premedication?     Patient stated she usually gets Keflex and gets 3 refills since patient goes to dentist 3x per year. Uses [facility] at (phone) [phone]

## 2018-10-25 RX ORDER — CEPHALEXIN 500 MG/1
CAPSULE ORAL
Qty: 4 CAPSULE | Refills: 2 | Status: SHIPPED | OUTPATIENT
Start: 2018-10-25 | End: 2018-11-20 | Stop reason: HOSPADM

## 2018-10-25 NOTE — TELEPHONE ENCOUNTER
Notified patient of MLL reply re: needing a 2 week PO & then an 8 week PO. Patient informed we would call to schedule her POs, however patient stated she would call later to schedule her POs since she would like to have AM appts.

## 2018-10-25 NOTE — TELEPHONE ENCOUNTER
Have spoken with Dr. tidwell.  Because of her history of periodontal disease Dr. tidwell is okay with her having the dental cleaning done 3 weeks prior to surgery.  I have E prescribed a new prescription to [facility] at [phone] for Keflex 500 mg #4, directions her to take all 4 capsules 1 hour prior to her procedure.  Refill ×2.  I've also advised the patient that when she comes in to the office for her H&P appointment if she does have inflamed gums or any signs of bleeding that her surgery may be canceled.  Patient understands and agrees

## 2018-10-30 ENCOUNTER — INFUSION (OUTPATIENT)
Dept: ONCOLOGY | Facility: HOSPITAL | Age: 73
End: 2018-10-30

## 2018-10-30 ENCOUNTER — LAB (OUTPATIENT)
Dept: OTHER | Facility: HOSPITAL | Age: 73
End: 2018-10-30

## 2018-10-30 DIAGNOSIS — C50.412 MALIGNANT NEOPLASM OF UPPER-OUTER QUADRANT OF LEFT BREAST IN FEMALE, ESTROGEN RECEPTOR POSITIVE (HCC): ICD-10-CM

## 2018-10-30 DIAGNOSIS — D50.9 IRON DEFICIENCY ANEMIA, UNSPECIFIED IRON DEFICIENCY ANEMIA TYPE: ICD-10-CM

## 2018-10-30 DIAGNOSIS — D63.1 ANEMIA DUE TO STAGE 3 CHRONIC KIDNEY DISEASE TREATED WITH ERYTHROPOIETIN (HCC): ICD-10-CM

## 2018-10-30 DIAGNOSIS — N18.30 ANEMIA DUE TO STAGE 3 CHRONIC KIDNEY DISEASE TREATED WITH ERYTHROPOIETIN (HCC): ICD-10-CM

## 2018-10-30 DIAGNOSIS — Z17.0 MALIGNANT NEOPLASM OF UPPER-OUTER QUADRANT OF LEFT BREAST IN FEMALE, ESTROGEN RECEPTOR POSITIVE (HCC): ICD-10-CM

## 2018-10-30 LAB
BASOPHILS # BLD AUTO: 0.04 10*3/MM3 (ref 0–0.2)
BASOPHILS NFR BLD AUTO: 0.8 % (ref 0–1.5)
DEPRECATED RDW RBC AUTO: 49.3 FL (ref 37–54)
EOSINOPHIL # BLD AUTO: 0.11 10*3/MM3 (ref 0–0.7)
EOSINOPHIL NFR BLD AUTO: 2.1 % (ref 0.3–6.2)
ERYTHROCYTE [DISTWIDTH] IN BLOOD BY AUTOMATED COUNT: 14.6 % (ref 11.7–13)
FERRITIN SERPL-MCNC: 94.2 NG/ML (ref 13–150)
HCT VFR BLD AUTO: 39.5 % (ref 35.6–45.5)
HGB BLD-MCNC: 13 G/DL (ref 11.9–15.5)
HGB RETIC QN: 32.3 PG (ref 32.7–38.6)
IMM GRANULOCYTES # BLD: 0.03 10*3/MM3 (ref 0–0.03)
IMM GRANULOCYTES NFR BLD: 0.6 % (ref 0–0.5)
IMM RETICS NFR: 13.3 % (ref 0.7–13.7)
IRON 24H UR-MRATE: 67 MCG/DL (ref 37–145)
IRON SATN MFR SERPL: 18 % (ref 20–50)
LYMPHOCYTES # BLD AUTO: 1.21 10*3/MM3 (ref 0.9–4.8)
LYMPHOCYTES NFR BLD AUTO: 22.9 % (ref 19.6–45.3)
MCH RBC QN AUTO: 30.2 PG (ref 26.9–32)
MCHC RBC AUTO-ENTMCNC: 32.9 G/DL (ref 32.4–36.3)
MCV RBC AUTO: 91.9 FL (ref 80.5–98.2)
MONOCYTES # BLD AUTO: 0.54 10*3/MM3 (ref 0.2–1.2)
MONOCYTES NFR BLD AUTO: 10.2 % (ref 5–12)
NEUTROPHILS # BLD AUTO: 3.35 10*3/MM3 (ref 1.9–8.1)
NEUTROPHILS NFR BLD AUTO: 63.4 % (ref 42.7–76)
NRBC BLD MANUAL-RTO: 0 /100 WBC (ref 0–0)
PLATELET # BLD AUTO: 220 10*3/MM3 (ref 140–500)
PMV BLD AUTO: 9.5 FL (ref 6–12)
RBC # BLD AUTO: 4.3 10*6/MM3 (ref 3.9–5.2)
RETICS/RBC NFR AUTO: 2.14 % (ref 0.5–1.5)
TIBC SERPL-MCNC: 377 MCG/DL (ref 298–536)
TRANSFERRIN SERPL-MCNC: 253 MG/DL (ref 200–360)
WBC NRBC COR # BLD: 5.28 10*3/MM3 (ref 4.5–10.7)

## 2018-10-30 PROCEDURE — 83540 ASSAY OF IRON: CPT | Performed by: INTERNAL MEDICINE

## 2018-10-30 PROCEDURE — 85025 COMPLETE CBC W/AUTO DIFF WBC: CPT | Performed by: INTERNAL MEDICINE

## 2018-10-30 PROCEDURE — 84466 ASSAY OF TRANSFERRIN: CPT | Performed by: INTERNAL MEDICINE

## 2018-10-30 PROCEDURE — 36415 COLL VENOUS BLD VENIPUNCTURE: CPT

## 2018-10-30 PROCEDURE — 85046 RETICYTE/HGB CONCENTRATE: CPT | Performed by: INTERNAL MEDICINE

## 2018-10-30 PROCEDURE — 82728 ASSAY OF FERRITIN: CPT | Performed by: INTERNAL MEDICINE

## 2018-10-30 NOTE — PROGRESS NOTES
Arrived ambulatory today for injection. Hgb 13.2 today Procrit injection held per doctor order. D/c home.

## 2018-11-06 ENCOUNTER — APPOINTMENT (OUTPATIENT)
Dept: PREADMISSION TESTING | Facility: HOSPITAL | Age: 73
End: 2018-11-06

## 2018-11-06 ENCOUNTER — OFFICE VISIT (OUTPATIENT)
Dept: ORTHOPEDIC SURGERY | Facility: CLINIC | Age: 73
End: 2018-11-06

## 2018-11-06 VITALS
WEIGHT: 135.9 LBS | DIASTOLIC BLOOD PRESSURE: 70 MMHG | RESPIRATION RATE: 16 BRPM | HEART RATE: 54 BPM | OXYGEN SATURATION: 100 % | BODY MASS INDEX: 22.64 KG/M2 | HEIGHT: 65 IN | TEMPERATURE: 98 F | SYSTOLIC BLOOD PRESSURE: 125 MMHG

## 2018-11-06 VITALS — BODY MASS INDEX: 22.49 KG/M2 | TEMPERATURE: 97.5 F | HEIGHT: 65 IN | WEIGHT: 135 LBS

## 2018-11-06 DIAGNOSIS — G89.29 CHRONIC PAIN OF RIGHT KNEE: Primary | ICD-10-CM

## 2018-11-06 DIAGNOSIS — M17.11 PRIMARY OSTEOARTHRITIS OF RIGHT KNEE: ICD-10-CM

## 2018-11-06 DIAGNOSIS — M25.561 CHRONIC PAIN OF RIGHT KNEE: Primary | ICD-10-CM

## 2018-11-06 LAB
ANION GAP SERPL CALCULATED.3IONS-SCNC: 13.1 MMOL/L
BACTERIA UR QL AUTO: NORMAL /HPF
BILIRUB UR QL STRIP: NEGATIVE
BUN BLD-MCNC: 36 MG/DL (ref 8–23)
BUN/CREAT SERPL: 26.5 (ref 7–25)
CALCIUM SPEC-SCNC: 9.5 MG/DL (ref 8.6–10.5)
CHLORIDE SERPL-SCNC: 96 MMOL/L (ref 98–107)
CLARITY UR: CLEAR
CO2 SERPL-SCNC: 27.9 MMOL/L (ref 22–29)
COLOR UR: YELLOW
CREAT BLD-MCNC: 1.36 MG/DL (ref 0.57–1)
DEPRECATED RDW RBC AUTO: 49.8 FL (ref 37–54)
ERYTHROCYTE [DISTWIDTH] IN BLOOD BY AUTOMATED COUNT: 14.7 % (ref 11.7–13)
GFR SERPL CREATININE-BSD FRML MDRD: 38 ML/MIN/1.73
GLUCOSE BLD-MCNC: 94 MG/DL (ref 65–99)
GLUCOSE UR STRIP-MCNC: NEGATIVE MG/DL
HCT VFR BLD AUTO: 39.3 % (ref 35.6–45.5)
HGB BLD-MCNC: 12.5 G/DL (ref 11.9–15.5)
HGB UR QL STRIP.AUTO: NEGATIVE
HYALINE CASTS UR QL AUTO: NORMAL /LPF
KETONES UR QL STRIP: NEGATIVE
LEUKOCYTE ESTERASE UR QL STRIP.AUTO: ABNORMAL
MCH RBC QN AUTO: 29.7 PG (ref 26.9–32)
MCHC RBC AUTO-ENTMCNC: 31.8 G/DL (ref 32.4–36.3)
MCV RBC AUTO: 93.3 FL (ref 80.5–98.2)
NITRITE UR QL STRIP: NEGATIVE
PH UR STRIP.AUTO: 7 [PH] (ref 5–8)
PLATELET # BLD AUTO: 209 10*3/MM3 (ref 140–500)
PMV BLD AUTO: 9.6 FL (ref 6–12)
POTASSIUM BLD-SCNC: 4.2 MMOL/L (ref 3.5–5.2)
PROT UR QL STRIP: NEGATIVE
RBC # BLD AUTO: 4.21 10*6/MM3 (ref 3.9–5.2)
RBC # UR: NORMAL /HPF
REF LAB TEST METHOD: NORMAL
SODIUM BLD-SCNC: 137 MMOL/L (ref 136–145)
SP GR UR STRIP: 1.01 (ref 1–1.03)
SQUAMOUS #/AREA URNS HPF: NORMAL /HPF
UROBILINOGEN UR QL STRIP: ABNORMAL
WBC NRBC COR # BLD: 5.41 10*3/MM3 (ref 4.5–10.7)
WBC UR QL AUTO: NORMAL /HPF

## 2018-11-06 PROCEDURE — 63710000001 MUPIROCIN 2 % OINTMENT: Performed by: ORTHOPAEDIC SURGERY

## 2018-11-06 PROCEDURE — 80048 BASIC METABOLIC PNL TOTAL CA: CPT | Performed by: ORTHOPAEDIC SURGERY

## 2018-11-06 PROCEDURE — 93010 ELECTROCARDIOGRAM REPORT: CPT | Performed by: INTERNAL MEDICINE

## 2018-11-06 PROCEDURE — 85027 COMPLETE CBC AUTOMATED: CPT | Performed by: ORTHOPAEDIC SURGERY

## 2018-11-06 PROCEDURE — A9270 NON-COVERED ITEM OR SERVICE: HCPCS | Performed by: ORTHOPAEDIC SURGERY

## 2018-11-06 PROCEDURE — 81001 URINALYSIS AUTO W/SCOPE: CPT | Performed by: ORTHOPAEDIC SURGERY

## 2018-11-06 PROCEDURE — 99213 OFFICE O/P EST LOW 20 MIN: CPT | Performed by: ORTHOPAEDIC SURGERY

## 2018-11-06 PROCEDURE — 36415 COLL VENOUS BLD VENIPUNCTURE: CPT

## 2018-11-06 PROCEDURE — 93005 ELECTROCARDIOGRAM TRACING: CPT

## 2018-11-06 PROCEDURE — 73562 X-RAY EXAM OF KNEE 3: CPT | Performed by: ORTHOPAEDIC SURGERY

## 2018-11-06 RX ORDER — AMIODARONE HYDROCHLORIDE 200 MG/1
200 TABLET ORAL AS NEEDED
COMMUNITY
End: 2021-06-22

## 2018-11-06 RX ORDER — FLUCONAZOLE 100 MG/1
100 TABLET ORAL AS NEEDED
COMMUNITY
End: 2021-01-01

## 2018-11-06 RX ORDER — DABIGATRAN ETEXILATE 150 MG/1
150 CAPSULE ORAL 2 TIMES DAILY
COMMUNITY
End: 2019-03-12 | Stop reason: SDUPTHER

## 2018-11-06 RX ORDER — ROSUVASTATIN CALCIUM 5 MG/1
5 TABLET, COATED ORAL DAILY
COMMUNITY
End: 2019-11-19

## 2018-11-06 RX ORDER — METHOCARBAMOL 750 MG/1
750 TABLET, FILM COATED ORAL AS NEEDED
COMMUNITY
End: 2020-01-27 | Stop reason: SDUPTHER

## 2018-11-06 RX ORDER — HYDROCHLOROTHIAZIDE 12.5 MG/1
12.5 TABLET ORAL DAILY
COMMUNITY

## 2018-11-06 RX ORDER — DIPHENOXYLATE HYDROCHLORIDE AND ATROPINE SULFATE 2.5; .025 MG/1; MG/1
1 TABLET ORAL 4 TIMES DAILY PRN
COMMUNITY

## 2018-11-06 RX ORDER — DILTIAZEM HYDROCHLORIDE 60 MG/1
60 TABLET, FILM COATED ORAL 2 TIMES DAILY
COMMUNITY

## 2018-11-06 RX ORDER — CYCLOSPORINE 0.5 MG/ML
1 EMULSION OPHTHALMIC 2 TIMES DAILY
COMMUNITY

## 2018-11-06 RX ORDER — CLOBETASOL PROPIONATE 0.5 MG/G
1 AEROSOL, FOAM TOPICAL 2 TIMES DAILY
COMMUNITY
End: 2020-05-26

## 2018-11-06 RX ORDER — CHLORHEXIDINE GLUCONATE 500 MG/1
CLOTH TOPICAL
COMMUNITY
End: 2018-11-20 | Stop reason: HOSPADM

## 2018-11-06 RX ORDER — DABIGATRAN ETEXILATE 150 MG/1
150 CAPSULE ORAL 2 TIMES DAILY
COMMUNITY
End: 2021-01-01

## 2018-11-06 ASSESSMENT — KOOS JR
KOOS JR SCORE: 42.281
KOOS JR SCORE: 18

## 2018-11-06 NOTE — DISCHARGE INSTRUCTIONS
Take the following medications the morning of surgery with a small sip of water:    NEXIUM    General Instructions:  • Do not eat solid food after midnight the night before surgery.  • You may drink clear liquids day of surgery but must stop at least one hour before your hospital arrival time.  • It is beneficial for you to have a clear drink that contains carbohydrates the day of surgery.  We suggest a 12 to 20 ounce bottle of Gatorade or Powerade for non-diabetic patients or a 12 to 20 ounce bottle of G2 or Powerade Zero for diabetic patients. (Pediatric patients, are not advised to drink a 12 to 20 ounce carbohydrate drink)    Clear liquids are liquids you can see through.  Nothing red in color.     Plain water                               Sports drinks  Sodas                                   Gelatin (Jell-O)  Fruit juices without pulp such as white grape juice and apple juice  Popsicles that contain no fruit or yogurt  Tea or coffee (no cream or milk added)  Gatorade / Powerade  G2 / Powerade Zero    • Infants may have breast milk up to four hours before surgery.  • Infants drinking formula may drink formula up to six hours before surgery.   • Patients who avoid smoking, chewing tobacco and alcohol for 4 weeks prior to surgery have a reduced risk of post-operative complications.  Quit smoking as many days before surgery as you can.  • Do not smoke, use chewing tobacco or drink alcohol the day of surgery.   • If applicable bring your C-PAP/ BI-PAP machine.  • Bring any papers given to you in the doctor’s office.  • Wear clean comfortable clothes and socks.  • Do not wear contact lenses or make-up.  Bring a case for your glasses.   • Bring crutches or walker if applicable.  • Remove all piercings.  Leave jewelry and any other valuables at home.  • Hair extensions with metal clips must be removed prior to surgery.  • The Pre-Admission Testing nurse will instruct you to bring medications if unable to obtain an  accurate list in Pre-Admission Testing.        If you were given a blood bank ID arm band remember to bring it with you the day of surgery.    Preventing a Surgical Site Infection:  • For 2 to 3 days before surgery, avoid shaving with a razor because the razor can irritate skin and make it easier to develop an infection.    • Any areas of open skin can increase the risk of a post-operative wound infection by allowing bacteria to enter and travel throughout the body.  Notify your surgeon if you have any skin wounds / rashes even if it is not near the expected surgical site.  The area will need assessed to determine if surgery should be delayed until it is healed.  • The night prior to surgery sleep in a clean bed with clean clothing.  Do not allow pets to sleep with you.  • Shower on the morning of surgery using a fresh bar of anti-bacterial soap (such as Dial) and clean washcloth.  Dry with a clean towel and dress in clean clothing.  • Ask your surgeon if you will be receiving antibiotics prior to surgery.  • Make sure you, your family, and all healthcare providers clean their hands with soap and water or an alcohol based hand  before caring for you or your wound.    Day of surgery:  Upon arrival, a Pre-op nurse and Anesthesiologist will review your health history, obtain vital signs, and answer questions you may have.  The only belongings needed at this time will be your home medications and if applicable your C-PAP/BI-PAP machine.  If you are staying overnight your family can leave the rest of your belongings in the car and bring them to your room later.  A Pre-op nurse will start an IV and you may receive medication in preparation for surgery, including something to help you relax.  Your family will be able to see you in the Pre-op area.  While you are in surgery your family should notify the waiting room  if they leave the waiting room area and provide a contact phone number.    Please be  aware that surgery does come with discomfort.  We want to make every effort to control your discomfort so please discuss any uncontrolled symptoms with your nurse.   Your doctor will most likely have prescribed pain medications.      If you are going home after surgery you will receive individualized written care instructions before being discharged.  A responsible adult must drive you to and from the hospital on the day of your surgery and stay with you for 24 hours.    If you are staying overnight following surgery, you will be transported to your hospital room following the recovery period.  [facility] has all private rooms.    You have received a list of surgical assistants for your reference.  If you have any questions please call Pre-Admission Testing at [phone].  Deductibles and co-payments are collected on the day of service. Please be prepared to pay the required co-pay, deductible or deposit on the day of service as defined by your plan.    2% CHLORAHEXIDINE GLUCONATE* CLOTH  Preparing or “prepping” skin before surgery can reduce the risk of infection at the surgical site. To make the process easier, [facility] has chosen disposable cloths moistened with a rinse-free, 2% Chlorhexidine Gluconate (CHG) antiseptic solution. The steps below outline the prepping process and should be carefully followed.        Use the prep cloth on the area that is circled in the diagram             Directions Night before Surgery  1) Shower using a fresh bar of anti-bacterial soap (such as Dial) and clean washcloth.  Use a clean towel to completely dry your skin.  2) Do not use any lotions, oils or creams on your skin.  3) Open the package and remove 1 cloth, wipe your skin for 30 seconds in a circular motion.  Allow to dry for 3 minutes.  4) Repeat #3 with second cloth.  5) Do not touch your eyes, ears, or mouth with the prep cloth.  6) Allow the wet prep solution to air dry.  7) Discard the  prep cloth and wash your hands with soap and water.   8) Dress in clean bed clothes and sleep on fresh clean bed sheets.   9) You may experience some temporary itching after the prep.    Directions Day of Surgery  1) Repeat steps 1,2,3,4,5,6,7, and 9.   2) Dress in clean clothes before coming to the hospital.    BACTROBAN NASAL OINTMENT  There are many germs normally in your nose. Bactroban is an ointment that will help reduce these germs. Please follow these instructions for Bactroban use:      ____The day before surgery in the morning  Date________    ____The day before surgery in the evening              Date________    ____The day of surgery in the morning    Date________    **Squirt ½ package of Bactroban Ointment onto a cotton applicator and apply to inside of 1st nostril.  Squirt the remaining Bactroban and apply to the inside of the other nostril.  .

## 2018-11-06 NOTE — PROGRESS NOTES
Here today for preoperative discussion prior to total knee replacement.  I answered one full-page encompassing 17 questions ranging from anesthesia preop blood thinners postop pain medication postop physical therapy postop wound care and others.    On exam she walks with nonantalgic gait there is moderate swelling of the knee she has crepitus with motion    X-rays 3 views of the right knee ordered and reviewed for eval of knee pain shows advanced arthritis with bone-on-bone articulation.  In comparison to previous films are has been moderate progression    Right knee end-stage Flaco arthritis the plan is to proceed with surgery on 11/19/18.  She is sensitive to narcotics so we will cut the dose in half.  She was instructed to stop her per doctor 2 days prior to surgery.  She'll still return and see Yaquelin for her preoperative H&P visit.\\    A total of 15 minutes was spent face to face with > 8 minutes being spent in counseling and discussion of joint replacement surgery.  We discussed the risks benefits and alternatives of the procedure.  I explained the proposed surgical procedure in detail and also discussed the expected hospital course, discharge course and postoperative treatment plan in detail.

## 2018-11-08 NOTE — PROGRESS NOTES
Pre-Operative Orthopedic Assessment      Patient: Mary Kay Saab    YOB: 1945      Age/Gender: 73 y.o. female  Medical Record Number: [id number]  Surgical Procedure Planned: TOTAL KNEE ARTHROPLASTY     Surgeon: Van Mcguire MD    Date of Surgery Planned: 11/19/2018    Question Value Score    Patient Score   1. What is your age group? 50-65 years  66-75 years  >75 years =2  =1  =0 1   2. Gender? Male  Female =2  =1 1   3. How far on average can you walk? (a block is 200 meters) Two blocks or more (+\- rest)  1-2 blocks (+\- rest)  Housebound (most of time) =2  =1  =0 2   4. Which gait aid to you use? (more often than not) None  Single-Point Stick  Crutches/Frame =2  =1  =0 2   5. Do you use community  supports? (home help, meals on wheels, district nursing) None or one per week  Two or more per week =1  =0 1   6. Will you live with someone who can care for you after your operation? Yes  No =3  =0 3      Your Score (out of 12)  10     Key Destination at Discharge from acute care predicted by score   Scores < 6  -- extended inpatient rehabilitation   Scores 6-9 -- additional intervention to discharge directly home (Rehabilitation in home)   Scores > 9 -- directly home         Discharge Disposition/Planning:     Patient Response   Discussed the Predicted discharge disposition needed based on RAPT Assessment with the patient.    yes   Patient selected discharge disposition:   Home with home health --[facility]er[facility]   Out Patient Rehabilitation Facility of Choice:    [facility]   Home Health Services Preferred:   [facility] (neris Gibson)   Has the patient completed or been scheduled to complete pre-operative testing? Yes, 11/6   Post-Operative Care Giver Name and Phone Number:    Spouse Cuate Saab, [phone]     Subacute Inpatient Rehabilitation:  Complete this section only if planning inpatient services at a Subacute Facility     Patient Response    Subacute Facility Preferred (Please list 2 facilities:      Requires pre-certification for inpatient rehabilitation services?         Planned source of transportation to inpatient rehabilitation facility?       If choosing inpatient services at an Acute or Subacute Facility please list a subsequent back-up plan (in case patient fails to qualify for inpatient rehabilitation). Back-up plans should include caregiver (family member or friend) for first 24-48 post- -operatively.       Home Equipment Patient Response   Does patient have a walker for home use?    yes   Does patient have a 3 in 1 commode for home use?    no   Does patient have a shower chair for home use?    yes   Does patient have an elevated commode seat for home use? yes   Does patient have a cane for home use?    yes   Is there any other medical equipment in the home? If so,  List in comment section below no   Pre-Operative Class Attendance Patient Response   Attended or scheduled to attend the pre-operative class within 1 year of total joint replacement? Yes, 11/6   Not planning to attend the pre-operative class (see comments below)    n/a   Patient Education  Completed   Expected time of discharge discussed yes   Encouraged to attend Pre-Operative Class    Yes, 11/6   Education re: Back-up plan for patients planning discharge to subacute facilities n/a   Education re: Predicted Discharge Disposition based on RAPT score    yes   Patient receptive and voiced understanding of information given    yes                                                                                                            Comments:    Address verified.  Pt has one flight of external stairs with rails.                                         Signature: Erlinda Espinoza RN    Date:  11/8/2018

## 2018-11-15 ENCOUNTER — OFFICE VISIT (OUTPATIENT)
Dept: ORTHOPEDIC SURGERY | Facility: CLINIC | Age: 73
End: 2018-11-15

## 2018-11-15 VITALS
TEMPERATURE: 97.7 F | HEIGHT: 65 IN | WEIGHT: 130 LBS | DIASTOLIC BLOOD PRESSURE: 70 MMHG | BODY MASS INDEX: 21.66 KG/M2 | SYSTOLIC BLOOD PRESSURE: 140 MMHG

## 2018-11-15 DIAGNOSIS — G89.29 CHRONIC PAIN OF RIGHT KNEE: Primary | ICD-10-CM

## 2018-11-15 DIAGNOSIS — M25.561 CHRONIC PAIN OF RIGHT KNEE: Primary | ICD-10-CM

## 2018-11-15 PROCEDURE — 77077 JOINT SURVEY SINGLE VIEW: CPT | Performed by: ORTHOPAEDIC SURGERY

## 2018-11-15 PROCEDURE — S0260 H&P FOR SURGERY: HCPCS | Performed by: NURSE PRACTITIONER

## 2018-11-15 NOTE — H&P
Patient: Mary Kay Saab    Date of Admission: 11/19/18    YOB: 1945    Medical Record Number: [id number]    Admitting Physician: Dr. Van Mcguire    Reason for Admission: End Stage Right Knee OA    History of Present Illness: 73 y.o. female presents with severe end stage knee osteoarthritis which has not been responsive to the full compliment of conservative measures. Despite conservative attempts, there is still severe, constant activity limiting pain. Given the severity of the pain, the patient has elected to proceed with knee replacement.    Allergies:   Allergies   Allergen Reactions   • Penicillins Unknown (See Comments)     Reactions: syncope and seizures   • Atropine Hives   • Epinephrine Palpitations     Patient states will go into A-Fib   • Sulfa Antibiotics Nausea Only     STATES SHE DOES FINE WITH CERTAIN SULFA DRUGS         Current Medications:  Scheduled Meds:  PRN Meds:.    PMH:     Past Medical History:   Diagnosis Date   • Anemia     Of chronic renal insufficiency   • Atrial fibrillation (CMS/HCC)    • Mcguire's esophagus    • Breast cancer (CMS/HCC)    • Chronic renal insufficiency     Stage 3   • MCCRAY (dyspnea on exertion)    • Dyspnea    • GERD (gastroesophageal reflux disease)    • H/O electrocardiogram    • History of staph infection    • Hx of being hospitalized     [facility] 09/2010 and 10/2010 for atrial fibrillation, Dr. Perla Hurtado   • Hyperlipidemia    • Hypertension    • Hypoxia    • IBS (irritable bowel syndrome)    • Kidney dysfunction    • Lower extremity edema    • Lump or mass in breast    • Malignant neoplasm of breast (CMS/HCC)    • Osteoarthritis    • Osteopenia    • Osteoporosis    • Paroxysmal atrial fibrillation (CMS/HCC)    • Seizure (CMS/HCC)     AS A TEENAGER, NOT CURRENTLY   • Sinusitis    • SOB (shortness of breath)        PF/Surg/Soc Hx:     Past Surgical History:   Procedure Laterality Date   • BREAST LUMPECTOMY  1990    For LCIS   •  "FOOT SURGERY     • HAND ARTHROPLASTY Right    • KNEE ARTHROSCOPY     • OTHER SURGICAL HISTORY      LYMPHATIC SURGERY        Social History     Occupational History     Employer: RETIRED   Tobacco Use   • Smoking status: Former Smoker     Last attempt to quit: 1975     Years since quittin.4   • Smokeless tobacco: Never Used   • Tobacco comment: QUIT SMOKING IN LATE 1970s   Substance and Sexual Activity   • Alcohol use: Yes     Comment: social drinker   • Drug use: No   • Sexual activity: Defer      Social History     Social History Narrative   • Not on file        Family History   Problem Relation Age of Onset   • Heart disease Other    • Stroke Other    • Stroke Mother    • Heart disease Mother    • Hypertension Mother    • Leukemia Father         Mid 60s   • Hypertension Brother    • Cancer Paternal Aunt    • Cancer Paternal Grandfather    • Malig Hyperthermia Neg Hx          Review of Systems:   A 14 point review of systems was performed, pertinent positives discussed above, all other systems are negative    Physical Exam: 73 y.o. female  Vital Signs :   Vitals:    11/15/18 1328   BP: 140/70   BP Location: Left arm   Patient Position: Sitting   Cuff Size: Adult   Temp: 97.7 °F (36.5 °C)   TempSrc: Temporal   Weight: 59 kg (130 lb)   Height: 163.8 cm (64.5\")     General: Alert and Oriented x 3, No acute distress.  Psych: mood and affect appropriate; recent and remote memory intact  Eyes: conjunctiva clear; pupils equally round and reactive, sclera nonicteric  CV: no peripheral edema  Resp: normal respiratory effort  Skin: no rashes or wounds; normal turgor  Musculosketetal; pain and crepitance with knee range of motion  Vascular: palpable distal pulses    Xrays:  -3 views (AP, lateral, and sunrise) were reviewed demonstrating end-stage OA with bone on bone articulation.  -A full length AP xray was ordered today for purposes of operative alignment demonstrating end stage arthritic findings. There are no " previous full length films for review    Assessment:  End-stage Right knee osteoarthritis. Conservative measures have failed.      Plan:  The plan is to proceed with Right Total Knee Replacement. The patient voiced understanding of the risks, benefits, and alternative forms of treatment that were discussed with Dr Mcguire at the time of scheduling.  Patient is planning on going home with home health next day.  We will resume her per JOON up.  Creatinine is elevated so no anti-inflammatories.  We will also consult Dr. hernandez who is the patient's hematologist to follow her anemia while she is in the hospital    Sabrina Vicente, APRN  11/15/2018

## 2018-11-15 NOTE — H&P (VIEW-ONLY)
Patient: Mary Kay Saab    Date of Admission: 11/19/18    YOB: 1945    Medical Record Number: [id number]    Admitting Physician: Dr. Van Mcguire    Reason for Admission: End Stage Right Knee OA    History of Present Illness: 73 y.o. female presents with severe end stage knee osteoarthritis which has not been responsive to the full compliment of conservative measures. Despite conservative attempts, there is still severe, constant activity limiting pain. Given the severity of the pain, the patient has elected to proceed with knee replacement.    Allergies:   Allergies   Allergen Reactions   • Penicillins Unknown (See Comments)     Reactions: syncope and seizures   • Atropine Hives   • Epinephrine Palpitations     Patient states will go into A-Fib   • Sulfa Antibiotics Nausea Only     STATES SHE DOES FINE WITH CERTAIN SULFA DRUGS         Current Medications:  Scheduled Meds:  PRN Meds:.    PMH:     Past Medical History:   Diagnosis Date   • Anemia     Of chronic renal insufficiency   • Atrial fibrillation (CMS/HCC)    • Mcguire's esophagus    • Breast cancer (CMS/HCC)    • Chronic renal insufficiency     Stage 3   • MCCRAY (dyspnea on exertion)    • Dyspnea    • GERD (gastroesophageal reflux disease)    • H/O electrocardiogram    • History of staph infection    • Hx of being hospitalized     [facility] in 09/2010 and 10/2010 for atrial fibrillation, Dr. Perla Hurtado   • Hyperlipidemia    • Hypertension    • Hypoxia    • IBS (irritable bowel syndrome)    • Kidney dysfunction    • Lower extremity edema    • Lump or mass in breast    • Malignant neoplasm of breast (CMS/HCC)    • Osteoarthritis    • Osteopenia    • Osteoporosis    • Paroxysmal atrial fibrillation (CMS/HCC)    • Seizure (CMS/HCC)     AS A TEENAGER, NOT CURRENTLY   • Sinusitis    • SOB (shortness of breath)        PF/Surg/Soc Hx:     Past Surgical History:   Procedure Laterality Date   • BREAST LUMPECTOMY  1990    For LCIS   •  "FOOT SURGERY     • HAND ARTHROPLASTY Right    • KNEE ARTHROSCOPY     • OTHER SURGICAL HISTORY      LYMPHATIC SURGERY        Social History     Occupational History     Employer: RETIRED   Tobacco Use   • Smoking status: Former Smoker     Last attempt to quit: 1975     Years since quittin.4   • Smokeless tobacco: Never Used   • Tobacco comment: QUIT SMOKING IN LATE 1970s   Substance and Sexual Activity   • Alcohol use: Yes     Comment: social drinker   • Drug use: No   • Sexual activity: Defer      Social History     Social History Narrative   • Not on file        Family History   Problem Relation Age of Onset   • Heart disease Other    • Stroke Other    • Stroke Mother    • Heart disease Mother    • Hypertension Mother    • Leukemia Father         Mid 60s   • Hypertension Brother    • Cancer Paternal Aunt    • Cancer Paternal Grandfather    • Malig Hyperthermia Neg Hx          Review of Systems:   A 14 point review of systems was performed, pertinent positives discussed above, all other systems are negative    Physical Exam: 73 y.o. female  Vital Signs :   Vitals:    11/15/18 1328   BP: 140/70   BP Location: Left arm   Patient Position: Sitting   Cuff Size: Adult   Temp: 97.7 °F (36.5 °C)   TempSrc: Temporal   Weight: 59 kg (130 lb)   Height: 163.8 cm (64.5\")     General: Alert and Oriented x 3, No acute distress.  Psych: mood and affect appropriate; recent and remote memory intact  Eyes: conjunctiva clear; pupils equally round and reactive, sclera nonicteric  CV: no peripheral edema  Resp: normal respiratory effort  Skin: no rashes or wounds; normal turgor  Musculosketetal; pain and crepitance with knee range of motion  Vascular: palpable distal pulses    Xrays:  -3 views (AP, lateral, and sunrise) were reviewed demonstrating end-stage OA with bone on bone articulation.  -A full length AP xray was ordered today for purposes of operative alignment demonstrating end stage arthritic findings. There are no " previous full length films for review    Assessment:  End-stage Right knee osteoarthritis. Conservative measures have failed.      Plan:  The plan is to proceed with Right Total Knee Replacement. The patient voiced understanding of the risks, benefits, and alternative forms of treatment that were discussed with Dr Mcguire at the time of scheduling.  Patient is planning on going home with home health next day.  We will resume her per JOON up.  Creatinine is elevated so no anti-inflammatories.  We will also consult Dr. hernandez who is the patient's hematologist to follow her anemia while she is in the hospital    Sabrina Vicente, APRN  11/15/2018

## 2018-11-19 ENCOUNTER — ANESTHESIA (OUTPATIENT)
Dept: PERIOP | Facility: HOSPITAL | Age: 73
End: 2018-11-19

## 2018-11-19 ENCOUNTER — HOSPITAL ENCOUNTER (OUTPATIENT)
Facility: HOSPITAL | Age: 73
Discharge: HOME HEALTH | End: 2018-11-20
Attending: ORTHOPAEDIC SURGERY | Admitting: ORTHOPAEDIC SURGERY

## 2018-11-19 ENCOUNTER — ANESTHESIA EVENT (OUTPATIENT)
Dept: PERIOP | Facility: HOSPITAL | Age: 73
End: 2018-11-19

## 2018-11-19 ENCOUNTER — APPOINTMENT (OUTPATIENT)
Dept: GENERAL RADIOLOGY | Facility: HOSPITAL | Age: 73
End: 2018-11-19

## 2018-11-19 DIAGNOSIS — R26.2 DIFFICULTY WALKING: Primary | ICD-10-CM

## 2018-11-19 DIAGNOSIS — M17.11 PRIMARY OSTEOARTHRITIS OF RIGHT KNEE: ICD-10-CM

## 2018-11-19 PROBLEM — M17.9 OA (OSTEOARTHRITIS) OF KNEE: Status: ACTIVE | Noted: 2018-11-19

## 2018-11-19 PROCEDURE — A9270 NON-COVERED ITEM OR SERVICE: HCPCS | Performed by: NURSE ANESTHETIST, CERTIFIED REGISTERED

## 2018-11-19 PROCEDURE — G0378 HOSPITAL OBSERVATION PER HR: HCPCS

## 2018-11-19 PROCEDURE — 63710000001 DILTIAZEM 60 MG TABLET: Performed by: INTERNAL MEDICINE

## 2018-11-19 PROCEDURE — 63710000001 ALPRAZOLAM 0.5 MG TABLET: Performed by: ORTHOPAEDIC SURGERY

## 2018-11-19 PROCEDURE — 63710000001 MUPIROCIN 2 % OINTMENT: Performed by: NURSE PRACTITIONER

## 2018-11-19 PROCEDURE — 25010000002 ONDANSETRON PER 1 MG: Performed by: NURSE ANESTHETIST, CERTIFIED REGISTERED

## 2018-11-19 PROCEDURE — 25010000002 VANCOMYCIN PER 500 MG: Performed by: ORTHOPAEDIC SURGERY

## 2018-11-19 PROCEDURE — 25010000002 FENTANYL CITRATE (PF) 100 MCG/2ML SOLUTION: Performed by: NURSE ANESTHETIST, CERTIFIED REGISTERED

## 2018-11-19 PROCEDURE — 97161 PT EVAL LOW COMPLEX 20 MIN: CPT

## 2018-11-19 PROCEDURE — 25010000002 MIDAZOLAM PER 1 MG: Performed by: ANESTHESIOLOGY

## 2018-11-19 PROCEDURE — 63710000001 METOPROLOL TARTRATE 25 MG TABLET: Performed by: ORTHOPAEDIC SURGERY

## 2018-11-19 PROCEDURE — A9270 NON-COVERED ITEM OR SERVICE: HCPCS | Performed by: NURSE PRACTITIONER

## 2018-11-19 PROCEDURE — 27447 TOTAL KNEE ARTHROPLASTY: CPT | Performed by: ORTHOPAEDIC SURGERY

## 2018-11-19 PROCEDURE — 25010000002 ROPIVACAINE PER 1 MG: Performed by: ANESTHESIOLOGY

## 2018-11-19 PROCEDURE — 63710000001 POVIDONE-IODINE 10 % SOLUTION 30 ML BOTTLE: Performed by: ORTHOPAEDIC SURGERY

## 2018-11-19 PROCEDURE — A9270 NON-COVERED ITEM OR SERVICE: HCPCS | Performed by: ORTHOPAEDIC SURGERY

## 2018-11-19 PROCEDURE — G8978 MOBILITY CURRENT STATUS: HCPCS

## 2018-11-19 PROCEDURE — 25010000002 DEXAMETHASONE PER 1 MG: Performed by: NURSE ANESTHETIST, CERTIFIED REGISTERED

## 2018-11-19 PROCEDURE — 63710000001 DABIGATRAN ETEXILATE 150 MG CAPSULE: Performed by: NURSE PRACTITIONER

## 2018-11-19 PROCEDURE — 63710000001 PREGABALIN 75 MG CAPSULE: Performed by: ORTHOPAEDIC SURGERY

## 2018-11-19 PROCEDURE — 63710000001 ROSUVASTATIN 5 MG TABLET: Performed by: ORTHOPAEDIC SURGERY

## 2018-11-19 PROCEDURE — 27447 TOTAL KNEE ARTHROPLASTY: CPT | Performed by: NURSE PRACTITIONER

## 2018-11-19 PROCEDURE — C1713 ANCHOR/SCREW BN/BN,TIS/BN: HCPCS | Performed by: ORTHOPAEDIC SURGERY

## 2018-11-19 PROCEDURE — G8979 MOBILITY GOAL STATUS: HCPCS

## 2018-11-19 PROCEDURE — 63710000001 HYDROCODONE-ACETAMINOPHEN 7.5-325 MG TABLET: Performed by: NURSE PRACTITIONER

## 2018-11-19 PROCEDURE — 25010000002 PHENYLEPHRINE PER 1 ML: Performed by: NURSE ANESTHETIST, CERTIFIED REGISTERED

## 2018-11-19 PROCEDURE — 97110 THERAPEUTIC EXERCISES: CPT

## 2018-11-19 PROCEDURE — 25010000002 FENTANYL CITRATE (PF) 100 MCG/2ML SOLUTION: Performed by: ANESTHESIOLOGY

## 2018-11-19 PROCEDURE — C1776 JOINT DEVICE (IMPLANTABLE): HCPCS | Performed by: ORTHOPAEDIC SURGERY

## 2018-11-19 PROCEDURE — 99212 OFFICE O/P EST SF 10 MIN: CPT | Performed by: INTERNAL MEDICINE

## 2018-11-19 PROCEDURE — 25010000002 PROPOFOL 10 MG/ML EMULSION: Performed by: NURSE ANESTHETIST, CERTIFIED REGISTERED

## 2018-11-19 PROCEDURE — 73560 X-RAY EXAM OF KNEE 1 OR 2: CPT

## 2018-11-19 PROCEDURE — A9270 NON-COVERED ITEM OR SERVICE: HCPCS | Performed by: INTERNAL MEDICINE

## 2018-11-19 PROCEDURE — 25010000002 VANCOMYCIN PER 500 MG: Performed by: NURSE PRACTITIONER

## 2018-11-19 PROCEDURE — 63710000001 HYDROCODONE-ACETAMINOPHEN 7.5-325 MG TABLET: Performed by: NURSE ANESTHETIST, CERTIFIED REGISTERED

## 2018-11-19 DEVICE — GENESIS II NON-POROUS TIBIAL                                    BASEPLATE SIZE 2 RIGHT
Type: IMPLANTABLE DEVICE | Site: KNEE | Status: FUNCTIONAL
Brand: GENESIS II

## 2018-11-19 DEVICE — GENESIS II BICONVEX PATELLA 26MM
Type: IMPLANTABLE DEVICE | Site: KNEE | Status: FUNCTIONAL
Brand: GENESIS II

## 2018-11-19 DEVICE — IMPLANTABLE DEVICE: Type: IMPLANTABLE DEVICE | Site: KNEE | Status: FUNCTIONAL

## 2018-11-19 DEVICE — GENESIS II CRUCIATE RETAINING DEEP                                    FLEXION INSERT SIZE 1-2 13MM
Type: IMPLANTABLE DEVICE | Site: KNEE | Status: FUNCTIONAL
Brand: GENESIS II

## 2018-11-19 DEVICE — CMT BONE PALACOS R HI/VISC 1X40: Type: IMPLANTABLE DEVICE | Site: KNEE | Status: FUNCTIONAL

## 2018-11-19 DEVICE — LEGION CRUCIATE RETAINING OXINIUM                                    FEMORAL SIZE 3 RIGHT
Type: IMPLANTABLE DEVICE | Site: KNEE | Status: FUNCTIONAL
Brand: LEGION

## 2018-11-19 RX ORDER — PROMETHAZINE HYDROCHLORIDE 25 MG/ML
12.5 INJECTION, SOLUTION INTRAMUSCULAR; INTRAVENOUS ONCE AS NEEDED
Status: DISCONTINUED | OUTPATIENT
Start: 2018-11-19 | End: 2018-11-19 | Stop reason: HOSPADM

## 2018-11-19 RX ORDER — HYDROCODONE BITARTRATE AND ACETAMINOPHEN 7.5; 325 MG/1; MG/1
2 TABLET ORAL EVERY 4 HOURS PRN
Status: DISCONTINUED | OUTPATIENT
Start: 2018-11-19 | End: 2018-11-20 | Stop reason: HOSPADM

## 2018-11-19 RX ORDER — CLINDAMYCIN PHOSPHATE 900 MG/50ML
900 INJECTION INTRAVENOUS ONCE
Status: COMPLETED | OUTPATIENT
Start: 2018-11-19 | End: 2018-11-19

## 2018-11-19 RX ORDER — MONTELUKAST SODIUM 10 MG/1
10 TABLET ORAL DAILY
Status: DISCONTINUED | OUTPATIENT
Start: 2018-11-20 | End: 2018-11-20 | Stop reason: HOSPADM

## 2018-11-19 RX ORDER — FAMOTIDINE 10 MG/ML
20 INJECTION, SOLUTION INTRAVENOUS ONCE
Status: COMPLETED | OUTPATIENT
Start: 2018-11-19 | End: 2018-11-19

## 2018-11-19 RX ORDER — SODIUM CHLORIDE 0.9 % (FLUSH) 0.9 %
3 SYRINGE (ML) INJECTION EVERY 12 HOURS SCHEDULED
Status: DISCONTINUED | OUTPATIENT
Start: 2018-11-19 | End: 2018-11-19 | Stop reason: HOSPADM

## 2018-11-19 RX ORDER — UREA 10 %
3 LOTION (ML) TOPICAL NIGHTLY PRN
Status: DISCONTINUED | OUTPATIENT
Start: 2018-11-19 | End: 2018-11-20 | Stop reason: HOSPADM

## 2018-11-19 RX ORDER — SODIUM CHLORIDE, SODIUM LACTATE, POTASSIUM CHLORIDE, CALCIUM CHLORIDE 600; 310; 30; 20 MG/100ML; MG/100ML; MG/100ML; MG/100ML
9 INJECTION, SOLUTION INTRAVENOUS CONTINUOUS PRN
Status: DISCONTINUED | OUTPATIENT
Start: 2018-11-19 | End: 2018-11-19 | Stop reason: HOSPADM

## 2018-11-19 RX ORDER — DILTIAZEM HYDROCHLORIDE 60 MG/1
60 TABLET, FILM COATED ORAL EVERY 6 HOURS PRN
Status: DISCONTINUED | OUTPATIENT
Start: 2018-11-19 | End: 2018-11-20 | Stop reason: HOSPADM

## 2018-11-19 RX ORDER — FLUMAZENIL 0.1 MG/ML
0.2 INJECTION INTRAVENOUS AS NEEDED
Status: DISCONTINUED | OUTPATIENT
Start: 2018-11-19 | End: 2018-11-19 | Stop reason: HOSPADM

## 2018-11-19 RX ORDER — DILTIAZEM HYDROCHLORIDE 60 MG/1
60 TABLET, FILM COATED ORAL 2 TIMES DAILY
Status: DISCONTINUED | OUTPATIENT
Start: 2018-11-19 | End: 2018-11-19

## 2018-11-19 RX ORDER — BUMETANIDE 1 MG/1
1 TABLET ORAL DAILY PRN
Status: DISCONTINUED | OUTPATIENT
Start: 2018-11-19 | End: 2018-11-20 | Stop reason: HOSPADM

## 2018-11-19 RX ORDER — PANTOPRAZOLE SODIUM 40 MG/1
40 TABLET, DELAYED RELEASE ORAL EVERY MORNING
Status: DISCONTINUED | OUTPATIENT
Start: 2018-11-20 | End: 2018-11-20 | Stop reason: HOSPADM

## 2018-11-19 RX ORDER — ACETAMINOPHEN 10 MG/ML
1000 INJECTION, SOLUTION INTRAVENOUS ONCE
Status: COMPLETED | OUTPATIENT
Start: 2018-11-19 | End: 2018-11-19

## 2018-11-19 RX ORDER — DEXAMETHASONE SODIUM PHOSPHATE 10 MG/ML
INJECTION INTRAMUSCULAR; INTRAVENOUS AS NEEDED
Status: DISCONTINUED | OUTPATIENT
Start: 2018-11-19 | End: 2018-11-19 | Stop reason: SURG

## 2018-11-19 RX ORDER — OXYCODONE AND ACETAMINOPHEN 7.5; 325 MG/1; MG/1
1 TABLET ORAL ONCE AS NEEDED
Status: DISCONTINUED | OUTPATIENT
Start: 2018-11-19 | End: 2018-11-19 | Stop reason: HOSPADM

## 2018-11-19 RX ORDER — MIDAZOLAM HYDROCHLORIDE 1 MG/ML
1 INJECTION INTRAMUSCULAR; INTRAVENOUS
Status: DISCONTINUED | OUTPATIENT
Start: 2018-11-19 | End: 2018-11-19 | Stop reason: HOSPADM

## 2018-11-19 RX ORDER — MAGNESIUM HYDROXIDE 1200 MG/15ML
LIQUID ORAL AS NEEDED
Status: DISCONTINUED | OUTPATIENT
Start: 2018-11-19 | End: 2018-11-19 | Stop reason: HOSPADM

## 2018-11-19 RX ORDER — HYDROMORPHONE HYDROCHLORIDE 1 MG/ML
0.5 INJECTION, SOLUTION INTRAMUSCULAR; INTRAVENOUS; SUBCUTANEOUS
Status: DISCONTINUED | OUTPATIENT
Start: 2018-11-19 | End: 2018-11-19 | Stop reason: HOSPADM

## 2018-11-19 RX ORDER — MELOXICAM 15 MG/1
15 TABLET ORAL ONCE
Status: DISCONTINUED | OUTPATIENT
Start: 2018-11-19 | End: 2018-11-19 | Stop reason: HOSPADM

## 2018-11-19 RX ORDER — CYCLOSPORINE 0.5 MG/ML
1 EMULSION OPHTHALMIC 2 TIMES DAILY
Status: DISCONTINUED | OUTPATIENT
Start: 2018-11-19 | End: 2018-11-20 | Stop reason: HOSPADM

## 2018-11-19 RX ORDER — VANCOMYCIN HYDROCHLORIDE 1 G/200ML
15 INJECTION, SOLUTION INTRAVENOUS ONCE
Status: COMPLETED | OUTPATIENT
Start: 2018-11-19 | End: 2018-11-19

## 2018-11-19 RX ORDER — EPHEDRINE SULFATE 50 MG/ML
5 INJECTION, SOLUTION INTRAVENOUS ONCE AS NEEDED
Status: DISCONTINUED | OUTPATIENT
Start: 2018-11-19 | End: 2018-11-19 | Stop reason: HOSPADM

## 2018-11-19 RX ORDER — FENTANYL CITRATE 50 UG/ML
INJECTION, SOLUTION INTRAMUSCULAR; INTRAVENOUS AS NEEDED
Status: DISCONTINUED | OUTPATIENT
Start: 2018-11-19 | End: 2018-11-19 | Stop reason: SURG

## 2018-11-19 RX ORDER — EPHEDRINE SULFATE 50 MG/ML
INJECTION, SOLUTION INTRAVENOUS AS NEEDED
Status: DISCONTINUED | OUTPATIENT
Start: 2018-11-19 | End: 2018-11-19 | Stop reason: SURG

## 2018-11-19 RX ORDER — ONDANSETRON 2 MG/ML
4 INJECTION INTRAMUSCULAR; INTRAVENOUS EVERY 6 HOURS PRN
Status: DISCONTINUED | OUTPATIENT
Start: 2018-11-19 | End: 2018-11-20 | Stop reason: HOSPADM

## 2018-11-19 RX ORDER — BISACODYL 10 MG
10 SUPPOSITORY, RECTAL RECTAL DAILY PRN
Status: DISCONTINUED | OUTPATIENT
Start: 2018-11-19 | End: 2018-11-20 | Stop reason: HOSPADM

## 2018-11-19 RX ORDER — ALPRAZOLAM 0.5 MG/1
1 TABLET ORAL NIGHTLY
Status: DISCONTINUED | OUTPATIENT
Start: 2018-11-19 | End: 2018-11-20 | Stop reason: HOSPADM

## 2018-11-19 RX ORDER — ACETAMINOPHEN 650 MG
TABLET, EXTENDED RELEASE ORAL AS NEEDED
Status: DISCONTINUED | OUTPATIENT
Start: 2018-11-19 | End: 2018-11-19 | Stop reason: HOSPADM

## 2018-11-19 RX ORDER — ONDANSETRON 2 MG/ML
INJECTION INTRAMUSCULAR; INTRAVENOUS AS NEEDED
Status: DISCONTINUED | OUTPATIENT
Start: 2018-11-19 | End: 2018-11-19 | Stop reason: SURG

## 2018-11-19 RX ORDER — DABIGATRAN ETEXILATE 150 MG/1
150 CAPSULE ORAL EVERY 12 HOURS SCHEDULED
Status: DISCONTINUED | OUTPATIENT
Start: 2018-11-19 | End: 2018-11-20 | Stop reason: HOSPADM

## 2018-11-19 RX ORDER — ROPIVACAINE HYDROCHLORIDE 5 MG/ML
INJECTION, SOLUTION EPIDURAL; INFILTRATION; PERINEURAL
Status: COMPLETED | OUTPATIENT
Start: 2018-11-19 | End: 2018-11-19

## 2018-11-19 RX ORDER — DIPHENHYDRAMINE HCL 25 MG
25 CAPSULE ORAL EVERY 6 HOURS PRN
Status: DISCONTINUED | OUTPATIENT
Start: 2018-11-19 | End: 2018-11-19 | Stop reason: HOSPADM

## 2018-11-19 RX ORDER — HYDROCODONE BITARTRATE AND ACETAMINOPHEN 7.5; 325 MG/1; MG/1
1 TABLET ORAL ONCE AS NEEDED
Status: COMPLETED | OUTPATIENT
Start: 2018-11-19 | End: 2018-11-19

## 2018-11-19 RX ORDER — HYDROCODONE BITARTRATE AND ACETAMINOPHEN 5; 325 MG/1; MG/1
TABLET ORAL
Status: DISCONTINUED
Start: 2018-11-19 | End: 2018-11-19 | Stop reason: WASHOUT

## 2018-11-19 RX ORDER — ONDANSETRON 2 MG/ML
4 INJECTION INTRAMUSCULAR; INTRAVENOUS ONCE AS NEEDED
Status: DISCONTINUED | OUTPATIENT
Start: 2018-11-19 | End: 2018-11-19 | Stop reason: HOSPADM

## 2018-11-19 RX ORDER — ONDANSETRON 4 MG/1
4 TABLET, FILM COATED ORAL EVERY 6 HOURS PRN
Status: DISCONTINUED | OUTPATIENT
Start: 2018-11-19 | End: 2018-11-20 | Stop reason: HOSPADM

## 2018-11-19 RX ORDER — PROMETHAZINE HYDROCHLORIDE 25 MG/1
25 SUPPOSITORY RECTAL ONCE AS NEEDED
Status: DISCONTINUED | OUTPATIENT
Start: 2018-11-19 | End: 2018-11-19 | Stop reason: HOSPADM

## 2018-11-19 RX ORDER — ONDANSETRON 4 MG/1
4 TABLET, ORALLY DISINTEGRATING ORAL EVERY 6 HOURS PRN
Status: DISCONTINUED | OUTPATIENT
Start: 2018-11-19 | End: 2018-11-20 | Stop reason: HOSPADM

## 2018-11-19 RX ORDER — LIDOCAINE HYDROCHLORIDE 20 MG/ML
INJECTION, SOLUTION INFILTRATION; PERINEURAL AS NEEDED
Status: DISCONTINUED | OUTPATIENT
Start: 2018-11-19 | End: 2018-11-19 | Stop reason: SURG

## 2018-11-19 RX ORDER — TRANEXAMIC ACID 100 MG/ML
INJECTION, SOLUTION INTRAVENOUS AS NEEDED
Status: DISCONTINUED | OUTPATIENT
Start: 2018-11-19 | End: 2018-11-19 | Stop reason: SURG

## 2018-11-19 RX ORDER — DILTIAZEM HYDROCHLORIDE 60 MG/1
60 TABLET, FILM COATED ORAL EVERY EVENING
Status: DISCONTINUED | OUTPATIENT
Start: 2018-11-19 | End: 2018-11-20 | Stop reason: HOSPADM

## 2018-11-19 RX ORDER — DOCUSATE SODIUM 100 MG/1
100 CAPSULE, LIQUID FILLED ORAL 2 TIMES DAILY
Status: DISCONTINUED | OUTPATIENT
Start: 2018-11-19 | End: 2018-11-20 | Stop reason: HOSPADM

## 2018-11-19 RX ORDER — PREGABALIN 75 MG/1
150 CAPSULE ORAL ONCE
Status: COMPLETED | OUTPATIENT
Start: 2018-11-19 | End: 2018-11-19

## 2018-11-19 RX ORDER — MIDAZOLAM HYDROCHLORIDE 1 MG/ML
2 INJECTION INTRAMUSCULAR; INTRAVENOUS
Status: DISCONTINUED | OUTPATIENT
Start: 2018-11-19 | End: 2018-11-19 | Stop reason: HOSPADM

## 2018-11-19 RX ORDER — NALOXONE HCL 0.4 MG/ML
0.2 VIAL (ML) INJECTION AS NEEDED
Status: DISCONTINUED | OUTPATIENT
Start: 2018-11-19 | End: 2018-11-19 | Stop reason: HOSPADM

## 2018-11-19 RX ORDER — ROCURONIUM BROMIDE 10 MG/ML
INJECTION, SOLUTION INTRAVENOUS AS NEEDED
Status: DISCONTINUED | OUTPATIENT
Start: 2018-11-19 | End: 2018-11-19 | Stop reason: SURG

## 2018-11-19 RX ORDER — PROMETHAZINE HYDROCHLORIDE 25 MG/1
12.5 TABLET ORAL ONCE AS NEEDED
Status: DISCONTINUED | OUTPATIENT
Start: 2018-11-19 | End: 2018-11-19 | Stop reason: HOSPADM

## 2018-11-19 RX ORDER — HYDROCODONE BITARTRATE AND ACETAMINOPHEN 7.5; 325 MG/1; MG/1
1 TABLET ORAL EVERY 4 HOURS PRN
Status: DISCONTINUED | OUTPATIENT
Start: 2018-11-19 | End: 2018-11-20 | Stop reason: HOSPADM

## 2018-11-19 RX ORDER — FENTANYL CITRATE 50 UG/ML
50 INJECTION, SOLUTION INTRAMUSCULAR; INTRAVENOUS
Status: DISCONTINUED | OUTPATIENT
Start: 2018-11-19 | End: 2018-11-19 | Stop reason: HOSPADM

## 2018-11-19 RX ORDER — LABETALOL HYDROCHLORIDE 5 MG/ML
5 INJECTION, SOLUTION INTRAVENOUS
Status: DISCONTINUED | OUTPATIENT
Start: 2018-11-19 | End: 2018-11-19 | Stop reason: HOSPADM

## 2018-11-19 RX ORDER — ROSUVASTATIN CALCIUM 5 MG/1
5 TABLET, COATED ORAL NIGHTLY
Status: DISCONTINUED | OUTPATIENT
Start: 2018-11-19 | End: 2018-11-20 | Stop reason: HOSPADM

## 2018-11-19 RX ORDER — AMIODARONE HYDROCHLORIDE 200 MG/1
200 TABLET ORAL AS NEEDED
Status: DISCONTINUED | OUTPATIENT
Start: 2018-11-19 | End: 2018-11-20 | Stop reason: HOSPADM

## 2018-11-19 RX ORDER — SODIUM CHLORIDE 0.9 % (FLUSH) 0.9 %
3-10 SYRINGE (ML) INJECTION AS NEEDED
Status: DISCONTINUED | OUTPATIENT
Start: 2018-11-19 | End: 2018-11-19 | Stop reason: HOSPADM

## 2018-11-19 RX ORDER — PROMETHAZINE HYDROCHLORIDE 25 MG/1
25 TABLET ORAL ONCE AS NEEDED
Status: DISCONTINUED | OUTPATIENT
Start: 2018-11-19 | End: 2018-11-19 | Stop reason: HOSPADM

## 2018-11-19 RX ORDER — PROPOFOL 10 MG/ML
VIAL (ML) INTRAVENOUS AS NEEDED
Status: DISCONTINUED | OUTPATIENT
Start: 2018-11-19 | End: 2018-11-19 | Stop reason: SURG

## 2018-11-19 RX ADMIN — EPHEDRINE SULFATE 10 MG: 50 INJECTION INTRAMUSCULAR; INTRAVENOUS; SUBCUTANEOUS at 08:30

## 2018-11-19 RX ADMIN — ONDANSETRON 4 MG: 2 INJECTION INTRAMUSCULAR; INTRAVENOUS at 09:36

## 2018-11-19 RX ADMIN — MUPIROCIN 10 APPLICATION: 20 OINTMENT TOPICAL at 20:50

## 2018-11-19 RX ADMIN — DEXAMETHASONE SODIUM PHOSPHATE 8 MG: 10 INJECTION INTRAMUSCULAR; INTRAVENOUS at 08:30

## 2018-11-19 RX ADMIN — ALPRAZOLAM 1 MG: 0.5 TABLET ORAL at 22:01

## 2018-11-19 RX ADMIN — FENTANYL CITRATE 50 MCG: 50 INJECTION, SOLUTION INTRAMUSCULAR; INTRAVENOUS at 07:20

## 2018-11-19 RX ADMIN — PREGABALIN 150 MG: 75 CAPSULE ORAL at 06:47

## 2018-11-19 RX ADMIN — FENTANYL CITRATE 50 MCG: 50 INJECTION, SOLUTION INTRAMUSCULAR; INTRAVENOUS at 11:10

## 2018-11-19 RX ADMIN — METOPROLOL TARTRATE 6.25 MG: 25 TABLET ORAL at 18:45

## 2018-11-19 RX ADMIN — PHENYLEPHRINE HYDROCHLORIDE 100 MCG: 10 INJECTION INTRAVENOUS at 08:47

## 2018-11-19 RX ADMIN — FENTANYL CITRATE 50 MCG: 50 INJECTION, SOLUTION INTRAMUSCULAR; INTRAVENOUS at 10:59

## 2018-11-19 RX ADMIN — HYDROCODONE BITARTRATE AND ACETAMINOPHEN 1 TABLET: 7.5; 325 TABLET ORAL at 22:46

## 2018-11-19 RX ADMIN — HYDROCODONE BITARTRATE AND ACETAMINOPHEN 1 TABLET: 7.5; 325 TABLET ORAL at 10:58

## 2018-11-19 RX ADMIN — SUGAMMADEX 124 MG: 100 INJECTION, SOLUTION INTRAVENOUS at 09:35

## 2018-11-19 RX ADMIN — FENTANYL CITRATE 100 MCG: 50 INJECTION INTRAMUSCULAR; INTRAVENOUS at 08:21

## 2018-11-19 RX ADMIN — ROCURONIUM BROMIDE 30 MG: 10 INJECTION INTRAVENOUS at 08:23

## 2018-11-19 RX ADMIN — EPHEDRINE SULFATE 10 MG: 50 INJECTION INTRAMUSCULAR; INTRAVENOUS; SUBCUTANEOUS at 08:33

## 2018-11-19 RX ADMIN — PROPOFOL 150 MG: 10 INJECTION, EMULSION INTRAVENOUS at 08:23

## 2018-11-19 RX ADMIN — CYCLOSPORINE 1 DROP: 0.5 EMULSION OPHTHALMIC at 20:47

## 2018-11-19 RX ADMIN — TRANEXAMIC ACID 1000 MG: 100 INJECTION, SOLUTION INTRAVENOUS at 09:25

## 2018-11-19 RX ADMIN — ACETAMINOPHEN 1000 MG: 10 INJECTION, SOLUTION INTRAVENOUS at 08:37

## 2018-11-19 RX ADMIN — DABIGATRAN ETEXILATE MESYLATE 150 MG: 150 CAPSULE ORAL at 20:50

## 2018-11-19 RX ADMIN — SODIUM CHLORIDE, POTASSIUM CHLORIDE, SODIUM LACTATE AND CALCIUM CHLORIDE: 600; 310; 30; 20 INJECTION, SOLUTION INTRAVENOUS at 09:39

## 2018-11-19 RX ADMIN — MIDAZOLAM 2 MG: 1 INJECTION INTRAMUSCULAR; INTRAVENOUS at 07:20

## 2018-11-19 RX ADMIN — DILTIAZEM HYDROCHLORIDE 30 MG: 60 TABLET, FILM COATED ORAL at 23:29

## 2018-11-19 RX ADMIN — CLINDAMYCIN PHOSPHATE 900 MG: 900 INJECTION INTRAVENOUS at 08:28

## 2018-11-19 RX ADMIN — ROSUVASTATIN CALCIUM 5 MG: 5 TABLET, FILM COATED ORAL at 20:51

## 2018-11-19 RX ADMIN — LIDOCAINE HYDROCHLORIDE 80 MG: 20 INJECTION, SOLUTION INFILTRATION; PERINEURAL at 08:23

## 2018-11-19 RX ADMIN — SODIUM CHLORIDE, POTASSIUM CHLORIDE, SODIUM LACTATE AND CALCIUM CHLORIDE 9 ML/HR: 600; 310; 30; 20 INJECTION, SOLUTION INTRAVENOUS at 06:52

## 2018-11-19 RX ADMIN — MIDAZOLAM 1 MG: 1 INJECTION INTRAMUSCULAR; INTRAVENOUS at 06:57

## 2018-11-19 RX ADMIN — ROPIVACAINE HYDROCHLORIDE 30 ML: 5 INJECTION, SOLUTION EPIDURAL; INFILTRATION; PERINEURAL at 07:25

## 2018-11-19 RX ADMIN — FAMOTIDINE 20 MG: 10 INJECTION, SOLUTION INTRAVENOUS at 06:57

## 2018-11-19 RX ADMIN — VANCOMYCIN HYDROCHLORIDE 1000 MG: 1 INJECTION, SOLUTION INTRAVENOUS at 06:41

## 2018-11-19 RX ADMIN — TRANEXAMIC ACID 1000 MG: 100 INJECTION, SOLUTION INTRAVENOUS at 08:39

## 2018-11-19 RX ADMIN — VANCOMYCIN HYDROCHLORIDE 1000 MG: 1 INJECTION, SOLUTION INTRAVENOUS at 18:39

## 2018-11-19 RX ADMIN — PHENYLEPHRINE HYDROCHLORIDE 100 MCG: 10 INJECTION INTRAVENOUS at 08:39

## 2018-11-19 RX ADMIN — DILTIAZEM HYDROCHLORIDE 60 MG: 60 TABLET, FILM COATED ORAL at 22:00

## 2018-11-19 NOTE — PLAN OF CARE
Problem: Patient Care Overview  Goal: Plan of Care Review  Outcome: Ongoing (interventions implemented as appropriate)   11/19/18 1855   Coping/Psychosocial   Plan of Care Reviewed With patient   OTHER   Outcome Summary Patient is ambulating using walker and x1 assist. Vitals are stable and voidng function is intact. Pain is tolerable and patient has not required any pain meds since arrival to unit. Patient educated on bp and hr monitoring and med compliance. Patient anticipates d/c home with hh tomorrow.    Plan of Care Review   Progress improving       Problem: Fall Risk (Adult)  Goal: Identify Related Risk Factors and Signs and Symptoms  Outcome: Ongoing (interventions implemented as appropriate)   11/19/18 1855   Fall Risk (Adult)   Related Risk Factors (Fall Risk) environment unfamiliar;slippery/uneven surfaces;polypharmacy;culprit medication(s);fear of falling;gait/mobility problems;fatigue/slow reaction;impaired vision;objects hard to reach;inadequate lighting;sensory deficits   Signs and Symptoms (Fall Risk) presence of risk factors     Goal: Absence of Fall  Outcome: Ongoing (interventions implemented as appropriate)   11/19/18 1855   Fall Risk (Adult)   Absence of Fall achieves outcome       Problem: Knee Arthroplasty (Total, Partial) (Adult)  Goal: Signs and Symptoms of Listed Potential Problems Will be Absent, Minimized or Managed (Knee Arthroplasty)  Outcome: Ongoing (interventions implemented as appropriate)   11/19/18 1855   Goal/Outcome Evaluation   Problems Assessed (Knee Arthroplasty) all   Problems Present (Knee Arthroplasty) pain;range of motion decreased;situational response;functional deficit     Goal: Anesthesia/Sedation Recovery  Outcome: Ongoing (interventions implemented as appropriate)   11/19/18 1855   Goal/Outcome Evaluation   Anesthesia/Sedation Recovery progressing toward baseline

## 2018-11-19 NOTE — ANESTHESIA POSTPROCEDURE EVALUATION
"Patient: Mary Kay Saab    Procedure Summary     Date:  11/19/18 Room / Location:  [facility] OR [address] [place] [address] MAIN OR    Anesthesia Start:  0820 Anesthesia Stop:  0955    Procedure:  TOTAL KNEE ARTHROPLASTY (Right Knee) Diagnosis:       Primary osteoarthritis of right knee      (Primary osteoarthritis of right knee [M17.11])    Surgeon:  Van Mcguire MD Provider:  Rell Mesa MD    Anesthesia Type:  general, regional ASA Status:  3          Anesthesia Type: general, regional  Last vitals  BP   134/56 (11/19/18 1020)   Temp   36.4 °C (97.5 °F) (11/19/18 0951)   Pulse   55 (11/19/18 1020)   Resp   16 (11/19/18 1030)     SpO2   98 % (11/19/18 1020)     Post Anesthesia Care and Evaluation    Patient location during evaluation: bedside  Patient participation: complete - patient participated  Level of consciousness: awake  Pain management: adequate  Airway patency: patent  Anesthetic complications: No anesthetic complications    Cardiovascular status: acceptable  Respiratory status: acceptable  Hydration status: acceptable    Comments: */56   Pulse 55   Temp 36.4 °C (97.5 °F) (Oral)   Resp 16   Ht 163.8 cm (64.5\")   Wt 61.9 kg (136 lb 7 oz)   SpO2 98%   BMI 23.06 kg/m²         "

## 2018-11-19 NOTE — THERAPY EVALUATION
Acute Care - Physical Therapy Initial Evaluation  [facility]     Patient Name: Mary Kay Saab  : 1945  MRN: [id number]  Today's Date: 2018   Onset of Illness/Injury or Date of Surgery: 18  Date of Referral to PT: 18  Referring Physician: Van Barber      Admit Date: 2018    Visit Dx:     ICD-10-CM ICD-9-CM   1. Difficulty walking R26.2 719.7   2. Primary osteoarthritis of right knee M17.11 715.16     Patient Active Problem List   Diagnosis   • Anemia due to stage 3 chronic kidney disease treated with erythropoietin (CMS/HCC)   • Breast cancer of upper-outer quadrant of left female breast (CMS/HCC)   • Atrial fibrillation (CMS/HCC)   • Iron deficiency anemia   • Osteopenia   • Mcguire esophagus   • HLD (hyperlipidemia)   • BP (high blood pressure)   • Hypoxia   • Primary osteoarthritis of right hip   • Primary osteoarthritis of right knee   • OA (osteoarthritis) of knee     Past Medical History:   Diagnosis Date   • Anemia     Of chronic renal insufficiency   • Atrial fibrillation (CMS/HCC)    • Mcguire's esophagus    • Breast cancer (CMS/HCC)    • Chronic renal insufficiency     Stage 3   • MCCRAY (dyspnea on exertion)    • Dyspnea    • GERD (gastroesophageal reflux disease)    • H/O electrocardiogram    • History of staph infection    • Hx of being hospitalized     [facility] in 2010 and 10/2010 for atrial fibrillation, Dr. Perla Hurtado   • Hyperlipidemia    • Hypertension    • Hypoxia    • IBS (irritable bowel syndrome)    • Kidney dysfunction    • Lower extremity edema    • Lump or mass in breast    • Malignant neoplasm of breast (CMS/HCC)    • Osteoarthritis    • Osteopenia    • Osteoporosis    • Paroxysmal atrial fibrillation (CMS/HCC)    • Seizure (CMS/HCC)     AS A TEENAGER, NOT CURRENTLY   • Sinusitis    • SOB (shortness of breath)      Past Surgical History:   Procedure Laterality Date   • BREAST LUMPECTOMY      For LCIS   • FOOT SURGERY     •  HAND ARTHROPLASTY Right    • KNEE ARTHROSCOPY     • OTHER SURGICAL HISTORY      LYMPHATIC SURGERY        PT ASSESSMENT (last 12 hours)      Physical Therapy Evaluation     Row Name 11/19/18 1448          PT Evaluation Time/Intention    Subjective Information  complains of;fatigue;pain  -MS     Document Type  evaluation  -MS     Mode of Treatment  physical therapy;individual therapy  -MS     Patient Effort  good  -MS     Comment  Pt. reports pain/soreness in her Right knee this PM.    -MS     Row Name 11/19/18 144          General Information    Onset of Illness/Injury or Date of Surgery  11/19/18  -MS     Referring Physician  Van Barber  -MS     Patient Observations  agree to therapy;cooperative  -MS     Prior Level of Function  independent:  -MS     Equipment Currently Used at Home  none  -MS     Pertinent History of Current Functional Problem  Pt. is s/p Right TKR  -MS     Existing Precautions/Restrictions  fall  (Significant)   -MS     Equipment Ordered for Patient  -- Pt. already owns a Rwx for home use.  -MS     Risks Reviewed  patient and family:  -MS     Benefits Reviewed  patient and family:  -MS     Barriers to Rehab  none identified  -MS     Row Name 11/19/18 1376          Cognitive Assessment/Intervention- PT/OT    Orientation Status (Cognition)  oriented x 3  -MS     Follows Commands (Cognition)  WNL  -MS     Personal Safety Interventions  fall prevention program maintained;gait belt;supervised activity;nonskid shoes/slippers when out of bed  -MS     Row Name 11/19/18 1440          Mobility Assessment/Treatment    Extremity Weight-bearing Status  right lower extremity  -MS     Right Lower Extremity (Weight-bearing Status)  weight-bearing as tolerated (WBAT)  -MS     Row Name 11/19/18 1444          Bed Mobility Assessment/Treatment    Bed Mobility Assessment/Treatment  supine-sit;sit-supine  -MS     Comment (Bed Mobility)  Pt. up in chair this PM.  -MS     Row Name 11/19/18 1444          Transfer  Assessment/Treatment    Transfer Assessment/Treatment  sit-stand transfer;stand-sit transfer  -MS     Sit-Stand Warrick (Transfers)  contact guard  -MS     Stand-Sit [place] (Transfers)  contact guard  -MS     Row Name 11/19/18 1445          Sit-Stand Transfer    Assistive Device (Sit-Stand Transfers)  walker, front-wheeled  -MS     Row Name 11/19/18 1445          Stand-Sit Transfer    Assistive Device (Stand-Sit Transfers)  walker, front-wheeled  -MS     Row Name 11/19/18 1445          Gait/Stairs Assessment/Training    Warrick Level (Gait)  minimum assist (75% patient effort)  -MS     Assistive Device (Gait)  walker, front-wheeled  -MS     Distance in Feet (Gait)  25 feet  -MS     Pattern (Gait)  step-to  -MS     Deviations/Abnormal Patterns (Gait)  antalgic;sara decreased  -MS     Bilateral Gait Deviations  knee buckling, right side  -MS     Comment (Gait/Stairs)  Pt. had to maintain TTWB Right L.E. due to knee buckling.  Pt. with x 2 losses of balance due to Right knee buckling.  -MS     Row Name 11/19/18 1445          General ROM    GENERAL ROM COMMENTS  BUE/LLE (WFL's)  -MS     Row Name 11/19/18 1445          MMT (Manual Muscle Testing)    General MMT Comments  BUE/LLE (>/= 3/5)  -MS     Row Name 11/19/18 1445          Therapeutic Exercise    Comment (Therapeutic Exercise)  Right TKR Protocol x 10 reps completed with assist.  -MS     Row Name 11/19/18 1445          Pain Assessment    Additional Documentation  Pain Scale: Numbers Pre/Post-Treatment (Group)  -MS     Row Name 11/19/18 1445          Pain Scale: Numbers Pre/Post-Treatment    Pain Scale: Numbers, Pretreatment  3/10  -MS     Pain Scale: Numbers, Post-Treatment  3/10  -MS     Pain Location - Side  Right  -MS     Pain Location  knee  -MS     Pain Intervention(s)  Medication (See MAR);Cold applied;Repositioned;Elevated;Rest  -MS     Row Name             Wound 11/19/18 0933 Right knee incision    Wound - Properties Group Date first  assessed: 11/19/18  -AS Time first assessed: 0939  -AS Side: Right  -AS Location: knee  -AS Type: incision  -AS    Row Name 11/19/18 1445          Physical Therapy Clinical Impression    Date of Referral to PT  11/19/18  -MS     Criteria for Skilled Interventions Met (PT Clinical Impression)  treatment indicated  -MS     Rehab Potential (PT Clinical Summary)  good, to achieve stated therapy goals  -MS     Row Name 11/19/18 1445          Physical Therapy Goals    Transfer Goal Selection (PT)  transfer, PT goal 1  -MS     Gait Training Goal Selection (PT)  gait training, PT goal 1  -MS     ROM Goal Selection (PT)  ROM, PT goal 1  -MS     Stairs Goal Selection (PT)  stairs, PT goal 1  -MS     Additional Documentation  ROM Goal Selection (PT) (Row);Stairs Goal Selection (PT) (Row)  -MS     Row Name 11/19/18 1443          Transfer Goal 1 (PT)    Activity/Assistive Device (Transfer Goal 1, PT)  transfers, all;walker, rolling  -MS     Madison Level/Cues Needed (Transfer Goal 1, PT)  independent  -MS     Time Frame (Transfer Goal 1, PT)  long term goal (LTG);2 - 3 days  -MS     Row Name 11/19/18 1445          Gait Training Goal 1 (PT)    Activity/Assistive Device (Gait Training Goal 1, PT)  gait (walking locomotion);walker, rolling  -MS     Madison Level (Gait Training Goal 1, PT)  independent  -MS     Distance (Gait Goal 1, PT)  100 feet  -MS     Time Frame (Gait Training Goal 1, PT)  long term goal (LTG);2 - 3 days  -MS     Row Name 11/19/18 1445          ROM Goal 1 (PT)    ROM Goal 1 (PT)  Right knee AROM (5, 85)  -MS     Time Frame (ROM Goal 1, PT)  long term goal (LTG);2 - 3 days  -MS     Row Name 11/19/18 1445          Stairs Goal 1 (PT)    Activity/Assistive Device (Stairs Goal 1, PT)  stairs, all skills  -MS     Mad[place] Level/Cues Needed (Stairs Goal 1, PT)  contact guard assist  -MS     Number of Stairs (Stairs Goal 1, PT)  9 With handrail  -MS     Time Frame (Stairs Goal 1, PT)  long term goal  (LTG);3 days  -MS     Row Name 11/19/18 1445          Positioning and Restraints    Pre-Treatment Position  sitting in chair/recliner  -MS     Post Treatment Position  chair  -MS     In Chair  notified nsg;reclined;call light within reach;sitting;encouraged to call for assist;with family/caregiver All lines intact. Ice pack to Right knee.  -MS       User Key  (r) = Recorded By, (t) = Taken By, (c) = Cosigned By    Initials Name Provider Type    MS Tip Mccall, PT Physical Therapist    AS Niki Broderick RN Registered Nurse        Physical Therapy Education     Title: PT OT SLP Therapies (Done)     Topic: Physical Therapy (Done)     Point: Mobility training (Done)     Learning Progress Summary           Patient Acceptance, E,D, VU,NR by MS at 11/19/2018  2:51 PM                   Point: Home exercise program (Done)     Learning Progress Summary           Patient Acceptance, E,D, VU,NR by MS at 11/19/2018  2:51 PM                   Point: Body mechanics (Done)     Learning Progress Summary           Patient Acceptance, E,D, VU,NR by MS at 11/19/2018  2:51 PM                   Point: Precautions (Done)     Learning Progress Summary           Patient Acceptance, E,D, VU,NR by MS at 11/19/2018  2:51 PM                               User Key     Initials Effective Dates Name Provider Type Discipline    MS 04/03/18 -  Tip Mccall, PT Physical Therapist PT              PT Recommendation and Plan  Anticipated Discharge Disposition (PT): home with assist, home with home health  Planned Therapy Interventions (PT Eval): balance training, bed mobility training, gait training, home exercise program, patient/family education, postural re-education, ROM (range of motion), stair training, strengthening, transfer training  Therapy Frequency (PT Clinical Impression): 2 times/day  Outcome Summary/Treatment Plan (PT)  Anticipated Discharge Disposition (PT): home with assist, home with home health  Plan of Care Reviewed  With: patient, family  Outcome Summary: Pt. will benefit from skilled inpt. P.T. to address her functional deficits and to assist pt. in regaining her maximum level of independence with functional mobility.    Outcome Measures     Row Name 11/19/18 1400             How much help from another person do you currently need...    Turning from your back to your side while in flat bed without using bedrails?  4  -MS      Moving from lying on back to sitting on the side of a flat bed without bedrails?  3  -MS      Moving to and from a bed to a chair (including a wheelchair)?  3  -MS      Standing up from a chair using your arms (e.g., wheelchair, bedside chair)?  3  -MS      Climbing 3-5 steps with a railing?  2  -MS      To walk in hospital room?  3  -MS      AM-PAC 6 Clicks Score  18  -MS         Functional Assessment    Outcome Measure Options  AM-PAC 6 Clicks Basic Mobility (PT)  -MS        User Key  (r) = Recorded By, (t) = Taken By, (c) = Cosigned By    Initials Name Provider Type    Tip Minor, PT Physical Therapist         Time Calculation:   PT Charges     Row Name 11/19/18 1452             Time Calculation    Start Time  1355  -MS      Stop Time  1412  -MS      Time Calculation (min)  17 min  -MS      PT Received On  11/19/18  -MS      PT - Next Appointment  11/20/18  -MS      PT Goal Re-Cert Due Date  11/21/18  -MS         Time Calculation- PT    Total Timed Code Minutes- PT  13 minute(s)  -MS        User Key  (r) = Recorded By, (t) = Taken By, (c) = Cosigned By    Initials Name Provider Type    Tip Minor, PT Physical Therapist        Therapy Suggested Charges     Code   Minutes Charges    None           Therapy Charges for Today     Code Description Service Date Service Provider Modifiers Qty    92752520223 HC PT MOBILITY CURRENT 11/19/2018 Tip Mccall, PT GP,  1    29244686285 HC PT MOBILITY PROJECTED 11/19/2018 Tip Mccall, PT GP,  1    85763706978 HC PT EVAL LOW  COMPLEXITY 1 11/19/2018 Tip Mccall, PT GP 1    02986660104 HC PT THER PROC EA 15 MIN 11/19/2018 Tip Mccall, PT GP 1          PT G-Codes  PT Professional Judgement Used?: Yes  Outcome Measure Options: AM-PAC 6 Clicks Basic Mobility (PT)  AM-PAC 6 Clicks Score: 18  Functional Limitation: Mobility: Walking and moving around  Mobility: Walking and Moving Around Current Status (): At least 40 percent but less than 60 percent impaired, limited or restricted  Mobility: Walking and Moving Around Goal Status (): 0 percent impaired, limited or restricted      Tip Mccall, PT  11/19/2018

## 2018-11-19 NOTE — ANESTHESIA PREPROCEDURE EVALUATION
Anesthesia Evaluation     no history of anesthetic complications:               Airway   Mallampati: III  Neck ROM: full  no difficulty expected  Dental - normal exam     Pulmonary - normal exam   (+) a smoker Former, shortness of breath,   (-) COPD, asthma, sleep apnea    PE comment: nonlabored  Cardiovascular - normal exam    Rhythm: regular  Rate: normal    (+) hypertension, valvular problems/murmurs murmur, dysrhythmias Paroxysmal Atrial Fib, MCCRAY, hyperlipidemia,   (-) past MI, CAD, angina      Neuro/Psych  (+) seizures (once as a teenager),     (-) TIA, CVA  GI/Hepatic/Renal/Endo    (+)  GERD,  renal disease (CKD stage 3),   (-) liver disease, diabetes, hypothyroidism, hyperthyroidism    ROS Comment: Mcguire's Esophagus    Musculoskeletal     (+) arthralgias,   Abdominal    Substance History      OB/GYN          Other   (+) blood dyscrasia (Anemia (Hgb 12.5)), arthritis   history of cancer (Breast Cancer)                    Anesthesia Plan    ASA 3     general and regional   (AC block for post-op pain psr)  intravenous induction   Anesthetic plan, all risks, benefits, and alternatives have been provided, discussed and informed consent has been obtained with: patient.

## 2018-11-19 NOTE — PLAN OF CARE
Problem: Patient Care Overview  Goal: Plan of Care Review   11/19/18 7531   Coping/Psychosocial   Plan of Care Reviewed With patient;family   OTHER   Outcome Summary Pt. will benefit from skilled inpt. P.T. to address her functional deficits and to assist pt. in regaining her maximum level of independence with functional mobility.

## 2018-11-19 NOTE — CONSULTS
Patient Name: Mary Kay Saab  :1945  73 y.o.    Date of Admission: 2018  Encounter Provider: Karley Roman MD  Date of Encounter Visit: 18  Place of Service: [facility] CARDIOLOGY  Referring Provider: Van Mcguire MD  Patient Care Team:  Sim Roman MD as PCP - General (Cardiology)  Jann Rojas MD as PCP - Claims Attributed  Perla Hurtado MD as Consulting Physician (Cardiology)  Diego Cornejo II, MD as Consulting Physician (Hematology and Oncology)  Perla Hurtado MD as Referring Physician (Cardiology)  Rell Shannon MD as Consulting Physician (Nephrology)      Chief complaint:  Right knee OA       History of Present Illness:  73 y.o. female with medical history of Atrial fib, HLD, HTN and osteopenia. Normally following by [facility]. She presents with severe end stage knee osteoarthritis which has not been responsive to the full compliment of conservative measures. Despite conservative attempts, there is still severe, constant activity limiting pain. Given the severity of the pain, the patient has elected to proceed with knee replacement.     Right TKR performed with complications. Cardiology has been consulted for post-operative cardiac elevation and management.     She denies any chest pain, tightness or pressure.  She has some occasional palpitations.  Her blood pressures generally well controlled.  She has a as needed regimen that she uses to control her blood pressure.      2018 PAT labs  Creatinine  1.36  BUN  36  GFR  38          Previous Results  2018  Stress Nuclear test    Summary   Normal pharmacological stress SPECT Myocardial Perfusion Imaging.   No evidence of stress induced myocardial ischemia or infarction.   Apical thinning artifact noted, a normal variant.   Summary of LV Function   Normal LV systolic function.   Risk Stratification   This is a low risk study.      2017 ECHO    Interpretation Summary   · Left ventricular function is normal. Calculated EF = 66.7%. Estimated EF was in agreement with the calculated EF. Normal left ventricular cavity size and wall thickness noted. All left ventricular wall segments contract normally.  · Left ventricular diastolic function is normal.  · Left atrial volume is mildly increased.  · The aortic valve is abnormal in structure. The valve exhibits sclerosis.  · No significnat valvular stenosis or insufficiency.        5/16/2016 Cardiac Cath    cardiac catheterization which was done on 05/16/2016.  Her coronary arteries were normal.  The stress study was deemed a false positive.          2/6/2017 Cardiac stress test   Interpretation Summary   · Suboptimal metabolic stress test patient did not achieve her maximal effort.  · Blunted heart rate response to exercise otherwise normal metabolic stress test        4/8/2016 ECHO   Interpretation Summary   · Calculated EF = 52.6%. Estimated EF was in agreement with the calculated EF. Normal left ventricular cavity size and wall thickness noted. All left ventricular wall segments contract normally. Left ventricular diastolic function is normal.  · Left atrial cavity size is mildly dilated.  · Mild to moderate tricuspid valve regurgitation is present. Estimated right ventricular systolic pressure from tricuspid regurgitation is normal (<35 mmHg).     4/5/2016 Myocardial stress test   Interpretation Summary   · Compared to the prior study from 2/7/2012  · Left ventricular ejection fraction is normal.  · Myocardial perfusion imaging indicates a small-to-medium-sized, mildly severe area of ischemia located in the inferior wall and septal wall.  · Impressions are consistent with an intermediate risk study.              Past Medical History:   Diagnosis Date   • Anemia     Of chronic renal insufficiency   • Atrial fibrillation (CMS/HCC)    • Mcguire's esophagus    • Breast cancer (CMS/HCC)    • Chronic renal  insufficiency     Stage 3   • MCCRAY (dyspnea on exertion)    • Dyspnea    • GERD (gastroesophageal reflux disease)    • H/O electrocardiogram    • History of staph infection    • Hx of being hospitalized     [facility] in 09/2010 and 10/2010 for atrial fibrillation, Dr. Perla Hurtado   • Hyperlipidemia    • Hypertension    • Hypoxia    • IBS (irritable bowel syndrome)    • Kidney dysfunction    • Lower extremity edema    • Lump or mass in breast    • Malignant neoplasm of breast (CMS/HCC)    • Osteoarthritis    • Osteopenia    • Osteoporosis    • Paroxysmal atrial fibrillation (CMS/HCC)    • Seizure (CMS/[facility])     AS A TEENAGER, NOT CURRENTLY   • Sinusitis    • SOB (shortness of breath)        Past Surgical History:   Procedure Laterality Date   • BREAST LUMPECTOMY  1990    For LCIS   • FOOT SURGERY     • HAND ARTHROPLASTY Right    • KNEE ARTHROSCOPY     • OTHER SURGICAL HISTORY      LYMPHATIC SURGERY         Prior to Admission medications    Medication Sig Start Date End Date Taking? Authorizing Provider   alendronate (FOSAMAX) 70 MG tablet Take 70 mg by mouth Every 7 (Seven) Days. sunday 2/20/17  Yes Yuriy Avila MD   alosetron (LOTRONEX) 0.5 MG tablet Take 0.05 mg by mouth Daily As Needed. 4/16/15  Yes Yuriy Avila MD   ALPRAZolam (XANAX) 1 MG tablet Take 1 mg by mouth every night.   Yes Yuriy Avila MD   bumetanide (BUMEX) 1 MG tablet Take 1 mg by mouth Daily As Needed.   Yes Yuriy Avila MD   cetirizine (ZyrTEC) 10 MG tablet Take 10 mg by mouth Daily. 4/24/13  Yes ProviderYuriy MD   Chlorhexidine Gluconate Cloth 2 % pads Apply  topically. PER MD INSTRUCTIONS   Yes Yuriy Avila MD   clobetasol (OLUX) 0.05 % topical foam Apply 1 application topically to the appropriate area as directed 2 (Two) Times a Day.   Yes ProviderYuriy MD   cycloSPORINE (RESTASIS) 0.05 % ophthalmic emulsion Administer 1 drop to both eyes 2 (Two) Times a Day.   Yes  Yuriy Avila MD   diltiaZEM (CARDIZEM) 60 MG tablet Take 60 mg by mouth 2 (Two) Times a Day. PATIENT TAKES ONE TABLET AT 5 PM AND ONE TAB HS   Yes Yuriy Avila MD   diphenoxylate-atropine (LOMOTIL) 2.5-0.025 MG per tablet Take 1 tablet by mouth 4 (Four) Times a Day As Needed for Diarrhea.   Yes Yuriy Avila MD   esomeprazole (NexIUM) 40 MG capsule Take 40 mg by mouth 2 (two) times a day. 1/27/12  Yes Yuriy Avila MD   metoprolol tartrate (LOPRESSOR) 25 MG tablet Take 0.625 mg by mouth As Needed.   Yes Yuriy Avila MD   montelukast (SINGULAIR) 10 MG tablet Take 10 mg by mouth Daily. 11/4/11  Yes Yuriy Avila MD   mupirocin (BACTROBAN) 2 % nasal ointment into the nostril(s) as directed by provider 2 (Two) Times a Day. PER MD INSTRUCTIONS   Yes Yuriy Avila MD   rosuvastatin (CRESTOR) 5 MG tablet Take 5 mg by mouth Daily.   Yes Yuriy Avila MD   amiodarone (PACERONE) 200 MG tablet Take 200 mg by mouth As Needed.    Yuriy Avila MD   cephalexin (KEFLEX) 500 MG capsule Take all 4 caps 1 hour prior to procedure  Patient taking differently: Take 500 mg by mouth. Take all 4 caps 1 hour prior to procedure 10/25/18   Van Mcguire MD   dabigatran etexilate (PRADAXA) 150 MG capsu Take 150 mg by mouth 2 (Two) Times a Day.    Yuriy Avila MD   dabigatran etexilate (PRADAXA) 150 MG capsu Take 150 mg by mouth 2 (Two) Times a Day. PATIENT TO STOP 2 DAYS BEFORE SURGERY PER CARDIOLOGIST Yuriy Preston MD   fluconazole (DIFLUCAN) 100 MG tablet Take 100 mg by mouth As Needed.    Yuriy Avila MD   hydrochlorothiazide (HYDRODIURIL) 12.5 MG tablet Take 12.5 mg by mouth As Needed.    Yuriy Avila MD   methocarbamol (ROBAXIN) 750 MG tablet Take 750 mg by mouth As Needed for Muscle Spasms.    Yuriy Avila MD   pseudoephedrine-guaifenesin (MUCINEX D)  MG per 12 hr tablet Take 1 tablet by mouth Every 12  (Twelve) Hours.    Provider, Historical, MD       Allergies   Allergen Reactions   • Penicillins Unknown (See Comments)     Reactions: syncope and seizures   • Adhesive Tape Other (See Comments)     Tears skin   • Atropine Hives   • Epinephrine Palpitations     Patient states will go into A-Fib   • Sulfa Antibiotics Nausea Only     STATES SHE DOES FINE WITH CERTAIN SULFA DRUGS       Social History     Socioeconomic History   • Marital status:      Spouse name: Cuate   • Number of children: 1   • Years of education: 1 year of college   • Highest education level: Not on file   Occupational History     Employer: RETIRED   Tobacco Use   • Smoking status: Former Smoker     Last attempt to quit: 1975     Years since quittin.4   • Smokeless tobacco: Never Used   • Tobacco comment: QUIT SMOKING IN LATE    Substance and Sexual Activity   • Alcohol use: Yes     Comment: social drinker   • Drug use: No   • Sexual activity: Defer       Family History   Problem Relation Age of Onset   • Heart disease Other    • Stroke Other    • Stroke Mother    • Heart disease Mother    • Hypertension Mother    • Leukemia Father         Mid 60s   • Hypertension Brother    • Cancer Paternal Aunt    • Cancer Paternal Grandfather    • Malig Hyperthermia Neg Hx        REVIEW OF SYSTEMS:   All other systems reviewed and negative.        Objective:     Vitals:    18 1240 18 1255 18 1313 18 1347   BP: 137/57 142/56 114/60 117/53   BP Location:   Right arm Right arm   Patient Position:   Lying Sitting   Pulse: 60 61 63 65   Resp: 16 16 16 16   Temp: 97.3 °F (36.3 °C)  96.7 °F (35.9 °C)    TempSrc: Oral  Oral    SpO2: 100% 99% 100% 99%   Weight:       Height:         Body mass index is 23.06 kg/m².    Intake/Output Summary (Last 24 hours) at 2018 1425  Last data filed at 2018 1340  Gross per 24 hour   Intake 1000 ml   Output 510 ml   Net 490 ml       Constitutional: She is oriented to person,  place, and time. She appears well-developed. She does not appear ill.   HENT:   Head: Normocephalic and atraumatic. Head is without contusion.   Right Ear: Hearing normal. No drainage.   Left Ear: Hearing normal. No drainage.   Nose: No nasal deformity. No epistaxis.   Eyes: Lids are normal. Right eye exhibits no exudate. Left eye exhibits no exudate.  Neck: No JVD present. Carotid bruit is not present. No tracheal deviation present. No thyroid mass and no thyromegaly present.   Cardiovascular: Normal rate, regular rhythm and normal heart sounds.    Pulses:       Posterior tibial pulses are 2+ on the right side, and 2+ on the left side.   Pulmonary/Chest: Effort normal and breath sounds normal.   Abdominal: Soft. Normal appearance and bowel sounds are normal. There is no tenderness.   Musculoskeletal: Normal range of motion.        Right shoulder: She exhibits no deformity.        Left shoulder: She exhibits no deformity.   Neurological: She is alert and oriented to person, place, and time. She has normal strength.   Skin: Skin is warm, dry and intact. No rash noted.   Psychiatric: She has a normal mood and affect. Her behavior is normal. Thought content normal.   Vitals reviewed      Lab Review:           Invalid input(s): LALITA, PROT                         11/06/75663      8/31/2017     I personally viewed and interpreted the patient's EKG/Telemetry data.        Assessment and Plan:       1.  Paroxysmal atrial fibrillation.  Agree with resuming Pradaxa.  2.  Hypertension.  This is labile.  She takes 60 mg of diltiazem in the evening and then takes another half to a whole pill as needed depending on if her blood pressure goes up.  I will adjust her medications here to reflect that.  3.  Hyperlipidemia  4.  Postoperative day 0 knee replacement    I do not anticipate any problems with discharge tomorrow.  Follow-up with her usual cardiologist Dr. Sim Roman per scheduled appointment.    Karley Roman,  MD  11/19/18  2:25 PM

## 2018-11-19 NOTE — ANESTHESIA PROCEDURE NOTES
Peripheral Block      Patient reassessed immediately prior to procedure    Patient location during procedure: holding area  Start time: 11/19/2018 7:17 AM  Stop time: 11/19/2018 7:24 AM  Reason for block: at surgeon's request and post-op pain management  Performed by  Anesthesiologist: Rell Mesa MD  Preanesthetic Checklist  Completed: patient identified, site marked, surgical consent, pre-op evaluation, timeout performed, IV checked, risks and benefits discussed and monitors and equipment checked  Prep:  Sterile barriers:cap, gloves and mask  Prep: ChloraPrep  Patient monitoring: blood pressure monitoring, continuous pulse oximetry and EKG  Procedure  Sedation:yes    Guidance:ultrasound guided  ULTRASOUND INTERPRETATION.  Using ultrasound guidance a 21 G gauge needle was placed in close proximity to the femoral nerve, at which point, under ultrasound guidance anesthetic was injected in the area of the nerve and spread of the anesthesia was seen on ultrasound in close proximity thereto.  There were no abnormalities seen on ultrasound; a digital image was taken; and the patient tolerated the procedure with no complications. Images:still images obtained    Laterality:right  Block Type:adductor canal block (Femoral Nerve at Adductor Canal)  Injection Technique:single-shot  Needle Type:short-bevel  Needle Gauge:21 G    Medications Used: ropivacaine (NAROPIN) 0.5 % injection, 30 mL  Post Assessment  Injection Assessment: negative aspiration for heme, no paresthesia on injection and incremental injection  Patient Tolerance:comfortable throughout block  Complications:no

## 2018-11-19 NOTE — OP NOTE
Name: Mary Kay Saab  YOB: 1945    DATE OF SURGERY: 11/19/2018    PREOPERATIVE DIAGNOSIS: Right knee end-stage osteoarthritis    POSTOPERATIVE DIAGNOSIS: Right knee end-stage osteoarthritis    PROCEDURE PERFORMED: Right total knee replacement    SURGEON: Van Mcguire M.D.    ASSISTANT: IVIS CHAPMAN    IMPLANTS: [employer]:     Implant Name Type Inv. Item Serial No.  Lot No. LRB No. Used   CMT BONE PALACOS R HI/VISC 1X40 - [id number] Implant CMT BONE PALACOS R HI/VISC 1X40  [facility] [id number] Right 1   BASE TIB/KN GEN2 NONPOR TI SZ2 RT - [id number] Implant BASE TIB/KN GEN2 NONPOR TI SZ2 RT  [employer] [id number] Right 1   COMP FEM LEGION OXINIUM CR SZ3 RT - [id number] Implant COMP FEM LEGION OXINIUM CR SZ3 RT  [employer] [id number] Right 1   PAT GEN2 BICONVEX [id number] - [id number] Implant PAT GEN2 BICONVEX [id number]  [employer]  Right 1   INSRT KN CR FLX DEEP GEN2 SZ1/2 13 - [id number] Implant INSRT KN CR FLX DEEP GEN2 SZ1/2 13  [employer] [id number] Right 1       Estimated Blood Loss: 200cc  Specimens : none  Complications: none    DESCRIPTION OF PROCEDURE: The patient was taken to the operating room and placed in the supine position. A sequential compression device was carefully placed on the non-operative leg. Preoperative antibiotics were administered. Surgical time out was performed. After adequate induction of anesthesia, the leg was prepped and draped in the usual sterile fashion, exsanguinated with an Esmarch bandage and the tourniquet inflated to 250 mmHg. A midline incision was performed followed by a medial parapatellar arthrotomy. The patella was subluxed laterally.  A portion of the fat pad, ACL, and anterior horns of the meniscus were excised. The drill hole was placed in the distal femur and the canal was the irrigated and suctioned. The IM verena was placed and a 5 degree distal valgus cut was performed on the femur. The femur was then  sized with a sizing guide. The femoral cutting block was placed and all femoral cuts were performed. The proximal tibia was exposed. We used the extramedullary tibial cutting guide set for removal of 9mm of bone off the high side. The tibial cut was performed. The posterior horns of the menisci were excised. The posterior osteophytes were removed. Flexion extension blocks were then used to balance the knee. The tibial cut surface was then sized with the sizing templates and the tibial and femoral trial were then placed. The knee was placed in full extension and then the tibial tray rotation was then matched to the femoral rotation and marked.    Attention was then placed to the patella. The patella was noted to track centrally through range of motion. The patella was then sized with the trials. The thickness of the patella was then measured. The patella was resurfaced and the surrounding osteophytes were removed. The preoperative thickness was reproduced. The patella tracked centrally through range of motion.   At this point all trial components were removed, the knee was copiously irrigated with pulsed lavage, and the knee was injected with anesthetic cocktail solution. The cut surfaces were then dried with clean lap sponges, and the components were cemented tibia, followed by femur, then patella. The knee was held in full extension and all excess cement was removed. The knee was held still until the cement had completely hardened. We then placed the trial polyethylene spacer which resulted in full extension and excellent flexion-extension balance. We placed the final polyethylene spacer.   The knee was then copiously irrigated. The tourniquet was then released. There was excellent hemostasis. We placed a one-eighth inch Hemovac drain. We closed the knee in multiple layers in standard fashion. Sterile dressing were applied. At the end of the case, the sponge and needle counts were reported as being correct. There  were no known complications. The patient was then transported to the recovery room.      Van Mcguire M.D.

## 2018-11-19 NOTE — ANESTHESIA PROCEDURE NOTES
Airway  Urgency: elective    Date/Time: 11/19/2018 8:26 AM  End Time:11/19/2018 8:27 AM  Airway not difficult    General Information and Staff    Patient location during procedure: OR  Anesthesiologist: Rell Mesa MD  CRNA: Elin Ramírez CRNA    Indications and Patient Condition  Indications for airway management: airway protection    Preoxygenated: yes  Mask difficulty assessment: 2 - vent by mask + OA or adjuvant +/- NMBA    Final Airway Details  Final airway type: endotracheal airway      Successful airway: ETT  Cuffed: yes   Successful intubation technique: direct laryngoscopy  Facilitating devices/methods: intubating stylet  Endotracheal tube insertion site: oral  Blade: Araujo  Blade size: 2  ETT size (mm): 7.0  Cormack-Lehane Classification: grade IIa - partial view of glottis  Placement verified by: chest auscultation and capnometry   Cuff volume (mL): 7  Measured from: lips  ETT to lips (cm): 21  Number of attempts at approach: 1    Additional Comments  SIVI.  EYES TAPED CLOSED PRIOR TO DL.  INTUBATION AS CHARTED ABOVE.  APPEARS ATRAUMATIC.  NO CHANGE TO DENTITION. +ETCO2. +BBS. +CR.

## 2018-11-20 VITALS
RESPIRATION RATE: 16 BRPM | OXYGEN SATURATION: 100 % | SYSTOLIC BLOOD PRESSURE: 130 MMHG | HEIGHT: 65 IN | TEMPERATURE: 97.2 F | WEIGHT: 136.44 LBS | BODY MASS INDEX: 22.73 KG/M2 | HEART RATE: 63 BPM | DIASTOLIC BLOOD PRESSURE: 60 MMHG

## 2018-11-20 LAB
ANION GAP SERPL CALCULATED.3IONS-SCNC: 12.9 MMOL/L
BASOPHILS # BLD AUTO: 0 10*3/MM3 (ref 0–0.2)
BASOPHILS NFR BLD AUTO: 0 % (ref 0–1.5)
BUN BLD-MCNC: 31 MG/DL (ref 8–23)
BUN/CREAT SERPL: 29.8 (ref 7–25)
CALCIUM SPEC-SCNC: 9.5 MG/DL (ref 8.6–10.5)
CHLORIDE SERPL-SCNC: 99 MMOL/L (ref 98–107)
CO2 SERPL-SCNC: 24.1 MMOL/L (ref 22–29)
CREAT BLD-MCNC: 1.04 MG/DL (ref 0.57–1)
DEPRECATED RDW RBC AUTO: 50 FL (ref 37–54)
EOSINOPHIL # BLD AUTO: 0 10*3/MM3 (ref 0–0.7)
EOSINOPHIL NFR BLD AUTO: 0 % (ref 0.3–6.2)
ERYTHROCYTE [DISTWIDTH] IN BLOOD BY AUTOMATED COUNT: 14.7 % (ref 11.7–13)
GFR SERPL CREATININE-BSD FRML MDRD: 52 ML/MIN/1.73
GLUCOSE BLD-MCNC: 136 MG/DL (ref 65–99)
HCT VFR BLD AUTO: 36 % (ref 35.6–45.5)
HGB BLD-MCNC: 11.3 G/DL (ref 11.9–15.5)
IMM GRANULOCYTES # BLD: 0.04 10*3/MM3 (ref 0–0.03)
IMM GRANULOCYTES NFR BLD: 0.3 % (ref 0–0.5)
LYMPHOCYTES # BLD AUTO: 0.72 10*3/MM3 (ref 0.9–4.8)
LYMPHOCYTES NFR BLD AUTO: 5.9 % (ref 19.6–45.3)
MCH RBC QN AUTO: 29.4 PG (ref 26.9–32)
MCHC RBC AUTO-ENTMCNC: 31.4 G/DL (ref 32.4–36.3)
MCV RBC AUTO: 93.5 FL (ref 80.5–98.2)
MONOCYTES # BLD AUTO: 0.87 10*3/MM3 (ref 0.2–1.2)
MONOCYTES NFR BLD AUTO: 7.2 % (ref 5–12)
NEUTROPHILS # BLD AUTO: 10.49 10*3/MM3 (ref 1.9–8.1)
NEUTROPHILS NFR BLD AUTO: 86.6 % (ref 42.7–76)
PLATELET # BLD AUTO: 181 10*3/MM3 (ref 140–500)
PMV BLD AUTO: 9.8 FL (ref 6–12)
POTASSIUM BLD-SCNC: 4.1 MMOL/L (ref 3.5–5.2)
RBC # BLD AUTO: 3.85 10*6/MM3 (ref 3.9–5.2)
SODIUM BLD-SCNC: 136 MMOL/L (ref 136–145)
WBC NRBC COR # BLD: 12.12 10*3/MM3 (ref 4.5–10.7)

## 2018-11-20 PROCEDURE — 97150 GROUP THERAPEUTIC PROCEDURES: CPT

## 2018-11-20 PROCEDURE — 99214 OFFICE O/P EST MOD 30 MIN: CPT | Performed by: INTERNAL MEDICINE

## 2018-11-20 PROCEDURE — A9270 NON-COVERED ITEM OR SERVICE: HCPCS | Performed by: NURSE PRACTITIONER

## 2018-11-20 PROCEDURE — G0378 HOSPITAL OBSERVATION PER HR: HCPCS

## 2018-11-20 PROCEDURE — A9270 NON-COVERED ITEM OR SERVICE: HCPCS | Performed by: ORTHOPAEDIC SURGERY

## 2018-11-20 PROCEDURE — 97110 THERAPEUTIC EXERCISES: CPT

## 2018-11-20 PROCEDURE — 80048 BASIC METABOLIC PNL TOTAL CA: CPT | Performed by: INTERNAL MEDICINE

## 2018-11-20 PROCEDURE — 63710000001 MONTELUKAST 10 MG TABLET: Performed by: ORTHOPAEDIC SURGERY

## 2018-11-20 PROCEDURE — 63710000001 DABIGATRAN ETEXILATE 150 MG CAPSULE: Performed by: NURSE PRACTITIONER

## 2018-11-20 PROCEDURE — 63710000001 HYDROCODONE-ACETAMINOPHEN 7.5-325 MG TABLET: Performed by: NURSE PRACTITIONER

## 2018-11-20 PROCEDURE — 63710000001 MUPIROCIN 2 % OINTMENT: Performed by: NURSE PRACTITIONER

## 2018-11-20 PROCEDURE — 85025 COMPLETE CBC W/AUTO DIFF WBC: CPT | Performed by: INTERNAL MEDICINE

## 2018-11-20 RX ORDER — ONDANSETRON 4 MG/1
4 TABLET, FILM COATED ORAL EVERY 6 HOURS PRN
Qty: 10 TABLET | Refills: 0 | Status: SHIPPED | OUTPATIENT
Start: 2018-11-20 | End: 2019-11-19

## 2018-11-20 RX ORDER — HYDROCODONE BITARTRATE AND ACETAMINOPHEN 7.5; 325 MG/1; MG/1
TABLET ORAL
Qty: 84 TABLET | Refills: 0 | Status: SHIPPED | OUTPATIENT
Start: 2018-11-20 | End: 2019-11-19

## 2018-11-20 RX ADMIN — HYDROCODONE BITARTRATE AND ACETAMINOPHEN 1 TABLET: 7.5; 325 TABLET ORAL at 09:01

## 2018-11-20 RX ADMIN — MUPIROCIN 10 APPLICATION: 20 OINTMENT TOPICAL at 08:13

## 2018-11-20 RX ADMIN — CYCLOSPORINE 1 DROP: 0.5 EMULSION OPHTHALMIC at 08:13

## 2018-11-20 RX ADMIN — DABIGATRAN ETEXILATE MESYLATE 150 MG: 150 CAPSULE ORAL at 08:13

## 2018-11-20 RX ADMIN — MONTELUKAST SODIUM 10 MG: 10 TABLET, FILM COATED ORAL at 08:13

## 2018-11-20 NOTE — THERAPY TREATMENT NOTE
Acute Care - Physical Therapy Treatment Note  [facility]     Patient Name: Mary Kay Saab  : 1945  MRN: [id number]  Today's Date: 2018  Onset of Illness/Injury or Date of Surgery: 18  Date of Referral to PT: 18  Referring Physician: Van Barber    Admit Date: 2018    Visit Dx:    ICD-10-CM ICD-9-CM   1. Difficulty walking R26.2 719.7   2. Primary osteoarthritis of right knee M17.11 715.16     Patient Active Problem List   Diagnosis   • Anemia due to stage 3 chronic kidney disease treated with erythropoietin (CMS/HCC)   • Breast cancer of upper-outer quadrant of left female breast (CMS/HCC)   • Atrial fibrillation (CMS/HCC)   • Iron deficiency anemia   • Osteopenia   • Mcguire esophagus   • HLD (hyperlipidemia)   • BP (high blood pressure)   • Hypoxia   • Primary osteoarthritis of right hip   • Primary osteoarthritis of right knee   • OA (osteoarthritis) of knee       Therapy Treatment    Rehabilitation Treatment Summary     Row Name 18             Treatment Time/Intention    Discipline  physical therapy assistant  -      Document Type  therapy note (daily note)  -      Subjective Information  complains of;pain  -SM      Mode of Treatment  physical therapy  -SM      Patient Effort  good  -SM      Existing Precautions/Restrictions  fall  -SM      Recorded by [] Colleen Araujo PTA 18 144      Row Name 18             Cognitive Assessment/Intervention- PT/OT    Orientation Status (Cognition)  oriented x 4  -SM      Follows Commands (Cognition)  WNL  -SM      Personal Safety Interventions  fall prevention program maintained;gait belt;nonskid shoes/slippers when out of bed  -SM      Recorded by [SM] Colleen Araujo PTA 18 1446      Row Name 1830             Bed Mobility Assessment/Treatment    Comment (Bed Mobility)  up in chair  -SM      Recorded by [] Colleen Araujo PTA 18 144      Row Name 1820              Transfer Assessment/Treatment    Transfer Assessment/Treatment  sit-stand transfer;stand-sit transfer  -SM      Recorded by [SM] Colleen Araujo, PTA 11/20/18 1446      Row Name 11/20/18 0930             Sit-Stand Transfer    Sit-Stand [place] (Transfers)  stand by assist  -      Assistive Device (Sit-Stand Transfers)  walker, front-wheeled  -SM      Recorded by [SM] Colleen Araujo, PTA 11/20/18 1446      Row Name 11/20/18 0930             Stand-Sit Transfer    Stand-Sit [place] (Transfers)  stand by assist  -      Assistive Device (Stand-Sit Transfers)  walker, front-wheeled  -      Recorded by [SM] Colleen Araujo, PTA 11/20/18 1446      Row Name 11/20/18 0930             Gait/Stairs Assessment/Training    [place] Level (Gait)  stand by assist  -      Assistive Device (Gait)  walker, front-wheeled  -      Distance in Feet (Gait)  100  -SM      Pattern (Gait)  step-to  -SM      Deviations/Abnormal Patterns (Gait)  antalgic;sara decreased;stride length decreased  -SM      Right Sided Gait Deviations  weight shift ability decreased  -SM      [place] Level (Stairs)  stand by assist  -      Handrail Location (Stairs)  both sides  -SM      Number of Steps (Stairs)  8  -SM      Ascending Technique (Stairs)  step-to-step  -SM      Descending Technique (Stairs)  step-to-step  -SM      Recorded by [SM] Colleen Araujo, PTA 11/20/18 1446      Row Name 11/20/18 0930             General ROM    GENERAL ROM COMMENTS  R knee 0-80  -SM      Recorded by [SM] Colleen Araujo, PTA 11/20/18 1446      Row Name 11/20/18 0930             Therapeutic Exercise    Comment (Therapeutic Exercise)  R TKR protocol x 15 reps  -SM      Recorded by [SM] Colleen Araujo, PTA 11/20/18 1446      Row Name 11/20/18 0930             Positioning and Restraints    Pre-Treatment Position  sitting in chair/recliner  -SM      Post Treatment Position  chair  -SM      In Chair  reclined;call  light within reach;encouraged to call for assist  -SM      Recorded by [SM] AraujoColleen Colleen, PTA 11/20/18 1446      Row Name 11/20/18 0930             Pain Scale: Numbers Pre/Post-Treatment    Pain Scale: Numbers, Pretreatment  2/10  -SM      Pain Scale: Numbers, Post-Treatment  2/10  -SM      Pain Location - Side  Right  -SM      Pain Location  knee  -SM      Pain Intervention(s)  Repositioned;Ambulation/increased activity;Rest  -SM      Recorded by [SM] Martha Araujoah Colleen, ELENA 11/20/18 1446      Row Name                Wound 11/19/18 0939 Right knee incision    Wound - Properties Group Date first assessed: 11/19/18 [AS] Time first assessed: 0939 [AS] Side: Right [AS] Location: knee [AS] Type: incision [AS] Recorded by:  [AS] Niki Broderick RN 11/19/18 0939      User Key  (r) = Recorded By, (t) = Taken By, (c) = Cosigned By    Initials Name Effective Dates Discipline    AS Niki Broderick RN 06/16/16 -  Nurse     Colleen Araujo Colleen, [facility] 03/07/18 -  PT          Wound 11/19/18 0939 Right knee incision (Active)   Dressing Appearance dry;intact;no drainage 11/20/2018 12:50 PM   Closure NICCI 11/20/2018 12:50 PM   Base dressing in place, unable to visualize 11/20/2018 12:50 PM   Drainage Amount none 11/20/2018 12:50 PM   Dressing Care, Wound border dressing 11/20/2018  8:13 AM           Physical Therapy Education     Title: PT OT SLP Therapies (Resolved)     Topic: Physical Therapy (Resolved)     Point: Mobility training (Resolved)     Learning Progress Summary           Patient Acceptance, E,TB,D, VU by  at 11/20/2018  2:46 PM    Acceptance, E,D, VU,NR by MS at 11/19/2018  2:51 PM                   Point: Home exercise program (Resolved)     Learning Progress Summary           Patient Acceptance, E,TB,D, VU by  at 11/20/2018  2:46 PM    Acceptance, E,D, VU,NR by MS at 11/19/2018  2:51 PM                   Point: Body mechanics (Resolved)     Learning Progress Summary           Patient Acceptance, E,TB,D,  VU by  at 11/20/2018  2:46 PM    Acceptance, E,D, VU,NR by MS at 11/19/2018  2:51 PM                   Point: Precautions (Resolved)     Learning Progress Summary           Patient Acceptance, E,TB,D, VU by  at 11/20/2018  2:46 PM    Acceptance, E,D, VU,NR by MS at 11/19/2018  2:51 PM                               User Key     Initials Effective Dates Name Provider Type Discipline    MS 04/03/18 -  MccallTip, PT Physical Therapist PT     03/07/18 -  Colleen Araujo PTA Physical Therapy Assistant PT                PT Recommendation and Plan     Plan of Care Reviewed With: patient  Progress: improving  Outcome Measures     Row Name 11/20/18 1400 11/19/18 1400          How much help from another person do you currently need...    Turning from your back to your side while in flat bed without using bedrails?  4  -SM  4  -MS     Moving from lying on back to sitting on the side of a flat bed without bedrails?  4  -SM  3  -MS     Moving to and from a bed to a chair (including a wheelchair)?  3  -SM  3  -MS     Standing up from a chair using your arms (e.g., wheelchair, bedside chair)?  3  -SM  3  -MS     Climbing 3-5 steps with a railing?  3  -SM  2  -MS     To walk in hospital room?  3  -SM  3  -MS     AM-PAC 6 Clicks Score  20  -SM  18  -MS        Functional Assessment    Outcome Measure Options  AM-PAC 6 Clicks Basic Mobility (PT)  -SM  AM-PAC 6 Clicks Basic Mobility (PT)  -MS       User Key  (r) = Recorded By, (t) = Taken By, (c) = Cosigned By    Initials Name Provider Type    Mady Minordonna DONATO, PT Physical Therapist     Colleen Araujo, ELENA Physical Therapy Assistant         Time Calculation:   PT Charges     Row Name 11/20/18 1446             Time Calculation    Start Time  0930  -      Stop Time  1030  -      Time Calculation (min)  60 min  -      PT Received On  11/20/18  -        User Key  (r) = Recorded By, (t) = Taken By, (c) = Cosigned By    Initials Name Provider Type      Colleen Araujo PTA Physical Therapy Assistant        Therapy Suggested Charges     Code   Minutes Charges    None           Therapy Charges for Today     Code Description Service Date Service Provider Modifiers Qty    65594887090 HC PT THER PROC EA 15 MIN 11/20/2018 Colleen Araujo PTA GP 1    22227508028 HC PT THER PROC GROUP 11/20/2018 Colleen Araujo PTA GP 1          PT G-Codes  PT Professional Judgement Used?: Yes  Outcome Measure Options: AM-PAC 6 Clicks Basic Mobility (PT)  AM-PAC 6 Clicks Score: 20  Functional Limitation: Mobility: Walking and moving around  Mobility: Walking and Moving Around Current Status (): At least 40 percent but less than 60 percent impaired, limited or restricted  Mobility: Walking and Moving Around Goal Status (): 0 percent impaired, limited or restricted    Colleen Araujo PTA  11/20/2018

## 2018-11-20 NOTE — PLAN OF CARE
Problem: Patient Care Overview  Goal: Plan of Care Review  Outcome: Outcome(s) achieved Date Met: 11/20/18 11/20/18 1432   Coping/Psychosocial   Plan of Care Reviewed With patient   OTHER   Outcome Summary Patient is ambulating using walker and stand by assist. Vitals are stable and voiding function is intact. Pain is controlled with po meds. Patient educated on bp and hr monitoring and med compliance. Patient c/o red, irritated, painful eye this am, which is improving. Patient is prepared and ready for d/c home with hh.    Plan of Care Review   Progress improving       Problem: Fall Risk (Adult)  Goal: Absence of Fall  Outcome: Outcome(s) achieved Date Met: 11/20/18 11/20/18 1432   Fall Risk (Adult)   Absence of Fall achieves outcome       Problem: Knee Arthroplasty (Total, Partial) (Adult)  Goal: Signs and Symptoms of Listed Potential Problems Will be Absent, Minimized or Managed (Knee Arthroplasty)  Outcome: Outcome(s) achieved Date Met: 11/20/18 11/20/18 1432   Goal/Outcome Evaluation   Problems Assessed (Knee Arthroplasty) all   Problems Present (Knee Arthroplasty) pain;range of motion decreased;functional deficit     Goal: Anesthesia/Sedation Recovery  Outcome: Unable to achieve outcome(s) by discharge Date Met: 11/20/18 11/20/18 1432   Goal/Outcome Evaluation   Anesthesia/Sedation Recovery recovered to baseline

## 2018-11-20 NOTE — PLAN OF CARE
Problem: Patient Care Overview  Goal: Plan of Care Review  Outcome: Outcome(s) achieved Date Met: 11/20/18 11/20/18 1446   Coping/Psychosocial   Plan of Care Reviewed With patient   Plan of Care Review   Progress improving

## 2018-11-20 NOTE — CONSULTS
.     REASON FOR CONSULTATION:     Provide an opinion on any further workup or treatment of anemia.                               REQUESTING PHYSICIAN: Van Mcguire MD     RECORDS OBTAINED:  Records of the patients history including those obtained from the referring provider were reviewed and summarized in detail.    HISTORY OF PRESENT ILLNESS:  The patient is a 73 y.o. year old female who was admitted yesterday and had surgery  for right knee total replacement by Dr. Mcguire.    This patient has history of anemia secondary to iron deficiency, chronic renal insufficiency, previously was given Procrit injection and IV iron therapy. This patient also has history of atrial fibrillation, hypertension, history of breast cancer and arthritis.     The patient did well with the surgery. Has minimal drainage or bleeding. Her right knee is dressed but looks having only mild edema, no evidence of infection or bleeding without skin redness.    Laboratory study today reported slightly decreased hemoglobin at 11.3, down from 12.5 on 11/06/2018 as preop evaluation. She has mildly elevated WBC 12,100 including neutrophils 10,500 and lymphocytes 720. She maintains normal platelets 181,000.    The patient has no specific complaints. She will be discharged later today to home with home rehabilitation 3 times a week.      Past Medical History:   Diagnosis Date   • Anemia     Of chronic renal insufficiency   • Atrial fibrillation (CMS/HCC)    • Mcguire's esophagus    • Breast cancer (CMS/HCC)    • Chronic renal insufficiency     Stage 3   • MCCRAY (dyspnea on exertion)    • Dyspnea    • GERD (gastroesophageal reflux disease)    • H/O electrocardiogram    • History of staph infection    • Hx of being hospitalized     [facility] in 09/2010 and 10/2010 for atrial fibrillation, Dr. Perla Hurtado   • Hyperlipidemia    • Hypertension    • Hypoxia    • IBS (irritable bowel syndrome)    • Kidney dysfunction    • Lower  extremity edema    • Lump or mass in breast    • Malignant neoplasm of breast (CMS/HCC)    • Osteoarthritis    • Osteopenia    • Osteoporosis    • Paroxysmal atrial fibrillation (CMS/HCC)    • Seizure (CMS/HCC)     AS A TEENAGER, NOT CURRENTLY   • Sinusitis    • SOB (shortness of breath)      Past Surgical History:   Procedure Laterality Date   • BREAST LUMPECTOMY  1990    For LCIS   • FOOT SURGERY     • HAND ARTHROPLASTY Right    • KNEE ARTHROSCOPY     • OTHER SURGICAL HISTORY      LYMPHATIC SURGERY       HEMATOLOGIC/ONCOLOGIC HISTORY:  (History from previous dates can be found in the separate document.)  Details see Dr. Cornejo's note on 7/24/2018.    MEDICATIONS    Current Facility-Administered Medications:   •  ALPRAZolam (XANAX) tablet 1 mg, 1 mg, Oral, Nightly, Quentin Christensen MD, 1 mg at 11/19/18 2201  •  amiodarone (PACERONE) tablet 200 mg, 200 mg, Oral, PRN, Van Mcguire MD  •  bisacodyl (DULCOLAX) suppository 10 mg, 10 mg, Rectal, Daily PRN, Sabrina Vicente APRN  •  bumetanide (BUMEX) tablet 1 mg, 1 mg, Oral, Daily PRN, Van Mcguire MD  •  cycloSPORINE (RESTASIS) 0.05 % ophthalmic emulsion 1 drop, 1 drop, Both Eyes, BID, Quentin Christensen MD, 1 drop at 11/20/18 0813  •  dabigatran etexilate (PRADAXA) capsule 150 mg, 150 mg, Oral, Q12H, Sabrina Vicente APRN, 150 mg at 11/20/18 0813  •  diltiaZEM (CARDIZEM) tablet 60 mg, 60 mg, Oral, Q PM, Karley Roman MD  •  diltiaZEM (CARDIZEM) tablet 60 mg, 60 mg, Oral, Q6H PRN, Karley Roman MD, 30 mg at 11/19/18 2329  •  docusate sodium (COLACE) capsule 100 mg, 100 mg, Oral, BID, Sabrina Vicente APRN  •  HYDROcodone-acetaminophen (NORCO) 7.5-325 MG per tablet 1 tablet, 1 tablet, Oral, Q4H PRN, Sabrina Vicente APRN, 1 tablet at 11/19/18 2651  •  HYDROcodone-acetaminophen (NORCO) 7.5-325 MG per tablet 2 tablet, 2 tablet, Oral, Q4H PRN, Sabrina Vicente APRN  •  melatonin tablet 3 mg, 3 mg, Oral, Nightly PRN, Sabrina Vicente APRN  •  metoprolol  tartrate (LOPRESSOR) tablet 6.25 mg, 6.25 mg, Oral, PRN, Van Mcguire MD, 6.25 mg at 18 1845  •  montelukast (SINGULAIR) tablet 10 mg, 10 mg, Oral, Daily, Quentin Christensen MD, 10 mg at 18 0813  •  ondansetron (ZOFRAN) tablet 4 mg, 4 mg, Oral, Q6H PRN **OR** ondansetron ODT (ZOFRAN-ODT) disintegrating tablet 4 mg, 4 mg, Oral, Q6H PRN **OR** ondansetron (ZOFRAN) injection 4 mg, 4 mg, Intravenous, Q6H PRN, Sabrina Vicente APRN  •  pantoprazole (PROTONIX) EC tablet 40 mg, 40 mg, Oral, QAM, Sabrina Vicente APRAYSE  •  polyethylene glycol 3350 powder (packet), 17 g, Oral, BID, Sabrina Vicente APRN  •  rosuvastatin (CRESTOR) tablet 5 mg, 5 mg, Oral, Nightly, Quentin Christensen MD, 5 mg at 18    Facility-Administered Medications Ordered in Other Encounters:   •  mupirocin (BACTROBAN) 2 % nasal ointment, , Nasal, BID, Van Mcguire MD    ALLERGIES:     Allergies   Allergen Reactions   • Penicillins Unknown (See Comments)     Reactions: syncope and seizures   • Adhesive Tape Other (See Comments)     Tears skin   • Atropine Hives   • Epinephrine Palpitations     Patient states will go into A-Fib   • Sulfa Antibiotics Nausea Only     STATES SHE DOES FINE WITH CERTAIN SULFA DRUGS       SOCIAL HISTORY:       Social History     Socioeconomic History   • Marital status:      Spouse name: Cuate   • Number of children: 1   • Years of education: 1 year of college   • Highest education level: Not on file   Social Needs   • Financial resource strain: Not on file   • Food insecurity - worry: Not on file   • Food insecurity - inability: Not on file   • Transportation needs - medical: Not on file   • Transportation needs - non-medical: Not on file   Occupational History     Employer: RETIRED   Tobacco Use   • Smoking status: Former Smoker     Last attempt to quit: 1975     Years since quittin.4   • Smokeless tobacco: Never Used   • Tobacco comment: QUIT SMOKING IN LATE 1970s   Substance and  Sexual Activity   • Alcohol use: Yes     Comment: social drinker   • Drug use: No   • Sexual activity: Defer   Other Topics Concern   • Not on file   Social History Narrative   • Not on file         FAMILY HISTORY:  Family History   Problem Relation Age of Onset   • Heart disease Other    • Stroke Other    • Stroke Mother    • Heart disease Mother    • Hypertension Mother    • Leukemia Father         Mid 60s   • Hypertension Brother    • Cancer Paternal Aunt    • Cancer Paternal Grandfather    • Malig Hyperthermia Neg Hx        REVIEW OF SYSTEMS:  Review of Systems   Constitutional: Negative for chills, diaphoresis, fatigue and fever.   HENT: Negative for congestion and mouth sores.    Eyes: Negative for visual disturbance.   Respiratory: Negative for cough and shortness of breath.    Cardiovascular: Negative for chest pain, palpitations and leg swelling.   Gastrointestinal: Negative for abdominal pain and blood in stool.   Endocrine: Negative for polyuria.   Genitourinary: Negative for dysuria and hematuria.   Musculoskeletal: Positive for arthralgias and joint swelling (Right knee swelling post surgery).        Status post right knee total replacement day #1.  Limited range of motion.   Skin: Positive for wound (Right knee surgical wound covered with dressing.  No surrounding skin erythematous changes.).   Allergic/Immunologic: Negative for food allergies.   Neurological: Negative for dizziness and headaches.   Hematological: Negative for adenopathy. Does not bruise/bleed easily.   Psychiatric/Behavioral: Negative for agitation and confusion.              Vitals:    11/19/18 2204 11/19/18 2322 11/20/18 0359 11/20/18 0755   BP: 149/65 147/65 149/70 130/60   BP Location: Right arm Right arm Right arm Right arm   Patient Position: Lying Lying Lying Sitting   Pulse:  61 57 63   Resp:  16 16 16   Temp:   98 °F (36.7 °C) 97.2 °F (36.2 °C)   TempSrc:   Oral Oral   SpO2:  100% 98% 100%   Weight:       Height:          Current Status 7/24/2018   ECOG score 0      PHYSICAL EXAM:      CONSTITUTIONAL: Well-developed well-nourished elderly  female.  No distress, looks comfortable.  EYES:  Conjunctiva and lids unremarkable.    EARS,NOSE,MOUTH,THROAT:  Ears and nose appear unremarkable.  Lips appear unremarkable.  RESPIRATORY:  Normal respiratory effort.  Lungs clear to auscultation bilaterally.  CARDIOVASCULAR:  Regular rhythm and rate.  Normal S1, S2.  No murmurs.   GASTROINTESTINAL: Abdomen appears unremarkable.  Nontender.  No hepatomegaly.  No splenomegaly.  Bowel sounds normal.  LYMPHATIC:  No cervical, supraclavicular lymphadenopathy.  MUSCULOSKELETAL:   Unremarkable digits/nails.  No cyanosis or clubbing.  Right knee post surgery, with mild edema as expected.  SKIN:  Warm.  No rashes.  PSYCHIATRIC:  Normal judgment and insight.  Normal mood and affect.      RECENT LABS:        WBC   Date Value Ref Range Status   11/20/2018 12.12 (H) 4.50 - 10.70 10*3/mm3 Final     Hemoglobin   Date Value Ref Range Status   11/20/2018 11.3 (L) 11.9 - 15.5 g/dL Final     Platelets   Date Value Ref Range Status   11/20/2018 181 140 - 500 10*3/mm3 Final       Assessment/Plan     Patient is one the posterior right knee total replacement.  History of anemia with iron deficiency and the stage III chronic renal insufficiency.    This time patient has normal hemoglobin preoperatively, and today her hemoglobin is 11.31 day after operation.  There is no evidence of iron deficiency and recent laboratory studies.    The patient is very concerned about worsening anemia. She had previous surgery of right hip replacement in 2017, with preop hemoglobin above 11 g, however, she had postop hemoglobin thalia 8.0, she is very concerned this will happen this time also.     I discussed with the patient, we will arrange home health care for laboratory test in 1 week on 11/27/2018 with CBC check and results faxed to our office. If she maintains reasonable  hemoglobin, then will continue routine followup with us, with laboratory study again on 01/02/2018 in our office with CBC, and iron panel and ferritin.       Thanks for letting us participating in the care of this patient!     NAOMI CHAMORRO M.D., Ph.D.    11/20/2018

## 2018-11-20 NOTE — PROGRESS NOTES
Discharge Planning Assessment  [facility]     Patient Name: Mary Kay Saab  MRN: [id number]  Today's Date: 11/20/2018    Admit Date: 11/19/2018    Discharge Needs Assessment     Row Name 11/20/18 1132       Living Environment    Lives With  spouse    Current Living Arrangements  home/apartment/condo    Able to Return to Prior Arrangements  yes       Resource/Environmental Concerns    Resource/Environmental Concerns  none       Transition Planning    Patient/Family Anticipates Transition to  home with family    Patient/Family Anticipated Services at Transition  home health care    Transportation Anticipated  family or friend will provide       Discharge Needs Assessment    Equipment Currently Used at Home  none    Discharge Facility/Level of Care Needs  home with home health    Offered/Gave Vendor List  yes        Discharge Plan     Row Name 11/20/18 1131       Plan    Plan  Home w/ [facility]     Patient/Family in Agreement with Plan  yes    Plan Comments  Met w/ pt and family at the bedside, verified facesheet and d/c plan. Pt plans to d/c home w/ spouse, she would like [facility] (therapist Kadie Gibson). Bill/[facility] notiied.     Final Discharge Disposition Code  06 - home with home health care        Destination      No service coordination in this encounter.      Durable Medical Equipment      No service coordination in this encounter.      Dialysis/Infusion      No service coordination in this encounter.      Home Medical Care      No service coordination in this encounter.      Community Resources      No service coordination in this encounter.        Expected Discharge Date and Time     Expected Discharge Date Expected Discharge Time    Nov 20, 2018         Demographic Summary    No documentation.       Functional Status     Row Name 11/20/18 1134       Functional Status    Usual Activity Tolerance  good    Current Activity Tolerance  fair       Functional Status, IADL    Medications  independent    Meal Preparation   independent    Housekeeping  independent    Laundry  independent    Shopping  independent       Mental Status    General Appearance WDL  WDL       Mental Status Summary    Recent Changes in Mental Status/Cognitive Functioning  no changes        Psychosocial    No documentation.       Abuse/Neglect    No documentation.       Legal    No documentation.       Substance Abuse    No documentation.       Patient Forms     Row Name 11/20/18 1132       Patient Forms    Provider Choice List  Delivered    Delivered to  Patient    Method of delivery  In person            Cora Irby RN

## 2018-11-20 NOTE — DISCHARGE SUMMARY
Patient Name: Mary Kay Saab  Patient YOB: 1945    Date of Admission:  11/19/2018  Date of Discharge:  11/20/2018  Discharge Diagnosis: TOTAL KNEE ARTHROPLASTY  Presenting Problem/History of Present Illness: Primary osteoarthritis of right knee [M17.11]  OA (osteoarthritis) of knee [M17.10]  Admitting Physician: Dr Van Mcguire  Consults:   Consults     Date and Time Order Name Status Description    11/19/2018 1320 Inpatient Cardiology Consult Completed     11/19/2018 1320 Hematology & Oncology Inpatient Consult            DETAILS OF HOSPITAL STAY:  Patient is a 73 y.o. female was admitted to the floor following the above procedure and underwent an uncomplicated hospital stay.  Patient did well with physical therapy and was ambulating well without problems.  On the day of discharge the wound was clean, dry and intact and calf was soft and non tender and Homans sign was negative.  Patient was tolerating  without problems.  Patient will be discharged home.    Condition on Discharge:  Stable    Vital Signs  Temp:  [96.7 °F (35.9 °C)-98 °F (36.7 °C)] 98 °F (36.7 °C)  Heart Rate:  [53-67] 57  Resp:  [16] 16  BP: (109-149)/(48-70) 149/70    LABS:      Admission on 11/19/2018   Component Date Value Ref Range Status   • Glucose 11/20/2018 136* 65 - 99 mg/dL Final   • BUN 11/20/2018 31* 8 - 23 mg/dL Final   • Creatinine 11/20/2018 1.04* 0.57 - 1.00 mg/dL Final   • Sodium 11/20/2018 136  136 - 145 mmol/L Final   • Potassium 11/20/2018 4.1  3.5 - 5.2 mmol/L Final   • Chloride 11/20/2018 99  98 - 107 mmol/L Final   • CO2 11/20/2018 24.1  22.0 - 29.0 mmol/L Final   • Calcium 11/20/2018 9.5  8.6 - 10.5 mg/dL Final   • eGFR Non African Amer 11/20/2018 52* >60 mL/min/1.73 Final   • BUN/Creatinine Ratio 11/20/2018 29.8* 7.0 - 25.0 Final   • Anion Gap 11/20/2018 12.9  mmol/L Final   • WBC 11/20/2018 12.12* 4.50 - 10.70 10*3/mm3 Final   • RBC 11/20/2018 3.85* 3.90 - 5.20 10*6/mm3 Final   • Hemoglobin 11/20/2018 11.3*  11.9 - 15.5 g/dL Final   • Hematocrit 11/20/2018 36.0  35.6 - 45.5 % Final   • MCV 11/20/2018 93.5  80.5 - 98.2 fL Final   • MCH 11/20/2018 29.4  26.9 - 32.0 pg Final   • MCHC 11/20/2018 31.4* 32.4 - 36.3 g/dL Final   • RDW 11/20/2018 14.7* 11.7 - 13.0 % Final   • RDW-SD 11/20/2018 50.0  37.0 - 54.0 fl Final   • MPV 11/20/2018 9.8  6.0 - 12.0 fL Final   • Platelets 11/20/2018 181  140 - 500 10*3/mm3 Final   • Neutrophil % 11/20/2018 86.6* 42.7 - 76.0 % Final   • Lymphocyte % 11/20/2018 5.9* 19.6 - 45.3 % Final   • Monocyte % 11/20/2018 7.2  5.0 - 12.0 % Final   • Eosinophil % 11/20/2018 0.0* 0.3 - 6.2 % Final   • Basophil % 11/20/2018 0.0  0.0 - 1.5 % Final   • Immature Grans % 11/20/2018 0.3  0.0 - 0.5 % Final   • Neutrophils, Absolute 11/20/2018 10.49* 1.90 - 8.10 10*3/mm3 Final   • Lymphocytes, Absolute 11/20/2018 0.72* 0.90 - 4.80 10*3/mm3 Final   • Monocytes, Absolute 11/20/2018 0.87  0.20 - 1.20 10*3/mm3 Final   • Eosinophils, Absolute 11/20/2018 0.00  0.00 - 0.70 10*3/mm3 Final   • Basophils, Absolute 11/20/2018 0.00  0.00 - 0.20 10*3/mm3 Final   • Immature Grans, Absolute 11/20/2018 0.04* 0.00 - 0.03 10*3/mm3 Final       No results found.    Discharge Medications     Discharge Medications      New Medications      Instructions Start Date   HYDROcodone-acetaminophen 7.5-325 MG per tablet  Commonly known as:  NORCO   1-2 po q 4-6 hr prn pain      ondansetron 4 MG tablet  Commonly known as:  ZOFRAN   4 mg, Oral, Every 6 Hours PRN      polyethylene glycol pack packet  Commonly known as:  MIRALAX   17 g, Oral, 2 Times Daily PRN         Continue These Medications      Instructions Start Date   alendronate 70 MG tablet  Commonly known as:  FOSAMAX   70 mg, Oral, Every 7 Days, sunday      alosetron 0.5 MG tablet  Commonly known as:  LOTRONEX   0.05 mg, Oral, Daily PRN      ALPRAZolam 1 MG tablet  Commonly known as:  XANAX   1 mg, Oral, Nightly      amiodarone 200 MG tablet  Commonly known as:  PACERONE   200 mg,  Oral, As Needed      bumetanide 1 MG tablet  Commonly known as:  BUMEX   1 mg, Oral, Daily PRN      cetirizine 10 MG tablet  Commonly known as:  zyrTEC   10 mg, Oral, Daily      clobetasol 0.05 % topical foam  Commonly known as:  OLUX   1 application, Topical, 2 Times Daily      cycloSPORINE 0.05 % ophthalmic emulsion  Commonly known as:  RESTASIS   1 drop, Both Eyes, 2 Times Daily      dabigatran etexilate 150 MG capsu  Commonly known as:  PRADAXA   150 mg, Oral, 2 Times Daily      dabigatran etexilate 150 MG capsu  Commonly known as:  PRADAXA   150 mg, Oral, 2 Times Daily, PATIENT TO STOP 2 DAYS BEFORE SURGERY PER CARDIOLOGIST DR RODRIGUEZ       diltiaZEM 60 MG tablet  Commonly known as:  CARDIZEM   60 mg, Oral, 2 Times Daily, PATIENT TAKES ONE TABLET AT 5 PM AND ONE TAB HS      diphenoxylate-atropine 2.5-0.025 MG per tablet  Commonly known as:  LOMOTIL   1 tablet, Oral, 4 Times Daily PRN      esomeprazole 40 MG capsule  Commonly known as:  nexIUM   40 mg, Oral, 2 Times Daily      fluconazole 100 MG tablet  Commonly known as:  DIFLUCAN   100 mg, Oral, As Needed      hydrochlorothiazide 12.5 MG tablet  Commonly known as:  HYDRODIURIL   12.5 mg, Oral, As Needed      methocarbamol 750 MG tablet  Commonly known as:  ROBAXIN   750 mg, Oral, As Needed      metoprolol tartrate 25 MG tablet  Commonly known as:  LOPRESSOR   0.625 mg, Oral, As Needed      montelukast 10 MG tablet  Commonly known as:  SINGULAIR   10 mg, Oral, Daily      pseudoephedrine-guaifenesin  MG per 12 hr tablet  Commonly known as:  MUCINEX D   1 tablet, Oral, Every 12 Hours      rosuvastatin 5 MG tablet  Commonly known as:  CRESTOR   5 mg, Oral, Daily         Stop These Medications    cephalexin 500 MG capsule  Commonly known as:  KEFLEX     Chlorhexidine Gluconate Cloth 2 % pads     mupirocin 2 % nasal ointment  Commonly known as:  BACTROBAN            Activity at Discharge:     Discharge Instructions: Patient is to continue with physical therapy  exercises daily and continue working with the physical therapist as ordered. Patient may weight bear as tolerated. Apply ice regularly. Patient may ice for long periods of time as long as ice is not directly on the skin. Patient instructed on frequent calf pumping exercises.  Patient also instructed on incentive spirometer during hospitalization and encouraged to continue to use at home regularly.    *The dressing should be left in place until the suction unit stops functioning (typically 7 days).  May leave dressing in place and cut tube if the dressing is still sealed at 7 days. If waterproof dressing is intact the patient may shower immediately following discharge. If the dressing becomes disloged or saturated it should be changed. Please refer to the [employer] information sheet if you have any questions about the dressing.  If you have a home health nurse or therapist they can be contacted to assist with dressing change or repair. You may also call the SANDY dressing hotline for questions related to the dressing (1-943.533.2965). If there still other problems or questions related to the dressing despite these measures then you can contact Zina at our office [phone].  After 1 week if the SANDY dressing is removed, the wound should be gently cleaned with antibacterial soap then allowed to dry and covered with a dry sterile dressing. The wound should be covered at all times except while showering.  Patient may change dressings daily and prn using sterile 4x4 and paper tape, and should call if any unusual drainage, redness or swelling.*  Follow up appointment in 2 weeks - patient to call the office at [phone] to schedule.  Patient will be discharged on aspirin 325mg BID x 2weeks, then daily x 4weeks    Discharge Diagnosis:    Patient Active Problem List   Diagnosis   • Anemia due to stage 3 chronic kidney disease treated with erythropoietin (CMS/HCC)   • Breast cancer of upper-outer quadrant of left female breast  (CMS/HCC)   • Atrial fibrillation (CMS/HCC)   • Iron deficiency anemia   • Osteopenia   • Mcguire esophagus   • HLD (hyperlipidemia)   • BP (high blood pressure)   • Hypoxia   • Primary osteoarthritis of right hip   • Primary osteoarthritis of right knee   • OA (osteoarthritis) of knee       Follow-up Appointments  Future Appointments   Date Time Provider Department Center   12/6/2018  8:00 AM Levi Mcguire MD [facility] None   1/2/2019 10:00 AM LAB CHAIR [facility] None   1/2/2019 10:30 [facility]   1/15/2019  8:30 AM Sabrina Vicente APRN [facility] None   3/12/2019 10:00 AM LAB CHAIR [facility] None   3/12/2019 10:40 AM Diego Cornejo II, MD [facility]     Additional Instructions for the Follow-ups that You Need to Schedule     Referral to home health   As directed      PT to see for Home PT protocol. Daily for first week then 3x/ wk for second week. Staples to be removed at f/u appointment in 2 weeks.    Order Comments:  PT to see for Home PT protocol. Daily for first week then 3x/ wk for second week. Staples to be removed at f/u appointment in 2 weeks.     Face to Face Visit Date:  11/20/2018    Follow-up Provider for Plan of Care?:  I will be treating the patient on an ongoing basis.  Please send me the Plan of Care for signature.    Follow-up Provider:  LEVI MCGUIRE [[id number]]    Reason/Clinical Findings:  post op knee replacement    Describe mobility limitations that make leaving home difficult:  pain, swelling, weakness, limited mobility, difficulty ambulating without assistive device    Nursing/Therapeutic Services Requested:  Physicial Therapy                  Levi Mcguire MD  11/20/18  7:46 AM

## 2018-11-20 NOTE — PLAN OF CARE
Problem: Patient Care Overview  Goal: Plan of Care Review  Outcome: Ongoing (interventions implemented as appropriate)   11/20/18 0509   Coping/Psychosocial   Plan of Care Reviewed With patient   OTHER   Outcome Summary VSS, pain minimal, voiding per BRP, RA, SL, educated on BP and HR monitoring, home with HH today   Plan of Care Review   Progress improving     Goal: Individualization and Mutuality  Outcome: Ongoing (interventions implemented as appropriate)    Goal: Discharge Needs Assessment  Outcome: Ongoing (interventions implemented as appropriate)      Problem: Fall Risk (Adult)  Goal: Identify Related Risk Factors and Signs and Symptoms  Outcome: Outcome(s) achieved Date Met: 11/20/18    Goal: Absence of Fall  Outcome: Ongoing (interventions implemented as appropriate)      Problem: Knee Arthroplasty (Total, Partial) (Adult)  Goal: Signs and Symptoms of Listed Potential Problems Will be Absent, Minimized or Managed (Knee Arthroplasty)  Outcome: Ongoing (interventions implemented as appropriate)    Goal: Anesthesia/Sedation Recovery  Outcome: Ongoing (interventions implemented as appropriate)

## 2018-11-23 NOTE — PROGRESS NOTES
Case Management Discharge Note    Final Note: Home    Destination      No service has been selected for the patient.      Durable Medical Equipment      No service has been selected for the patient.      Dialysis/Infusion      No service has been selected for the patient.      Home Medical Care      No service has been selected for the patient.      Community Resources      No service has been selected for the patient.        Other: Other(private auto)    Final Discharge Disposition Code: 06 - home with home health care

## 2018-11-26 ENCOUNTER — TELEPHONE (OUTPATIENT)
Dept: ORTHOPEDIC SURGERY | Facility: CLINIC | Age: 73
End: 2018-11-26

## 2018-11-26 NOTE — TELEPHONE ENCOUNTER
Call return to the patient.  I was unable to reach her and leave a message on her answering machine.  Have advised her that the battery pack on her seble dressing is only scheduled last for 7 days and she is now 1 week postop.  Have left her instructions to cut turn the battery pack off and to cut the tubing to remove the battery pack and placing a waterproof dressing over the end of the tubing and as long as her dressing is dry and intact to leave the dressing on until her follow-up visit.  Left it open for her to call if she has any other questions or concerns

## 2018-11-27 ENCOUNTER — LAB REQUISITION (OUTPATIENT)
Dept: LAB | Facility: HOSPITAL | Age: 73
End: 2018-11-27

## 2018-11-27 DIAGNOSIS — D50.9 IRON DEFICIENCY ANEMIA: ICD-10-CM

## 2018-11-27 LAB
DEPRECATED RDW RBC AUTO: 51.9 FL (ref 37–54)
ERYTHROCYTE [DISTWIDTH] IN BLOOD BY AUTOMATED COUNT: 15 % (ref 11.7–13)
HCT VFR BLD AUTO: 34.3 % (ref 35.6–45.5)
HGB BLD-MCNC: 10.5 G/DL (ref 11.9–15.5)
MCH RBC QN AUTO: 29 PG (ref 26.9–32)
MCHC RBC AUTO-ENTMCNC: 30.6 G/DL (ref 32.4–36.3)
MCV RBC AUTO: 94.8 FL (ref 80.5–98.2)
PLATELET # BLD AUTO: 273 10*3/MM3 (ref 140–500)
PMV BLD AUTO: 9.3 FL (ref 6–12)
RBC # BLD AUTO: 3.62 10*6/MM3 (ref 3.9–5.2)
WBC NRBC COR # BLD: 7.72 10*3/MM3 (ref 4.5–10.7)

## 2018-11-27 PROCEDURE — 85027 COMPLETE CBC AUTOMATED: CPT | Performed by: INTERNAL MEDICINE

## 2018-11-28 ENCOUNTER — TELEPHONE (OUTPATIENT)
Dept: ORTHOPEDIC SURGERY | Facility: CLINIC | Age: 73
End: 2018-11-28

## 2018-11-28 DIAGNOSIS — Z96.651 STATUS POST TOTAL RIGHT KNEE REPLACEMENT: Primary | ICD-10-CM

## 2018-12-06 ENCOUNTER — OFFICE VISIT (OUTPATIENT)
Dept: ORTHOPEDIC SURGERY | Facility: CLINIC | Age: 73
End: 2018-12-06

## 2018-12-06 VITALS — HEIGHT: 65 IN | BODY MASS INDEX: 21.66 KG/M2 | WEIGHT: 130 LBS | TEMPERATURE: 97.6 F

## 2018-12-06 DIAGNOSIS — Z96.651 STATUS POST TOTAL RIGHT KNEE REPLACEMENT: Primary | ICD-10-CM

## 2018-12-06 PROCEDURE — 99024 POSTOP FOLLOW-UP VISIT: CPT | Performed by: ORTHOPAEDIC SURGERY

## 2018-12-06 NOTE — PROGRESS NOTES
Mary Kay Saab : 1945 MRN: [id number] DATE: 2018    DIAGNOSIS: 2 week follow up right total knee      SUBJECTIVE:Patient returns today for 2 week follow up of right total knee replacement. Patient reports doing well with no unusual complaints. Appears to be progressing appropriately.    OBJECTIVE:   Exam:. The incision is healing appropriately. No sign of infection. Range of motion is progressing as expected. The calf is soft and nontender with a negative Homans sign.    ASSESSMENT: 2 week status post right knee replacement.    PLAN: 1) Staples removed and steri strips applied   2) Order given for PT   3) Discontinue RIGO hose   4) Continue ice PRN   5) pradaxa for lifetime   6) Follow up in 6 weeks with repeat Xrays of right knee (3views)    Van Mcguire MD  2018

## 2018-12-10 ENCOUNTER — LAB (OUTPATIENT)
Dept: LAB | Facility: HOSPITAL | Age: 73
End: 2018-12-10
Attending: ORTHOPAEDIC SURGERY

## 2018-12-10 ENCOUNTER — TRANSCRIBE ORDERS (OUTPATIENT)
Dept: ADMINISTRATIVE | Facility: HOSPITAL | Age: 73
End: 2018-12-10

## 2018-12-10 DIAGNOSIS — D50.0 BLOOD LOSS ANEMIA: ICD-10-CM

## 2018-12-10 DIAGNOSIS — D50.0 BLOOD LOSS ANEMIA: Primary | ICD-10-CM

## 2018-12-10 LAB
HCT VFR BLD AUTO: 33.6 % (ref 35.6–45.5)
HGB BLD-MCNC: 10.3 G/DL (ref 11.9–15.5)

## 2018-12-10 PROCEDURE — 85014 HEMATOCRIT: CPT | Performed by: ORTHOPAEDIC SURGERY

## 2018-12-10 PROCEDURE — 85018 HEMOGLOBIN: CPT | Performed by: ORTHOPAEDIC SURGERY

## 2018-12-10 PROCEDURE — 36415 COLL VENOUS BLD VENIPUNCTURE: CPT

## 2018-12-12 ENCOUNTER — TREATMENT (OUTPATIENT)
Dept: PHYSICAL THERAPY | Facility: CLINIC | Age: 73
End: 2018-12-12

## 2018-12-12 ENCOUNTER — TELEPHONE (OUTPATIENT)
Dept: ORTHOPEDIC SURGERY | Facility: CLINIC | Age: 73
End: 2018-12-12

## 2018-12-12 DIAGNOSIS — Z96.651 STATUS POST RIGHT KNEE REPLACEMENT: Primary | ICD-10-CM

## 2018-12-12 DIAGNOSIS — M25.561 RIGHT KNEE PAIN, UNSPECIFIED CHRONICITY: ICD-10-CM

## 2018-12-12 DIAGNOSIS — Z74.09 IMPAIRED FUNCTIONAL MOBILITY, BALANCE, GAIT, AND ENDURANCE: ICD-10-CM

## 2018-12-12 PROCEDURE — 97161 PT EVAL LOW COMPLEX 20 MIN: CPT | Performed by: PHYSICAL THERAPIST

## 2018-12-12 PROCEDURE — G8978 MOBILITY CURRENT STATUS: HCPCS | Performed by: PHYSICAL THERAPIST

## 2018-12-12 PROCEDURE — 97110 THERAPEUTIC EXERCISES: CPT | Performed by: PHYSICAL THERAPIST

## 2018-12-12 PROCEDURE — G8979 MOBILITY GOAL STATUS: HCPCS | Performed by: PHYSICAL THERAPIST

## 2018-12-12 NOTE — PROGRESS NOTES
Physical Therapy Initial Evaluation and Plan of Care      Patient: Mary Kay Saab   : 1945  Diagnosis/ICD-10 Code:  Status post right knee replacement [Z96.651]  Referring practitioner: YUSEF Crenshaw  Date of Initial Visit: 2018  Today's Date: 2018  Patient seen for 1 sessions           Subjective Questionnaire: LEFS:       Subjective Evaluation    History of Present Illness  Mechanism of injury: R TKA 18. Pt states she had 7 visits of home PT and was discharged last week. She also had the flu since TKA. Stopped pain medication 2 days after getting home. Taking Motrin prn. Pt reports burning pain in knee (medial aspect). Pain relieved with pillow under knee. Sleeping in bed. Using cane for community distances.      Patient Occupation: Retired Pain  Current pain rating: 3  At worst pain rating: 10  Location: R knee  Quality: dull ache and burning  Relieving factors: ice and medications    Social Support  Lives in: apartment (1 flight to get in home)  Lives with: spouse    Treatments  Previous treatment: injection treatment and medication  Current treatment: physical therapy  Patient Goals  Patient goals for therapy: decreased pain, improved balance, increased motion, decreased edema, increased strength, independence with ADLs/IADLs and return to sport/leisure activities             Objective       Observations     Right Knee   Positive for edema.     Additional Observation Details  Incision clean w/ steri strips present    Tenderness     Right Knee   Tenderness in the ITB and pes anserinus.     Active Range of Motion     Right Knee   Flexion: 95 degrees   Extension: 5 degrees     Strength/Myotome Testing     Right Knee   Flexion: 4  Extension: 4+    Swelling     Left Knee Girth Measurement (cm)   Joint line: 37 cm    Right Knee Girth Measurement (cm)   Joint line: 38 cm         Assessment & Plan     Assessment  Impairments: abnormal gait, abnormal muscle tone, abnormal or  restricted ROM, activity intolerance, impaired balance, impaired physical strength, lacks appropriate home exercise program, pain with function and weight-bearing intolerance  Assessment details: Pt will benefit from skilled PT services in order to address listed impairments and increase tolerance to normal daily activities including ADL's, work, and recreational activities.    Prognosis: good  Functional Limitations: lifting, sleeping, walking, uncomfortable because of pain, moving in bed, sitting and standing  Goals  Plan Goals: Plan Goals: Short Term Goals: 2 weeks. Patient will:  1. Be independent with initial HEP  2. Be instructed in posture and body mechanics  3. Tolerate CKC knee strengthening w/o significant increase in pain    Long Term Goals: 4-8 weeks. Patient will:  1. Ambulate 50ft w/o significant pain, limitation, or deviation in gait mechanics  2. Ascend/descend 4 stairs using reciprocal steps and 1 rail for safety  3. Perform body weight squat w/o significant knee valgus, demonstrating adequate strength for ADLs  4. Demonstrate normal lower extremity flexibility (0-110deg) to allow for walking/ADL's/performance of household tasks without pain.  5. Perform SL balance >10sec demonstrating balance required for navigating uneven terrain  6. Perceived disability </= 15% as measured on LEFS  7. Be independent with long term HEP    Plan  Therapy options: will be seen for skilled physical therapy services  Planned modality interventions: cryotherapy, microcurrent electrical stimulation, ultrasound and thermotherapy (hydrocollator packs)  Planned therapy interventions: abdominal trunk stabilization, balance/weight-bearing training, body mechanics training, ADL retraining, flexibility, functional ROM exercises, gait training, home exercise program, joint mobilization, manual therapy, neuromuscular re-education, soft tissue mobilization, strengthening, stretching and therapeutic activities  Frequency: 3x  week  Duration in weeks: 12  Treatment plan discussed with: patient        Manual Therapy:    0     mins  15688;  Therapeutic Exercise:    25     mins  33272;     Neuromuscular Lenny:    0    mins  33772;    Therapeutic Activity:     0     mins  87560;     Gait Trainin     mins  38765;     Ultrasound:     0     mins  58331;    Electrical Stimulation:    0     mins  81322 ( );  Dry Needling     0     mins self-pay    Timed Treatment:   25   mins   Total Treatment:     60   mins    PT SIGNATURE: Lisa Cuellar, PT   DATE TREATMENT INITIATED: 2018    Initial Certification  Certification Period: 3/12/2019  I certify that the therapy services are furnished while this patient is under my care.  The services outlined above are required by this patient, and will be reviewed every 90 days.     PHYSICIAN: Sabrina Vicente, YUSEF      DATE:     Please sign and return via fax to [phone].. Thank you, [facility].

## 2018-12-12 NOTE — TELEPHONE ENCOUNTER
"Patient had Right TKA 11/19/18 - patient picked up upper respiratory virus 2 days after R TKA and was on antibiotics & a steroid from PCP  until 1 week ago. Patient states she has a spot\" on inside of Right knee - not raised or swollen, but hurts all the time, has hurt since got home from hospital, but has hurt in the middle of the night & interrupting sleep. \"Feels like somebody has lit a torch\" to inside of Right knee a burning pain. No redness, but feels warm from time to time. Temperature has been 99.4 but patient asking if could be an infection in Right knee?    Patient will only be home until 2:45 has 1st OP PT appt (patient going to EP & thinks she'll be home by 5:00p).  Please advise.  "

## 2018-12-14 ENCOUNTER — TREATMENT (OUTPATIENT)
Dept: PHYSICAL THERAPY | Facility: CLINIC | Age: 73
End: 2018-12-14

## 2018-12-14 DIAGNOSIS — Z96.651 STATUS POST RIGHT KNEE REPLACEMENT: Primary | ICD-10-CM

## 2018-12-14 DIAGNOSIS — M25.561 RIGHT KNEE PAIN, UNSPECIFIED CHRONICITY: ICD-10-CM

## 2018-12-14 PROCEDURE — 97140 MANUAL THERAPY 1/> REGIONS: CPT | Performed by: PHYSICAL THERAPIST

## 2018-12-14 PROCEDURE — 97110 THERAPEUTIC EXERCISES: CPT | Performed by: PHYSICAL THERAPIST

## 2018-12-14 NOTE — PROGRESS NOTES
Physical Therapy Daily Progress Note    Visit # : 2  Mary Kay Saab reports: my R hip/groin hurts; can't get comfortable for sleeping since I was told not to put a pillow under your knee. I spilled my coke in the frig last night and spent a lot of time cleaning it upThe exercise on my stomach seems to aggravate my hip.      Subjective     Objective   See Exercise, Manual, and Modality Logs for complete treatment.       Assessment/Plan  Improving knee extension with persistent muscle guarding of HS.  Good tolerance to exercise progression.  Modified knee extension stretch to sitting  Progress strengthening /stabilization /functional activity  Add lateral step ups/standing toe/heel raises as tolerated.          Manual Therapy:    8     mins  88360;  Therapeutic Exercise:    27     mins  31470;     Neuromuscular Lenny:    -    mins  00746;    Therapeutic Activity:     5     mins  08537;     Gait Training:      -     mins  31246;     Ultrasound:     -     mins  62595;    Electrical Stimulation:    -     mins  96125 ( );  Iontophoresis                 -     mins 47009      Timed Treatment:   40   mins direct  Total Treatment:     55   mins        Jojo Garg PT  Physical Therapist  [place] License # [id number]

## 2018-12-17 ENCOUNTER — TREATMENT (OUTPATIENT)
Dept: PHYSICAL THERAPY | Facility: CLINIC | Age: 73
End: 2018-12-17

## 2018-12-17 DIAGNOSIS — Z96.651 STATUS POST RIGHT KNEE REPLACEMENT: Primary | ICD-10-CM

## 2018-12-17 DIAGNOSIS — Z74.09 IMPAIRED FUNCTIONAL MOBILITY, BALANCE, GAIT, AND ENDURANCE: ICD-10-CM

## 2018-12-17 DIAGNOSIS — M25.561 RIGHT KNEE PAIN, UNSPECIFIED CHRONICITY: ICD-10-CM

## 2018-12-17 PROCEDURE — 97140 MANUAL THERAPY 1/> REGIONS: CPT | Performed by: PHYSICAL THERAPIST

## 2018-12-17 PROCEDURE — 97110 THERAPEUTIC EXERCISES: CPT | Performed by: PHYSICAL THERAPIST

## 2018-12-17 PROCEDURE — 97530 THERAPEUTIC ACTIVITIES: CPT | Performed by: PHYSICAL THERAPIST

## 2018-12-17 NOTE — PROGRESS NOTES
"Physical Therapy Daily Progress Note      Visit # 3      Subjective   Pt reports medial knee pain was better for a few days but now it is back to \"flaming\". States she cannot tolerate prone hang.    Objective   See Exercise, Manual, and Modality Logs for complete treatment.       Assessment/Plan  Continues to have increased tenderness of medial knee. Fair tolerance to stretching. Demonstrated fair balance on steps and hurdles with min use of rails. Progress per POC.                 Manual Therapy:    10     mins  92636;  Therapeutic Exercise:    25     mins  85124;     Neuromuscular Lenny:    0    mins  31824;    Therapeutic Activity:     10     mins  54256;     Gait Trainin     mins  86679;     Ultrasound:     0     mins  67570;    Work Hardening           0      mins 28895  Iontophoresis               0   mins 11524    Timed Treatment:   45   mins   Total Treatment:     60   mins    Lisa Cuellar, PT  Physical Therapist  "

## 2018-12-19 ENCOUNTER — TREATMENT (OUTPATIENT)
Dept: PHYSICAL THERAPY | Facility: CLINIC | Age: 73
End: 2018-12-19

## 2018-12-19 DIAGNOSIS — Z74.09 IMPAIRED FUNCTIONAL MOBILITY, BALANCE, GAIT, AND ENDURANCE: ICD-10-CM

## 2018-12-19 DIAGNOSIS — Z96.651 STATUS POST RIGHT KNEE REPLACEMENT: Primary | ICD-10-CM

## 2018-12-19 DIAGNOSIS — M25.561 RIGHT KNEE PAIN, UNSPECIFIED CHRONICITY: ICD-10-CM

## 2018-12-19 PROCEDURE — 97530 THERAPEUTIC ACTIVITIES: CPT | Performed by: PHYSICAL THERAPIST

## 2018-12-19 PROCEDURE — G0283 ELEC STIM OTHER THAN WOUND: HCPCS | Performed by: PHYSICAL THERAPIST

## 2018-12-19 PROCEDURE — 97110 THERAPEUTIC EXERCISES: CPT | Performed by: PHYSICAL THERAPIST

## 2018-12-19 NOTE — PROGRESS NOTES
Physical Therapy Daily Progress Note      Visit # 4      Subjective   Pt reports medial knee feels a little bit better.    Objective   See Exercise, Manual, and Modality Logs for complete treatment.       Assessment/Plan  Pain levels decreasing. Tolerated increase in step height well w/o c/o pain. Progress per POC.                 Manual Therapy:    0     mins  43357;  Therapeutic Exercise:    30     mins  89169;     Neuromuscular Lenny:    0    mins  70083;    Therapeutic Activity:     20     mins  21117;     Gait Trainin     mins  62559;     Ultrasound:     0     mins  11556;    Work Hardening           0      mins 96433  Iontophoresis               0   mins 54150    Timed Treatment:   50   mins   Total Treatment:     70   mins    Lisa Cuellar, PT  Physical Therapist

## 2018-12-21 ENCOUNTER — TREATMENT (OUTPATIENT)
Dept: PHYSICAL THERAPY | Facility: CLINIC | Age: 73
End: 2018-12-21

## 2018-12-21 DIAGNOSIS — Z74.09 IMPAIRED FUNCTIONAL MOBILITY, BALANCE, GAIT, AND ENDURANCE: ICD-10-CM

## 2018-12-21 DIAGNOSIS — M25.561 RIGHT KNEE PAIN, UNSPECIFIED CHRONICITY: ICD-10-CM

## 2018-12-21 DIAGNOSIS — Z96.651 STATUS POST RIGHT KNEE REPLACEMENT: Primary | ICD-10-CM

## 2018-12-21 PROCEDURE — 97110 THERAPEUTIC EXERCISES: CPT | Performed by: PHYSICAL THERAPIST

## 2018-12-21 PROCEDURE — G0283 ELEC STIM OTHER THAN WOUND: HCPCS | Performed by: PHYSICAL THERAPIST

## 2018-12-21 PROCEDURE — 97530 THERAPEUTIC ACTIVITIES: CPT | Performed by: PHYSICAL THERAPIST

## 2018-12-21 NOTE — PROGRESS NOTES
Physical Therapy Daily Progress Note      Visit # 5      Subjective   Pt reports knee is not painful, just uncomfortable. She tried driving, but does not feel comfortable braking.    Objective   See Exercise, Manual, and Modality Logs for complete treatment.       Assessment/Plan  Knee ROM continues to improve. Gait is mildly antalgic and limited by AROM. Pt reported increased pain w/ initiation of SL lowering. Pain decreased w/ estim and ice. Progress per POC.                 Manual Therapy:    0     mins  35133;  Therapeutic Exercise:    30     mins  98751;     Neuromuscular Lenny:    0    mins  98342;    Therapeutic Activity:     15     mins  30913;     Gait Trainin     mins  75869;     Ultrasound:     0     mins  37537;    Work Hardening           0      mins 55152  Iontophoresis               0   mins 81120    Timed Treatment:   53   mins   Total Treatment:     70   mins    Lisa Cuellar, PT  Physical Therapist

## 2018-12-26 ENCOUNTER — TREATMENT (OUTPATIENT)
Dept: PHYSICAL THERAPY | Facility: CLINIC | Age: 73
End: 2018-12-26

## 2018-12-26 DIAGNOSIS — M25.561 RIGHT KNEE PAIN, UNSPECIFIED CHRONICITY: ICD-10-CM

## 2018-12-26 DIAGNOSIS — Z96.651 STATUS POST RIGHT KNEE REPLACEMENT: Primary | ICD-10-CM

## 2018-12-26 DIAGNOSIS — Z74.09 IMPAIRED FUNCTIONAL MOBILITY, BALANCE, GAIT, AND ENDURANCE: ICD-10-CM

## 2018-12-26 PROCEDURE — 97530 THERAPEUTIC ACTIVITIES: CPT | Performed by: PHYSICAL THERAPIST

## 2018-12-26 PROCEDURE — 97110 THERAPEUTIC EXERCISES: CPT | Performed by: PHYSICAL THERAPIST

## 2018-12-26 PROCEDURE — G0283 ELEC STIM OTHER THAN WOUND: HCPCS | Performed by: PHYSICAL THERAPIST

## 2018-12-26 NOTE — PROGRESS NOTES
Physical Therapy Daily Progress Note      Visit # 6      Subjective   Pt reports she sat for a long time and her knee really hurt. She has been doing her exercises but feels like her knee is not bending more. She is not using cane today.    Objective   R knee AROM flexion 121  See Exercise, Manual, and Modality Logs for complete treatment.       Assessment/Plan  R knee AROM continues to improve. Slight limitations in knee extension persist as witnessed during prone hangs. Pt requested decreased weight with strengthening due to overexerting herself over the holiday. Progress per POC.                 Manual Therapy:    0     mins  85206;  Therapeutic Exercise:   25     mins  71614;     Neuromuscular Lenny:    0    mins  03027;    Therapeutic Activity:     15     mins  77542;     Gait Trainin     mins  51072;     Ultrasound:     0     mins  02658;    Work Hardening           0      mins 71789  Iontophoresis               0   mins 75869    Timed Treatment:   45   mins   Total Treatment:     68   mins    Lisa Cuellar, PT  Physical Therapist

## 2018-12-28 ENCOUNTER — TREATMENT (OUTPATIENT)
Dept: PHYSICAL THERAPY | Facility: CLINIC | Age: 73
End: 2018-12-28

## 2018-12-28 DIAGNOSIS — Z96.651 STATUS POST RIGHT KNEE REPLACEMENT: Primary | ICD-10-CM

## 2018-12-28 DIAGNOSIS — Z74.09 IMPAIRED FUNCTIONAL MOBILITY, BALANCE, GAIT, AND ENDURANCE: ICD-10-CM

## 2018-12-28 DIAGNOSIS — M25.561 RIGHT KNEE PAIN, UNSPECIFIED CHRONICITY: ICD-10-CM

## 2018-12-28 PROCEDURE — G0283 ELEC STIM OTHER THAN WOUND: HCPCS | Performed by: PHYSICAL THERAPIST

## 2018-12-28 PROCEDURE — 97530 THERAPEUTIC ACTIVITIES: CPT | Performed by: PHYSICAL THERAPIST

## 2018-12-28 PROCEDURE — 97110 THERAPEUTIC EXERCISES: CPT | Performed by: PHYSICAL THERAPIST

## 2018-12-28 NOTE — PROGRESS NOTES
Physical Therapy Daily Progress Note      Visit # 7      Subjective   Pt reports her knee is very sore today.    Objective   See Exercise, Manual, and Modality Logs for complete treatment.       Assessment/Plan  Did not progress exercise due to c/o P! AROM is steadily progressing. Low tolerance to extension overpressure. Excellent balance stepping over hurdles. Progress per POC.                 Manual Therapy:    3     mins  90841;  Therapeutic Exercise:    15     mins  34864;     Neuromuscular Lenny:    0    mins  95285;    Therapeutic Activity:     20     mins  63850;     Gait Trainin     mins  36662;     Ultrasound:     0     mins  46782;    Work Hardening           0      mins 02429  Iontophoresis               0   mins 62517    Timed Treatment:   38   mins   Total Treatment:     60   mins    Lisa Cuellar, PT  Physical Therapist

## 2019-01-02 ENCOUNTER — TREATMENT (OUTPATIENT)
Dept: PHYSICAL THERAPY | Facility: CLINIC | Age: 74
End: 2019-01-02

## 2019-01-02 ENCOUNTER — INFUSION (OUTPATIENT)
Dept: ONCOLOGY | Facility: HOSPITAL | Age: 74
End: 2019-01-02

## 2019-01-02 ENCOUNTER — LAB (OUTPATIENT)
Dept: OTHER | Facility: HOSPITAL | Age: 74
End: 2019-01-02

## 2019-01-02 VITALS
TEMPERATURE: 97.9 F | OXYGEN SATURATION: 98 % | DIASTOLIC BLOOD PRESSURE: 95 MMHG | WEIGHT: 133 LBS | HEART RATE: 66 BPM | BODY MASS INDEX: 22.48 KG/M2 | SYSTOLIC BLOOD PRESSURE: 145 MMHG

## 2019-01-02 DIAGNOSIS — Z96.651 STATUS POST RIGHT KNEE REPLACEMENT: Primary | ICD-10-CM

## 2019-01-02 DIAGNOSIS — N18.30 ANEMIA DUE TO STAGE 3 CHRONIC KIDNEY DISEASE TREATED WITH ERYTHROPOIETIN (HCC): ICD-10-CM

## 2019-01-02 DIAGNOSIS — C50.412 MALIGNANT NEOPLASM OF UPPER-OUTER QUADRANT OF LEFT BREAST IN FEMALE, ESTROGEN RECEPTOR POSITIVE (HCC): ICD-10-CM

## 2019-01-02 DIAGNOSIS — D63.1 ANEMIA DUE TO STAGE 3 CHRONIC KIDNEY DISEASE TREATED WITH ERYTHROPOIETIN (HCC): ICD-10-CM

## 2019-01-02 DIAGNOSIS — Z74.09 IMPAIRED FUNCTIONAL MOBILITY, BALANCE, GAIT, AND ENDURANCE: ICD-10-CM

## 2019-01-02 DIAGNOSIS — D50.9 IRON DEFICIENCY ANEMIA, UNSPECIFIED IRON DEFICIENCY ANEMIA TYPE: ICD-10-CM

## 2019-01-02 DIAGNOSIS — M25.561 RIGHT KNEE PAIN, UNSPECIFIED CHRONICITY: ICD-10-CM

## 2019-01-02 DIAGNOSIS — Z17.0 MALIGNANT NEOPLASM OF UPPER-OUTER QUADRANT OF LEFT BREAST IN FEMALE, ESTROGEN RECEPTOR POSITIVE (HCC): ICD-10-CM

## 2019-01-02 LAB
BASOPHILS # BLD AUTO: 0.05 10*3/MM3 (ref 0–0.2)
BASOPHILS NFR BLD AUTO: 1 % (ref 0–1.5)
DEPRECATED RDW RBC AUTO: 51.8 FL (ref 37–54)
EOSINOPHIL # BLD AUTO: 0.17 10*3/MM3 (ref 0–0.7)
EOSINOPHIL NFR BLD AUTO: 3.3 % (ref 0.3–6.2)
ERYTHROCYTE [DISTWIDTH] IN BLOOD BY AUTOMATED COUNT: 15.7 % (ref 11.7–13)
FERRITIN SERPL-MCNC: 192.6 NG/ML (ref 13–150)
HCT VFR BLD AUTO: 33.4 % (ref 35.6–45.5)
HGB BLD-MCNC: 11 G/DL (ref 11.9–15.5)
HGB RETIC QN AUTO: 32 PG (ref 32.7–38.6)
IMM GRANULOCYTES # BLD AUTO: 0.02 10*3/MM3 (ref 0–0.03)
IMM GRANULOCYTES NFR BLD AUTO: 0.4 % (ref 0–0.5)
IMM RETICS NFR: 10.3 % (ref 0.7–13.7)
IRON 24H UR-MRATE: 61 MCG/DL (ref 37–145)
IRON SATN MFR SERPL: 18 % (ref 20–50)
LYMPHOCYTES # BLD AUTO: 1.08 10*3/MM3 (ref 0.9–4.8)
LYMPHOCYTES NFR BLD AUTO: 21 % (ref 19.6–45.3)
MCH RBC QN AUTO: 29.7 PG (ref 26.9–32)
MCHC RBC AUTO-ENTMCNC: 32.9 G/DL (ref 32.4–36.3)
MCV RBC AUTO: 90.3 FL (ref 80.5–98.2)
MONOCYTES # BLD AUTO: 0.62 10*3/MM3 (ref 0.2–1.2)
MONOCYTES NFR BLD AUTO: 12.1 % (ref 5–12)
NEUTROPHILS # BLD AUTO: 3.2 10*3/MM3 (ref 1.9–8.1)
NEUTROPHILS NFR BLD AUTO: 62.2 % (ref 42.7–76)
NRBC BLD AUTO-RTO: 0 /100 WBC (ref 0–0)
PLATELET # BLD AUTO: 265 10*3/MM3 (ref 140–500)
PMV BLD AUTO: 9.5 FL (ref 6–12)
RBC # BLD AUTO: 3.7 10*6/MM3 (ref 3.9–5.2)
RETICS/RBC NFR AUTO: 1.96 % (ref 0.5–1.5)
TIBC SERPL-MCNC: 338 MCG/DL (ref 298–536)
TRANSFERRIN SERPL-MCNC: 227 MG/DL (ref 200–360)
WBC NRBC COR # BLD: 5.14 10*3/MM3 (ref 4.5–10.7)

## 2019-01-02 PROCEDURE — 85046 RETICYTE/HGB CONCENTRATE: CPT | Performed by: INTERNAL MEDICINE

## 2019-01-02 PROCEDURE — 97110 THERAPEUTIC EXERCISES: CPT | Performed by: PHYSICAL THERAPIST

## 2019-01-02 PROCEDURE — 83540 ASSAY OF IRON: CPT | Performed by: INTERNAL MEDICINE

## 2019-01-02 PROCEDURE — 84466 ASSAY OF TRANSFERRIN: CPT | Performed by: INTERNAL MEDICINE

## 2019-01-02 PROCEDURE — G0283 ELEC STIM OTHER THAN WOUND: HCPCS | Performed by: PHYSICAL THERAPIST

## 2019-01-02 PROCEDURE — 36415 COLL VENOUS BLD VENIPUNCTURE: CPT

## 2019-01-02 PROCEDURE — 82728 ASSAY OF FERRITIN: CPT | Performed by: INTERNAL MEDICINE

## 2019-01-02 PROCEDURE — 85025 COMPLETE CBC W/AUTO DIFF WBC: CPT | Performed by: INTERNAL MEDICINE

## 2019-01-02 PROCEDURE — 97530 THERAPEUTIC ACTIVITIES: CPT | Performed by: PHYSICAL THERAPIST

## 2019-01-02 NOTE — PROGRESS NOTES
Physical Therapy Daily Progress Note      Visit # 8      Subjective   Pt reports she has been sore but did her HEP regardless. She drove to PT for the first time today.    Objective   See Exercise, Manual, and Modality Logs for complete treatment.       Assessment/Plan  AROM and strength progressing very well. Swelling is minimal. Limited strengthening progressions per pt's request. Gait remains mildy antalgic. Progress per POC.                 Manual Therapy:    0     mins  57593;  Therapeutic Exercise:    25     mins  16227;     Neuromuscular Lenny:    0    mins  18566;    Therapeutic Activity:     10     mins  29698;     Gait Trainin     mins  21701;     Ultrasound:     0     mins  56617;    Work Hardening           0      mins 75757  Iontophoresis               0   mins 24448    Timed Treatment:   35   mins   Total Treatment:     55   mins    Lisa Cuellar, PT  Physical Therapist

## 2019-01-02 NOTE — PROGRESS NOTES
Hgb 11 today and no procrit indicated.  Iron panel and ferritin results called to patient.  No complaints voiced.  Pt next appt reviewed and instructed to call sooner with concerns.

## 2019-01-03 ENCOUNTER — TELEPHONE (OUTPATIENT)
Dept: ORTHOPEDIC SURGERY | Facility: CLINIC | Age: 74
End: 2019-01-03

## 2019-01-03 NOTE — TELEPHONE ENCOUNTER
Would recommend continuing to do exercises that she is able to tolerate, obviously avoiding any exercises that cause right hip pain

## 2019-01-04 ENCOUNTER — TREATMENT (OUTPATIENT)
Dept: PHYSICAL THERAPY | Facility: CLINIC | Age: 74
End: 2019-01-04

## 2019-01-04 DIAGNOSIS — Z74.09 IMPAIRED FUNCTIONAL MOBILITY, BALANCE, GAIT, AND ENDURANCE: ICD-10-CM

## 2019-01-04 DIAGNOSIS — Z96.651 STATUS POST RIGHT KNEE REPLACEMENT: Primary | ICD-10-CM

## 2019-01-04 DIAGNOSIS — M25.561 RIGHT KNEE PAIN, UNSPECIFIED CHRONICITY: ICD-10-CM

## 2019-01-04 PROCEDURE — 97110 THERAPEUTIC EXERCISES: CPT | Performed by: PHYSICAL THERAPIST

## 2019-01-04 PROCEDURE — 97140 MANUAL THERAPY 1/> REGIONS: CPT | Performed by: PHYSICAL THERAPIST

## 2019-01-04 PROCEDURE — 97530 THERAPEUTIC ACTIVITIES: CPT | Performed by: PHYSICAL THERAPIST

## 2019-01-04 NOTE — PROGRESS NOTES
Physical Therapy Daily Progress Note      Visit # 9      Subjective   Pt reports her R hip hurts worse than her knee. She does not want to use weights today. Describes pain as being in the groin, wrapping around anterior to posterior hip. Reports swelling in inner thigh/groin.    Objective   Increased tenderness of R proximal adductors and rectus femoris  R hip PROM WNL, P! in groin w/ PROM flexion    See Exercise, Manual, and Modality Logs for complete treatment.       Assessment/Plan  Increased tenderness of R anterior hip musculature. P! w/ quadriceps/hip flexor use. Fair tolerance to STM and foam rolling despite reports of pain. Encouraged pt to perform self-massage of anterior hip a few times per week. Modified exercise to avoid pain. Progress per POC.                 Manual Therapy:    15     mins  39511;  Therapeutic Exercise:    20     mins  45800;     Neuromuscular Lenny:    0    mins  63092;    Therapeutic Activity:     20     mins  46572;     Gait Trainin     mins  68005;     Ultrasound:     0     mins  13814;    Work Hardening           0      mins 81135  Iontophoresis               0   mins 27710    Timed Treatment:   55   mins   Total Treatment:     75   mins    Lisa Cuellar, PT  Physical Therapist

## 2019-01-07 ENCOUNTER — TREATMENT (OUTPATIENT)
Dept: PHYSICAL THERAPY | Facility: CLINIC | Age: 74
End: 2019-01-07

## 2019-01-07 DIAGNOSIS — M25.561 RIGHT KNEE PAIN, UNSPECIFIED CHRONICITY: ICD-10-CM

## 2019-01-07 DIAGNOSIS — Z96.651 STATUS POST RIGHT KNEE REPLACEMENT: Primary | ICD-10-CM

## 2019-01-07 DIAGNOSIS — Z74.09 IMPAIRED FUNCTIONAL MOBILITY, BALANCE, GAIT, AND ENDURANCE: ICD-10-CM

## 2019-01-07 PROCEDURE — 97110 THERAPEUTIC EXERCISES: CPT | Performed by: PHYSICAL THERAPIST

## 2019-01-07 PROCEDURE — 97530 THERAPEUTIC ACTIVITIES: CPT | Performed by: PHYSICAL THERAPIST

## 2019-01-07 PROCEDURE — G0283 ELEC STIM OTHER THAN WOUND: HCPCS | Performed by: PHYSICAL THERAPIST

## 2019-01-07 PROCEDURE — 97140 MANUAL THERAPY 1/> REGIONS: CPT | Performed by: PHYSICAL THERAPIST

## 2019-01-09 ENCOUNTER — TREATMENT (OUTPATIENT)
Dept: PHYSICAL THERAPY | Facility: CLINIC | Age: 74
End: 2019-01-09

## 2019-01-09 DIAGNOSIS — Z96.651 STATUS POST RIGHT KNEE REPLACEMENT: Primary | ICD-10-CM

## 2019-01-09 DIAGNOSIS — Z74.09 IMPAIRED FUNCTIONAL MOBILITY, BALANCE, GAIT, AND ENDURANCE: ICD-10-CM

## 2019-01-09 DIAGNOSIS — M25.561 RIGHT KNEE PAIN, UNSPECIFIED CHRONICITY: ICD-10-CM

## 2019-01-09 PROCEDURE — G0283 ELEC STIM OTHER THAN WOUND: HCPCS | Performed by: PHYSICAL THERAPIST

## 2019-01-09 PROCEDURE — 97140 MANUAL THERAPY 1/> REGIONS: CPT | Performed by: PHYSICAL THERAPIST

## 2019-01-09 PROCEDURE — 97110 THERAPEUTIC EXERCISES: CPT | Performed by: PHYSICAL THERAPIST

## 2019-01-09 PROCEDURE — 97530 THERAPEUTIC ACTIVITIES: CPT | Performed by: PHYSICAL THERAPIST

## 2019-01-09 NOTE — PROGRESS NOTES
Re-Assessment / Re-Certification        Patient: Mary Kay Saab   : 1945  Diagnosis/ICD-10 Code:  Status post right knee replacement [Z96.651]  Referring practitioner: YUSEF Crenshaw  Date of Initial Evaluation:  Type: THERAPY  Noted: 2018  Patient seen for 11 sessions      Subjective:   Mary Kay Saab reports: I'm starting to go up stairs normally. I have not tried going for a walk. Rates pain 2-3/10 on average.  Subjective Questionnaire: LEFS: 39/80  Clinical Progress: improved  Home Program Compliance: Yes  Treatment has included: therapeutic exercise, manual therapy, therapeutic activity, gait training, electrical stimulation and cryotherapy    Subjective   Objective       Active Range of Motion     Right Knee   Flexion: 128 degrees   Extension: 0 degrees     Strength/Myotome Testing     Right Knee   Flexion: 4  Extension: 4+     Assessment/Plan  Progress toward previous goals: Partially Met   Pt is progressing appropriately. Pain is not significantly limiting function. Demonstrates excellent ROM. Will continue to benefit from skilled PT to decrease pain and progress functional strength and endurance. Progress per POC.        New Goals  4-8 weeks. Patient will:  1. Ambulate 50ft w/o significant pain, limitation, or deviation in gait mechanics  2. Ascend/descend 4 stairs using reciprocal steps and 1 rail for safety  3. Perform body weight squat w/o significant knee valgus, demonstrating adequate strength for ADLs  4. Step over small obstacle (4in rommel) w/o LOB demonstrating adequate stability for uneven terrain  5. Perceived disability </= 15% as measured on LEFS  6. Be independent with long term HEP      Recommendations: Continue as planned  Timeframe: 6 weeks  Prognosis to achieve goals: good    PT Signature: Lisa Cuellar, PT      Based upon review of the patient's progress and continued therapy plan, it is my medical opinion that Mary Kay Saab should continue physical therapy  treatment at [facility]  [address]  [place] [address] [phone].    Signature: __________________________________  Sabrina Vicente APRN    Manual Therapy:    8     mins  03365;  Therapeutic Exercise:    20     mins  77158;     Neuromuscular Lenny:    0    mins  51331;    Therapeutic Activity:     10     mins  88408;     Gait Trainin     mins  15745;     Ultrasound:     0     mins  89740;    Work Hardening           0      mins 16954  Iontophoresis               0   mins 29113    Timed Treatment:   38   mins   Total Treatment:     60   mins

## 2019-01-11 ENCOUNTER — TREATMENT (OUTPATIENT)
Dept: PHYSICAL THERAPY | Facility: CLINIC | Age: 74
End: 2019-01-11

## 2019-01-11 DIAGNOSIS — M25.561 RIGHT KNEE PAIN, UNSPECIFIED CHRONICITY: ICD-10-CM

## 2019-01-11 DIAGNOSIS — Z74.09 IMPAIRED FUNCTIONAL MOBILITY, BALANCE, GAIT, AND ENDURANCE: ICD-10-CM

## 2019-01-11 DIAGNOSIS — Z96.651 STATUS POST RIGHT KNEE REPLACEMENT: Primary | ICD-10-CM

## 2019-01-11 PROCEDURE — 97110 THERAPEUTIC EXERCISES: CPT | Performed by: PHYSICAL THERAPIST

## 2019-01-11 PROCEDURE — 97530 THERAPEUTIC ACTIVITIES: CPT | Performed by: PHYSICAL THERAPIST

## 2019-01-11 PROCEDURE — 97140 MANUAL THERAPY 1/> REGIONS: CPT | Performed by: PHYSICAL THERAPIST

## 2019-01-11 NOTE — PROGRESS NOTES
Physical Therapy Daily Progress Note      Visit # 12      Subjective   Pt reports her knee is sore from doing a lot of housework yesterday.    Objective   See Exercise, Manual, and Modality Logs for complete treatment.       Assessment/Plan  Excellent tolerance to ther ex routine w/o c/o pain. No significant tenderness of patellar tendon. Mild tenderness of anterior hip. Deferred electrical stimulation due to low level pain post-session. Progress per POC.               Manual Therapy:    8     mins  77354;  Therapeutic Exercise:    20     mins  85143;     Neuromuscular Lenny:    0    mins  44225;    Therapeutic Activity:     20     mins  12257;     Gait Trainin     mins  02463;     Ultrasound:     0     mins  68792;    Work Hardening           0      mins 81808  Iontophoresis               0   mins 15283    Timed Treatment:   48   mins   Total Treatment:     70   mins    Lisa Cuellar, PT  Physical Therapist

## 2019-01-14 ENCOUNTER — TREATMENT (OUTPATIENT)
Dept: PHYSICAL THERAPY | Facility: CLINIC | Age: 74
End: 2019-01-14

## 2019-01-14 DIAGNOSIS — Z96.651 STATUS POST RIGHT KNEE REPLACEMENT: Primary | ICD-10-CM

## 2019-01-14 DIAGNOSIS — Z74.09 IMPAIRED FUNCTIONAL MOBILITY, BALANCE, GAIT, AND ENDURANCE: ICD-10-CM

## 2019-01-14 DIAGNOSIS — M25.561 RIGHT KNEE PAIN, UNSPECIFIED CHRONICITY: ICD-10-CM

## 2019-01-14 PROCEDURE — 97530 THERAPEUTIC ACTIVITIES: CPT | Performed by: PHYSICAL THERAPIST

## 2019-01-14 PROCEDURE — 97110 THERAPEUTIC EXERCISES: CPT | Performed by: PHYSICAL THERAPIST

## 2019-01-14 PROCEDURE — G0283 ELEC STIM OTHER THAN WOUND: HCPCS | Performed by: PHYSICAL THERAPIST

## 2019-01-14 NOTE — PROGRESS NOTES
Physical Therapy Daily Progress Note      Visit # 13      Subjective   Pt reports she slept in crouched position and woke up with sore knee on Saturday.    Objective   See Exercise, Manual, and Modality Logs for complete treatment.       Assessment/Plan  C/o anterior hip pain w/ seated hamstring curls. No improvement in hip pain w/ cues for posture. Demonstrates knee flexion WFL. Continues to have slight limitation in knee extension ROM. Encouraged pt to continue prone hangs at home. Progress per POC.                 Manual Therapy:    0     mins  18217;  Therapeutic Exercise:    30     mins  92681;     Neuromuscular Lenny:    0    mins  16801;    Therapeutic Activity:     15     mins  16951;     Gait Trainin     mins  42058;     Ultrasound:     0     mins  56098;    Work Hardening           0      mins 99997  Iontophoresis               0   mins 78904    Timed Treatment:   45   mins   Total Treatment:     65   mins    Lisa Cuellar, PT  Physical Therapist

## 2019-01-15 ENCOUNTER — OFFICE VISIT (OUTPATIENT)
Dept: ORTHOPEDIC SURGERY | Facility: CLINIC | Age: 74
End: 2019-01-15

## 2019-01-15 VITALS — TEMPERATURE: 98.8 F | BODY MASS INDEX: 21.83 KG/M2 | HEIGHT: 65 IN | WEIGHT: 131 LBS

## 2019-01-15 DIAGNOSIS — Z96.651 STATUS POST TOTAL RIGHT KNEE REPLACEMENT: Primary | ICD-10-CM

## 2019-01-15 PROCEDURE — 99024 POSTOP FOLLOW-UP VISIT: CPT | Performed by: NURSE PRACTITIONER

## 2019-01-15 PROCEDURE — 73562 X-RAY EXAM OF KNEE 3: CPT | Performed by: NURSE PRACTITIONER

## 2019-01-15 NOTE — PROGRESS NOTES
Mary Kay Saab : 1945 MRN: [id number] DATE: 1/15/2019    DIAGNOSIS: 8 week follow up right total knee      SUBJECTIVE:Patient returns today for 8 week follow up of right total knee replacement. Patient reports doing well with no unusual complaints. Appears to be progressing appropriately.    OBJECTIVE:   Exam:. The incision is well healed. No sign of infection. Range of motion is measured at 0 to 125. The calf is soft and nontender with a negative Homans sign. Strength is progressing and the patient is ambulating appropriately.    DIAGNOSTIC STUDIES  Xrays: 3 views of the right knee (AP, lateral, and sunrise) were ordered and reviewed for evaluation of recent knee replacement. They demonstrate a well positioned, well aligned knee replacement without complicating factors noted. In comparison with previous films there has been no change.    ASSESSMENT: 8 week status post right knee replacement.    PLAN: 1) Continue with PT exercises as prescribed   2) Follow up in 10 months    Sabrina Vicente, APRN  1/15/2019

## 2019-01-25 ENCOUNTER — TREATMENT (OUTPATIENT)
Dept: PHYSICAL THERAPY | Facility: CLINIC | Age: 74
End: 2019-01-25

## 2019-01-25 DIAGNOSIS — M25.561 RIGHT KNEE PAIN, UNSPECIFIED CHRONICITY: ICD-10-CM

## 2019-01-25 DIAGNOSIS — Z74.09 IMPAIRED FUNCTIONAL MOBILITY, BALANCE, GAIT, AND ENDURANCE: ICD-10-CM

## 2019-01-25 DIAGNOSIS — Z96.651 STATUS POST RIGHT KNEE REPLACEMENT: Primary | ICD-10-CM

## 2019-01-25 PROCEDURE — 97110 THERAPEUTIC EXERCISES: CPT | Performed by: PHYSICAL THERAPIST

## 2019-01-25 PROCEDURE — G0283 ELEC STIM OTHER THAN WOUND: HCPCS | Performed by: PHYSICAL THERAPIST

## 2019-01-25 PROCEDURE — 97140 MANUAL THERAPY 1/> REGIONS: CPT | Performed by: PHYSICAL THERAPIST

## 2019-01-25 PROCEDURE — 97530 THERAPEUTIC ACTIVITIES: CPT | Performed by: PHYSICAL THERAPIST

## 2019-01-25 NOTE — PROGRESS NOTES
Physical Therapy Daily Progress Note      Visit # 14      Subjective   Pt reports she is doing well overall. She fell sideways out of rocker chair to her L side (was putting down a child). Fall made R hip hurt. Describes pain from R groin wrapping around to posterior hip.    Objective   See Exercise, Manual, and Modality Logs for complete treatment.       Assessment/Plan  No significant change in pain or function after fall at home. Continues to have increased tightness and tenderness of R quadriceps, especially at hip. Gait and functional strength WNL. Discussed possible benefit of lumbar evaluation to rule out radicular pain at hip. Pt would need to do this with provider. Progress per POC.                 Manual Therapy:    8     mins  97213;  Therapeutic Exercise:    12     mins  14781;     Neuromuscular Lenny:    0    mins  17551;    Therapeutic Activity:     20     mins  75482;     Gait Trainin     mins  00651;     Ultrasound:     0     mins  31261;    Work Hardening           0      mins 59841  Iontophoresis               0   mins 30187    Timed Treatment:   45   mins   Total Treatment:     65   mins    Lisa Cuellar, PT  Physical Therapist

## 2019-01-28 ENCOUNTER — TREATMENT (OUTPATIENT)
Dept: PHYSICAL THERAPY | Facility: CLINIC | Age: 74
End: 2019-01-28

## 2019-01-28 DIAGNOSIS — Z96.651 STATUS POST RIGHT KNEE REPLACEMENT: Primary | ICD-10-CM

## 2019-01-28 DIAGNOSIS — M25.561 RIGHT KNEE PAIN, UNSPECIFIED CHRONICITY: ICD-10-CM

## 2019-01-28 DIAGNOSIS — Z74.09 IMPAIRED FUNCTIONAL MOBILITY, BALANCE, GAIT, AND ENDURANCE: ICD-10-CM

## 2019-01-28 PROCEDURE — 97530 THERAPEUTIC ACTIVITIES: CPT | Performed by: PHYSICAL THERAPIST

## 2019-01-28 PROCEDURE — G0283 ELEC STIM OTHER THAN WOUND: HCPCS | Performed by: PHYSICAL THERAPIST

## 2019-01-28 PROCEDURE — 97140 MANUAL THERAPY 1/> REGIONS: CPT | Performed by: PHYSICAL THERAPIST

## 2019-01-28 NOTE — PROGRESS NOTES
Physical Therapy Daily Progress Note      Visit # 15      Subjective   Pt reports manual therapy and stretching decreased R hip pain. Knee is still sore.    Objective   See Exercise, Manual, and Modality Logs for complete treatment.       Assessment/Plan  Pt demonstrates functional ROM and strength WNL. Continues tor report pain in hip and knee. Pain does not appear limit function. Will continue to benefit from skilled PT to improve tolerance to ADLs.                 Manual Therapy:    10     mins  38820;  Therapeutic Exercise:    0     mins  70890;     Neuromuscular Lenny:    0    mins  13131;    Therapeutic Activity:     25     mins  69442;     Gait Trainin     mins  92316;     Ultrasound:     0     mins  06682;    Work Hardening           0      mins 17310  Iontophoresis               0   mins 63046    Timed Treatment:   35   mins   Total Treatment:     50   mins    Lisa Cuellar, PT  Physical Therapist

## 2019-02-08 ENCOUNTER — TREATMENT (OUTPATIENT)
Dept: PHYSICAL THERAPY | Facility: CLINIC | Age: 74
End: 2019-02-08

## 2019-02-08 DIAGNOSIS — M25.561 RIGHT KNEE PAIN, UNSPECIFIED CHRONICITY: ICD-10-CM

## 2019-02-08 DIAGNOSIS — Z74.09 IMPAIRED FUNCTIONAL MOBILITY, BALANCE, GAIT, AND ENDURANCE: ICD-10-CM

## 2019-02-08 DIAGNOSIS — Z96.651 STATUS POST RIGHT KNEE REPLACEMENT: Primary | ICD-10-CM

## 2019-02-08 PROCEDURE — 97530 THERAPEUTIC ACTIVITIES: CPT | Performed by: PHYSICAL THERAPIST

## 2019-02-08 PROCEDURE — 97110 THERAPEUTIC EXERCISES: CPT | Performed by: PHYSICAL THERAPIST

## 2019-02-08 NOTE — PROGRESS NOTES
Physical Therapy Daily Progress Note      Visit # 16      Subjective   Knee is pretty good. It doesn't hurt. Feels like there rosie little pocket of swelling behind knee. I'd like to come next week, because I will be back to work and I don't know how it will impact knee.    Objective   See Exercise, Manual, and Modality Logs for complete treatment.       Assessment/Plan  Mild limitation in knee extension ROM exists (visible w/ prone hang). Demonstrates excellent functional strength. Encourage pt to continue stretches for knee extension at home. Educated pt on importance of full knee extension for gait mechanics. Progress per POC.                 Manual Therapy:    0     mins  80868;  Therapeutic Exercise:    20     mins  55527;     Neuromuscular Lenny:    0    mins  31936;    Therapeutic Activity:     20     mins  10712;     Gait Trainin     mins  85218;     Ultrasound:     0     mins  34245;    Work Hardening           0      mins 23578  Iontophoresis               0   mins 60873    Timed Treatment:   40   mins   Total Treatment:     55   mins    Lisa Cuellar, PT  Physical Therapist

## 2019-02-13 ENCOUNTER — TREATMENT (OUTPATIENT)
Dept: PHYSICAL THERAPY | Facility: CLINIC | Age: 74
End: 2019-02-13

## 2019-02-13 DIAGNOSIS — M25.561 RIGHT KNEE PAIN, UNSPECIFIED CHRONICITY: ICD-10-CM

## 2019-02-13 DIAGNOSIS — Z96.651 STATUS POST RIGHT KNEE REPLACEMENT: Primary | ICD-10-CM

## 2019-02-13 DIAGNOSIS — Z74.09 IMPAIRED FUNCTIONAL MOBILITY, BALANCE, GAIT, AND ENDURANCE: ICD-10-CM

## 2019-02-13 PROCEDURE — 97110 THERAPEUTIC EXERCISES: CPT | Performed by: PHYSICAL THERAPIST

## 2019-02-13 PROCEDURE — 97530 THERAPEUTIC ACTIVITIES: CPT | Performed by: PHYSICAL THERAPIST

## 2019-02-13 NOTE — PROGRESS NOTES
Physical Therapy Daily Progress Note      Visit # 17      Subjective   Pt reports knee is doing well.    Objective   See Exercise, Manual, and Modality Logs for complete treatment.       Assessment/Plan  No significant impairment in gait. Demonstrates good strength on 8 inch step w/o use of rail. Continues to have fair tolerance to prolonged knee extension. Progress per POC.                 Manual Therapy:    0     mins  92516;  Therapeutic Exercise:    15     mins  73713;     Neuromuscular Lenny:    0    mins  12656;    Therapeutic Activity:     25     mins  52376;     Gait Trainin     mins  77522;     Ultrasound:     0     mins  49769;    Work Hardening           0      mins 86056  Iontophoresis               0   mins 74132    Timed Treatment:   40   mins   Total Treatment:     55   mins    Lisa Cuellar, PT  Physical Therapist

## 2019-02-26 ENCOUNTER — TREATMENT (OUTPATIENT)
Dept: PHYSICAL THERAPY | Facility: CLINIC | Age: 74
End: 2019-02-26

## 2019-02-26 DIAGNOSIS — M25.561 RIGHT KNEE PAIN, UNSPECIFIED CHRONICITY: ICD-10-CM

## 2019-02-26 DIAGNOSIS — Z74.09 IMPAIRED FUNCTIONAL MOBILITY, BALANCE, GAIT, AND ENDURANCE: ICD-10-CM

## 2019-02-26 DIAGNOSIS — Z96.651 STATUS POST RIGHT KNEE REPLACEMENT: Primary | ICD-10-CM

## 2019-02-26 PROCEDURE — 97530 THERAPEUTIC ACTIVITIES: CPT | Performed by: PHYSICAL THERAPIST

## 2019-02-26 PROCEDURE — 97110 THERAPEUTIC EXERCISES: CPT | Performed by: PHYSICAL THERAPIST

## 2019-02-26 NOTE — PROGRESS NOTES
Re-Assessment / Re-Certification        Patient: Mary Kay Saab   : 1945  Diagnosis/ICD-10 Code:  Status post right knee replacement [Z96.651]  Referring practitioner: YUSEF Crenshaw  Date of Initial Evaluation:  Type: THERAPY  Noted: 2018  Patient seen for 18 sessions      Subjective:   Mary Kay Saab reports: The only complaint I have is if I sit for a long time, I have to get up and move because knee is stiff. Does not hurt.  Subjective Questionnaire: LEFS: 55/80 (69%)  Clinical Progress: improved  Home Program Compliance: Yes  Treatment has included: therapeutic exercise, manual therapy, therapeutic activity, gait training, electrical stimulation and cryotherapy    Subjective   Objective       Ambulation     Ambulation: Stairs   Ascend stairs: independent  Pattern: reciprocal  Railings: without rails  Descend stairs: independent  Pattern: reciprocal  Railings: without rails    Observational Gait   Gait: within functional limits     Functional Assessment   Squat No pain.     Comments  Squat: Hip knee alignment WNL, no anterior translation beyond toes, 3/4 depth     R knee AROM 0-125    Assessment/Plan  Progress toward previous goals: All Met  Pt has met all goals w/ the exception of score on self-reported functional scale LEFS (31% limited). No significant pain or limitation w/ ambulation, stair navigation, or squatting. Plan to DC to [facility] at this time.      Recommendations: Discharge  Timeframe: N/A  Prognosis to achieve goals: N/A    PT Signature: Lisa Cuellar PT      Based upon review of the patient's progress and continued therapy plan, it is my medical opinion that Mary Kay Saab should continue physical therapy treatment at [facility] PH[facility] THERAP[facility]  [address] [place] [address] [phone].    Signature: __________________________________  Sabrina Vicente APRN    Manual Therapy:    0     mins  70088;  Therapeutic Exercise:     15     mins  73926;     Neuromuscular Lenny:    0    mins  92711;    Therapeutic Activity:     15     mins  22511;     Gait Trainin     mins  89511;     Ultrasound:     0     mins  45756;    Work Hardening           0      mins 25944  Iontophoresis               0   mins 26071    Timed Treatment:   30   mins   Total Treatment:     50   mins

## 2019-03-04 ENCOUNTER — HOSPITAL ENCOUNTER (OUTPATIENT)
Dept: GENERAL RADIOLOGY | Facility: HOSPITAL | Age: 74
Discharge: HOME | End: 2019-03-04

## 2019-03-04 ENCOUNTER — HOSPITAL ENCOUNTER (OUTPATIENT)
Dept: GENERAL RADIOLOGY | Facility: HOSPITAL | Age: 74
Discharge: HOME | End: 2019-03-04
Admitting: INTERNAL MEDICINE

## 2019-03-04 ENCOUNTER — TRANSCRIBE ORDERS (OUTPATIENT)
Dept: ADMINISTRATIVE | Facility: HOSPITAL | Age: 74
End: 2019-03-04

## 2019-03-04 DIAGNOSIS — M54.9 SPINE PAIN: ICD-10-CM

## 2019-03-04 DIAGNOSIS — M54.9 SPINE PAIN: Primary | ICD-10-CM

## 2019-03-04 PROCEDURE — 72072 X-RAY EXAM THORAC SPINE 3VWS: CPT

## 2019-03-04 PROCEDURE — 71046 X-RAY EXAM CHEST 2 VIEWS: CPT

## 2019-03-06 ENCOUNTER — TELEPHONE (OUTPATIENT)
Dept: ORTHOPEDIC SURGERY | Facility: CLINIC | Age: 74
End: 2019-03-06

## 2019-03-06 RX ORDER — CEPHALEXIN 500 MG/1
CAPSULE ORAL
Qty: 20 CAPSULE | Refills: 0 | Status: SHIPPED | OUTPATIENT
Start: 2019-03-06 | End: 2019-05-31 | Stop reason: SDUPTHER

## 2019-03-06 NOTE — TELEPHONE ENCOUNTER
Call returned to the patient.  She is calling about premedication.  She has at least 3 or 4 dental procedure scheduled over the next few months.  Patient would like to get a large amount of antibiotics patient would like to get enough antibiotics to cover her for those appointments.  Have E prescribed a new prescription to [facility] at [phone] for Keflex 500 mg,  #20, directions are to take 4 capsules 1 hour prior to her procedure.  Patient does have a history to penicillin however has taken Keflex in the past without any problems per [facility]

## 2019-03-11 NOTE — PROGRESS NOTES
Subjective .     REASONS FOR FOLLOWUP:  Breast cancer, anemia    HISTORY OF PRESENT ILLNESS:  The patient is a 74 y.o. year old female  who is here for follow-up with the above-mentioned history.    Knee replacement went well.  She is completed all physical therapy.  She feels good.  Denies shortness of breath, nausea, pain.  States she has lost some weight with effort.  She and her  have changed their eating habits.  Past Medical History:   Diagnosis Date   • Anemia     Of chronic renal insufficiency   • Atrial fibrillation (CMS/HCC)    • Mcguire's esophagus    • Breast cancer (CMS/HCC)    • Chronic renal insufficiency     Stage 3   • MCCRAY (dyspnea on exertion)    • Dyspnea    • GERD (gastroesophageal reflux disease)    • H/O electrocardiogram    • History of staph infection    • Hx of being hospitalized     [facility] in 09/2010 and 10/2010 for atrial fibrillation, Dr. Perla Hurtado   • Hyperlipidemia    • Hypertension    • Hypoxia    • IBS (irritable bowel syndrome)    • Kidney dysfunction    • Lower extremity edema    • Lump or mass in breast    • Malignant neoplasm of breast (CMS/HCC)    • Osteoarthritis    • Osteopenia    • Osteoporosis    • Paroxysmal atrial fibrillation (CMS/HCC)    • Seizure (CMS/HCC)     AS A TEENAGER, NOT CURRENTLY   • Sinusitis    • SOB (shortness of breath)      Past Surgical History:   Procedure Laterality Date   • BREAST LUMPECTOMY  1990    For LCIS   • CARDIAC CATHETERIZATION Left 4/27/2016    Procedure: Cardiac catheterization;  Surgeon: Kevin Guillen MD;  Location: [facility] INVASIVE LOCATION;  Service:    • FOOT SURGERY     • HAND ARTHROPLASTY Right    • KNEE ARTHROSCOPY     • OTHER SURGICAL HISTORY      LYMPHATIC SURGERY   • TOTAL HIP ARTHROPLASTY Right 7/12/2017    Procedure: TOTAL HIP ARTHROPLASTY ANTERIOR WITH HANA TABLE;  Surgeon: Van Mcguire MD;  Location: [facility] OR;  Service:    • TOTAL KNEE ARTHROPLASTY Right 11/19/2018    Procedure:  TOTAL KNEE ARTHROPLASTY;  Surgeon: Van Mcguire MD;  Location: [facility] MAIN OR;  Service: Orthopedics       HEMATOLOGIC/ONCOLOGIC HISTORY:  (History from previous dates can be found in the separate document.)    MEDICATIONS    Current Outpatient Medications:   •  alendronate (FOSAMAX) 70 MG tablet, Take 70 mg by mouth Every 7 (Seven) Days. sunday, Disp: , Rfl: 12  •  alosetron (LOTRONEX) 0.5 MG tablet, Take 0.05 mg by mouth Daily As Needed., Disp: , Rfl:   •  ALPRAZolam (XANAX) 1 MG tablet, Take 1 mg by mouth every night., Disp: , Rfl:   •  amiodarone (PACERONE) 200 MG tablet, Take 200 mg by mouth As Needed., Disp: , Rfl:   •  bumetanide (BUMEX) 1 MG tablet, Take 1 mg by mouth Daily As Needed., Disp: , Rfl:   •  cephalexin (KEFLEX) 500 MG capsule, Take all 4 caps 1 hour prior to procedure, Disp: 20 capsule, Rfl: 0  •  cetirizine (ZyrTEC) 10 MG tablet, Take 10 mg by mouth Daily., Disp: , Rfl:   •  cycloSPORINE (RESTASIS) 0.05 % ophthalmic emulsion, Administer 1 drop to both eyes 2 (Two) Times a Day., Disp: , Rfl:   •  dabigatran etexilate (PRADAXA) 150 MG capsu, Take 150 mg by mouth 2 (Two) Times a Day., Disp: , Rfl:   •  diltiaZEM (CARDIZEM) 60 MG tablet, Take 60 mg by mouth 2 (Two) Times a Day. PATIENT TAKES ONE TABLET AT 5 PM AND ONE TAB HS, Disp: , Rfl:   •  esomeprazole (NexIUM) 40 MG capsule, Take 40 mg by mouth 2 (two) times a day., Disp: , Rfl:   •  fluconazole (DIFLUCAN) 100 MG tablet, Take 100 mg by mouth As Needed., Disp: , Rfl:   •  hydrochlorothiazide (HYDRODIURIL) 12.5 MG tablet, Take 12.5 mg by mouth As Needed., Disp: , Rfl:   •  methocarbamol (ROBAXIN) 750 MG tablet, Take 750 mg by mouth As Needed for Muscle Spasms., Disp: , Rfl:   •  metoprolol tartrate (LOPRESSOR) 25 MG tablet, Take 0.625 mg by mouth As Needed., Disp: , Rfl:   •  montelukast (SINGULAIR) 10 MG tablet, Take 10 mg by mouth Daily., Disp: , Rfl:   •  pseudoephedrine-guaifenesin (MUCINEX D)  MG per 12 hr tablet, Take 1 tablet by  mouth Every 12 (Twelve) Hours., Disp: , Rfl:   •  simvastatin (ZOCOR) 40 MG tablet, Take 20 mg by mouth Daily., Disp: , Rfl: 3  •  clobetasol (OLUX) 0.05 % topical foam, Apply 1 application topically to the appropriate area as directed 2 (Two) Times a Day., Disp: , Rfl:   •  diphenoxylate-atropine (LOMOTIL) 2.5-0.025 MG per tablet, Take 1 tablet by mouth 4 (Four) Times a Day As Needed for Diarrhea., Disp: , Rfl:   •  HYDROcodone-acetaminophen (NORCO) 7.5-325 MG per tablet, 1-2 po q 4-6 hr prn pain, Disp: 84 tablet, Rfl: 0  •  ondansetron (ZOFRAN) 4 MG tablet, Take 1 tablet by mouth Every 6 (Six) Hours As Needed for Nausea or Vomiting for up to 10 doses., Disp: 10 tablet, Rfl: 0  •  polyethylene glycol (MIRALAX) pack packet, Take 17 g by mouth 2 (Two) Times a Day As Needed (constipation) for up to 7 doses., Disp: 238 g, Rfl: 0  •  rosuvastatin (CRESTOR) 5 MG tablet, Take 5 mg by mouth Daily., Disp: , Rfl:   No current facility-administered medications for this visit.     Facility-Administered Medications Ordered in Other Visits:   •  mupirocin (BACTROBAN) 2 % nasal ointment, , Nasal, BID, Van Mcguire MD    ALLERGIES:     Allergies   Allergen Reactions   • Penicillins Unknown (See Comments)     Reactions: syncope and seizures   • Adhesive Tape Other (See Comments)     Tears skin   • Atropine Hives   • Epinephrine Palpitations     Patient states will go into A-Fib   • Sulfa Antibiotics Nausea Only     STATES SHE DOES FINE WITH CERTAIN SULFA DRUGS       SOCIAL HISTORY:       Social History     Socioeconomic History   • Marital status:      Spouse name: Cuate   • Number of children: 1   • Years of education: 1 year of college   • Highest education level: Not on file   Social Needs   • Financial resource strain: Not on file   • Food insecurity - worry: Not on file   • Food insecurity - inability: Not on file   • Transportation needs - medical: Not on file   • Transportation needs - non-medical: Not on file  "  Occupational History     Employer: RETIRED   Tobacco Use   • Smoking status: Former Smoker     Last attempt to quit: 1975     Years since quittin.7   • Smokeless tobacco: Never Used   • Tobacco comment: QUIT SMOKING IN LATE 1970s   Substance and Sexual Activity   • Alcohol use: Yes     Comment: social drinker   • Drug use: No   • Sexual activity: Defer   Other Topics Concern   • Not on file   Social History Narrative   • Not on file         FAMILY HISTORY:  Family History   Problem Relation Age of Onset   • Heart disease Other    • Stroke Other    • Stroke Mother    • Heart disease Mother    • Hypertension Mother    • Leukemia Father         Mid 60s   • Hypertension Brother    • Cancer Paternal Aunt    • Cancer Paternal Grandfather    • Malig Hyperthermia Neg Hx        REVIEW OF SYSTEMS:    Review of Systems   Constitutional: Negative for activity change.   HENT: Negative for nosebleeds and trouble swallowing.    Respiratory: Negative for shortness of breath and wheezing.    Cardiovascular: Negative for chest pain and palpitations.   Gastrointestinal: Negative for constipation, diarrhea and nausea.   Genitourinary: Negative for dysuria and hematuria.   Musculoskeletal: Negative for arthralgias and myalgias.   Skin: Negative for rash and wound.   Neurological: Negative for seizures and syncope.   Hematological: Negative for adenopathy. Does not bruise/bleed easily.   Psychiatric/Behavioral: Negative for confusion.           Objective    Vitals:    19 1032   Resp: 16   Weight: 60.4 kg (133 lb 1.6 oz)   Height: 163.8 cm (64.49\")   PainSc: 0-No pain     Current Status 3/12/2019   ECOG score 0      PHYSICAL EXAM:    CONSTITUTIONAL:  Vital signs reviewed.  No distress, looks comfortable.  EYES:  Conjunctiva and lids unremarkable.  PERRLA  EARS,NOSE,MOUTH,THROAT:  Ears and nose appear unremarkable.  Lips, teeth, gums appear unremarkable.  RESPIRATORY:  Normal respiratory effort.  Lungs clear to " auscultation bilaterally.  CARDIOVASCULAR:  Normal S1, S2.  No murmurs rubs or gallops.  No significant lower extremity edema.  GASTROINTESTINAL: Abdomen appears unremarkable.  Nontender.  No hepatomegaly.  No splenomegaly.  LYMPHATIC:  No cervical, supraclavicular, axillary lymphadenopathy.  SKIN:  Warm.  No rashes.  PSYCHIATRIC:  Normal judgment and insight.  Normal mood and affect.      RECENT LABS:        WBC   Date/Time Value Ref Range Status   03/12/2019 10:11 AM 5.52 3.40 - 10.80 10*3/mm3 Final     Hemoglobin   Date/Time Value Ref Range Status   03/12/2019 10:11 AM 13.5 12.0 - 15.9 g/dL Final     Platelets   Date/Time Value Ref Range Status   03/12/2019 10:11  140 - 450 10*3/mm3 Final       Assessment/Plan     ASSESSMENT:  Iron deficiency anemia, unspecified iron deficiency anemia type  - Ferritin  - Iron Profile  - Retic With IRF & RET-He  - CBC & Differential      *Stage IB ([id number]) left breast cancer. Grade 2, 1.9 cm, 1 mm micrometastasis in 1 out of 2 sentinel nodes. ER positive, HER-2 negative. OncoType DX: low risk category.   Tamoxifen 10/11/2013 through November 2013. Just a few days or so of Aromasin in January 2014. Could not tolerate hormonal therapy due to vaginal dryness and irritation and declined any more hormonal therapy.   On Fosamax and Evista through Dr. Perla Garza.  Remission continues.    * Atrial fibrillation, on Pradaxa through Dr. Perla Hurtado.     *Anemia of stage 3 chronic renal insufficiency (Dr. Rell Shannon is her nephrologist whom she follows up with). (Evaluation negative for B12 or folate deficiency, hemolysis or multiple myeloma. She has not had a bone marrow biopsy. She responds well to Procrit).   She received Procrit 12/2014 and 1 dose 06/2015 and 1 dose 09/2015 and 1 dose on 10/28/2015 and some weekly doses of Procrit after hip replacement July 2017.  She responds to Procrit.   No need for Procrit recently.  Hb normal today.    * Iron deficiency anemia.  does not tolerate oral iron due to significant abdominal pain and diarrhea. She refuses any further Benadryl as a pre-medicine because it made her feel very sleepy for over 24-hours.  Today's iron labs normal.    *Fibrocystic changes in both breasts.     * On the 1/18/17 visit, she complained of Hypoxia with exertion.  See that note for details.  She did not complain of this today.    PLAN:   · Since Hb has been doing well, she wants to change to annual follow-up with me.  She states her PCP will monitor her CBC every 3-4 months.  · MD 1 year.  One week prior: Ferritin, iron panel, CBC, reticulated hemoglobin  · If HP drops below 10, she could return for stat iron labs and consider resuming Procrit.  · Last mammogram 8/31/18, BI-RADS 2.    For this office visit, reviewed and summarized hospital records.

## 2019-03-12 ENCOUNTER — OFFICE VISIT (OUTPATIENT)
Dept: ONCOLOGY | Facility: CLINIC | Age: 74
End: 2019-03-12

## 2019-03-12 ENCOUNTER — LAB (OUTPATIENT)
Dept: OTHER | Facility: HOSPITAL | Age: 74
End: 2019-03-12

## 2019-03-12 VITALS — RESPIRATION RATE: 16 BRPM | HEIGHT: 64 IN | WEIGHT: 133.1 LBS | BODY MASS INDEX: 22.72 KG/M2

## 2019-03-12 DIAGNOSIS — D50.9 IRON DEFICIENCY ANEMIA, UNSPECIFIED IRON DEFICIENCY ANEMIA TYPE: ICD-10-CM

## 2019-03-12 DIAGNOSIS — D50.9 IRON DEFICIENCY ANEMIA, UNSPECIFIED IRON DEFICIENCY ANEMIA TYPE: Primary | ICD-10-CM

## 2019-03-12 DIAGNOSIS — C50.412 MALIGNANT NEOPLASM OF UPPER-OUTER QUADRANT OF LEFT BREAST IN FEMALE, ESTROGEN RECEPTOR POSITIVE (HCC): ICD-10-CM

## 2019-03-12 DIAGNOSIS — Z17.0 MALIGNANT NEOPLASM OF UPPER-OUTER QUADRANT OF LEFT BREAST IN FEMALE, ESTROGEN RECEPTOR POSITIVE (HCC): ICD-10-CM

## 2019-03-12 DIAGNOSIS — N18.30 ANEMIA DUE TO STAGE 3 CHRONIC KIDNEY DISEASE TREATED WITH ERYTHROPOIETIN (HCC): ICD-10-CM

## 2019-03-12 DIAGNOSIS — D63.1 ANEMIA DUE TO STAGE 3 CHRONIC KIDNEY DISEASE TREATED WITH ERYTHROPOIETIN (HCC): ICD-10-CM

## 2019-03-12 LAB
BASOPHILS # BLD AUTO: 0.07 10*3/MM3 (ref 0–0.2)
BASOPHILS NFR BLD AUTO: 1.3 % (ref 0–1.5)
DEPRECATED RDW RBC AUTO: 50.3 FL (ref 37–54)
EOSINOPHIL # BLD AUTO: 0.13 10*3/MM3 (ref 0–0.4)
EOSINOPHIL NFR BLD AUTO: 2.4 % (ref 0.3–6.2)
ERYTHROCYTE [DISTWIDTH] IN BLOOD BY AUTOMATED COUNT: 15 % (ref 12.3–15.4)
FERRITIN SERPL-MCNC: 54.1 NG/ML (ref 13–150)
HCT VFR BLD AUTO: 42 % (ref 34–46.6)
HGB BLD-MCNC: 13.5 G/DL (ref 12–15.9)
HGB RETIC QN AUTO: 32.3 PG (ref 29.8–36.1)
IMM GRANULOCYTES # BLD AUTO: 0.05 10*3/MM3 (ref 0–0.05)
IMM GRANULOCYTES NFR BLD AUTO: 0.9 % (ref 0–0.5)
IMM RETICS NFR: 9.3 % (ref 3–15.8)
IRON 24H UR-MRATE: 107 MCG/DL (ref 37–145)
IRON SATN MFR SERPL: 24 % (ref 20–50)
LYMPHOCYTES # BLD AUTO: 1.4 10*3/MM3 (ref 0.7–3.1)
LYMPHOCYTES NFR BLD AUTO: 25.4 % (ref 19.6–45.3)
MCH RBC QN AUTO: 29.3 PG (ref 26.6–33)
MCHC RBC AUTO-ENTMCNC: 32.1 G/DL (ref 31.5–35.7)
MCV RBC AUTO: 91.1 FL (ref 79–97)
MONOCYTES # BLD AUTO: 0.53 10*3/MM3 (ref 0.1–0.9)
MONOCYTES NFR BLD AUTO: 9.6 % (ref 5–12)
NEUTROPHILS # BLD AUTO: 3.34 10*3/MM3 (ref 1.4–7)
NEUTROPHILS NFR BLD AUTO: 60.4 % (ref 42.7–76)
NRBC BLD AUTO-RTO: 0 /100 WBC (ref 0–0)
PLATELET # BLD AUTO: 232 10*3/MM3 (ref 140–450)
PMV BLD AUTO: 10.1 FL (ref 6–12)
RBC # BLD AUTO: 4.61 10*6/MM3 (ref 3.77–5.28)
RETICS/RBC NFR AUTO: 1.73 % (ref 0.5–1.5)
TIBC SERPL-MCNC: 450 MCG/DL (ref 298–536)
TRANSFERRIN SERPL-MCNC: 302 MG/DL (ref 200–360)
WBC NRBC COR # BLD: 5.52 10*3/MM3 (ref 3.4–10.8)

## 2019-03-12 PROCEDURE — 85046 RETICYTE/HGB CONCENTRATE: CPT | Performed by: INTERNAL MEDICINE

## 2019-03-12 PROCEDURE — 84466 ASSAY OF TRANSFERRIN: CPT | Performed by: INTERNAL MEDICINE

## 2019-03-12 PROCEDURE — 85025 COMPLETE CBC W/AUTO DIFF WBC: CPT | Performed by: INTERNAL MEDICINE

## 2019-03-12 PROCEDURE — 82728 ASSAY OF FERRITIN: CPT | Performed by: INTERNAL MEDICINE

## 2019-03-12 PROCEDURE — 99214 OFFICE O/P EST MOD 30 MIN: CPT | Performed by: INTERNAL MEDICINE

## 2019-03-12 PROCEDURE — 36415 COLL VENOUS BLD VENIPUNCTURE: CPT

## 2019-03-12 PROCEDURE — 83540 ASSAY OF IRON: CPT | Performed by: INTERNAL MEDICINE

## 2019-03-12 RX ORDER — SIMVASTATIN 40 MG
40 TABLET ORAL DAILY
Refills: 3 | COMMUNITY
Start: 2019-03-04

## 2019-05-30 ENCOUNTER — TELEPHONE (OUTPATIENT)
Dept: ORTHOPEDIC SURGERY | Facility: CLINIC | Age: 74
End: 2019-05-30

## 2019-05-30 DIAGNOSIS — Z79.2 PROPHYLACTIC ANTIBIOTIC: Primary | ICD-10-CM

## 2019-05-31 RX ORDER — CEPHALEXIN 500 MG/1
CAPSULE ORAL
Qty: 4 CAPSULE | Refills: 0 | Status: SHIPPED | OUTPATIENT
Start: 2019-05-31 | End: 2019-11-19

## 2019-05-31 NOTE — TELEPHONE ENCOUNTER
Per Susana the - Endoscopy procedure-it is ok for patient to take oral medication in the morning before procedure     Zay  [phone]    RX sent to [facility] pharmacy patient informed wkt [id number]

## 2019-05-31 NOTE — TELEPHONE ENCOUNTER
Patient is asking [facility] to send antibiotic to [facility]   Allergies PCN,  Sulfa patient can tolerate Keflex

## 2019-08-09 ENCOUNTER — TRANSCRIBE ORDERS (OUTPATIENT)
Dept: ADMINISTRATIVE | Facility: HOSPITAL | Age: 74
End: 2019-08-09

## 2019-08-09 ENCOUNTER — LAB (OUTPATIENT)
Dept: LAB | Facility: HOSPITAL | Age: 74
End: 2019-08-09

## 2019-08-09 DIAGNOSIS — E78.5 HYPERLIPIDEMIA, UNSPECIFIED HYPERLIPIDEMIA TYPE: Primary | ICD-10-CM

## 2019-08-09 DIAGNOSIS — M10.9 GOUT, UNSPECIFIED CAUSE, UNSPECIFIED CHRONICITY, UNSPECIFIED SITE: ICD-10-CM

## 2019-08-09 DIAGNOSIS — E78.5 HYPERLIPIDEMIA, UNSPECIFIED HYPERLIPIDEMIA TYPE: ICD-10-CM

## 2019-08-09 LAB
CHOLEST SERPL-MCNC: 170 MG/DL (ref 0–200)
HDLC SERPL-MCNC: 65 MG/DL (ref 40–60)
LDLC SERPL CALC-MCNC: 94 MG/DL (ref 0–100)
LDLC/HDLC SERPL: 1.45 {RATIO}
TRIGL SERPL-MCNC: 55 MG/DL (ref 0–150)
URATE SERPL-MCNC: 5.7 MG/DL (ref 2.4–5.7)
VLDLC SERPL-MCNC: 11 MG/DL (ref 5–40)

## 2019-08-09 PROCEDURE — 36415 COLL VENOUS BLD VENIPUNCTURE: CPT

## 2019-08-09 PROCEDURE — 80061 LIPID PANEL: CPT | Performed by: INTERNAL MEDICINE

## 2019-08-09 PROCEDURE — 84550 ASSAY OF BLOOD/URIC ACID: CPT | Performed by: INTERNAL MEDICINE

## 2019-09-03 ENCOUNTER — APPOINTMENT (OUTPATIENT)
Dept: WOMENS IMAGING | Facility: HOSPITAL | Age: 74
End: 2019-09-03

## 2019-09-03 PROCEDURE — 77067 SCR MAMMO BI INCL CAD: CPT | Performed by: RADIOLOGY

## 2019-09-03 PROCEDURE — 77063 BREAST TOMOSYNTHESIS BI: CPT | Performed by: RADIOLOGY

## 2019-09-03 PROCEDURE — MDREVIEWSP: Performed by: RADIOLOGY

## 2019-09-25 ENCOUNTER — HOSPITAL ENCOUNTER (OUTPATIENT)
Dept: GENERAL RADIOLOGY | Facility: HOSPITAL | Age: 74
Discharge: HOME | End: 2019-09-25
Admitting: INTERNAL MEDICINE

## 2019-09-25 ENCOUNTER — HOSPITAL ENCOUNTER (OUTPATIENT)
Dept: GENERAL RADIOLOGY | Facility: HOSPITAL | Age: 74
Discharge: HOME | End: 2019-09-25

## 2019-09-25 ENCOUNTER — TRANSCRIBE ORDERS (OUTPATIENT)
Dept: ADMINISTRATIVE | Facility: HOSPITAL | Age: 74
End: 2019-09-25

## 2019-09-25 DIAGNOSIS — R07.9 CHEST PAIN, UNSPECIFIED TYPE: ICD-10-CM

## 2019-09-25 DIAGNOSIS — R07.81 RIB PAIN ON RIGHT SIDE: ICD-10-CM

## 2019-09-25 DIAGNOSIS — R07.9 CHEST PAIN, UNSPECIFIED TYPE: Primary | ICD-10-CM

## 2019-09-25 PROCEDURE — 71100 X-RAY EXAM RIBS UNI 2 VIEWS: CPT

## 2019-09-25 PROCEDURE — 71046 X-RAY EXAM CHEST 2 VIEWS: CPT

## 2019-10-02 ENCOUNTER — TRANSCRIBE ORDERS (OUTPATIENT)
Dept: ADMINISTRATIVE | Facility: HOSPITAL | Age: 74
End: 2019-10-02

## 2019-10-02 DIAGNOSIS — R10.11 RUQ ABDOMINAL PAIN: Primary | ICD-10-CM

## 2019-10-14 ENCOUNTER — HOSPITAL ENCOUNTER (OUTPATIENT)
Dept: ULTRASOUND IMAGING | Facility: HOSPITAL | Age: 74
Discharge: HOME | End: 2019-10-14
Admitting: INTERNAL MEDICINE

## 2019-10-14 DIAGNOSIS — R10.11 RUQ ABDOMINAL PAIN: ICD-10-CM

## 2019-10-14 PROCEDURE — 76705 ECHO EXAM OF ABDOMEN: CPT

## 2019-11-19 ENCOUNTER — OFFICE VISIT (OUTPATIENT)
Dept: ORTHOPEDIC SURGERY | Facility: CLINIC | Age: 74
End: 2019-11-19

## 2019-11-19 VITALS — TEMPERATURE: 98.6 F | BODY MASS INDEX: 25.52 KG/M2 | HEIGHT: 60 IN | WEIGHT: 130 LBS

## 2019-11-19 DIAGNOSIS — Z96.651 STATUS POST TOTAL RIGHT KNEE REPLACEMENT: Primary | ICD-10-CM

## 2019-11-19 PROCEDURE — 99212 OFFICE O/P EST SF 10 MIN: CPT | Performed by: ORTHOPAEDIC SURGERY

## 2019-11-19 PROCEDURE — 73562 X-RAY EXAM OF KNEE 3: CPT | Performed by: ORTHOPAEDIC SURGERY

## 2019-11-19 RX ORDER — METOPROLOL SUCCINATE 25 MG/1
TABLET, EXTENDED RELEASE ORAL
COMMUNITY
Start: 2015-01-15 | End: 2020-05-26

## 2019-11-19 RX ORDER — EZETIMIBE 10 MG/1
TABLET ORAL
COMMUNITY
End: 2020-01-27 | Stop reason: SDUPTHER

## 2019-11-19 RX ORDER — BETAMETHASONE DIPROPIONATE 0.5 MG/G
CREAM TOPICAL
Refills: 1 | COMMUNITY
Start: 2019-11-08 | End: 2020-05-26

## 2019-11-19 RX ORDER — GLUCOSAMINE SULFATE 500 MG
CAPSULE ORAL
COMMUNITY
Start: 2015-01-15

## 2019-11-19 RX ORDER — DESONIDE 0.5 MG/G
OINTMENT TOPICAL
COMMUNITY
Start: 2015-01-15 | End: 2020-01-27 | Stop reason: SDUPTHER

## 2019-11-19 RX ORDER — AZITHROMYCIN 250 MG/1
TABLET, FILM COATED ORAL
Refills: 0 | COMMUNITY
Start: 2019-11-08 | End: 2020-01-27 | Stop reason: SDUPTHER

## 2019-11-19 RX ORDER — FUROSEMIDE 40 MG/1
TABLET ORAL EVERY 12 HOURS SCHEDULED
COMMUNITY
End: 2021-06-22

## 2019-11-19 RX ORDER — GUAIFENESIN AND DEXTROMETHORPHAN HYDROBROMIDE 600; 30 MG/1; MG/1
TABLET, EXTENDED RELEASE ORAL
COMMUNITY
Start: 2015-01-15 | End: 2020-05-26

## 2019-11-19 RX ORDER — MULTIVITAMIN
CAPSULE ORAL
COMMUNITY
Start: 2015-01-15

## 2019-11-19 RX ORDER — RALOXIFENE HYDROCHLORIDE 60 MG/1
TABLET, FILM COATED ORAL
COMMUNITY
Start: 2015-01-15 | End: 2020-01-27 | Stop reason: SDUPTHER

## 2019-11-19 NOTE — PROGRESS NOTES
"Mary Kay Saab : 1945 MRN: [id number] DATE: 2019    DIAGNOSIS: Annual follow up right total knee  , 2.5 yrs right PREETHI    SUBJECTIVE:Patient returns today for  1 year follow up of right total knee replacement. Patient reports doing well with no unusual complaints. Denies any limitations due to the knee or right hip.    OBJECTIVE:    Temp 98.6 °F (37 °C)   Ht 152.4 cm (60\")   Wt 59 kg (130 lb)   BMI 25.39 kg/m²   Family History   Problem Relation Age of Onset   • Heart disease Other    • Stroke Other    • Stroke Mother    • Heart disease Mother    • Hypertension Mother    • Leukemia Father         Mid 60s   • Hypertension Brother    • Cancer Paternal Aunt    • Cancer Paternal Grandfather    • Malig Hyperthermia Neg Hx      Past Medical History:   Diagnosis Date   • Anemia     Of chronic renal insufficiency   • Atrial fibrillation (CMS/HCC)    • Mcguire's esophagus    • Breast cancer (CMS/HCC)    • Chronic renal insufficiency     Stage 3   • MCCRAY (dyspnea on exertion)    • Dyspnea    • GERD (gastroesophageal reflux disease)    • H/O electrocardiogram    • History of staph infection    • Hx of being hospitalized     [facility] in 2010 and 10/2010 for atrial fibrillation, Dr. Perla Hurtado   • Hyperlipidemia    • Hypertension    • Hypoxia    • IBS (irritable bowel syndrome)    • Kidney dysfunction    • Lower extremity edema    • Lump or mass in breast    • Malignant neoplasm of breast (CMS/HCC)    • Osteoarthritis    • Osteopenia    • Osteoporosis    • Paroxysmal atrial fibrillation (CMS/HCC)    • Seizure (CMS/HCC)     AS A TEENAGER, NOT CURRENTLY   • Sinusitis    • SOB (shortness of breath)      Past Surgical History:   Procedure Laterality Date   • BREAST LUMPECTOMY      For LCIS   • CARDIAC CATHETERIZATION Left 2016    Procedure: Cardiac catheterization;  Surgeon: Kevin Guillen MD;  Location: [facility] INVASIVE LOCATION;  Service:    • FOOT SURGERY     • HAND " ARTHROPLASTY Right    • KNEE ARTHROSCOPY     • OTHER SURGICAL HISTORY      LYMPHATIC SURGERY   • TOTAL HIP ARTHROPLASTY Right 2017    Procedure: TOTAL HIP ARTHROPLASTY ANTERIOR WITH HANA TABLE;  Surgeon: Vna Mcguire MD;  Location: [facility];  Service:    • TOTAL KNEE ARTHROPLASTY Right 2018    Procedure: TOTAL KNEE ARTHROPLASTY;  Surgeon: Van Mcguire MD;  Location: [facility];  Service: Orthopedics     Social History     Socioeconomic History   • Marital status:      Spouse name: Cuate   • Number of children: 1   • Years of education: 1 year of college   • Highest education level: Not on file   Occupational History     Employer: RETIRED   Tobacco Use   • Smoking status: Former Smoker     Last attempt to quit: 1975     Years since quittin.4   • Smokeless tobacco: Never Used   • Tobacco comment: QUIT SMOKING IN LATE    Substance and Sexual Activity   • Alcohol use: Yes     Comment: social drinker   • Drug use: No   • Sexual activity: Defer     Review of Systems: 14 point review of systems performed, all systems negative     Exam:. The incision is well healed. Range of motion is measured at 0 to 125. The calf is soft and nontender with a negative Homans sign. Alignment is neutral. Good quad strength. There is no evidence of varus/valgus or flexion instability. No effusion. Intact to light touch with palpable distal pulses.     DIAGNOSTIC STUDIES  Xrays: 3 views(AP bilateral knees, lateral right, and sunrise bilateral knees) were ordered and reviewed for evaluation of right knee replacement. They demonstrate a well positioned, well aligned knee replacement without complicating factors noted. In comparison with previous films there has been no change.    ASSESSMENT: Annual follow up right knee replacement, right PREETHI.    PLAN:  Continue activities as tolerated    Follow up PRN    Van Mcguire MD  2019

## 2019-11-27 ENCOUNTER — TELEPHONE (OUTPATIENT)
Dept: ONCOLOGY | Facility: HOSPITAL | Age: 74
End: 2019-11-27

## 2019-11-27 ENCOUNTER — DOCUMENTATION (OUTPATIENT)
Dept: ONCOLOGY | Facility: CLINIC | Age: 74
End: 2019-11-27

## 2019-11-27 NOTE — TELEPHONE ENCOUNTER
Notified patient and she v/u     ----- Message from Diego Cornejo II, MD sent at 11/27/2019 12:29 PM EST -----   Please tell her I reviewed her ultrasound report that she sent to me today.  Tell her I do not think the calcification in the liver needs any further testing.

## 2019-11-27 NOTE — PROGRESS NOTES
Copy of the note I sent to the triage nurses today:      Please tell her I reviewed her ultrasound report that she sent to me today.  Tell her I do not think the calcification in the liver needs any further testing.

## 2020-01-08 ENCOUNTER — TELEPHONE (OUTPATIENT)
Dept: ORTHOPEDIC SURGERY | Facility: CLINIC | Age: 75
End: 2020-01-08

## 2020-01-08 NOTE — TELEPHONE ENCOUNTER
Patient reinjured the right surgical knee on a tempered glass door. Patient says that she has been having constant pain in the knee radiating down the shin and back of the calf. An appointment has been made for 1/27 to see MLL, patient wants to know if she should be seen any sooner than that.

## 2020-01-09 NOTE — TELEPHONE ENCOUNTER
I would recommend ice and activity modification and continue with elevation.  Keep appointment for 1/27 please

## 2020-01-27 ENCOUNTER — OFFICE VISIT (OUTPATIENT)
Dept: ORTHOPEDIC SURGERY | Facility: CLINIC | Age: 75
End: 2020-01-27

## 2020-01-27 ENCOUNTER — TELEPHONE (OUTPATIENT)
Dept: ONCOLOGY | Facility: CLINIC | Age: 75
End: 2020-01-27

## 2020-01-27 VITALS — HEIGHT: 64 IN | WEIGHT: 135 LBS | BODY MASS INDEX: 23.05 KG/M2

## 2020-01-27 DIAGNOSIS — G89.29 CHRONIC PAIN OF LEFT KNEE: ICD-10-CM

## 2020-01-27 DIAGNOSIS — Z96.651 STATUS POST TOTAL RIGHT KNEE REPLACEMENT: Primary | ICD-10-CM

## 2020-01-27 DIAGNOSIS — M25.562 CHRONIC PAIN OF LEFT KNEE: ICD-10-CM

## 2020-01-27 PROCEDURE — 99213 OFFICE O/P EST LOW 20 MIN: CPT | Performed by: NURSE PRACTITIONER

## 2020-01-27 PROCEDURE — 73562 X-RAY EXAM OF KNEE 3: CPT | Performed by: NURSE PRACTITIONER

## 2020-01-27 RX ORDER — CELECOXIB 200 MG/1
CAPSULE ORAL
COMMUNITY
End: 2020-05-26

## 2020-01-27 RX ORDER — POTASSIUM CHLORIDE 7.45 MG/ML
INJECTION INTRAVENOUS
COMMUNITY
End: 2020-05-26

## 2020-01-27 RX ORDER — DOXYCYCLINE HYCLATE 100 MG/1
CAPSULE ORAL
COMMUNITY
Start: 2020-01-23 | End: 2020-05-26

## 2020-01-27 RX ORDER — TRIAMCINOLONE ACETONIDE 0.25 MG/G
CREAM TOPICAL
COMMUNITY
Start: 2020-01-23 | End: 2020-05-26

## 2020-01-27 NOTE — PROGRESS NOTES
Patient: Mary Kay Saab  YOB: 1945 74 y.o. female  Medical Record Number: [id number]    Chief Complaints:   Chief Complaint   Patient presents with   • Right Knee - Pain   • Establish Care       History of Present Illness:Mary Kay Saab is a 74 y.o. female who presents with complaints of bilateral knee pain.  The patient had previous right total knee replacement and had been doing great, hit her right knee On a door about 3 weeks ago while getting out of the shower.  Had acute onset of pain it was initially very painful but has progressively gotten better.  Is now just an intermittent dull ache.  In addition the patient started with increased left knee pain after favoring the right knee.  She describes that left knee pain as a moderate dull ache which is constant better with rest    Allergies:   Allergies   Allergen Reactions   • Penicillins Anaphylaxis     Reactions: syncope and seizures   • Adhesive Tape Hives     Tears skin   • Atropine Hives   • Epinephrine Palpitations     Patient states will go into A-Fib   • Sulfa Antibiotics Nausea Only     STATES SHE DOES FINE WITH CERTAIN SULFA DRUGS       Medications:   Current Outpatient Medications   Medication Sig Dispense Refill   • alendronate (FOSAMAX) 70 MG tablet Take 70 mg by mouth Every 7 (Seven) Days. sunday 12   • alosetron (LOTRONEX) 0.5 MG tablet Take 0.05 mg by mouth Daily As Needed.     • ALPRAZolam (XANAX) 1 MG tablet Take 1 mg by mouth every night.     • amiodarone (PACERONE) 200 MG tablet Take 200 mg by mouth As Needed.     • betamethasone, augmented, (DIPROLENE) 0.05 % cream BRY AA QD PRF ITCHING OR RASH  1   • celecoxib (CeleBREX) 200 MG capsule celecoxib 200 mg capsule     • cetirizine (ZyrTEC) 10 MG tablet Take 10 mg by mouth Daily.     • clobetasol (OLUX) 0.05 % topical foam Apply 1 application topically to the appropriate area as directed 2 (Two) Times a Day.     • cycloSPORINE (RESTASIS) 0.05 % ophthalmic emulsion Administer 1  drop to both eyes 2 (Two) Times a Day.     • dabigatran etexilate (PRADAXA) 150 MG capsu Take 150 mg by mouth 2 (Two) Times a Day.     • diltiaZEM (CARDIZEM) 60 MG tablet Take 60 mg by mouth 2 (Two) Times a Day. PATIENT TAKES ONE TABLET AT 5 PM AND ONE TAB HS     • diphenoxylate-atropine (LOMOTIL) 2.5-0.025 MG per tablet Take 1 tablet by mouth 4 (Four) Times a Day As Needed for Diarrhea.     • doxycycline (VIBRAMYCIN) 100 MG capsule TK ONE C PO QD     • esomeprazole (NexIUM) 40 MG capsule Take 40 mg by mouth 2 (two) times a day.     • fluconazole (DIFLUCAN) 100 MG tablet Take 100 mg by mouth As Needed.     • furosemide (LASIX) 40 MG tablet Every 12 (Twelve) Hours.     • Glucosamine 500 MG capsule Take  by mouth.     • guaifenesin-dextromethorphan (MUCINEX DM)  MG tablet sustained-release 12 hour tablet Take  by mouth.     • hydrochlorothiazide (HYDRODIURIL) 12.5 MG tablet Take 12.5 mg by mouth As Needed.     • metoprolol succinate XL (TOPROL XL) 25 MG 24 hr tablet Take  by mouth.     • metoprolol tartrate (LOPRESSOR) 25 MG tablet Take 0.625 mg by mouth As Needed.     • metroNIDAZOLE (METROCREAM) 0.75 % cream BRY TO FACE QD TO BID     • montelukast (SINGULAIR) 10 MG tablet Take 10 mg by mouth Daily.     • Multiple Vitamin (MULTIVITAMIN) capsule      • nystatin (MYCOSTATIN) 194262 UNIT/ML suspension nystatin 100,000 unit/mL oral suspension     • potassium chloride 10 MEQ/100ML potassium chloride ER 10 mEq tablet,extended release     • pseudoephedrine-guaifenesin (MUCINEX D)  MG per 12 hr tablet Take 1 tablet by mouth Every 12 (Twelve) Hours.     • simvastatin (ZOCOR) 40 MG tablet Take 20 mg by mouth Daily.  3   • triamcinolone (KENALOG) 0.025 % cream BRY QD TO EARS PRF RASH       No current facility-administered medications for this visit.      Facility-Administered Medications Ordered in Other Visits   Medication Dose Route Frequency Provider Last Rate Last Dose   • mupirocin (BACTROBAN) 2 % nasal  "ointment   Nasal BID Van Mcguire MD             The following portions of the patient's history were reviewed and updated as appropriate: allergies, current medications, past family history, past medical history, past social history, past surgical history and problem list.    Review of Systems:   A 14 point review of systems was performed. All systems negative except pertinent positives/negative listed in HPI above    Physical Exam:   Vitals:    01/27/20 0808   Weight: 61.2 kg (135 lb)   Height: 162.6 cm (64\")       General: A and O x 3, ASA, NAD    SCLERA:    Normal    DENTITION:   Normal  Skin clear no unusual lesions noted  Right knee patient has a well-healed surgical incision noted she is nontender palpation excellent range of motion with no instability calf soft and nontender  Left knee patient has no appreciable effusion 110 degrees flexion neutral and extension with a positive Brown negative Lockman calf soft and nontender    Radiology:  Xrays 3views (ap,lateral, sunrise) right knee were ordered and reviewed today secondary to pain and the patient does have a well-placed well-positioned right total knee replacement.  Compared to views are unchanged    Assessment/Plan:  Status post right TKA with recent contusion  Left knee osteoarthritis    Patient I discussed treatment options.  We did discuss possibly a cortisone injection to the left knee since it is more painful but the patient would like to try Monovisc instead.  Order placed and she will come back to see me in a couple weeks for that injection  Answers for HPI/ROS submitted by the patient on 1/20/2020   How long have you been having these symptoms?: 1-4 weeks ago    "

## 2020-01-27 NOTE — TELEPHONE ENCOUNTER
Pt calling requesting iron levels be drawn, as she does not feel good.  Asked if CBC had been drawn recently at her PCP.  She has not had CBC drawn here for almost 1 year.  Per Dr. Cornejo's note, pt was to have CBC drawn every 3-4 months with PCP and if Hgb dropped below 10 she could come in for stat iron and ferritin labs.  She has not had a CBC recently according to patient.  Pt to go to PCP's office to have CBC drawn.  Instructed to call if her Hgb has dropped, otherwise she can call and make a follow-up apt for March.  Pt v/u.

## 2020-02-06 ENCOUNTER — LAB (OUTPATIENT)
Dept: LAB | Facility: HOSPITAL | Age: 75
End: 2020-02-06

## 2020-02-06 ENCOUNTER — TRANSCRIBE ORDERS (OUTPATIENT)
Dept: ADMINISTRATIVE | Facility: HOSPITAL | Age: 75
End: 2020-02-06

## 2020-02-06 DIAGNOSIS — D64.9 ANEMIA, UNSPECIFIED TYPE: ICD-10-CM

## 2020-02-06 DIAGNOSIS — E34.9 ELEVATED PARATHYROID HORMONE: ICD-10-CM

## 2020-02-06 DIAGNOSIS — E34.9 ELEVATED PARATHYROID HORMONE: Primary | ICD-10-CM

## 2020-02-06 DIAGNOSIS — E78.5 HYPERLIPIDEMIA, UNSPECIFIED HYPERLIPIDEMIA TYPE: ICD-10-CM

## 2020-02-06 LAB
ALBUMIN SERPL-MCNC: 4 G/DL (ref 3.5–5.2)
ALBUMIN/GLOB SERPL: 2 G/DL
ALP SERPL-CCNC: 109 U/L (ref 39–117)
ALT SERPL W P-5'-P-CCNC: 15 U/L (ref 1–33)
ANION GAP SERPL CALCULATED.3IONS-SCNC: 12.5 MMOL/L (ref 5–15)
AST SERPL-CCNC: 20 U/L (ref 1–32)
BASOPHILS # BLD AUTO: 0.05 10*3/MM3 (ref 0–0.2)
BASOPHILS NFR BLD AUTO: 1.1 % (ref 0–1.5)
BILIRUB SERPL-MCNC: 0.3 MG/DL (ref 0.2–1.2)
BUN BLD-MCNC: 36 MG/DL (ref 8–23)
BUN/CREAT SERPL: 26.5 (ref 7–25)
CALCIUM SPEC-SCNC: 9.2 MG/DL (ref 8.6–10.5)
CHLORIDE SERPL-SCNC: 98 MMOL/L (ref 98–107)
CO2 SERPL-SCNC: 24.5 MMOL/L (ref 22–29)
CREAT BLD-MCNC: 1.36 MG/DL (ref 0.57–1)
DEPRECATED RDW RBC AUTO: 44.1 FL (ref 37–54)
EOSINOPHIL # BLD AUTO: 0.15 10*3/MM3 (ref 0–0.4)
EOSINOPHIL NFR BLD AUTO: 3.2 % (ref 0.3–6.2)
ERYTHROCYTE [DISTWIDTH] IN BLOOD BY AUTOMATED COUNT: 13.8 % (ref 12.3–15.4)
GFR SERPL CREATININE-BSD FRML MDRD: 38 ML/MIN/1.73
GLOBULIN UR ELPH-MCNC: 2 GM/DL
GLUCOSE BLD-MCNC: 86 MG/DL (ref 65–99)
HCT VFR BLD AUTO: 38.2 % (ref 34–46.6)
HGB BLD-MCNC: 12.8 G/DL (ref 12–15.9)
IMM GRANULOCYTES # BLD AUTO: 0.02 10*3/MM3 (ref 0–0.05)
IMM GRANULOCYTES NFR BLD AUTO: 0.4 % (ref 0–0.5)
LYMPHOCYTES # BLD AUTO: 1.05 10*3/MM3 (ref 0.7–3.1)
LYMPHOCYTES NFR BLD AUTO: 22.1 % (ref 19.6–45.3)
MCH RBC QN AUTO: 29.2 PG (ref 26.6–33)
MCHC RBC AUTO-ENTMCNC: 33.5 G/DL (ref 31.5–35.7)
MCV RBC AUTO: 87 FL (ref 79–97)
MONOCYTES # BLD AUTO: 0.51 10*3/MM3 (ref 0.1–0.9)
MONOCYTES NFR BLD AUTO: 10.7 % (ref 5–12)
NEUTROPHILS # BLD AUTO: 2.97 10*3/MM3 (ref 1.7–7)
NEUTROPHILS NFR BLD AUTO: 62.5 % (ref 42.7–76)
NRBC BLD AUTO-RTO: 0 /100 WBC (ref 0–0.2)
PLATELET # BLD AUTO: 212 10*3/MM3 (ref 140–450)
PMV BLD AUTO: 10.3 FL (ref 6–12)
POTASSIUM BLD-SCNC: 4.1 MMOL/L (ref 3.5–5.2)
PROT SERPL-MCNC: 6 G/DL (ref 6–8.5)
PTH-INTACT SERPL-MCNC: 97.6 PG/ML (ref 15–65)
RBC # BLD AUTO: 4.39 10*6/MM3 (ref 3.77–5.28)
SODIUM BLD-SCNC: 135 MMOL/L (ref 136–145)
WBC NRBC COR # BLD: 4.75 10*3/MM3 (ref 3.4–10.8)

## 2020-02-06 PROCEDURE — 83970 ASSAY OF PARATHORMONE: CPT | Performed by: INTERNAL MEDICINE

## 2020-02-06 PROCEDURE — 36415 COLL VENOUS BLD VENIPUNCTURE: CPT

## 2020-02-06 PROCEDURE — 80053 COMPREHEN METABOLIC PANEL: CPT | Performed by: INTERNAL MEDICINE

## 2020-02-06 PROCEDURE — 85025 COMPLETE CBC W/AUTO DIFF WBC: CPT | Performed by: INTERNAL MEDICINE

## 2020-02-23 NOTE — PLAN OF CARE
Problem: Patient Care Overview (Adult)  Goal: Plan of Care Review  Outcome: Ongoing (interventions implemented as appropriate)    07/12/17 0948   Coping/Psychosocial Response Interventions   Plan Of Care Reviewed With patient   Patient Care Overview   Progress no change       Goal: Adult Individualization and Mutuality  Outcome: Ongoing (interventions implemented as appropriate)    07/12/17 0948   Individualization   Patient Specific Preferences PT GOES BY [facility]       Goal: Discharge Needs Assessment  Outcome: Ongoing (interventions implemented as appropriate)    Problem: Perioperative Period (Adult)  Goal: Signs and Symptoms of Listed Potential Problems Will be Absent or Manageable (Perioperative Period)  Outcome: Ongoing (interventions implemented as appropriate)    07/12/17 0948   Perioperative Period   Problems Assessed (Perioperative Period) pain;infection   Problems Present (Perioperative Period) pain            Lower abdominal pain

## 2020-05-20 ENCOUNTER — TREATMENT (OUTPATIENT)
Dept: PHYSICAL THERAPY | Facility: CLINIC | Age: 75
End: 2020-05-20

## 2020-05-20 DIAGNOSIS — M76.62 ACHILLES TENDONITIS, BILATERAL: Primary | ICD-10-CM

## 2020-05-20 DIAGNOSIS — M76.61 ACHILLES TENDONITIS, BILATERAL: Primary | ICD-10-CM

## 2020-05-20 DIAGNOSIS — M79.671 PAIN IN BOTH FEET: ICD-10-CM

## 2020-05-20 DIAGNOSIS — M79.672 PAIN IN BOTH FEET: ICD-10-CM

## 2020-05-20 PROCEDURE — 97035 APP MDLTY 1+ULTRASOUND EA 15: CPT | Performed by: PHYSICAL THERAPIST

## 2020-05-20 PROCEDURE — 97161 PT EVAL LOW COMPLEX 20 MIN: CPT | Performed by: PHYSICAL THERAPIST

## 2020-05-20 PROCEDURE — 97110 THERAPEUTIC EXERCISES: CPT | Performed by: PHYSICAL THERAPIST

## 2020-05-20 NOTE — PROGRESS NOTES
Physical Therapy Initial Evaluation and Plan of Care    Patient: Mary Kay Saab   : 1945  Diagnosis/ICD-10 Code:  Achilles tendonitis, bilateral [M76.61, M76.62]  Referring practitioner: No ref. provider found    Subjective Evaluation    History of Present Illness  Date of onset: 3/16/2020  Mechanism of injury: Pt reports pain and tendonitis in B achilles tendons L> R. L is more chronic and R achilles pain started about few months ago.  Pt reports she has a boot for both feet that she uses at home. Reports she has medication patches at home. Reports she has difficulty with prolonged walking.   PM reviewed  PLOF: I       Patient Occupation: Retired  Quality of life: good    Pain  Pain scale: R: 0     L: 2.  Pain scale at highest: R: 7    L: 10.  Location: B achilles tendon   Quality: dull ache, tight, sharp and discomfort  Relieving factors: support, rest, change in position and medications  Aggravating factors: ambulation, stairs, standing and squatting (housework )  Progression: no change    Social Support  Lives in: condominium and multiple-level home  Lives with: spouse    Treatments  Previous treatment: physical therapy, medication and immobilization  Current treatment: medication and physical therapy  Patient Goals  Patient goals for therapy: decreased pain, increased motion, increased strength, independence with ADLs/IADLs and return to sport/leisure activities             Objective          Active Range of Motion   Left Ankle/Foot   Dorsiflexion (ke): 5 degrees   Plantar flexion: 30 degrees   Inversion: 20 degrees   Eversion: 5 degrees     Right Ankle/Foot   Dorsiflexion (ke): 5 degrees   Plantar flexion: 40 degrees   Inversion: 25 degrees   Eversion: 5 degrees     Strength/Myotome Testing     Left Ankle/Foot   Dorsiflexion: 5  Plantar flexion: 4  Inversion: 5  Eversion: 5    Right Ankle/Foot   Dorsiflexion: 5  Plantar flexion: 4  Inversion: 5  Eversion: 5    Ambulation     Observational Gait    Walking speed, stride length, left stance time, right stance time, left step length and right step length within functional limits.     Additional Observational Gait Details  Increased EV B feet when ambulating     Functional Assessment     Comments  LEFS: 41          Assessment & Plan     Assessment  Impairments: abnormal gait, abnormal or restricted ROM, activity intolerance, impaired physical strength, lacks appropriate home exercise program, pain with function and weight-bearing intolerance  Assessment details: Pt presents to PT with symptoms consistent with B achilles tendonitis, B foot pain.  Pt would benefit from skilled PT intervention to address the deficits noted.   Prognosis: good  Prognosis details:       Functional Limitations: carrying objects, walking, uncomfortable because of pain, sitting, standing and unable to perform repetitive tasks  Goals  Plan Goals: SHORT TERM GOALS: Time for Goal Achievement: 6 visits    1.  Patient to be compliant with HEP.   2.  Pt able to ascend/descend steps and ambulatte with less knee pain < 5/10  3.Pt. to exhibit increased LE endurance/strength and decreased pain  to allow for increased ease  standing/walking > 30 minutes    LONG TERM GOALS: Time for Goal Achievement: 12 visits  1.  Pt to score 45 or greater on LEFS  2.  Patient able to ascend/descend steps and prolonged standing & cooking with pain < 2/10  3.  Pt to exhibit full B ankle  AROM to allow for kneeling, bending squatting as is necessary for ADL's, IADL's and household activities.   4.  Pt to demonstrate increased stability of the ankle to balance on foam as needed to traverse uneven terrain .          Plan  Therapy options: will be seen for skilled physical therapy services  Planned modality interventions: ultrasound, electrical stimulation/Russian stimulation, thermotherapy (hydrocollator packs) and cryotherapy  Other planned modality interventions: Dry Needling  Planned therapy interventions:  balance/weight-bearing training, body mechanics training, functional ROM exercises, flexibility, home exercise program, joint mobilization, strengthening, soft tissue mobilization, neuromuscular re-education, manual therapy, therapeutic activities and stretching  Frequency: 2x week  Duration in visits: 12  Treatment plan discussed with: patient        Manual Therapy:          mins  13068;  Therapeutic Exercise:   8       mins  68220;     Neuromuscular Lenny:         mins  32785;    Therapeutic Activity:           mins  26969;     Gait Training:            mins  10470;     Ultrasound:      8/8     mins  05534;    Electrical Stimulation:          mins  04873 ( );  Dry Needling           mins self-pay  Traction           mins 99153  Canalith Repositioning         mins 69017      Timed Treatment:   24   mins   Total Treatment:    60    mins    PT SIGNATURE: Angelia Gomez PT   [place] Lic #[id number]    DATE TREATMENT INITIATED: 5/21/2020    Initial Certification  Certification Period: 8/19/2020  I certify that the therapy services are furnished while this patient is under my care.  The services outlined above are required by this patient, and will be reviewed every 90 days.     PHYSICIAN:       DATE:     Please sign and return via fax to [phone].. Thank you, [facility].

## 2020-05-21 NOTE — PATIENT INSTRUCTIONS
HEP: soleus stretch, calf stretch   Pt was educated on findings of evaluation, purpose of treatment and goals for therapy. Treatment options discussed and questions answered. Pt was educated on exercises, self treatment and pain relief techniques.

## 2020-05-26 ENCOUNTER — CLINICAL SUPPORT (OUTPATIENT)
Dept: ORTHOPEDIC SURGERY | Facility: CLINIC | Age: 75
End: 2020-05-26

## 2020-05-26 VITALS — TEMPERATURE: 97.6 F | WEIGHT: 135 LBS | HEIGHT: 65 IN | BODY MASS INDEX: 22.49 KG/M2

## 2020-05-26 DIAGNOSIS — M17.12 PRIMARY OSTEOARTHRITIS OF LEFT KNEE: Primary | ICD-10-CM

## 2020-05-26 PROCEDURE — 20610 DRAIN/INJ JOINT/BURSA W/O US: CPT | Performed by: NURSE PRACTITIONER

## 2020-05-26 PROCEDURE — 99213 OFFICE O/P EST LOW 20 MIN: CPT | Performed by: NURSE PRACTITIONER

## 2020-05-26 NOTE — PROGRESS NOTES
Patient: Mary Kay Saab  YOB: 1945 75 y.o. female  Medical Record Number: [id number]    Chief Complaints:   Chief Complaint   Patient presents with   • Left Knee - Follow-up, Pain       History of Present Illness:Mary Kay Saab is a 75 y.o. female who presents with complaints of increased left knee pain.  She has known osteoarthritis.  Describes the left knee pain as a moderate sometimes severe constant ache dull in nature worse with standing walking, better with rest    Allergies:   Allergies   Allergen Reactions   • Penicillins Anaphylaxis     Reactions: syncope and seizures   • Adhesive Tape Hives     Tears skin   • Atropine Hives   • Epinephrine Palpitations     Patient states will go into A-Fib   • Sulfa Antibiotics Nausea Only     STATES SHE DOES FINE WITH CERTAIN SULFA DRUGS       Medications:   Current Outpatient Medications   Medication Sig Dispense Refill   • alendronate (FOSAMAX) 70 MG tablet Take 70 mg by mouth Every 7 (Seven) Days. sunday 12   • alosetron (LOTRONEX) 0.5 MG tablet Take 0.05 mg by mouth Daily As Needed.     • ALPRAZolam (XANAX) 1 MG tablet Take 1 mg by mouth every night.     • amiodarone (PACERONE) 200 MG tablet Take 200 mg by mouth As Needed.     • cetirizine (ZyrTEC) 10 MG tablet Take 10 mg by mouth Daily.     • cycloSPORINE (RESTASIS) 0.05 % ophthalmic emulsion Administer 1 drop to both eyes 2 (Two) Times a Day.     • dabigatran etexilate (PRADAXA) 150 MG capsu Take 150 mg by mouth 2 (Two) Times a Day.     • diltiaZEM (CARDIZEM) 60 MG tablet Take 60 mg by mouth 2 (Two) Times a Day. PATIENT TAKES ONE TABLET AT 5 PM AND ONE TAB HS     • diphenoxylate-atropine (LOMOTIL) 2.5-0.025 MG per tablet Take 1 tablet by mouth 4 (Four) Times a Day As Needed for Diarrhea.     • esomeprazole (NexIUM) 40 MG capsule Take 40 mg by mouth 2 (two) times a day.     • fluconazole (DIFLUCAN) 100 MG tablet Take 100 mg by mouth As Needed.     • furosemide (LASIX) 40 MG tablet Every 12 (Twelve)  "Hours.     • Glucosamine 500 MG capsule Take  by mouth.     • hydrochlorothiazide (HYDRODIURIL) 12.5 MG tablet Take 12.5 mg by mouth As Needed.     • metoprolol tartrate (LOPRESSOR) 25 MG tablet Take 0.625 mg by mouth As Needed.     • montelukast (SINGULAIR) 10 MG tablet Take 10 mg by mouth Daily.     • Multiple Vitamin (MULTIVITAMIN) capsule      • pseudoephedrine-guaifenesin (MUCINEX D)  MG per 12 hr tablet Take 1 tablet by mouth Every 12 (Twelve) Hours.     • simvastatin (ZOCOR) 40 MG tablet Take 20 mg by mouth Daily.  3     No current facility-administered medications for this visit.      Facility-Administered Medications Ordered in Other Visits   Medication Dose Route Frequency Provider Last Rate Last Dose   • mupirocin (BACTROBAN) 2 % nasal ointment   Nasal BID Van Mcguire MD             The following portions of the patient's history were reviewed and updated as appropriate: allergies, current medications, past family history, past medical history, past social history, past surgical history and problem list.    Review of Systems:   A 14 point review of systems was performed. All systems negative except pertinent positives/negative listed in HPI above    Physical Exam:   Vitals:    05/26/20 0952   Temp: 97.6 °F (36.4 °C)   TempSrc: Temporal   Weight: 61.2 kg (135 lb)   Height: 163.8 cm (64.5\")   PainSc:   1   PainLoc: Knee       General: A and O x 3, ASA, NAD    SCLERA:    Normal    DENTITION:   Normal  Skin clear no unusual lesions noted  Left knee patient has no appreciable effusion limited range of motion secondary to pain calf is soft and nontender    Radiology:  Xrays 3views (ap,lateral, sunrise) previous x-rays of left knee were reviewed show significant arthritic changes    Assessment/Plan:  Left knee osteoarthritis    Patient discussed treatment options.  She would like to proceed with left knee Monovisc injection.  Prior to injection risks were discussed including pain infection.  Patient " verbalized understanding would like to proceed with injection she will continue with physical therapy exercises and I will see her back as needed    Large Joint Arthrocentesis: L knee  Date/Time: 5/26/2020 9:56 AM  Consent given by: patient  Site marked: site marked  Timeout: Immediately prior to procedure a time out was called to verify the correct patient, procedure, equipment, support staff and site/side marked as required   Supporting Documentation  Indications: pain   Procedure Details  Location: knee - L knee  Needle gauge: 21.  Approach: anteromedial  Medications administered: 88 mg Hyaluronan 88 MG/4ML; 2 mL lidocaine (cardiac)  Patient tolerance: patient tolerated the procedure well with no immediate complications

## 2020-05-28 ENCOUNTER — TRANSCRIBE ORDERS (OUTPATIENT)
Dept: ADMINISTRATIVE | Facility: HOSPITAL | Age: 75
End: 2020-05-28

## 2020-05-28 ENCOUNTER — TREATMENT (OUTPATIENT)
Dept: PHYSICAL THERAPY | Facility: CLINIC | Age: 75
End: 2020-05-28

## 2020-05-28 ENCOUNTER — LAB (OUTPATIENT)
Dept: LAB | Facility: HOSPITAL | Age: 75
End: 2020-05-28

## 2020-05-28 DIAGNOSIS — E89.2 HX OF PARATHYROIDECTOMY (HCC): ICD-10-CM

## 2020-05-28 DIAGNOSIS — E78.5 HYPERLIPIDEMIA, UNSPECIFIED HYPERLIPIDEMIA TYPE: ICD-10-CM

## 2020-05-28 DIAGNOSIS — I10 HYPERTENSION, UNSPECIFIED TYPE: ICD-10-CM

## 2020-05-28 DIAGNOSIS — Z78.0 OSTEOPENIA AFTER MENOPAUSE: ICD-10-CM

## 2020-05-28 DIAGNOSIS — M79.671 PAIN IN BOTH FEET: ICD-10-CM

## 2020-05-28 DIAGNOSIS — M79.672 PAIN IN BOTH FEET: ICD-10-CM

## 2020-05-28 DIAGNOSIS — M85.80 OSTEOPENIA AFTER MENOPAUSE: ICD-10-CM

## 2020-05-28 DIAGNOSIS — M19.90 OSTEOARTHRITIS, UNSPECIFIED OSTEOARTHRITIS TYPE, UNSPECIFIED SITE: ICD-10-CM

## 2020-05-28 DIAGNOSIS — M76.61 ACHILLES TENDONITIS, BILATERAL: Primary | ICD-10-CM

## 2020-05-28 DIAGNOSIS — M76.62 ACHILLES TENDONITIS, BILATERAL: Primary | ICD-10-CM

## 2020-05-28 DIAGNOSIS — I10 HYPERTENSION, UNSPECIFIED TYPE: Primary | ICD-10-CM

## 2020-05-28 LAB
25(OH)D3 SERPL-MCNC: 47.3 NG/ML (ref 30–100)
ALBUMIN SERPL-MCNC: 4.4 G/DL (ref 3.5–5.2)
ALBUMIN/GLOB SERPL: 2.3 G/DL
ALP SERPL-CCNC: 88 U/L (ref 39–117)
ALT SERPL W P-5'-P-CCNC: 11 U/L (ref 1–33)
ANION GAP SERPL CALCULATED.3IONS-SCNC: 11.7 MMOL/L (ref 5–15)
AST SERPL-CCNC: 21 U/L (ref 1–32)
BASOPHILS # BLD AUTO: 0.05 10*3/MM3 (ref 0–0.2)
BASOPHILS NFR BLD AUTO: 0.9 % (ref 0–1.5)
BILIRUB SERPL-MCNC: 0.3 MG/DL (ref 0.2–1.2)
BUN BLD-MCNC: 61 MG/DL (ref 8–23)
BUN/CREAT SERPL: 32.1 (ref 7–25)
CALCIUM SPEC-SCNC: 9.2 MG/DL (ref 8.6–10.5)
CHLORIDE SERPL-SCNC: 91 MMOL/L (ref 98–107)
CHOLEST SERPL-MCNC: 147 MG/DL (ref 0–200)
CO2 SERPL-SCNC: 27.3 MMOL/L (ref 22–29)
CREAT BLD-MCNC: 1.9 MG/DL (ref 0.57–1)
DEPRECATED RDW RBC AUTO: 45.3 FL (ref 37–54)
EOSINOPHIL # BLD AUTO: 0.11 10*3/MM3 (ref 0–0.4)
EOSINOPHIL NFR BLD AUTO: 1.9 % (ref 0.3–6.2)
ERYTHROCYTE [DISTWIDTH] IN BLOOD BY AUTOMATED COUNT: 14.5 % (ref 12.3–15.4)
GFR SERPL CREATININE-BSD FRML MDRD: 26 ML/MIN/1.73
GLOBULIN UR ELPH-MCNC: 1.9 GM/DL
GLUCOSE BLD-MCNC: 88 MG/DL (ref 65–99)
HCT VFR BLD AUTO: 36.5 % (ref 34–46.6)
HDLC SERPL-MCNC: 73 MG/DL (ref 40–60)
HGB BLD-MCNC: 12.3 G/DL (ref 12–15.9)
IMM GRANULOCYTES # BLD AUTO: 0.03 10*3/MM3 (ref 0–0.05)
IMM GRANULOCYTES NFR BLD AUTO: 0.5 % (ref 0–0.5)
LDLC SERPL CALC-MCNC: 65 MG/DL (ref 0–100)
LDLC/HDLC SERPL: 0.89 {RATIO}
LYMPHOCYTES # BLD AUTO: 0.96 10*3/MM3 (ref 0.7–3.1)
LYMPHOCYTES NFR BLD AUTO: 16.6 % (ref 19.6–45.3)
MCH RBC QN AUTO: 28.9 PG (ref 26.6–33)
MCHC RBC AUTO-ENTMCNC: 33.7 G/DL (ref 31.5–35.7)
MCV RBC AUTO: 85.7 FL (ref 79–97)
MONOCYTES # BLD AUTO: 0.55 10*3/MM3 (ref 0.1–0.9)
MONOCYTES NFR BLD AUTO: 9.5 % (ref 5–12)
NEUTROPHILS # BLD AUTO: 4.1 10*3/MM3 (ref 1.7–7)
NEUTROPHILS NFR BLD AUTO: 70.6 % (ref 42.7–76)
NRBC BLD AUTO-RTO: 0 /100 WBC (ref 0–0.2)
PLATELET # BLD AUTO: 228 10*3/MM3 (ref 140–450)
PMV BLD AUTO: 10.1 FL (ref 6–12)
POTASSIUM BLD-SCNC: 3.1 MMOL/L (ref 3.5–5.2)
PROT SERPL-MCNC: 6.3 G/DL (ref 6–8.5)
PTH-INTACT SERPL-MCNC: 122 PG/ML (ref 15–65)
RBC # BLD AUTO: 4.26 10*6/MM3 (ref 3.77–5.28)
SODIUM BLD-SCNC: 130 MMOL/L (ref 136–145)
TRIGL SERPL-MCNC: 46 MG/DL (ref 0–150)
VLDLC SERPL-MCNC: 9.2 MG/DL (ref 5–40)
WBC NRBC COR # BLD: 5.8 10*3/MM3 (ref 3.4–10.8)

## 2020-05-28 PROCEDURE — 36415 COLL VENOUS BLD VENIPUNCTURE: CPT

## 2020-05-28 PROCEDURE — 97035 APP MDLTY 1+ULTRASOUND EA 15: CPT | Performed by: PHYSICAL THERAPIST

## 2020-05-28 PROCEDURE — 80053 COMPREHEN METABOLIC PANEL: CPT | Performed by: INTERNAL MEDICINE

## 2020-05-28 PROCEDURE — 83970 ASSAY OF PARATHORMONE: CPT | Performed by: INTERNAL MEDICINE

## 2020-05-28 PROCEDURE — 97140 MANUAL THERAPY 1/> REGIONS: CPT | Performed by: PHYSICAL THERAPIST

## 2020-05-28 PROCEDURE — 80061 LIPID PANEL: CPT | Performed by: INTERNAL MEDICINE

## 2020-05-28 PROCEDURE — 85025 COMPLETE CBC W/AUTO DIFF WBC: CPT | Performed by: INTERNAL MEDICINE

## 2020-05-28 PROCEDURE — 82306 VITAMIN D 25 HYDROXY: CPT | Performed by: INTERNAL MEDICINE

## 2020-05-28 NOTE — PROGRESS NOTES
Physical Therapy Daily Progress Note  Visit: 2    Subjective Mary Kay Saab reports: both of her feet felt better after last treatment and feeling better today.     Objective   See Exercise, Manual, and Modality Logs for complete treatment.     Assessment/Plan: good tolerance to treatment. Plan details: Progress ROM / strengthening / stabilization / functional activity as tolerated     Manual Therapy:     35     mins  16933;  Therapeutic Exercise:          mins  07465;     Neuromuscular Lenny:         mins  21759;    Therapeutic Activity:           mins  65335;     Gait Training:            mins  94128;     Ultrasound:    10/10       mins  26340;    Electrical Stimulation:          mins  51006 ( );  Dry Needling           mins self-pay  Traction           mins 55541  Canalith Repositioning         mins 18946      Timed Treatment:   55   mins   Total Treatment:     55   mins    Angelia Gomez PT  [place] License #: [id number]    Physical Therapist

## 2020-05-29 ENCOUNTER — OFFICE VISIT (OUTPATIENT)
Dept: ORTHOPEDIC SURGERY | Facility: CLINIC | Age: 75
End: 2020-05-29

## 2020-05-29 VITALS — TEMPERATURE: 97.1 F | BODY MASS INDEX: 22.51 KG/M2 | WEIGHT: 135.1 LBS | HEIGHT: 65 IN

## 2020-05-29 DIAGNOSIS — M75.100 TEAR OF ROTATOR CUFF, UNSPECIFIED LATERALITY, UNSPECIFIED TEAR EXTENT, UNSPECIFIED WHETHER TRAUMATIC: ICD-10-CM

## 2020-05-29 DIAGNOSIS — M25.512 BILATERAL SHOULDER PAIN, UNSPECIFIED CHRONICITY: Primary | ICD-10-CM

## 2020-05-29 DIAGNOSIS — M25.511 BILATERAL SHOULDER PAIN, UNSPECIFIED CHRONICITY: Primary | ICD-10-CM

## 2020-05-29 PROCEDURE — 73030 X-RAY EXAM OF SHOULDER: CPT | Performed by: ORTHOPAEDIC SURGERY

## 2020-05-29 PROCEDURE — 20610 DRAIN/INJ JOINT/BURSA W/O US: CPT | Performed by: ORTHOPAEDIC SURGERY

## 2020-05-29 PROCEDURE — 99214 OFFICE O/P EST MOD 30 MIN: CPT | Performed by: ORTHOPAEDIC SURGERY

## 2020-05-29 RX ORDER — METHYLPREDNISOLONE ACETATE 80 MG/ML
80 INJECTION, SUSPENSION INTRA-ARTICULAR; INTRALESIONAL; INTRAMUSCULAR; SOFT TISSUE
Status: COMPLETED | OUTPATIENT
Start: 2020-05-29 | End: 2020-05-29

## 2020-05-29 RX ADMIN — METHYLPREDNISOLONE ACETATE 80 MG: 80 INJECTION, SUSPENSION INTRA-ARTICULAR; INTRALESIONAL; INTRAMUSCULAR; SOFT TISSUE at 08:37

## 2020-05-29 NOTE — PROGRESS NOTES
New Bilateral Shoulder      Patient: Mary Kay Saab        YOB: 1945    Medical Record Number: [id number]        Chief Complaints: bilateral shoulder pain      History of Present Illness: This is a 75-year-old female who presents complaining of bilateral shoulder pain right equal to left she is right-hand dominant been ongoing several months no history injury change in activity no night pain but does hurt in the morning.  She is currently in physical therapy for Achilles tendon current symptoms are mild/moderate intermittent aching worse with activity and house work somewhat better with rest she is retired past medical history is marked for breast cancer thyroid disease anemia A. fib kidney disease and osteoporosis      Allergies:   Allergies   Allergen Reactions   • Penicillins Anaphylaxis     Reactions: syncope and seizures   • Adhesive Tape Hives     Tears skin   • Atropine Hives   • Epinephrine Palpitations     Patient states will go into A-Fib   • Sulfa Antibiotics Nausea Only     STATES SHE DOES FINE WITH CERTAIN SULFA DRUGS       Medications:   Home Medications:  Current Outpatient Medications on File Prior to Visit   Medication Sig   • alendronate (FOSAMAX) 70 MG tablet Take 70 mg by mouth Every 7 (Seven) Days. sunday   • alosetron (LOTRONEX) 0.5 MG tablet Take 0.05 mg by mouth Daily As Needed.   • ALPRAZolam (XANAX) 1 MG tablet Take 1 mg by mouth every night.   • amiodarone (PACERONE) 200 MG tablet Take 200 mg by mouth As Needed.   • cetirizine (ZyrTEC) 10 MG tablet Take 10 mg by mouth Daily.   • cycloSPORINE (RESTASIS) 0.05 % ophthalmic emulsion Administer 1 drop to both eyes 2 (Two) Times a Day.   • dabigatran etexilate (PRADAXA) 150 MG capsu Take 150 mg by mouth 2 (Two) Times a Day.   • diltiaZEM (CARDIZEM) 60 MG tablet Take 60 mg by mouth 2 (Two) Times a Day. PATIENT TAKES ONE TABLET AT 5 PM AND ONE TAB HS   • diphenoxylate-atropine (LOMOTIL) 2.5-0.025 MG per tablet Take 1 tablet by  mouth 4 (Four) Times a Day As Needed for Diarrhea.   • esomeprazole (NexIUM) 40 MG capsule Take 40 mg by mouth 2 (two) times a day.   • fluconazole (DIFLUCAN) 100 MG tablet Take 100 mg by mouth As Needed.   • furosemide (LASIX) 40 MG tablet Every 12 (Twelve) Hours.   • Glucosamine 500 MG capsule Take  by mouth.   • hydrochlorothiazide (HYDRODIURIL) 12.5 MG tablet Take 12.5 mg by mouth As Needed.   • metoprolol tartrate (LOPRESSOR) 25 MG tablet Take 0.625 mg by mouth As Needed.   • montelukast (SINGULAIR) 10 MG tablet Take 10 mg by mouth Daily.   • Multiple Vitamin (MULTIVITAMIN) capsule    • pseudoephedrine-guaifenesin (MUCINEX D)  MG per 12 hr tablet Take 1 tablet by mouth Every 12 (Twelve) Hours.   • simvastatin (ZOCOR) 40 MG tablet Take 20 mg by mouth Daily.     Current Facility-Administered Medications on File Prior to Visit   Medication   • mupirocin (BACTROBAN) 2 % nasal ointment     Current Medications:  Scheduled Meds:  Continuous Infusions:  No current facility-administered medications for this visit.   PRN Meds:.    Past Medical History:   Diagnosis Date   • Anemia     Of chronic renal insufficiency   • Atrial fibrillation (CMS/HCC)    • Mcguire's esophagus    • Breast cancer (CMS/HCC)    • Chronic renal insufficiency     Stage 3   • MCCRAY (dyspnea on exertion)    • Dyspnea    • GERD (gastroesophageal reflux disease)    • H/O electrocardiogram    • History of staph infection    • Hx of being hospitalized     [facility] in 09/2010 and 10/2010 for atrial fibrillation, Dr. Perla Hurtado   • Hyperlipidemia    • Hypertension    • Hypoxia    • IBS (irritable bowel syndrome)    • Kidney dysfunction    • Lower extremity edema    • Lump or mass in breast    • Malignant neoplasm of breast (CMS/HCC)    • Osteoarthritis    • Osteopenia    • Osteoporosis    • Paroxysmal atrial fibrillation (CMS/HCC)    • Seizure (CMS/HCC)     AS A TEENAGER, NOT CURRENTLY   • Sinusitis    • SOB (shortness of  "breath)         Past Surgical History:   Procedure Laterality Date   • BREAST LUMPECTOMY      For LCIS   • CARDIAC CATHETERIZATION Left 2016    Procedure: Cardiac catheterization;  Surgeon: Kevin Guillen MD;  Location: [facility] CATH INVASIVE LOCATION;  Service:    • FOOT SURGERY     • HAND ARTHROPLASTY Right    • KNEE ARTHROSCOPY     • OTHER SURGICAL HISTORY      LYMPHATIC SURGERY   • TOTAL HIP ARTHROPLASTY Right 2017    Procedure: TOTAL HIP ARTHROPLASTY ANTERIOR WITH HANA TABLE;  Surgeon: Van Mcguire MD;  Location: [facility] OR;  Service:    • TOTAL KNEE ARTHROPLASTY Right 2018    Procedure: TOTAL KNEE ARTHROPLASTY;  Surgeon: Van Mcguire MD;  Location: [facility] OR;  Service: Orthopedics        Social History     Occupational History     Employer: RETIRED   Tobacco Use   • Smoking status: Former Smoker     Last attempt to quit: 1975     Years since quittin.0   • Smokeless tobacco: Never Used   • Tobacco comment: QUIT SMOKING IN LATE    Substance and Sexual Activity   • Alcohol use: Yes     Comment: social drinker   • Drug use: No   • Sexual activity: Defer      Social History     Social History Narrative   • Not on file        Family History   Problem Relation Age of Onset   • Heart disease Other    • Stroke Other    • Stroke Mother    • Heart disease Mother    • Hypertension Mother    • Leukemia Father         Mid 60s   • Hypertension Brother    • Cancer Paternal Aunt    • Cancer Paternal Grandfather    • Malig Hyperthermia Neg Hx              Review of Systems: 14 point review of systems are remarkable for the pertinent positives listed in the chart by the patient the remainder negative    Review of Systems      Physical Exam: 75 y.o. female  General Appearance:    Alert, cooperative, in no acute distress                   Vitals:    20 0814   Temp: 97.1 °F (36.2 °C)   Weight: 61.3 kg (135 lb 1.6 oz)   Height: 163.8 cm (64.5\")   PainSc:   5      Patient is alert " and read ×3 no acute distress appears her above-listed at height weight and age.  Affect is normal respiratory rate is normal unlabored. Heart rate regular rate rhythm, sclera, dentition and hearing are normal for the purpose of this exam.    Ortho Exam Physical exam of the right shoulder reveals no overlying skin changes no lymphedema no lymphadenopathy.  Patient has active flexion 180 with mild symptoms abduction is similar external rotation is to 50 and internal rotation to the upper lumbar spine with mild symptoms.  Patient has good rotator cuff strength 4+ over 5 with isometric strength testing with pain.  Patient has a positive impingement and a positive Astudillo sign.  Patient has good cervical range of motion which is full and asymptomatic no radicular symptoms.  Patient has a normal elbow exam.  Good distal pulses are present  Patient has pain with overhead activity and a positive Neer sign and a positive empty can sign , a positive drop arm and a definitive painful arc    Physical exam of the left shoulder reveals no overlying skin changes no lymphedema no lymphadenopathy.  Patient has active flexion 180 with mild symptoms abduction is similar external rotation is to 50 and internal rotation to the upper lumbar spine with mild symptoms.  Patient has good rotator cuff strength 4+ over 5 with isometric strength testing with pain.  Patient has a positive impingement and a positive Astudillo sign.  Patient has good cervical range of motion which is full and asymptomatic no radicular symptoms.  Patient has a normal elbow exam.  Good distal pulses are presentPatient has pain with overhead activity and a positive Neer sign and a positive empty can sign  They have a positive drop arm any definitive painful arc      Large Joint Arthrocentesis: L subacromial bursa  Date/Time: 5/29/2020 8:37 AM  Consent given by: patient  Site marked: site marked  Timeout: Immediately prior to procedure a time out was called to verify  the correct patient, procedure, equipment, support staff and site/side marked as required   Supporting Documentation  Indications: pain   Procedure Details  Location: shoulder - L subacromial bursa  Preparation: Patient was prepped and draped in the usual sterile fashion  Needle size: 22 G  Approach: posterior  Medications administered: 4 mL lidocaine (cardiac); 80 mg methylPREDNISolone acetate 80 MG/ML  Patient tolerance: patient tolerated the procedure well with no immediate complications                Radiology:   AP, Scapular Y and Axillary Lateral of the right and left shoulder were ordered/reviewed to evauate shoulder pain.  I have no comparative films she does have bilateral acromioclavicular arthritis on the right she has a tiny osteophyte sitting on the inferior aspect of the glenohumeral joint otherwise good maintenance of joint space no acute pathology  Imaging Results (Most Recent)     Procedure Component Value Units Date/Time    XR Shoulder 2+ View Bilateral [[id number]] Resulted:  05/29/20 0730     Updated:  05/29/20 0807    Impression:       Ordering physician's impression is located in the Encounter Note dated 05/29/20. X-ray performed in the DR room.          Assessment/Plan: Bilateral shoulder pain I think this is rotator cuff in origin plan is to proceed with an injection she would like to do 1 injection so she does not push herself out of rhythm we will start with the left one she is currently seeing physical therapy I will send an order in for them to work on cuff and core strengthening as well.  I will have her schedule appointment for 4 weeks for the right one she fails to improve these we will pursue other means of testing  Cortisone Injection. See procedure note.  Cortisone Injection for DIAGNOSTIC and THERAPUTIC purposes.

## 2020-06-01 ENCOUNTER — TREATMENT (OUTPATIENT)
Dept: PHYSICAL THERAPY | Facility: CLINIC | Age: 75
End: 2020-06-01

## 2020-06-01 DIAGNOSIS — M25.512 CHRONIC PAIN OF BOTH SHOULDERS: Primary | ICD-10-CM

## 2020-06-01 DIAGNOSIS — G89.29 CHRONIC PAIN OF BOTH SHOULDERS: Primary | ICD-10-CM

## 2020-06-01 DIAGNOSIS — M25.511 CHRONIC PAIN OF BOTH SHOULDERS: Primary | ICD-10-CM

## 2020-06-01 PROCEDURE — 97140 MANUAL THERAPY 1/> REGIONS: CPT | Performed by: PHYSICAL THERAPIST

## 2020-06-01 PROCEDURE — 97110 THERAPEUTIC EXERCISES: CPT | Performed by: PHYSICAL THERAPIST

## 2020-06-01 PROCEDURE — 97161 PT EVAL LOW COMPLEX 20 MIN: CPT | Performed by: PHYSICAL THERAPIST

## 2020-06-01 NOTE — PROGRESS NOTES
Physical Therapy Initial Evaluation and Plan of Care      Patient: Mary Kay Saab   : 1945  Diagnosis/ICD-10 Code:  Chronic pain of both shoulders [M25.511, G89.29, M25.512]  Referring practitioner: Baylee Zhao MD  Date of Initial Visit: 2020  Today's Date: 2020  Patient seen for 1 sessions           Subjective Questionnaire: QuickDASH: 27.27      Subjective Evaluation    History of Present Illness  Mechanism of injury: B shoulder pain off/on for 1 year, L>R. R shoulder pain noticeable as of 6 mos ago. Thinks she carried groceries on L and pulled herself up stairs w/ L when experiencing R knee/hip pain. Decreased pain w/ L shoulder injection 20. Pain increased with grocery shopping, cleaning, or heavy lifting. No problem reaching overhead. Pain reaching behind back with L hand.  Takes tylenol as needed.  Sleeps on sides. Does not wake her up, but pain is worse if she is on one side all night.    Xray 20: bilateral acromioclavicular arthritis on the right she has a tiny osteophyte sitting on the inferior aspect of the glenohumeral joint otherwise good maintenance of joint space no acute pathology            Patient Occupation: Employee meetings, benefits Pain  Current pain rating: 3  At best pain ratin  At worst pain ratin  Location: B shoulders, upper arms, region of upper trapezius/SS  Quality: dull ache and sharp  Relieving factors: medications, change in position and rest  Aggravating factors: lifting and prolonged positioning  Progression: worsening    Social Support  Lives in: multiple-level home    Hand dominance: right    Diagnostic Tests  X-ray: abnormal    Treatments  Previous treatment: injection treatment  Current treatment: physical therapy  Patient Goals  Patient goals for therapy: decreased pain, increased motion, increased strength, independence with ADLs/IADLs and return to sport/leisure activities             Objective          Postural Observations  Seated  posture: fair    Additional Postural Observation Details  Fwd shoulders    Tenderness     Right Shoulder  Tenderness in the infraspinatus tendon and supraspinatus tendon.     Additional Tenderness Details  TTP medial scapular border B    Cervical/Thoracic Screen   Cervical range of motion within normal limits    Active Range of Motion   Left Shoulder   Flexion: 140 (P! L upper arm) degrees with pain  Abduction: 151 degrees   External rotation BTH: T2   Internal rotation BTB: T8     Right Shoulder   Flexion: 153 (P! anterior shoulder) degrees with pain  Abduction: 152 degrees with pain  External rotation BTH: T1   Internal rotation BTB: T4     Strength/Myotome Testing     Left Shoulder     Planes of Motion   Flexion: 4   Abduction: 4-   External rotation at 0°: 4+   Internal rotation at 0°: 4+     Isolated Muscles   Biceps: 4+   Triceps: 4+     Right Shoulder     Planes of Motion   Flexion: 4-   Abduction: 4-   External rotation at 0°: 4+   Internal rotation at 0°: 4+     Isolated Muscles   Biceps: 4+   Triceps: 4+     Tests     Left Shoulder   Positive Hawkin's.   Negative empty can, full can and Neer's.     Right Shoulder   Positive empty can and Hawkin's.   Negative full can and Neer's.           Assessment & Plan     Assessment  Impairments: abnormal muscle tone, abnormal or restricted ROM, activity intolerance, impaired physical strength, lacks appropriate home exercise program and pain with function  Assessment details: Pt presents w/ c/o B shoulder pain, B shoulder weakness, limited and painful AROM (B), tenderness (see above), and positive special testing (see above). Will benefit from skilled PT services in order to address listed impairments and increase tolerance to normal daily activities including ADLs and recreational activities.    Prognosis: good  Functional Limitations: carrying objects, lifting, pulling, pushing, uncomfortable because of pain, reaching behind back, reaching overhead and unable to  perform repetitive tasks  Goals  Plan Goals: Short Term Goals: 2-4 weeks. Patient will:  1. Be independent with initial HEP  2. Be instructed in posture and body mechanics.  3. Report pain </= 5/10 w/ all activity    Long Term Goals: 4-8 weeks. Pt will:  1. Exhibit (B) shoulder AROM to WFL to allow for reaching overhead and out (ABD) without pain limiting function.  2. Demonstrate improved BUE MMT of >/= 4+/5 to allow for performance of ADLs/household management/recreational activities.  3. Pt able to reach overhead and lift 10# to allow for return to doing home/yard/recreational activities with min to no pain.  4. Report perceived disability </=10% based on QuickDASH    Plan  Therapy options: will be seen for skilled physical therapy services  Planned modality interventions: cryotherapy, electrical stimulation/Russian stimulation, thermotherapy (hydrocollator packs), ultrasound and iontophoresis  Planned therapy interventions: abdominal trunk stabilization, ADL retraining, balance/weight-bearing training, body mechanics training, flexibility, functional ROM exercises, home exercise program, joint mobilization, manual therapy, neuromuscular re-education, postural training, soft tissue mobilization, spinal/joint mobilization, strengthening, stretching and therapeutic activities  Frequency: 2x week  Duration in weeks: 12  Treatment plan discussed with: patient        Manual Therapy:    10     mins  77281;  Therapeutic Exercise:    15     mins  34414;     Neuromuscular Lenny:    0    mins  78433;    Therapeutic Activity:     0     mins  01906;     Gait Trainin     mins  79330;     Ultrasound:     0     mins  82642;    Electrical Stimulation:    0     mins  96525 ( );  Dry Needling     0     mins self-pay    Timed Treatment:   25   mins   Total Treatment:     60   mins    PT SIGNATURE: Lisa Guzmán PT   DATE TREATMENT INITIATED: 2020    Initial Certification  Certification Period: 2020  I  certify that the therapy services are furnished while this patient is under my care.  The services outlined above are required by this patient, and will be reviewed every 90 days.     PHYSICIAN: Baylee Zhao MD      DATE:     Please sign and return via fax to [phone].. Thank you, [facility].

## 2020-06-03 ENCOUNTER — TREATMENT (OUTPATIENT)
Dept: PHYSICAL THERAPY | Facility: CLINIC | Age: 75
End: 2020-06-03

## 2020-06-03 DIAGNOSIS — M25.511 CHRONIC PAIN OF BOTH SHOULDERS: Primary | ICD-10-CM

## 2020-06-03 DIAGNOSIS — G89.29 CHRONIC PAIN OF BOTH SHOULDERS: Primary | ICD-10-CM

## 2020-06-03 DIAGNOSIS — M25.512 CHRONIC PAIN OF BOTH SHOULDERS: Primary | ICD-10-CM

## 2020-06-03 PROCEDURE — G0283 ELEC STIM OTHER THAN WOUND: HCPCS | Performed by: PHYSICAL THERAPIST

## 2020-06-03 PROCEDURE — 97110 THERAPEUTIC EXERCISES: CPT | Performed by: PHYSICAL THERAPIST

## 2020-06-03 PROCEDURE — 97140 MANUAL THERAPY 1/> REGIONS: CPT | Performed by: PHYSICAL THERAPIST

## 2020-06-03 NOTE — PROGRESS NOTES
Physical Therapy Daily Progress Note      Visit # 2      Subjective   Pt reports she felt great leaving PT Monday, but her whole body hurt yesterday.    Objective   See Exercise, Manual, and Modality Logs for complete treatment.       Assessment/Plan  Deferred additional exercises due to reports of upper back soreness after initial evaluation. Reported pain relief with massage and electrical stimulation. Progress per POC.         Manual Therapy:    10     mins  42582;  Therapeutic Exercise:    15     mins  49917;     Neuromuscular Lenny:    0    mins  06671;    Therapeutic Activity:     0     mins  31254;     Gait Trainin     mins  80368;     Ultrasound:     0     mins  92798;    Work Hardening           0      mins 01208  Iontophoresis               0   mins 00022  Estim 15 mins    Timed Treatment:   25   mins   Total Treatment:     45   mins    Lisa Guzmán, PT  Physical Therapist

## 2020-06-09 ENCOUNTER — TREATMENT (OUTPATIENT)
Dept: PHYSICAL THERAPY | Facility: CLINIC | Age: 75
End: 2020-06-09

## 2020-06-09 DIAGNOSIS — M76.61 ACHILLES TENDONITIS, BILATERAL: Primary | ICD-10-CM

## 2020-06-09 DIAGNOSIS — M76.62 ACHILLES TENDONITIS, BILATERAL: Primary | ICD-10-CM

## 2020-06-09 DIAGNOSIS — M79.671 PAIN IN BOTH FEET: ICD-10-CM

## 2020-06-09 DIAGNOSIS — M79.672 PAIN IN BOTH FEET: ICD-10-CM

## 2020-06-09 PROCEDURE — 97035 APP MDLTY 1+ULTRASOUND EA 15: CPT | Performed by: PHYSICAL THERAPIST

## 2020-06-09 PROCEDURE — G0283 ELEC STIM OTHER THAN WOUND: HCPCS | Performed by: PHYSICAL THERAPIST

## 2020-06-09 PROCEDURE — 97140 MANUAL THERAPY 1/> REGIONS: CPT | Performed by: PHYSICAL THERAPIST

## 2020-06-09 NOTE — PROGRESS NOTES
Physical Therapy Daily Progress Note  Visit: 3    Subjective Mary Kay Saab reports: B achilles felt better after last session.     Objective   See Exercise, Manual, and Modality Logs for complete treatment.     Assessment/Plan: good tolerance to treatment. Plan details: Progress ROM / strengthening / stabilization / functional activity as tolerated     Manual Therapy:   20       mins  76203;  Therapeutic Exercise:          mins  46272;     Neuromuscular Lenny:         mins  31996;    Therapeutic Activity:           mins  44757;     Gait Training:            mins  64699;     Ultrasound:     20      mins  38297;    Electrical Stimulation:    15      mins  25205 ( );  Dry Needling           mins self-pay  Traction           mins 97773  Canalith Repositioning         mins 65554      Timed Treatment:   40   mins   Total Treatment:   55     mins    Angelia Gomez, PT  [place] License #: [id number]    Physical Therapist

## 2020-06-26 ENCOUNTER — OFFICE VISIT (OUTPATIENT)
Dept: ORTHOPEDIC SURGERY | Facility: CLINIC | Age: 75
End: 2020-06-26

## 2020-06-26 VITALS — WEIGHT: 137.4 LBS | BODY MASS INDEX: 22.89 KG/M2 | TEMPERATURE: 97.7 F | HEIGHT: 65 IN

## 2020-06-26 DIAGNOSIS — M75.102 TEAR OF LEFT ROTATOR CUFF, UNSPECIFIED TEAR EXTENT, UNSPECIFIED WHETHER TRAUMATIC: Primary | ICD-10-CM

## 2020-06-26 PROCEDURE — 99213 OFFICE O/P EST LOW 20 MIN: CPT | Performed by: ORTHOPAEDIC SURGERY

## 2020-06-26 NOTE — PROGRESS NOTES
Bilateral Shoulder Follow Up      Patient: Mary Kay Saab        YOB: 1945            Chief Complaints: Bilateral Shoulder pain      History of Present Illness: Patient is here followed bilateral shoulder pain the right was doing well left one is still bothering her we did inject it last time she got very temporary relief now symptoms are worsening      Physical Exam: 75 y.o. female  General Appearance:    Alert, cooperative, in no acute distress                 There were no vitals filed for this visit.     Patient is alert and read ×3 no acute distress appears her above-listed at height weight and age.  Affect is normal respiratory rate is normal unlabored. Heart rate regular rate rhythm, sclera, dentition and hearing are normal for the purpose of this exam.      Ortho Exam  Physical exam of the left shoulder reveals no overlying skin changes no lymphedema no lymphadenopathy.  Patient has active flexion 180 with mild symptoms abduction is similar external rotation is to 50 and internal rotation to the upper lumbar spine with mild symptoms.  Patient has good rotator cuff strength 4+ over 5 with isometric strength testing with pain.  Patient has a positive impingement and a positive Astudillo sign.  Patient has good cervical range of motion which is full and asymptomatic no radicular symptoms.  Patient has a normal elbow exam.  Good distal pulses are presentPatient has pain with overhead activity and a positive Neer sign and a positive empty can sign  They have a positive drop arm any definitive painful arc        Prior x-raysShow some acromioclavicular arthritis      Assessment/Plan: Persistent left shoulder pain despite conservative measures plan is to proceed with an MRI and have her follow-up after that test

## 2020-07-07 ENCOUNTER — TREATMENT (OUTPATIENT)
Dept: PHYSICAL THERAPY | Facility: CLINIC | Age: 75
End: 2020-07-07

## 2020-07-07 DIAGNOSIS — M76.61 ACHILLES TENDONITIS, BILATERAL: Primary | ICD-10-CM

## 2020-07-07 DIAGNOSIS — M76.62 ACHILLES TENDONITIS, BILATERAL: Primary | ICD-10-CM

## 2020-07-07 DIAGNOSIS — M79.671 PAIN IN BOTH FEET: ICD-10-CM

## 2020-07-07 DIAGNOSIS — M79.672 PAIN IN BOTH FEET: ICD-10-CM

## 2020-07-07 PROCEDURE — G0283 ELEC STIM OTHER THAN WOUND: HCPCS | Performed by: PHYSICAL THERAPIST

## 2020-07-07 PROCEDURE — 97140 MANUAL THERAPY 1/> REGIONS: CPT | Performed by: PHYSICAL THERAPIST

## 2020-07-07 PROCEDURE — 97035 APP MDLTY 1+ULTRASOUND EA 15: CPT | Performed by: PHYSICAL THERAPIST

## 2020-07-07 NOTE — PROGRESS NOTES
Physical Therapy Daily Progress Note  Visit: 4    Subjective Mary Kay Saab reports: both of her feet are feeling better.     Objective   See Exercise, Manual, and Modality Logs for complete treatment.     Assessment/Plan: Compliant/cooperative with current rehab efforts.  Plan details: Progress ROM / strengthening / stabilization / functional activity as tolerated     Manual Therapy:    10      mins  83435;  Therapeutic Exercise:          mins  00889;     Neuromuscular Lenny:         mins  15581;    Therapeutic Activity:           mins  50779;     Gait Training:            mins  95549;     Ultrasound:     8/8      mins  66248;    Electrical Stimulation:   20       mins  66957 ( );  Dry Needling           mins self-pay  Traction           mins 91012  Canalith Repositioning         mins 18603      Timed Treatment: 26     mins   Total Treatment:    46    mins    Angelia Gomez PT  [place] License #: [id number]    Physical Therapist

## 2020-07-10 ENCOUNTER — HOSPITAL ENCOUNTER (OUTPATIENT)
Dept: MRI IMAGING | Facility: HOSPITAL | Age: 75
Discharge: HOME | End: 2020-07-10
Admitting: ORTHOPAEDIC SURGERY

## 2020-07-10 DIAGNOSIS — M75.102 TEAR OF LEFT ROTATOR CUFF, UNSPECIFIED TEAR EXTENT, UNSPECIFIED WHETHER TRAUMATIC: ICD-10-CM

## 2020-07-10 PROCEDURE — 73221 MRI JOINT UPR EXTREM W/O DYE: CPT

## 2020-07-13 ENCOUNTER — TELEPHONE (OUTPATIENT)
Dept: ORTHOPEDIC SURGERY | Facility: CLINIC | Age: 75
End: 2020-07-13

## 2020-07-13 DIAGNOSIS — M75.100 TEAR OF ROTATOR CUFF, UNSPECIFIED LATERALITY, UNSPECIFIED TEAR EXTENT, UNSPECIFIED WHETHER TRAUMATIC: ICD-10-CM

## 2020-07-13 DIAGNOSIS — M25.512 BILATERAL SHOULDER PAIN, UNSPECIFIED CHRONICITY: Primary | ICD-10-CM

## 2020-07-13 DIAGNOSIS — M25.511 BILATERAL SHOULDER PAIN, UNSPECIFIED CHRONICITY: Primary | ICD-10-CM

## 2020-07-13 NOTE — TELEPHONE ENCOUNTER
Baylee Zhao MD Schweinsberg, Kelly, MA             Please let her know she has some tendinitis of the rotator cuff also some arthritis at the acromioclavicular joint generally these respond well to injections and physical therapy I am happy to discuss further in follow-up

## 2020-07-21 ENCOUNTER — TREATMENT (OUTPATIENT)
Dept: PHYSICAL THERAPY | Facility: CLINIC | Age: 75
End: 2020-07-21

## 2020-07-21 DIAGNOSIS — M25.512 CHRONIC PAIN OF BOTH SHOULDERS: Primary | ICD-10-CM

## 2020-07-21 DIAGNOSIS — G89.29 CHRONIC PAIN OF BOTH SHOULDERS: Primary | ICD-10-CM

## 2020-07-21 DIAGNOSIS — M25.511 CHRONIC PAIN OF BOTH SHOULDERS: Primary | ICD-10-CM

## 2020-07-21 PROCEDURE — 97110 THERAPEUTIC EXERCISES: CPT | Performed by: PHYSICAL THERAPIST

## 2020-07-21 PROCEDURE — 97530 THERAPEUTIC ACTIVITIES: CPT | Performed by: PHYSICAL THERAPIST

## 2020-07-21 NOTE — PROGRESS NOTES
Re-Assessment / Re-Certification        Patient: Mary Kay Saab   : 1945  Diagnosis/ICD-10 Code:  Chronic pain of both shoulders [M25.511, G89.29, M25.512]  Referring practitioner: YUSEF Vázquez  Date of Initial Evaluation:  Type: THERAPY  Noted: 2020  Patient seen for 3 sessions      Subjective:   Mary Kay Saab reports: Both shoulders hurt with activity. Today, R hurts more than L. R shoulder pain is anterior. L shoulder pain is superior. Pain cleaning and lifting overhead. Denies neck pain. Not taking medication. L shoulder injection in May was helpful until I started doing heavy housework (2.5 weeks). Pt has been cleaning out closets in preparation for move.    MRI L shoulder 20: Degenerative change at the left acromioclavicular joint with  rotator cuff tendinitis but no evidence of rotator cuff tear.    Subjective Questionnaire: QuickDASH: 43.18 vs 27.27 on 20  Clinical Progress: Worsened  Home Program Compliance: No  Treatment has included: therapeutic exercise, manual therapy and electrical stimulation    Subjective   Objective          Cervical/Thoracic Screen   Cervical range of motion within normal limits    Active Range of Motion   Left Shoulder   Flexion: 128 degrees   Abduction: 142 degrees   External rotation BTH: T2   Internal rotation BTB: T6     Right Shoulder   Flexion: 140 degrees with pain  Abduction: 148 degrees with pain  External rotation BTH: T2   Internal rotation BTB: T12     Strength/Myotome Testing     Left Shoulder     Planes of Motion   Flexion: 5   Abduction: 5   External rotation at 0°: 4+   Internal rotation at 0°: 5     Right Shoulder     Planes of Motion   Flexion: 5   Abduction: 5   External rotation at 0°: 4+   Internal rotation at 0°: 5       Assessment/Plan  Progress toward previous goals: Not Met    PT paused to pursue imaging of shoulder. Continues to have pain w/ activity, limited and painful AROM, and mild limitations in strength. Updated and  reviewed HEP. Progress per POC.      Recommendations: Continue as planned  Timeframe: 6 weeks  Prognosis to achieve goals: good    PT Signature: Lisa Guzmán PT      Based upon review of the patient's progress and continued therapy plan, it is my medical opinion that Mary Kay Saab should continue physical therapy treatment at [facility]  [address] [place] [id number]  [phone].    Signature: __________________________________  Beatriz Beal, YUSEF    Manual Therapy:    0     mins  47894;  Therapeutic Exercise:    30     mins  77632;     Neuromuscular Lenny:    0    mins  67830;    Therapeutic Activity:     10     mins  58557;     Gait Trainin     mins  81439;     Ultrasound:     0     mins  95767;    Work Hardening           0      mins 96787  Iontophoresis               0   mins 23719    Timed Treatment:   40   mins   Total Treatment:     45   mins

## 2020-07-23 ENCOUNTER — OFFICE VISIT (OUTPATIENT)
Dept: ORTHOPEDIC SURGERY | Facility: CLINIC | Age: 75
End: 2020-07-23

## 2020-07-23 ENCOUNTER — TREATMENT (OUTPATIENT)
Dept: PHYSICAL THERAPY | Facility: CLINIC | Age: 75
End: 2020-07-23

## 2020-07-23 VITALS — BODY MASS INDEX: 23.63 KG/M2 | HEIGHT: 64 IN | TEMPERATURE: 98.6 F | WEIGHT: 138.4 LBS

## 2020-07-23 DIAGNOSIS — M25.512 CHRONIC PAIN OF BOTH SHOULDERS: Primary | ICD-10-CM

## 2020-07-23 DIAGNOSIS — G89.29 CHRONIC PAIN OF BOTH SHOULDERS: Primary | ICD-10-CM

## 2020-07-23 DIAGNOSIS — M75.40 IMPINGEMENT SYNDROME OF SHOULDER REGION, UNSPECIFIED LATERALITY: Primary | ICD-10-CM

## 2020-07-23 DIAGNOSIS — M25.511 CHRONIC PAIN OF BOTH SHOULDERS: Primary | ICD-10-CM

## 2020-07-23 PROCEDURE — 97110 THERAPEUTIC EXERCISES: CPT | Performed by: PHYSICAL THERAPIST

## 2020-07-23 PROCEDURE — 97140 MANUAL THERAPY 1/> REGIONS: CPT | Performed by: PHYSICAL THERAPIST

## 2020-07-23 PROCEDURE — 99213 OFFICE O/P EST LOW 20 MIN: CPT | Performed by: ORTHOPAEDIC SURGERY

## 2020-07-23 NOTE — PROGRESS NOTES
Left Shoulder MRI Follow Up      Patient: Mary Kay Saab        YOB: 1945            Chief Complaints: left Shoulder pain and right shoulder pain      History of Present Illness: The patient is here follow-up of an MRI of the shoulder MRIs of the left shoulder which shows a tendinitis of the rotator cuff as well as some acromioclavicular arthritis.  She states that shot did help a little bit still has some pain she is currently in physical therapy.  She is having some pain in the right shoulder as well we contemplated injection however she is getting ready to move she would like to wait for the injection until after she moves.      Physical Exam: 75 y.o. female  General Appearance:    Alert, cooperative, in no acute distress                 There were no vitals filed for this visit.     Patient is alert and read ×3 no acute distress appears her above-listed at height weight and age.  Affect is normal respiratory rate is normal unlabored. Heart rate regular rate rhythm, sclera, dentition and hearing are normal for the purpose of this exam.      Ortho Exam  Physical exam of the right shoulder reveals no overlying skin changes no lymphedema no lymphadenopathy.  Patient has active flexion 180 with mild symptoms abduction is similar external rotation is to 50 and internal rotation to the upper lumbar spine with mild symptoms.  Patient has good rotator cuff strength 4+ over 5 with isometric strength testing with pain.  Patient has a positive impingement and a positive Astudillo sign.  Patient has good cervical range of motion which is full and asymptomatic no radicular symptoms.  Patient has a normal elbow exam.  Good distal pulses are present  Patient has pain with overhead activity and a positive Neer sign and a positive empty can sign , a positive drop arm and a definitive painful arc    Physical exam of the left shoulder reveals no overlying skin changes no lymphedema no lymphadenopathy.  Patient has  active flexion 180 with mild symptoms abduction is similar external rotation is to 50 and internal rotation to the upper lumbar spine with mild symptoms.  Patient has good rotator cuff strength 4+ over 5 with isometric strength testing with pain.  Patient has a positive impingement and a positive Astudillo sign.  Patient has good cervical range of motion which is full and asymptomatic no radicular symptoms.  Patient has a normal elbow exam.  Good distal pulses are presentPatient has pain with overhead activity and a positive Neer sign and a positive empty can sign  They have a positive drop arm any definitive painful arc    MRI Results: MRIs as above have reviewed the films with self and agree with the findings  Procedures      Assessment/Plan: Bilateral shoulder pain left MRI does show acromioclavicular arthritis well some tendinitis of rotator cuff no tear.  This should get better with conservative management.  Her x-rays were reviewed she does have some acromioclavicular arthritis otherwise no acute pathology.  She would like to get through her moving it back in for consideration of injection of the right at that point if she still symptomatic we could probably inject the left again

## 2020-07-23 NOTE — PROGRESS NOTES
Physical Therapy Daily Progress Note      Visit # 4      Subjective   Pt reports neck was sore after resuming PT. Per pt, Dr. Zhao would like her to decrease exercises by half.    Objective   See Exercise, Manual, and Modality Logs for complete treatment.       Assessment/Plan  Decreased exercise reps by half and reviewed HEP. Pt was mistakenly holding isometrics for 20+ seconds. Pt reported pain relief with massage of pec major/minor and passive thoracic extension stretch with heat. Progress per POC.         Manual Therapy:    10     mins  26443;  Therapeutic Exercise:    30     mins  22206;     Neuromuscular Lenny:    0    mins  64814;    Therapeutic Activity:     0     mins  41305;     Gait Trainin     mins  83443;     Ultrasound:     0     mins  52505;    Work Hardening           0      mins 88998  Iontophoresis               0   mins 50557    Timed Treatment:   40   mins   Total Treatment:     45   mins    Lisa Guzmán, PT  Physical Therapist

## 2020-07-29 ENCOUNTER — TREATMENT (OUTPATIENT)
Dept: PHYSICAL THERAPY | Facility: CLINIC | Age: 75
End: 2020-07-29

## 2020-07-29 DIAGNOSIS — M25.512 CHRONIC PAIN OF BOTH SHOULDERS: Primary | ICD-10-CM

## 2020-07-29 DIAGNOSIS — M25.511 CHRONIC PAIN OF BOTH SHOULDERS: Primary | ICD-10-CM

## 2020-07-29 DIAGNOSIS — G89.29 CHRONIC PAIN OF BOTH SHOULDERS: Primary | ICD-10-CM

## 2020-07-29 PROCEDURE — 97110 THERAPEUTIC EXERCISES: CPT | Performed by: PHYSICAL THERAPIST

## 2020-07-29 PROCEDURE — 97140 MANUAL THERAPY 1/> REGIONS: CPT | Performed by: PHYSICAL THERAPIST

## 2020-07-29 NOTE — PROGRESS NOTES
Physical Therapy Daily Progress Note      Visit # 5      Subjective   Pt reports she felt great after last visit and would like to repeat heat/manualk stretching after exercise. No pain currently.    Objective   See Exercise, Manual, and Modality Logs for complete treatment.       Assessment/Plan  Subjective reports of pain improved. Good tolerance to exercise w/o c/o pain. Updated HEP to include band row. Plan to progress cuff strengthening as tolerated. Progress per POC.      Manual Therapy:    10     mins  00712;  Therapeutic Exercise:    30     mins  50009;     Neuromuscular Lenny:    0    mins  54575;    Therapeutic Activity:     0     mins  83324;     Gait Trainin     mins  59178;     Ultrasound:     0     mins  31134;    Work Hardening           0      mins 74078  Iontophoresis               0   mins 63603    Timed Treatment:   40   mins   Total Treatment:     45   mins    Lisa Guzmán, PT  Physical Therapist

## 2020-08-04 ENCOUNTER — TREATMENT (OUTPATIENT)
Dept: PHYSICAL THERAPY | Facility: CLINIC | Age: 75
End: 2020-08-04

## 2020-08-04 DIAGNOSIS — M79.671 PAIN IN BOTH FEET: ICD-10-CM

## 2020-08-04 DIAGNOSIS — M76.62 ACHILLES TENDONITIS, BILATERAL: Primary | ICD-10-CM

## 2020-08-04 DIAGNOSIS — M79.672 PAIN IN BOTH FEET: ICD-10-CM

## 2020-08-04 DIAGNOSIS — M76.61 ACHILLES TENDONITIS, BILATERAL: Primary | ICD-10-CM

## 2020-08-04 PROCEDURE — 97035 APP MDLTY 1+ULTRASOUND EA 15: CPT | Performed by: PHYSICAL THERAPIST

## 2020-08-04 PROCEDURE — 97140 MANUAL THERAPY 1/> REGIONS: CPT | Performed by: PHYSICAL THERAPIST

## 2020-08-04 PROCEDURE — G0283 ELEC STIM OTHER THAN WOUND: HCPCS | Performed by: PHYSICAL THERAPIST

## 2020-08-06 ENCOUNTER — TREATMENT (OUTPATIENT)
Dept: PHYSICAL THERAPY | Facility: CLINIC | Age: 75
End: 2020-08-06

## 2020-08-06 DIAGNOSIS — M25.512 CHRONIC PAIN OF BOTH SHOULDERS: Primary | ICD-10-CM

## 2020-08-06 DIAGNOSIS — G89.29 CHRONIC PAIN OF BOTH SHOULDERS: Primary | ICD-10-CM

## 2020-08-06 DIAGNOSIS — M25.511 CHRONIC PAIN OF BOTH SHOULDERS: Primary | ICD-10-CM

## 2020-08-06 PROCEDURE — 97140 MANUAL THERAPY 1/> REGIONS: CPT | Performed by: PHYSICAL THERAPIST

## 2020-08-06 PROCEDURE — 97110 THERAPEUTIC EXERCISES: CPT | Performed by: PHYSICAL THERAPIST

## 2020-08-06 NOTE — PROGRESS NOTES
Physical Therapy Daily Progress Note      Visit # 6      Subjective   Pt reports some anterior shoulder pain bilaterally.    Objective   See Exercise, Manual, and Modality Logs for complete treatment.       Assessment/Plan  Excellent tolerance to exercise w/o c/o pain. Reports pain relief with manual pec major/minor stretching and massage. Progress per POC.           Manual Therapy:    15     mins  72694;  Therapeutic Exercise:    25     mins  30149;     Neuromuscular Lenny:    0    mins  11564;    Therapeutic Activity:     0     mins  99291;     Gait Trainin     mins  48205;     Ultrasound:     0     mins  69102;    Work Hardening           0      mins 55966  Iontophoresis               0   mins 34415    Timed Treatment:   40   mins   Total Treatment:     60   mins    Lisa Guzmán, PT  Physical Therapist

## 2020-08-17 ENCOUNTER — TREATMENT (OUTPATIENT)
Dept: PHYSICAL THERAPY | Facility: CLINIC | Age: 75
End: 2020-08-17

## 2020-08-17 DIAGNOSIS — G89.29 CHRONIC PAIN OF BOTH SHOULDERS: Primary | ICD-10-CM

## 2020-08-17 DIAGNOSIS — M25.512 CHRONIC PAIN OF BOTH SHOULDERS: Primary | ICD-10-CM

## 2020-08-17 DIAGNOSIS — M25.511 CHRONIC PAIN OF BOTH SHOULDERS: Primary | ICD-10-CM

## 2020-08-17 PROCEDURE — 97140 MANUAL THERAPY 1/> REGIONS: CPT | Performed by: PHYSICAL THERAPIST

## 2020-08-17 PROCEDURE — 97110 THERAPEUTIC EXERCISES: CPT | Performed by: PHYSICAL THERAPIST

## 2020-08-17 NOTE — PROGRESS NOTES
Physical Therapy Daily Progress Note      Visit # 7      Subjective   Pt reports today her L shoulder is bothering her; she is not sure why. She started walking with cooler weather, so all of her joints hurt.    Objective   See Exercise, Manual, and Modality Logs for complete treatment.     Assessment/Plan  Modified thoracic extension exercise as pt was concerned about neck pain. Excellent tolerance to all exercise and manual interventions w/o c/o pain. Discussed goals for DC including no significant limitations w/ ADLs and independence w/ HEP. Progress per POC.                 Manual Therapy:    15     mins  88673;  Therapeutic Exercise:    25     mins  98391;     Neuromuscular Lenny:    0    mins  63833;    Therapeutic Activity:     0     mins  45363;     Gait Trainin     mins  12312;     Ultrasound:     0     mins  55658;    Work Hardening           0      mins 50645  Iontophoresis               0   mins 31010    Timed Treatment:   40   mins   Total Treatment:     45   mins    Lisa Guzmán, PT  Physical Therapist

## 2020-08-21 ENCOUNTER — TELEPHONE (OUTPATIENT)
Dept: ORTHOPEDICS | Facility: OTHER | Age: 75
End: 2020-08-21

## 2020-09-01 ENCOUNTER — TREATMENT (OUTPATIENT)
Dept: PHYSICAL THERAPY | Facility: CLINIC | Age: 75
End: 2020-09-01

## 2020-09-01 DIAGNOSIS — M25.511 CHRONIC PAIN OF BOTH SHOULDERS: Primary | ICD-10-CM

## 2020-09-01 DIAGNOSIS — M25.512 CHRONIC PAIN OF BOTH SHOULDERS: Primary | ICD-10-CM

## 2020-09-01 DIAGNOSIS — G89.29 CHRONIC PAIN OF BOTH SHOULDERS: Primary | ICD-10-CM

## 2020-09-01 PROCEDURE — 97110 THERAPEUTIC EXERCISES: CPT | Performed by: PHYSICAL THERAPIST

## 2020-09-01 PROCEDURE — 97140 MANUAL THERAPY 1/> REGIONS: CPT | Performed by: PHYSICAL THERAPIST

## 2020-09-01 NOTE — PROGRESS NOTES
Re-Assessment / Re-Certification        Patient: Mary Kay Saab   : 1945  Diagnosis/ICD-10 Code:  Chronic pain of both shoulders [M25.511, G89.29, M25.512]  Referring practitioner: Baylee Zhoa MD  Date of Initial Evaluation:  Type: THERAPY  Noted: 2020  Patient seen for 8 sessions      Subjective:   Mary Kay Saab reports: Shoulders feel better. Pain in front of R shoulder reaching overhead. Pain in R shoulder if I try to rub my back (IR reach) for a while. I still can't reach as far behind my back with L hand. When I get done cleaning, mopping, etc both shoulders hurt. Rates pain 3/10 today. Rates pain 7/10 with activity.    Subjective Questionnaire: QuickDASH: 36.36 vs 43.18 20  Clinical Progress: improved  Home Program Compliance: 3-4x/week  Treatment has included: therapeutic exercise, manual therapy and moist heat    Subjective   Objective          Active Range of Motion   Left Shoulder   Flexion: 130 degrees with pain  Abduction: 122 degrees with pain  External rotation BTH: T2 with pain  Internal rotation BTB: T10 with pain    Right Shoulder   Flexion: 125 degrees   Abduction: 135 degrees   External rotation BTH: T2   Internal rotation BTB: T6     Strength/Myotome Testing     Left Shoulder     Planes of Motion   Flexion: 4+   Abduction: 4+   External rotation at 0°: 5   External rotation at 90°: 5     Isolated Muscles   Biceps: 4+   Triceps: 4+     Right Shoulder     Planes of Motion   Flexion: 4   Abduction: 4+   External rotation at 0°: 5   External rotation at 90°: 5     Isolated Muscles   Biceps: 4+   Triceps: 4+         Goals established 20:  Short Term Goals: 2-4 weeks. Patient will:  1. Be independent with initial HEP MET  2. Be instructed in posture and body mechanics MET  3. Report pain </= 5/10 w/ all activity NOT MET    Long Term Goals: 4-8 weeks. Pt will:  1. Exhibit (B) shoulder AROM to WFL to allow for reaching overhead and out (ABD) without pain limiting function.  PARTIALLY MET  2. Demonstrate improved BUE MMT of >/= 4+/5 to allow for performance of ADLs/household management/recreational activities. PARTIALLY MET  3. Pt able to reach overhead and lift 10# to allow for return to doing home/yard/recreational activities with min to no pain. DID NOT ASSESS  4. Report perceived disability </=10% based on QuickDASH NOT MET    Assessment/Plan  Progress toward previous goals: Partially Met    Subjective reports of pain improved. Continues to have pain at end range w/ all L shoulder AROM. Both shoulders are lacking motion overhead. Mild weakness w/ resisted strength testing. Able to progress home program to include isolated banded cuff strengthening. Progress per POC.        Recommendations: Continue with recommendations 1-2x/week  Timeframe: 1 month  Prognosis to achieve goals: good    PT Signature: Lisa Guzmán PT      Based upon review of the patient's progress and continued therapy plan, it is my medical opinion that Mary Kay Saab should continue physical therapy treatment at [facility]  [address] [place] [id number] [phone].    Signature: __________________________________  Baylee Zhao MD    Manual Therapy:    10     mins  70889;  Therapeutic Exercise:    30     mins  25984;     Neuromuscular Lenny:    0    mins  95165;    Therapeutic Activity:     0     mins  62025;     Gait Trainin     mins  71287;     Ultrasound:     0     mins  29348;    Work Hardening           0      mins 38376  Iontophoresis               0   mins 18362    Timed Treatment:   40   mins   Total Treatment:     50   mins

## 2020-09-02 ENCOUNTER — TELEPHONE (OUTPATIENT)
Dept: ORTHOPEDIC SURGERY | Facility: CLINIC | Age: 75
End: 2020-09-02

## 2020-09-09 ENCOUNTER — TREATMENT (OUTPATIENT)
Dept: PHYSICAL THERAPY | Facility: CLINIC | Age: 75
End: 2020-09-09

## 2020-09-09 DIAGNOSIS — M25.512 CHRONIC PAIN OF BOTH SHOULDERS: Primary | ICD-10-CM

## 2020-09-09 DIAGNOSIS — M25.511 CHRONIC PAIN OF BOTH SHOULDERS: Primary | ICD-10-CM

## 2020-09-09 DIAGNOSIS — G89.29 CHRONIC PAIN OF BOTH SHOULDERS: Primary | ICD-10-CM

## 2020-09-09 PROCEDURE — 97140 MANUAL THERAPY 1/> REGIONS: CPT | Performed by: PHYSICAL THERAPIST

## 2020-09-09 PROCEDURE — 97110 THERAPEUTIC EXERCISES: CPT | Performed by: PHYSICAL THERAPIST

## 2020-09-09 NOTE — PROGRESS NOTES
Physical Therapy Daily Progress Note      Visit # 9      Subjective   No new changes. Shoulders have good and bad days. Not much pain today.    Objective   See Exercise, Manual, and Modality Logs for complete treatment.       Assessment/Plan  Good tolerance to shoulder strengthening w/o c/o pain. No c/o pain w/ shoulder PROM in all directions. Continues to have limited posterior capsule mobility, L>R. Responds well to pectoralis major/minor STM and PA mobilization of shoulders. Progress per POC.              Manual Therapy:    15     mins  54005;  Therapeutic Exercise:    25     mins  61174;     Neuromuscular Lenny:    0    mins  63181;    Therapeutic Activity:     0     mins  13019;     Gait Trainin     mins  08084;     Ultrasound:     0     mins  68916;    Work Hardening           0      mins 54461  Iontophoresis               0   mins 29509    Timed Treatment:   40   mins   Total Treatment:     50   mins    Lisa Guzmán PT  Physical Therapist

## 2020-09-10 ENCOUNTER — APPOINTMENT (OUTPATIENT)
Dept: WOMENS IMAGING | Facility: HOSPITAL | Age: 75
End: 2020-09-10

## 2020-09-10 PROCEDURE — 77063 BREAST TOMOSYNTHESIS BI: CPT | Performed by: RADIOLOGY

## 2020-09-10 PROCEDURE — 77067 SCR MAMMO BI INCL CAD: CPT | Performed by: RADIOLOGY

## 2020-09-18 ENCOUNTER — TELEPHONE (OUTPATIENT)
Dept: ORTHOPEDIC SURGERY | Facility: CLINIC | Age: 75
End: 2020-09-18

## 2020-09-18 NOTE — TELEPHONE ENCOUNTER
Patient is moving the week of 10/12 and will be doing a lot of stairs. Asking if she can get worked in for a cortisone injection in her left knee before then.   Patient is scheduled for cortisone injections of right and left shoulders by [facility]ED on 10/2 and 10/8. Ok to have knee done that close to the others? Patient has A-fib.

## 2020-09-21 NOTE — TELEPHONE ENCOUNTER
Called patient and she appreciates the appt but she and her  have anniversary dinner reservations at 5pm on Friday. She could come earlier in the afternoon. Also, they are now moving the week of 10/5. Anything you can do is appreciated.

## 2020-09-24 ENCOUNTER — TREATMENT (OUTPATIENT)
Dept: PHYSICAL THERAPY | Facility: CLINIC | Age: 75
End: 2020-09-24

## 2020-09-24 DIAGNOSIS — M25.512 CHRONIC PAIN OF BOTH SHOULDERS: Primary | ICD-10-CM

## 2020-09-24 DIAGNOSIS — G89.29 CHRONIC PAIN OF BOTH SHOULDERS: Primary | ICD-10-CM

## 2020-09-24 DIAGNOSIS — M25.511 CHRONIC PAIN OF BOTH SHOULDERS: Primary | ICD-10-CM

## 2020-09-24 PROCEDURE — 97140 MANUAL THERAPY 1/> REGIONS: CPT | Performed by: PHYSICAL THERAPIST

## 2020-09-24 PROCEDURE — 97110 THERAPEUTIC EXERCISES: CPT | Performed by: PHYSICAL THERAPIST

## 2020-09-24 NOTE — PROGRESS NOTES
Physical Therapy Daily Progress Note      Visit # 10      Subjective   Pt reports she is tired from preparing to move. Shoulders are not too bad.    Objective   See Exercise, Manual, and Modality Logs for complete treatment.     Assessment/Plan  Excellent tolerance to all exercise w/o c/o pain. Discussed plan to discharge to [facility] in near future so long as pt does not have any flare-ups.            Manual Therapy:    15     mins  43289;  Therapeutic Exercise:    25     mins  82629;     Neuromuscular Lenny:    0    mins  15393;    Therapeutic Activity:     0     mins  56487;     Gait Trainin     mins  57139;     Ultrasound:     0     mins  18850;    Work Hardening           0      mins 83996  Iontophoresis               0   mins 74955    Timed Treatment:   40   mins   Total Treatment:     45   mins    Lisa Guzmán, PT  Physical Therapist

## 2020-09-25 ENCOUNTER — CLINICAL SUPPORT (OUTPATIENT)
Dept: ORTHOPEDIC SURGERY | Facility: CLINIC | Age: 75
End: 2020-09-25

## 2020-09-25 VITALS — TEMPERATURE: 97.1 F | HEIGHT: 64 IN | WEIGHT: 138 LBS | BODY MASS INDEX: 23.56 KG/M2

## 2020-09-25 DIAGNOSIS — M17.12 PRIMARY OSTEOARTHRITIS OF LEFT KNEE: Primary | ICD-10-CM

## 2020-09-25 PROCEDURE — 99213 OFFICE O/P EST LOW 20 MIN: CPT | Performed by: NURSE PRACTITIONER

## 2020-09-25 PROCEDURE — 20610 DRAIN/INJ JOINT/BURSA W/O US: CPT | Performed by: NURSE PRACTITIONER

## 2020-09-25 RX ORDER — CEPHALEXIN 500 MG/1
CAPSULE ORAL
Qty: 4 CAPSULE | Refills: 5 | OUTPATIENT
Start: 2020-09-25 | End: 2021-01-01

## 2020-09-25 RX ORDER — METHYLPREDNISOLONE ACETATE 80 MG/ML
80 INJECTION, SUSPENSION INTRA-ARTICULAR; INTRALESIONAL; INTRAMUSCULAR; SOFT TISSUE
Status: COMPLETED | OUTPATIENT
Start: 2020-09-25 | End: 2020-09-25

## 2020-09-25 RX ADMIN — METHYLPREDNISOLONE ACETATE 80 MG: 80 INJECTION, SUSPENSION INTRA-ARTICULAR; INTRALESIONAL; INTRAMUSCULAR; SOFT TISSUE at 08:33

## 2020-10-01 NOTE — PROGRESS NOTES
Patient: Mary Kay Saab  YOB: 1945  Date of Service: 10/1/2020    Chief Complaints: Right shoulder pain    Subjective:    History of Present Illness: Pt is seen in the office today with complaints of right shoulder pain right shoulder pain.  She gets intermittent injections she is getting ready to move and wants to do the right one today and wants to do the left one next week which is fine she has a hard time doing both in 1 day as it increases her blood pressure        Allergies:   Allergies   Allergen Reactions   • Penicillins Anaphylaxis     Reactions: syncope and seizures   • Adhesive Tape Hives     Tears skin   • Atropine Hives   • Epinephrine Palpitations     Patient states will go into A-Fib   • Sulfa Antibiotics Nausea Only     STATES SHE DOES FINE WITH CERTAIN SULFA DRUGS       Medications:   Home Medications:  Current Outpatient Medications on File Prior to Visit   Medication Sig   • alendronate (FOSAMAX) 70 MG tablet Take 70 mg by mouth Every 7 (Seven) Days. sunday   • alosetron (LOTRONEX) 0.5 MG tablet Take 0.05 mg by mouth Daily As Needed.   • ALPRAZolam (XANAX) 1 MG tablet Take 1 mg by mouth every night.   • amiodarone (PACERONE) 200 MG tablet Take 200 mg by mouth As Needed.   • cephalexin (KEFLEX) 500 MG capsule Take 4 po 1 hour prior to dental procedure   • cetirizine (ZyrTEC) 10 MG tablet Take 10 mg by mouth Daily.   • cycloSPORINE (RESTASIS) 0.05 % ophthalmic emulsion Administer 1 drop to both eyes 2 (Two) Times a Day.   • dabigatran etexilate (PRADAXA) 150 MG capsu Take 150 mg by mouth 2 (Two) Times a Day.   • diltiaZEM (CARDIZEM) 60 MG tablet Take 60 mg by mouth 2 (Two) Times a Day. PATIENT TAKES ONE TABLET AT 5 PM AND ONE TAB HS   • diphenoxylate-atropine (LOMOTIL) 2.5-0.025 MG per tablet Take 1 tablet by mouth 4 (Four) Times a Day As Needed for Diarrhea.   • esomeprazole (NexIUM) 40 MG capsule Take 40 mg by mouth 2 (two) times a day.   • fluconazole (DIFLUCAN) 100 MG tablet  Take 100 mg by mouth As Needed.   • furosemide (LASIX) 40 MG tablet Every 12 (Twelve) Hours.   • Glucosamine 500 MG capsule Take  by mouth.   • hydrochlorothiazide (HYDRODIURIL) 12.5 MG tablet Take 12.5 mg by mouth As Needed.   • metoprolol tartrate (LOPRESSOR) 25 MG tablet Take 0.625 mg by mouth As Needed.   • montelukast (SINGULAIR) 10 MG tablet Take 10 mg by mouth Daily.   • Multiple Vitamin (MULTIVITAMIN) capsule    • pseudoephedrine-guaifenesin (MUCINEX D)  MG per 12 hr tablet Take 1 tablet by mouth Every 12 (Twelve) Hours.   • simvastatin (ZOCOR) 40 MG tablet Take 20 mg by mouth Daily.     Current Facility-Administered Medications on File Prior to Visit   Medication   • mupirocin (BACTROBAN) 2 % nasal ointment     Current Medications:  Scheduled Meds:  Continuous Infusions:No current facility-administered medications for this visit.     PRN Meds:.    I have reviewed the patient's medical history in detail and updated the computerized patient record.  Review and summarization of old records include:    Past Medical History:   Diagnosis Date   • Anemia     Of chronic renal insufficiency   • Atrial fibrillation (CMS/HCC)    • Mcguire's esophagus    • Breast cancer (CMS/HCC)    • Chronic renal insufficiency     Stage 3   • MCCRAY (dyspnea on exertion)    • Dyspnea    • GERD (gastroesophageal reflux disease)    • H/O electrocardiogram    • History of staph infection    • Hx of being hospitalized     [facility] in 09/2010 and 10/2010 for atrial fibrillation, Dr. Perla Hurtado   • Hyperlipidemia    • Hypertension    • Hypoxia    • IBS (irritable bowel syndrome)    • Kidney dysfunction    • Lower extremity edema    • Lump or mass in breast    • Malignant neoplasm of breast (CMS/HCC)    • Osteoarthritis    • Osteopenia    • Osteoporosis    • Paroxysmal atrial fibrillation (CMS/HCC)    • Seizure (CMS/HCC)     AS A TEENAGER, NOT CURRENTLY   • Sinusitis    • SOB (shortness of breath)         Past  Surgical History:   Procedure Laterality Date   • BREAST LUMPECTOMY      For LCIS   • CARDIAC CATHETERIZATION Left 2016    Procedure: Cardiac catheterization;  Surgeon: Kevin Guillen MD;  Location:  [facility];  Service:    • FOOT SURGERY     • HAND ARTHROPLASTY Right    • KNEE ARTHROSCOPY     • OTHER SURGICAL HISTORY      LYMPHATIC SURGERY   • TOTAL HIP ARTHROPLASTY Right 2017    Procedure: TOTAL HIP ARTHROPLASTY ANTERIOR WITH HANA TABLE;  Surgeon: Van Mcguire MD;  Location: [facility] MAIN OR;  Service:    • TOTAL KNEE ARTHROPLASTY Right 2018    Procedure: TOTAL KNEE ARTHROPLASTY;  Surgeon: Van Mcguire MD;  Location: [facility] MAIN OR;  Service: Orthopedics        Social History     Occupational History     Employer: RETIRED   Tobacco Use   • Smoking status: Former Smoker     Quit date: 1975     Years since quittin.3   • Smokeless tobacco: Never Used   • Tobacco comment: QUIT SMOKING IN LATE    Substance and Sexual Activity   • Alcohol use: Yes     Comment: social drinker   • Drug use: No   • Sexual activity: Defer      Social History     Social History Narrative   • Not on file        Family History   Problem Relation Age of Onset   • Heart disease Other    • Stroke Other    • Stroke Mother    • Heart disease Mother    • Hypertension Mother    • Leukemia Father         Mid 60s   • Hypertension Brother    • Cancer Paternal Aunt    • Cancer Paternal Grandfather    • Malig Hyperthermia Neg Hx        ROS: 14 point review of systems was performed and was negative except for documented findings in HPI and today's encounter.     Allergies:   Allergies   Allergen Reactions   • Penicillins Anaphylaxis     Reactions: syncope and seizures   • Adhesive Tape Hives     Tears skin   • Atropine Hives   • Epinephrine Palpitations     Patient states will go into A-Fib   • Sulfa Antibiotics Nausea Only     STATES SHE DOES FINE WITH CERTAIN SULFA DRUGS     Constitutional:  Denies  fever, shaking or chills   Eyes:  Denies change in visual acuity   HENT:  Denies nasal congestion or sore throat   Respiratory:  Denies cough or shortness of breath   Cardiovascular:  Denies chest pain or severe LE edema   GI:  Denies abdominal pain, nausea, vomiting, bloody stools or diarrhea   Musculoskeletal:  Numbness, tingling, or loss of motor function only as noted above in history of present illness.  : Denies painful urination or hematuria  Integument:  Denies rash, lesion or ulceration   Neurologic:  Denies headache or focal weakness  Endocrine:  Denies lymphadenopathy  Psych:  Denies confusion or change in mental status   Hem:  Denies active bleeding      Physical Exam: 75 y.o. female  Wt Readings from Last 3 Encounters:   09/25/20 62.6 kg (138 lb)   07/23/20 62.8 kg (138 lb 6.4 oz)   06/26/20 62.3 kg (137 lb 6.4 oz)       There is no height or weight on file to calculate BMI.  No height and weight on file for this encounter.  There were no vitals filed for this visit.  Vital signs reviewed.   General Appearance:    Alert, cooperative, in no acute distress                    Ortho exam      Exam is unchanged       .time    Assessment: Right shoulder pain I think this is rotator cuff most likely a impingement plan is to proceed with an injection    Plan:   Follow up as indicated.  Ice, elevate, and rest as needed.  Discussed conservative measures of pain control including ice, bracing.  Also talked about the importance of strengthening     Baylee Zhao M.D.      Large Joint Arthrocentesis: R subacromial bursa  Date/Time: 10/2/2020 8:06 AM  Consent given by: patient  Site marked: site marked  Timeout: Immediately prior to procedure a time out was called to verify the correct patient, procedure, equipment, support staff and site/side marked as required   Supporting Documentation  Indications: pain   Procedure Details  Location: shoulder - R subacromial bursa  Preparation: Patient was prepped and draped in  the usual sterile fashion  Needle size: 22 G  Approach: posterior  Medications administered: 4 mL lidocaine (cardiac); 80 mg methylPREDNISolone acetate 80 MG/ML  Patient tolerance: patient tolerated the procedure well with no immediate complications

## 2020-10-02 ENCOUNTER — OFFICE VISIT (OUTPATIENT)
Dept: ORTHOPEDIC SURGERY | Facility: CLINIC | Age: 75
End: 2020-10-02

## 2020-10-02 VITALS — BODY MASS INDEX: 23.06 KG/M2 | TEMPERATURE: 97.1 F | WEIGHT: 135.1 LBS | HEIGHT: 64 IN

## 2020-10-02 DIAGNOSIS — M75.42 IMPINGEMENT SYNDROME OF LEFT SHOULDER: Primary | ICD-10-CM

## 2020-10-02 PROCEDURE — 20610 DRAIN/INJ JOINT/BURSA W/O US: CPT | Performed by: ORTHOPAEDIC SURGERY

## 2020-10-02 RX ORDER — METHYLPREDNISOLONE ACETATE 80 MG/ML
80 INJECTION, SUSPENSION INTRA-ARTICULAR; INTRALESIONAL; INTRAMUSCULAR; SOFT TISSUE
Status: COMPLETED | OUTPATIENT
Start: 2020-10-02 | End: 2020-10-02

## 2020-10-02 RX ADMIN — METHYLPREDNISOLONE ACETATE 80 MG: 80 INJECTION, SUSPENSION INTRA-ARTICULAR; INTRALESIONAL; INTRAMUSCULAR; SOFT TISSUE at 08:06

## 2020-11-23 ENCOUNTER — HOSPITAL ENCOUNTER (OUTPATIENT)
Dept: GENERAL RADIOLOGY | Facility: HOSPITAL | Age: 75
Discharge: HOME | End: 2020-11-23
Admitting: INTERNAL MEDICINE

## 2020-11-23 ENCOUNTER — TRANSCRIBE ORDERS (OUTPATIENT)
Dept: ADMINISTRATIVE | Facility: HOSPITAL | Age: 75
End: 2020-11-23

## 2020-11-23 DIAGNOSIS — R05.9 COUGH: Primary | ICD-10-CM

## 2020-11-23 DIAGNOSIS — R05.9 COUGH: ICD-10-CM

## 2020-11-23 PROCEDURE — 71046 X-RAY EXAM CHEST 2 VIEWS: CPT

## 2020-11-25 ENCOUNTER — APPOINTMENT (OUTPATIENT)
Dept: GENERAL RADIOLOGY | Facility: HOSPITAL | Age: 75
End: 2020-11-25

## 2020-12-08 ENCOUNTER — TELEPHONE (OUTPATIENT)
Dept: ORTHOPEDIC SURGERY | Facility: CLINIC | Age: 75
End: 2020-12-08

## 2020-12-08 NOTE — TELEPHONE ENCOUNTER
Caller: Mary Kay Saab    Relationship to patient: Self    Best call back number: [phone]    Chief complaint: PATIENT HAS PNEUMONIA, NEEDS TO RESCHEDULE MONOVISC APPT WITH NILDA CHAPMAN     Type of visit:MONOVISC LEFT KNEE / PER MEDICARE GUIDELINES NO AUTH REQUIRED FOR MONOVISC AS LONG AS 6 MONTHS AND 1 DAY HAS PASSED BETWEEN INJECTIONS/DM    Requested date: WANTS TO RESCHEDULE TO SOMETIME IN January - [place] BUT UNDERSTANDS IF DR. DICKEY / IVIS CHAPMAN WON'T BE AT [place] AFTER SOME TIME.     If rescheduling, when is the original appointment: TODAY 12/08/20 @ 910     Additional notes: PATIENT STATED SHE HAS A CHEST XR THIS FRI 12/11, WISHES TO RESCHEDULE LEFT KNEE MONOVISC INJ SOMETIME IN January 2021     CALL BACK [phone]     THANKS

## 2020-12-11 ENCOUNTER — HOSPITAL ENCOUNTER (OUTPATIENT)
Dept: GENERAL RADIOLOGY | Facility: HOSPITAL | Age: 75
Discharge: HOME | End: 2020-12-11
Admitting: INTERNAL MEDICINE

## 2020-12-11 ENCOUNTER — TRANSCRIBE ORDERS (OUTPATIENT)
Dept: ADMINISTRATIVE | Facility: HOSPITAL | Age: 75
End: 2020-12-11

## 2020-12-11 DIAGNOSIS — R05.9 COUGH: ICD-10-CM

## 2020-12-11 DIAGNOSIS — R05.9 COUGH: Primary | ICD-10-CM

## 2020-12-11 PROCEDURE — 71046 X-RAY EXAM CHEST 2 VIEWS: CPT

## 2021-01-01 ENCOUNTER — TREATMENT (OUTPATIENT)
Dept: PHYSICAL THERAPY | Facility: CLINIC | Age: 76
End: 2021-01-01

## 2021-01-01 ENCOUNTER — TRANSCRIBE ORDERS (OUTPATIENT)
Dept: ADMINISTRATIVE | Facility: HOSPITAL | Age: 76
End: 2021-01-01

## 2021-01-01 ENCOUNTER — LAB (OUTPATIENT)
Dept: OTHER | Facility: HOSPITAL | Age: 76
End: 2021-01-01

## 2021-01-01 ENCOUNTER — APPOINTMENT (OUTPATIENT)
Dept: OTHER | Facility: HOSPITAL | Age: 76
End: 2021-01-01

## 2021-01-01 ENCOUNTER — APPOINTMENT (OUTPATIENT)
Dept: NUCLEAR MEDICINE | Facility: HOSPITAL | Age: 76
End: 2021-01-01

## 2021-01-01 ENCOUNTER — OFFICE VISIT (OUTPATIENT)
Dept: CARDIOLOGY | Facility: CLINIC | Age: 76
End: 2021-01-01

## 2021-01-01 ENCOUNTER — PATIENT ROUNDING (BHMG ONLY) (OUTPATIENT)
Dept: CARDIOLOGY | Facility: CLINIC | Age: 76
End: 2021-01-01

## 2021-01-01 ENCOUNTER — LAB (OUTPATIENT)
Dept: LAB | Facility: HOSPITAL | Age: 76
End: 2021-01-01

## 2021-01-01 ENCOUNTER — APPOINTMENT (OUTPATIENT)
Dept: WOMENS IMAGING | Facility: HOSPITAL | Age: 76
End: 2021-01-01

## 2021-01-01 ENCOUNTER — TELEPHONE (OUTPATIENT)
Dept: PHYSICAL THERAPY | Facility: CLINIC | Age: 76
End: 2021-01-01

## 2021-01-01 ENCOUNTER — OFFICE VISIT (OUTPATIENT)
Dept: ONCOLOGY | Facility: CLINIC | Age: 76
End: 2021-01-01

## 2021-01-01 ENCOUNTER — CLINICAL SUPPORT (OUTPATIENT)
Dept: ORTHOPEDIC SURGERY | Facility: CLINIC | Age: 76
End: 2021-01-01

## 2021-01-01 ENCOUNTER — TELEPHONE (OUTPATIENT)
Dept: ONCOLOGY | Facility: CLINIC | Age: 76
End: 2021-01-01

## 2021-01-01 VITALS
DIASTOLIC BLOOD PRESSURE: 56 MMHG | TEMPERATURE: 97.3 F | RESPIRATION RATE: 18 BRPM | HEIGHT: 65 IN | SYSTOLIC BLOOD PRESSURE: 127 MMHG | BODY MASS INDEX: 24.03 KG/M2 | HEART RATE: 64 BPM | WEIGHT: 144.2 LBS | OXYGEN SATURATION: 100 %

## 2021-01-01 VITALS
DIASTOLIC BLOOD PRESSURE: 82 MMHG | BODY MASS INDEX: 23.66 KG/M2 | HEIGHT: 65 IN | HEART RATE: 57 BPM | WEIGHT: 142 LBS | SYSTOLIC BLOOD PRESSURE: 144 MMHG

## 2021-01-01 VITALS — WEIGHT: 140 LBS | BODY MASS INDEX: 20.73 KG/M2 | TEMPERATURE: 97.1 F | HEIGHT: 69 IN

## 2021-01-01 VITALS
WEIGHT: 140 LBS | BODY MASS INDEX: 23.32 KG/M2 | HEIGHT: 65 IN | HEART RATE: 70 BPM | DIASTOLIC BLOOD PRESSURE: 66 MMHG | SYSTOLIC BLOOD PRESSURE: 120 MMHG

## 2021-01-01 DIAGNOSIS — R68.89 DECREASED FUNCTIONAL ACTIVITY TOLERANCE: ICD-10-CM

## 2021-01-01 DIAGNOSIS — M79.672 LEFT FOOT PAIN: ICD-10-CM

## 2021-01-01 DIAGNOSIS — R06.09 DYSPNEA ON EXERTION: ICD-10-CM

## 2021-01-01 DIAGNOSIS — Z78.9 IMPAIRED ACTIVITIES OF DAILY LIVING: ICD-10-CM

## 2021-01-01 DIAGNOSIS — D64.9 ANEMIA, UNSPECIFIED TYPE: Primary | ICD-10-CM

## 2021-01-01 DIAGNOSIS — M54.50 CHRONIC BILATERAL LOW BACK PAIN WITHOUT SCIATICA: Primary | ICD-10-CM

## 2021-01-01 DIAGNOSIS — I48.91 ATRIAL FIBRILLATION, UNSPECIFIED TYPE (HCC): ICD-10-CM

## 2021-01-01 DIAGNOSIS — D48.1 ABDOMINAL FIBROMATOSIS: ICD-10-CM

## 2021-01-01 DIAGNOSIS — G89.29 CHRONIC BILATERAL LOW BACK PAIN WITHOUT SCIATICA: Primary | ICD-10-CM

## 2021-01-01 DIAGNOSIS — R29.898 WEAKNESS OF LEFT LOWER EXTREMITY: ICD-10-CM

## 2021-01-01 DIAGNOSIS — M76.62 TENDONITIS, ACHILLES, LEFT: Primary | ICD-10-CM

## 2021-01-01 DIAGNOSIS — E78.5 HYPERLIPIDEMIA, UNSPECIFIED HYPERLIPIDEMIA TYPE: ICD-10-CM

## 2021-01-01 DIAGNOSIS — I10 ESSENTIAL HYPERTENSION: ICD-10-CM

## 2021-01-01 DIAGNOSIS — R52 PAIN: Primary | ICD-10-CM

## 2021-01-01 DIAGNOSIS — M17.12 PRIMARY OSTEOARTHRITIS OF LEFT KNEE: ICD-10-CM

## 2021-01-01 DIAGNOSIS — R06.02 SHORTNESS OF BREATH: Primary | ICD-10-CM

## 2021-01-01 DIAGNOSIS — I48.0 PAROXYSMAL ATRIAL FIBRILLATION (HCC): Primary | ICD-10-CM

## 2021-01-01 DIAGNOSIS — D48.1 ABDOMINAL FIBROMATOSIS: Primary | ICD-10-CM

## 2021-01-01 DIAGNOSIS — D50.9 IRON DEFICIENCY ANEMIA, UNSPECIFIED IRON DEFICIENCY ANEMIA TYPE: Primary | ICD-10-CM

## 2021-01-01 DIAGNOSIS — D64.9 ANEMIA, UNSPECIFIED TYPE: ICD-10-CM

## 2021-01-01 DIAGNOSIS — E78.2 MIXED HYPERLIPIDEMIA: ICD-10-CM

## 2021-01-01 LAB
ALBUMIN SERPL-MCNC: 4.2 G/DL (ref 3.5–5.2)
ALBUMIN/GLOB SERPL: 2.1 G/DL
ALP SERPL-CCNC: 96 U/L (ref 39–117)
ALT SERPL W P-5'-P-CCNC: 13 U/L (ref 1–33)
ANION GAP SERPL CALCULATED.3IONS-SCNC: 11.3 MMOL/L (ref 5–15)
AST SERPL-CCNC: 19 U/L (ref 1–32)
BASOPHILS # BLD AUTO: 0.05 10*3/MM3 (ref 0–0.2)
BASOPHILS # BLD AUTO: 0.06 10*3/MM3 (ref 0–0.2)
BASOPHILS NFR BLD AUTO: 0.7 % (ref 0–1.5)
BASOPHILS NFR BLD AUTO: 1 % (ref 0–1.5)
BILIRUB SERPL-MCNC: 0.3 MG/DL (ref 0–1.2)
BUN SERPL-MCNC: 39 MG/DL (ref 8–23)
BUN/CREAT SERPL: 25.3 (ref 7–25)
CALCIUM SPEC-SCNC: 9.6 MG/DL (ref 8.6–10.5)
CHLORIDE SERPL-SCNC: 103 MMOL/L (ref 98–107)
CHOLEST SERPL-MCNC: 141 MG/DL (ref 0–200)
CO2 SERPL-SCNC: 20.7 MMOL/L (ref 22–29)
CREAT SERPL-MCNC: 1.54 MG/DL (ref 0.57–1)
DEPRECATED RDW RBC AUTO: 45 FL (ref 37–54)
DEPRECATED RDW RBC AUTO: 50.1 FL (ref 37–54)
EOSINOPHIL # BLD AUTO: 0.1 10*3/MM3 (ref 0–0.4)
EOSINOPHIL # BLD AUTO: 0.11 10*3/MM3 (ref 0–0.4)
EOSINOPHIL NFR BLD AUTO: 1.5 % (ref 0.3–6.2)
EOSINOPHIL NFR BLD AUTO: 1.6 % (ref 0.3–6.2)
ERYTHROCYTE [DISTWIDTH] IN BLOOD BY AUTOMATED COUNT: 13.9 % (ref 12.3–15.4)
ERYTHROCYTE [DISTWIDTH] IN BLOOD BY AUTOMATED COUNT: 14.9 % (ref 12.3–15.4)
FERRITIN SERPL-MCNC: 48.7 NG/ML (ref 13–150)
GFR SERPL CREATININE-BSD FRML MDRD: 33 ML/MIN/1.73
GLOBULIN UR ELPH-MCNC: 2 GM/DL
GLUCOSE SERPL-MCNC: 93 MG/DL (ref 65–99)
HCT VFR BLD AUTO: 35 % (ref 34–46.6)
HCT VFR BLD AUTO: 38.5 % (ref 34–46.6)
HDLC SERPL-MCNC: 66 MG/DL (ref 40–60)
HGB BLD-MCNC: 11.6 G/DL (ref 12–15.9)
HGB BLD-MCNC: 12 G/DL (ref 12–15.9)
HGB RETIC QN AUTO: 30.2 PG (ref 29.8–36.1)
IMM GRANULOCYTES # BLD AUTO: 0.02 10*3/MM3 (ref 0–0.05)
IMM GRANULOCYTES # BLD AUTO: 0.05 10*3/MM3 (ref 0–0.05)
IMM GRANULOCYTES NFR BLD AUTO: 0.3 % (ref 0–0.5)
IMM GRANULOCYTES NFR BLD AUTO: 0.7 % (ref 0–0.5)
IMM RETICS NFR: 14.3 % (ref 3–15.8)
IRON 24H UR-MRATE: 70 MCG/DL (ref 37–145)
IRON SATN MFR SERPL: 17 % (ref 20–50)
LDLC SERPL CALC-MCNC: 60 MG/DL (ref 0–100)
LDLC/HDLC SERPL: 0.9 {RATIO}
LYMPHOCYTES # BLD AUTO: 0.76 10*3/MM3 (ref 0.7–3.1)
LYMPHOCYTES # BLD AUTO: 0.83 10*3/MM3 (ref 0.7–3.1)
LYMPHOCYTES NFR BLD AUTO: 10.6 % (ref 19.6–45.3)
LYMPHOCYTES NFR BLD AUTO: 13.2 % (ref 19.6–45.3)
MCH RBC QN AUTO: 28.4 PG (ref 26.6–33)
MCH RBC QN AUTO: 29.7 PG (ref 26.6–33)
MCHC RBC AUTO-ENTMCNC: 31.2 G/DL (ref 31.5–35.7)
MCHC RBC AUTO-ENTMCNC: 33.1 G/DL (ref 31.5–35.7)
MCV RBC AUTO: 89.5 FL (ref 79–97)
MCV RBC AUTO: 91.2 FL (ref 79–97)
MONOCYTES # BLD AUTO: 0.57 10*3/MM3 (ref 0.1–0.9)
MONOCYTES # BLD AUTO: 0.62 10*3/MM3 (ref 0.1–0.9)
MONOCYTES NFR BLD AUTO: 8 % (ref 5–12)
MONOCYTES NFR BLD AUTO: 9.8 % (ref 5–12)
NEUTROPHILS NFR BLD AUTO: 4.67 10*3/MM3 (ref 1.7–7)
NEUTROPHILS NFR BLD AUTO: 5.61 10*3/MM3 (ref 1.7–7)
NEUTROPHILS NFR BLD AUTO: 74.1 % (ref 42.7–76)
NEUTROPHILS NFR BLD AUTO: 78.5 % (ref 42.7–76)
NRBC BLD AUTO-RTO: 0 /100 WBC (ref 0–0.2)
NRBC BLD AUTO-RTO: 0 /100 WBC (ref 0–0.2)
NT-PROBNP SERPL-MCNC: 510.3 PG/ML (ref 0–1800)
PLATELET # BLD AUTO: 244 10*3/MM3 (ref 140–450)
PLATELET # BLD AUTO: 244 10*3/MM3 (ref 140–450)
PMV BLD AUTO: 10.7 FL (ref 6–12)
PMV BLD AUTO: 9.9 FL (ref 6–12)
POTASSIUM SERPL-SCNC: 4.3 MMOL/L (ref 3.5–5.2)
PROT SERPL-MCNC: 6.2 G/DL (ref 6–8.5)
RBC # BLD AUTO: 3.91 10*6/MM3 (ref 3.77–5.28)
RBC # BLD AUTO: 4.22 10*6/MM3 (ref 3.77–5.28)
RETICS # AUTO: 0.08 10*6/MM3 (ref 0.02–0.13)
RETICS/RBC NFR AUTO: 1.82 % (ref 0.7–1.9)
SODIUM SERPL-SCNC: 135 MMOL/L (ref 136–145)
TIBC SERPL-MCNC: 407 MCG/DL (ref 298–536)
TRANSFERRIN SERPL-MCNC: 273 MG/DL (ref 200–360)
TRIGL SERPL-MCNC: 78 MG/DL (ref 0–150)
VLDLC SERPL-MCNC: 15 MG/DL (ref 5–40)
WBC # BLD AUTO: 6.3 10*3/MM3 (ref 3.4–10.8)
WBC NRBC COR # BLD: 7.15 10*3/MM3 (ref 3.4–10.8)

## 2021-01-01 PROCEDURE — 97140 MANUAL THERAPY 1/> REGIONS: CPT | Performed by: PHYSICAL THERAPIST

## 2021-01-01 PROCEDURE — G0283 ELEC STIM OTHER THAN WOUND: HCPCS | Performed by: PHYSICAL THERAPIST

## 2021-01-01 PROCEDURE — 83880 ASSAY OF NATRIURETIC PEPTIDE: CPT

## 2021-01-01 PROCEDURE — 97530 THERAPEUTIC ACTIVITIES: CPT | Performed by: PHYSICAL THERAPIST

## 2021-01-01 PROCEDURE — 73562 X-RAY EXAM OF KNEE 3: CPT | Performed by: NURSE PRACTITIONER

## 2021-01-01 PROCEDURE — 97035 APP MDLTY 1+ULTRASOUND EA 15: CPT | Performed by: PHYSICAL THERAPIST

## 2021-01-01 PROCEDURE — 83540 ASSAY OF IRON: CPT | Performed by: INTERNAL MEDICINE

## 2021-01-01 PROCEDURE — 99204 OFFICE O/P NEW MOD 45 MIN: CPT | Performed by: INTERNAL MEDICINE

## 2021-01-01 PROCEDURE — 80061 LIPID PANEL: CPT

## 2021-01-01 PROCEDURE — 82728 ASSAY OF FERRITIN: CPT | Performed by: INTERNAL MEDICINE

## 2021-01-01 PROCEDURE — 85025 COMPLETE CBC W/AUTO DIFF WBC: CPT | Performed by: INTERNAL MEDICINE

## 2021-01-01 PROCEDURE — 97161 PT EVAL LOW COMPLEX 20 MIN: CPT | Performed by: PHYSICAL THERAPIST

## 2021-01-01 PROCEDURE — 77067 SCR MAMMO BI INCL CAD: CPT | Performed by: RADIOLOGY

## 2021-01-01 PROCEDURE — 97112 NEUROMUSCULAR REEDUCATION: CPT | Performed by: PHYSICAL THERAPIST

## 2021-01-01 PROCEDURE — 97110 THERAPEUTIC EXERCISES: CPT | Performed by: PHYSICAL THERAPIST

## 2021-01-01 PROCEDURE — 36415 COLL VENOUS BLD VENIPUNCTURE: CPT

## 2021-01-01 PROCEDURE — 20610 DRAIN/INJ JOINT/BURSA W/O US: CPT | Performed by: NURSE PRACTITIONER

## 2021-01-01 PROCEDURE — 93000 ELECTROCARDIOGRAM COMPLETE: CPT | Performed by: INTERNAL MEDICINE

## 2021-01-01 PROCEDURE — 85046 RETICYTE/HGB CONCENTRATE: CPT | Performed by: INTERNAL MEDICINE

## 2021-01-01 PROCEDURE — 99214 OFFICE O/P EST MOD 30 MIN: CPT | Performed by: INTERNAL MEDICINE

## 2021-01-01 PROCEDURE — 99215 OFFICE O/P EST HI 40 MIN: CPT | Performed by: INTERNAL MEDICINE

## 2021-01-01 PROCEDURE — 77063 BREAST TOMOSYNTHESIS BI: CPT | Performed by: RADIOLOGY

## 2021-01-01 PROCEDURE — 84466 ASSAY OF TRANSFERRIN: CPT | Performed by: INTERNAL MEDICINE

## 2021-01-01 PROCEDURE — 80053 COMPREHEN METABOLIC PANEL: CPT

## 2021-01-01 PROCEDURE — 85025 COMPLETE CBC W/AUTO DIFF WBC: CPT

## 2021-01-01 RX ORDER — APIXABAN 5 MG/1
TABLET, FILM COATED ORAL
COMMUNITY
Start: 2021-01-01 | End: 2021-01-01 | Stop reason: SDUPTHER

## 2021-01-01 RX ORDER — CEPHALEXIN 500 MG/1
CAPSULE ORAL
Qty: 4 CAPSULE | Refills: 5 | Status: SHIPPED | OUTPATIENT
Start: 2021-01-01 | End: 2022-01-01 | Stop reason: SDUPTHER

## 2021-01-01 RX ORDER — APIXABAN 5 MG/1
5 TABLET, FILM COATED ORAL EVERY 12 HOURS SCHEDULED
Qty: 180 TABLET | Refills: 3 | Status: SHIPPED | OUTPATIENT
Start: 2021-01-01 | End: 2022-01-01 | Stop reason: SDUPTHER

## 2021-01-18 NOTE — PROGRESS NOTES
Patient: Mary Kay Saab  YOB: 1945 75 y.o. female  Medical Record Number: [id number]    Chief Complaints: Left knee pain    History of Present Illness:Mary Kay Saab is a 75 y.o. female who presents with complaints of left knee pain.  Patient has history of osteoarthritis.  Denies any injury.  Describes the knee pain as a moderate intermittent ache worse with standing walking, better with rest.    Allergies:   Allergies   Allergen Reactions   • Penicillins Anaphylaxis     Reactions: syncope and seizures   • Adhesive Tape Hives     Tears skin   • Atropine Hives   • Epinephrine Palpitations     Patient states will go into A-Fib   • Sulfa Antibiotics Nausea Only     STATES SHE DOES FINE WITH CERTAIN SULFA DRUGS       Medications:   Current Outpatient Medications   Medication Sig Dispense Refill   • alendronate (FOSAMAX) 70 MG tablet Take 70 mg by mouth Every 7 (Seven) Days. sunday 12   • alosetron (LOTRONEX) 0.5 MG tablet Take 0.05 mg by mouth Daily As Needed.     • ALPRAZolam (XANAX) 1 MG tablet Take 1 mg by mouth every night.     • amiodarone (PACERONE) 200 MG tablet Take 200 mg by mouth As Needed.     • cephalexin (KEFLEX) 500 MG capsule Take 4 po 1 hour prior to dental procedure 4 capsule 5   • cetirizine (ZyrTEC) 10 MG tablet Take 10 mg by mouth Daily.     • cycloSPORINE (RESTASIS) 0.05 % ophthalmic emulsion Administer 1 drop to both eyes 2 (Two) Times a Day.     • dabigatran etexilate (PRADAXA) 150 MG capsu Take 150 mg by mouth 2 (Two) Times a Day.     • diltiaZEM (CARDIZEM) 60 MG tablet Take 60 mg by mouth 2 (Two) Times a Day. PATIENT TAKES ONE TABLET AT 5 PM AND ONE TAB HS     • diphenoxylate-atropine (LOMOTIL) 2.5-0.025 MG per tablet Take 1 tablet by mouth 4 (Four) Times a Day As Needed for Diarrhea.     • esomeprazole (NexIUM) 40 MG capsule Take 40 mg by mouth 2 (two) times a day.     • fluconazole (DIFLUCAN) 100 MG tablet Take 100 mg by mouth As Needed.     • furosemide (LASIX) 40 MG  "tablet Every 12 (Twelve) Hours.     • Glucosamine 500 MG capsule Take  by mouth.     • hydrochlorothiazide (HYDRODIURIL) 12.5 MG tablet Take 12.5 mg by mouth As Needed.     • metoprolol tartrate (LOPRESSOR) 25 MG tablet Take 0.625 mg by mouth As Needed.     • montelukast (SINGULAIR) 10 MG tablet Take 10 mg by mouth Daily.     • Multiple Vitamin (MULTIVITAMIN) capsule      • pseudoephedrine-guaifenesin (MUCINEX D)  MG per 12 hr tablet Take 1 tablet by mouth Every 12 (Twelve) Hours.     • simvastatin (ZOCOR) 40 MG tablet Take 20 mg by mouth Daily.  3     No current facility-administered medications for this visit.      Facility-Administered Medications Ordered in Other Visits   Medication Dose Route Frequency Provider Last Rate Last Admin   • mupirocin (BACTROBAN) 2 % nasal ointment   Nasal BID Van Mcguire MD             The following portions of the patient's history were reviewed and updated as appropriate: allergies, current medications, past family history, past medical history, past social history, past surgical history and problem list.    Review of Systems:   A 14 point review of systems was performed. All systems negative except pertinent positives/negative listed in HPI above    Physical Exam:   Vitals:    01/19/21 0820   Temp: 96 °F (35.6 °C)   Weight: 61.2 kg (135 lb)   Height: 163.8 cm (64.5\")   PainSc:   5       General: A and O x 3, ASA, NAD    SCLERA:    Normal    DENTITION:   Normal  Skin clear no unusual lesions noted  Left knee patient has no appreciable effusion 120 degrees flexion neutral and extension with a positive Randy negative Lockman calf soft and nontender    Radiology:  Xrays 3views (ap,lateral, sunrise) previous x-rays left knee was reviewed and shows significant arthritic changes    Assessment/Plan:  Osteoarthritis left knee    Patient discussed options, she would like to proceed with left knee Monovisc injection, she will continue with physical therapy exercises and we will " see her back if needed    Large Joint Arthrocentesis: L knee  Date/Time: 1/19/2021 4:33 PM  Consent given by: patient  Site marked: site marked  Timeout: Immediately prior to procedure a time out was called to verify the correct patient, procedure, equipment, support staff and site/side marked as required   Supporting Documentation  Indications: pain   Procedure Details  Location: knee - L knee  Preparation: Patient was prepped and draped in the usual sterile fashion  Needle gauge: 21g.  Approach: lateral  Medications administered: 88 mg Hyaluronan 88 MG/4ML; 2 mL lidocaine (cardiac)  Patient tolerance: patient tolerated the procedure well with no immediate complications

## 2021-01-19 ENCOUNTER — CLINICAL SUPPORT (OUTPATIENT)
Dept: ORTHOPEDIC SURGERY | Facility: CLINIC | Age: 76
End: 2021-01-19

## 2021-01-19 VITALS — WEIGHT: 135 LBS | BODY MASS INDEX: 22.49 KG/M2 | TEMPERATURE: 96 F | HEIGHT: 65 IN

## 2021-01-19 DIAGNOSIS — M17.12 PRIMARY OSTEOARTHRITIS OF LEFT KNEE: Primary | ICD-10-CM

## 2021-01-19 PROCEDURE — 99212 OFFICE O/P EST SF 10 MIN: CPT | Performed by: NURSE PRACTITIONER

## 2021-01-19 PROCEDURE — 20610 DRAIN/INJ JOINT/BURSA W/O US: CPT | Performed by: NURSE PRACTITIONER

## 2021-01-19 RX ORDER — CLOBETASOL PROPIONATE 0.5 MG/G
AEROSOL, FOAM TOPICAL AS NEEDED
COMMUNITY
Start: 2020-11-24

## 2021-01-19 RX ORDER — CYCLOBENZAPRINE HCL 10 MG
TABLET ORAL
COMMUNITY
End: 2022-01-01

## 2021-01-19 RX ORDER — BUMETANIDE 1 MG/1
1 TABLET ORAL DAILY
Status: ON HOLD | COMMUNITY
Start: 2020-11-15 | End: 2022-01-01

## 2021-01-19 RX ORDER — CELECOXIB 200 MG/1
CAPSULE ORAL
COMMUNITY
Start: 2020-11-11 | End: 2021-06-22

## 2021-01-19 RX ORDER — TRIAMCINOLONE ACETONIDE 0.25 MG/G
CREAM TOPICAL
COMMUNITY
End: 2021-06-22

## 2021-04-19 ENCOUNTER — HOSPITAL ENCOUNTER (OUTPATIENT)
Dept: GENERAL RADIOLOGY | Facility: HOSPITAL | Age: 76
Discharge: HOME | End: 2021-04-19

## 2021-04-19 ENCOUNTER — TELEPHONE (OUTPATIENT)
Dept: ORTHOPEDIC SURGERY | Facility: CLINIC | Age: 76
End: 2021-04-19

## 2021-04-19 ENCOUNTER — TRANSCRIBE ORDERS (OUTPATIENT)
Dept: ADMINISTRATIVE | Facility: HOSPITAL | Age: 76
End: 2021-04-19

## 2021-04-19 DIAGNOSIS — M54.50 LOW BACK PAIN, UNSPECIFIED BACK PAIN LATERALITY, UNSPECIFIED CHRONICITY, UNSPECIFIED WHETHER SCIATICA PRESENT: ICD-10-CM

## 2021-04-19 DIAGNOSIS — M54.50 LOW BACK PAIN, UNSPECIFIED BACK PAIN LATERALITY, UNSPECIFIED CHRONICITY, UNSPECIFIED WHETHER SCIATICA PRESENT: Primary | ICD-10-CM

## 2021-04-19 PROCEDURE — 72110 X-RAY EXAM L-2 SPINE 4/>VWS: CPT

## 2021-04-19 PROCEDURE — 72220 X-RAY EXAM SACRUM TAILBONE: CPT

## 2021-04-19 PROCEDURE — 73523 X-RAY EXAM HIPS BI 5/> VIEWS: CPT

## 2021-04-19 NOTE — TELEPHONE ENCOUNTER
Caller: PT     Relationship to patient:     Best call back number: [phone]    Chief complaint: LEFT KNEE      Type of visit: GEL INECTIONS     Requested date: FIRST AVAILABLE APPT EARLIEST IN THE AM  - DOES NOT CARE IF APPT IS FAR OUT     If rescheduling, when is the original appointment: 4/20/21    Additional notes: PATIENT STATES THAT THE INJECTION IS A GEL INJECTION AND WANTS TO RESCHD FOR FIRST AVAILABLE APPT BUT WANTS THE EARLIEST IN THE AM AND KNOWS THAT IT WILL BE FURTHER OUT

## 2021-04-21 ENCOUNTER — TELEPHONE (OUTPATIENT)
Dept: ORTHOPEDIC SURGERY | Facility: CLINIC | Age: 76
End: 2021-04-21

## 2021-06-09 ENCOUNTER — TRANSCRIBE ORDERS (OUTPATIENT)
Dept: ADMINISTRATIVE | Facility: HOSPITAL | Age: 76
End: 2021-06-09

## 2021-06-09 ENCOUNTER — LAB (OUTPATIENT)
Dept: LAB | Facility: HOSPITAL | Age: 76
End: 2021-06-09

## 2021-06-09 DIAGNOSIS — D72.0: ICD-10-CM

## 2021-06-09 DIAGNOSIS — R79.89 ELEVATED SERUM CREATININE: ICD-10-CM

## 2021-06-09 DIAGNOSIS — E34.9 ELEVATED PARATHYROID HORMONE: ICD-10-CM

## 2021-06-09 DIAGNOSIS — E34.9 ELEVATED PARATHYROID HORMONE: Primary | ICD-10-CM

## 2021-06-09 LAB
ALBUMIN SERPL-MCNC: 4.2 G/DL (ref 3.5–5.2)
ALBUMIN/GLOB SERPL: 1.8 G/DL
ALP SERPL-CCNC: 95 U/L (ref 39–117)
ALT SERPL W P-5'-P-CCNC: 11 U/L (ref 1–33)
ANION GAP SERPL CALCULATED.3IONS-SCNC: 12.5 MMOL/L (ref 5–15)
AST SERPL-CCNC: 19 U/L (ref 1–32)
BASOPHILS # BLD AUTO: 0.07 10*3/MM3 (ref 0–0.2)
BASOPHILS NFR BLD AUTO: 1.4 % (ref 0–1.5)
BILIRUB SERPL-MCNC: 0.3 MG/DL (ref 0–1.2)
BUN SERPL-MCNC: 47 MG/DL (ref 8–23)
BUN/CREAT SERPL: 30.7 (ref 7–25)
CALCIUM SPEC-SCNC: 9.3 MG/DL (ref 8.6–10.5)
CHLORIDE SERPL-SCNC: 95 MMOL/L (ref 98–107)
CO2 SERPL-SCNC: 27.5 MMOL/L (ref 22–29)
CREAT SERPL-MCNC: 1.53 MG/DL (ref 0.57–1)
DEPRECATED RDW RBC AUTO: 45.3 FL (ref 37–54)
EOSINOPHIL # BLD AUTO: 0.13 10*3/MM3 (ref 0–0.4)
EOSINOPHIL NFR BLD AUTO: 2.5 % (ref 0.3–6.2)
ERYTHROCYTE [DISTWIDTH] IN BLOOD BY AUTOMATED COUNT: 14.4 % (ref 12.3–15.4)
GFR SERPL CREATININE-BSD FRML MDRD: 33 ML/MIN/1.73
GLOBULIN UR ELPH-MCNC: 2.3 GM/DL
GLUCOSE SERPL-MCNC: 88 MG/DL (ref 65–99)
HCT VFR BLD AUTO: 38.4 % (ref 34–46.6)
HGB BLD-MCNC: 13 G/DL (ref 12–15.9)
IMM GRANULOCYTES # BLD AUTO: 0.02 10*3/MM3 (ref 0–0.05)
IMM GRANULOCYTES NFR BLD AUTO: 0.4 % (ref 0–0.5)
LYMPHOCYTES # BLD AUTO: 0.87 10*3/MM3 (ref 0.7–3.1)
LYMPHOCYTES NFR BLD AUTO: 17 % (ref 19.6–45.3)
MCH RBC QN AUTO: 30 PG (ref 26.6–33)
MCHC RBC AUTO-ENTMCNC: 33.9 G/DL (ref 31.5–35.7)
MCV RBC AUTO: 88.5 FL (ref 79–97)
MONOCYTES # BLD AUTO: 0.51 10*3/MM3 (ref 0.1–0.9)
MONOCYTES NFR BLD AUTO: 9.9 % (ref 5–12)
NEUTROPHILS NFR BLD AUTO: 3.53 10*3/MM3 (ref 1.7–7)
NEUTROPHILS NFR BLD AUTO: 68.8 % (ref 42.7–76)
NRBC BLD AUTO-RTO: 0 /100 WBC (ref 0–0.2)
PLATELET # BLD AUTO: 252 10*3/MM3 (ref 140–450)
PMV BLD AUTO: 9.9 FL (ref 6–12)
POTASSIUM SERPL-SCNC: 3.4 MMOL/L (ref 3.5–5.2)
PROT SERPL-MCNC: 6.5 G/DL (ref 6–8.5)
PTH-INTACT SERPL-MCNC: 98.5 PG/ML (ref 15–65)
RBC # BLD AUTO: 4.34 10*6/MM3 (ref 3.77–5.28)
SODIUM SERPL-SCNC: 135 MMOL/L (ref 136–145)
WBC # BLD AUTO: 5.13 10*3/MM3 (ref 3.4–10.8)

## 2021-06-09 PROCEDURE — 83970 ASSAY OF PARATHORMONE: CPT

## 2021-06-09 PROCEDURE — 85025 COMPLETE CBC W/AUTO DIFF WBC: CPT

## 2021-06-09 PROCEDURE — 80053 COMPREHEN METABOLIC PANEL: CPT

## 2021-06-09 PROCEDURE — 36415 COLL VENOUS BLD VENIPUNCTURE: CPT

## 2021-06-22 ENCOUNTER — OFFICE VISIT (OUTPATIENT)
Dept: ORTHOPEDIC SURGERY | Facility: CLINIC | Age: 76
End: 2021-06-22

## 2021-06-22 VITALS — WEIGHT: 140 LBS | BODY MASS INDEX: 20.73 KG/M2 | HEIGHT: 69 IN | TEMPERATURE: 95.9 F

## 2021-06-22 DIAGNOSIS — M43.16 SPONDYLOLISTHESIS OF LUMBAR REGION: Primary | ICD-10-CM

## 2021-06-22 DIAGNOSIS — M54.50 LOW BACK PAIN, UNSPECIFIED BACK PAIN LATERALITY, UNSPECIFIED CHRONICITY, UNSPECIFIED WHETHER SCIATICA PRESENT: ICD-10-CM

## 2021-06-22 PROCEDURE — 99213 OFFICE O/P EST LOW 20 MIN: CPT | Performed by: ORTHOPAEDIC SURGERY

## 2021-06-22 PROCEDURE — 72100 X-RAY EXAM L-S SPINE 2/3 VWS: CPT | Performed by: ORTHOPAEDIC SURGERY

## 2021-06-22 RX ORDER — GUAIFENESIN AND PSEUDOEPHEDRINE HYDROCHLORIDE 1200; 120 MG/1; MG/1
1 TABLET, EXTENDED RELEASE ORAL EVERY 12 HOURS
COMMUNITY
Start: 2021-05-11

## 2021-06-22 RX ORDER — SALICYLIC ACID 6 MG/100ML
SHAMPOO TOPICAL
COMMUNITY

## 2021-06-22 RX ORDER — POTASSIUM CHLORIDE 750 MG/1
TABLET, FILM COATED, EXTENDED RELEASE ORAL
COMMUNITY
Start: 2021-05-13 | End: 2021-01-01

## 2021-06-22 RX ORDER — IMIQUIMOD 12.5 MG/.25G
CREAM TOPICAL
COMMUNITY
End: 2021-01-01

## 2021-06-22 RX ORDER — KETOCONAZOLE 20 MG/ML
SHAMPOO TOPICAL
COMMUNITY

## 2021-06-25 ENCOUNTER — TREATMENT (OUTPATIENT)
Dept: PHYSICAL THERAPY | Facility: CLINIC | Age: 76
End: 2021-06-25

## 2021-06-25 DIAGNOSIS — M79.672 LEFT FOOT PAIN: ICD-10-CM

## 2021-06-25 DIAGNOSIS — R68.89 DECREASED FUNCTIONAL ACTIVITY TOLERANCE: ICD-10-CM

## 2021-06-25 DIAGNOSIS — M76.62 TENDONITIS, ACHILLES, LEFT: Primary | ICD-10-CM

## 2021-06-25 PROCEDURE — 97140 MANUAL THERAPY 1/> REGIONS: CPT | Performed by: PHYSICAL THERAPIST

## 2021-06-25 PROCEDURE — 97161 PT EVAL LOW COMPLEX 20 MIN: CPT | Performed by: PHYSICAL THERAPIST

## 2021-06-25 PROCEDURE — 97035 APP MDLTY 1+ULTRASOUND EA 15: CPT | Performed by: PHYSICAL THERAPIST

## 2021-06-25 PROCEDURE — G0283 ELEC STIM OTHER THAN WOUND: HCPCS | Performed by: PHYSICAL THERAPIST

## 2021-06-25 NOTE — PROGRESS NOTES
Physical Therapy Initial Evaluation and Plan of Care    Patient: Mary Kay Saab   : 1945  Diagnosis/ICD-10 Code:  Tendonitis, Achilles, left [M76.62]  Referring practitioner: Jann Rojas, *    Subjective Evaluation    History of Present Illness  Mechanism of injury: Pt reports reoccurrence of L foot pain and achilles tendonitis that started about 2 months ago. Reports moving to a new condo and thinks walking on the hardwood floors may have aggravated her foot.      Pain  Current pain ratin  At best pain ratin  At worst pain ratin  Quality: discomfort, dull ache, tight and sharp  Relieving factors: change in position and ice  Aggravating factors: stairs, standing, ambulation, prolonged positioning and sleeping  Progression: no change    Social Support  Lives in: condominium  Lives with: spouse    Treatments  Current treatment: physical therapy  Patient Goals  Patient goals for therapy: decreased pain, improved balance, increased motion, increased strength, independence with ADLs/IADLs and return to sport/leisure activities             Objective          Tenderness   Left Ankle/Foot   Tenderness in the Achilles insertion.     Active Range of Motion   Left Ankle/Foot   Normal active range of motion    Right Ankle/Foot   Normal active range of motion    Strength/Myotome Testing     Left Ankle/Foot   Normal strength    Right Ankle/Foot   Normal strength    Functional Assessment     Comments  LEFS: 43          Assessment & Plan     Assessment  Impairments: abnormal gait, abnormal or restricted ROM, activity intolerance, impaired balance, impaired physical strength, lacks appropriate home exercise program, pain with function and weight-bearing intolerance  Assessment details: Pt presents to PT with symptoms consistent with L foot achilles tendonitis, L foot pain, decreased functional activity tolerance, pain with walking.  Pt would benefit from skilled PT intervention to address the deficits  noted.   Prognosis: good  Prognosis details:       Functional Limitations: walking, uncomfortable because of pain, standing, stooping and unable to perform repetitive tasks  Goals  Plan Goals: SHORT TERM GOALS: Time for Goal Achievement: 6 visits    1.  Patient to be compliant with HEP.   2.  Pt able to ascend/descend steps and transfer with less ankle pain < 5/10      LONG TERM GOALS: Time for Goal Achievement: 12 visits  1.  Pt to score 45 or greater on LEFS  2.  Patient able to ascend/descend steps and prolonged standing & cooking with pain < 2/10  3.  Pt to demonstrate increased stability of the ankle  to balance on foam as needed to traverse uneven terrain .          Plan  Therapy options: will be seen for skilled physical therapy services  Planned modality interventions: ultrasound, electrical stimulation/Russian stimulation, thermotherapy (hydrocollator packs) and cryotherapy  Planned therapy interventions: balance/weight-bearing training, body mechanics training, functional ROM exercises, flexibility, home exercise program, joint mobilization, stretching, strengthening, soft tissue mobilization, neuromuscular re-education, manual therapy and therapeutic activities  Frequency: 2x week  Duration in visits: 12  Treatment plan discussed with: patient        Manual Therapy:    15      mins  27106;  Therapeutic Exercise:          mins  90902;     Neuromuscular Lenny:         mins  04170;    Therapeutic Activity:           mins  68267;     Gait Training:            mins  28845;     Ultrasound:     10      mins  76378;    Electrical Stimulation:    15      mins  47680 ( );  Dry Needling           mins self-pay  Traction           mins 95183  Canalith Repositioning         mins 28840      Timed Treatment:   25   mins   Total Treatment:      60  mins    PT SIGNATURE: Angelia Gomez PT   [facility] #[id number]    DATE TREATMENT INITIATED: 6/25/2021    Initial Certification  Certification Period: 9/23/2021  I certify  that the therapy services are furnished while this patient is under my care.  The services outlined above are required by this patient, and will be reviewed every 90 days.     PHYSICIAN: Jann Rojas MD      DATE:     Please sign and return via fax to [phone].. Thank you, [facility].

## 2021-06-29 ENCOUNTER — TREATMENT (OUTPATIENT)
Dept: PHYSICAL THERAPY | Facility: CLINIC | Age: 76
End: 2021-06-29

## 2021-06-29 DIAGNOSIS — M79.672 LEFT FOOT PAIN: ICD-10-CM

## 2021-06-29 DIAGNOSIS — M76.62 TENDONITIS, ACHILLES, LEFT: Primary | ICD-10-CM

## 2021-06-29 DIAGNOSIS — R68.89 DECREASED FUNCTIONAL ACTIVITY TOLERANCE: ICD-10-CM

## 2021-06-29 PROCEDURE — 97035 APP MDLTY 1+ULTRASOUND EA 15: CPT | Performed by: PHYSICAL THERAPIST

## 2021-06-29 PROCEDURE — G0283 ELEC STIM OTHER THAN WOUND: HCPCS | Performed by: PHYSICAL THERAPIST

## 2021-06-29 PROCEDURE — 97140 MANUAL THERAPY 1/> REGIONS: CPT | Performed by: PHYSICAL THERAPIST

## 2021-06-29 NOTE — PROGRESS NOTES
Physical Therapy Daily Progress Note  Visit: 2    Subjective Mary Kay Saab reports: her foot felt better after last visit    Objective   See Exercise, Manual, and Modality Logs for complete treatment.     Assessment/Plan: good tolerance to treatment. Plan details: Progress ROM / strengthening / stabilization / functional activity as tolerated     Manual Therapy:    15      mins  37419;  Therapeutic Exercise:          mins  53295;     Neuromuscular Lenny:         mins  97802;    Therapeutic Activity:           mins  34813;     Gait Training:            mins  80154;     Ultrasound:     10      mins  93555;    Electrical Stimulation:    20      mins  66788 ( );  Dry Needling           mins self-pay  Traction           mins 29093  Canalith Repositioning         mins 09321      Timed Treatment:   25   mins   Total Treatment:    45    mins    AMANDA Campa License #: [id number]    Physical Therapist

## 2021-07-01 ENCOUNTER — TREATMENT (OUTPATIENT)
Dept: PHYSICAL THERAPY | Facility: CLINIC | Age: 76
End: 2021-07-01

## 2021-07-01 DIAGNOSIS — R68.89 DECREASED FUNCTIONAL ACTIVITY TOLERANCE: ICD-10-CM

## 2021-07-01 DIAGNOSIS — M79.672 LEFT FOOT PAIN: ICD-10-CM

## 2021-07-01 DIAGNOSIS — M76.62 TENDONITIS, ACHILLES, LEFT: Primary | ICD-10-CM

## 2021-07-01 PROCEDURE — 97140 MANUAL THERAPY 1/> REGIONS: CPT | Performed by: PHYSICAL THERAPIST

## 2021-07-01 PROCEDURE — G0283 ELEC STIM OTHER THAN WOUND: HCPCS | Performed by: PHYSICAL THERAPIST

## 2021-07-01 PROCEDURE — 97035 APP MDLTY 1+ULTRASOUND EA 15: CPT | Performed by: PHYSICAL THERAPIST

## 2021-07-01 NOTE — PROGRESS NOTES
Physical Therapy Daily Progress Note  Visit: 3    Subjective Mary Kay Saab reports: she wore her boot yesterday to stretch her foot     Objective   See Exercise, Manual, and Modality Logs for complete treatment.     Assessment/Plan: Compliant/cooperative with current rehab efforts.  Plan details: Progress ROM / strengthening / stabilization / functional activity as tolerated       Manual Therapy:    15      mins  76393;  Therapeutic Exercise:          mins  40617;     Neuromuscular Lenny:         mins  00803;    Therapeutic Activity:           mins  06532;     Gait Training:            mins  73541;     Ultrasound:    10       mins  89246;    Electrical Stimulation:    15      mins  25513 ( );  Dry Needling           mins self-pay  Traction           mins 27442  Canalith Repositioning         mins 07404      Timed Treatment:    15  mins   Total Treatment:      40  mins    Angelia Gomez PT  [place] License #: [id number]    Physical Therapist

## 2021-07-16 NOTE — PROGRESS NOTES
Physical Therapy Daily Progress Note    Patient: Mary Kay Saab   : 1945  Diagnosis/ICD-10 Code:  Tendonitis, Achilles, left [M76.62]  Referring practitioner: Jann Rojas, *  Date of Initial Visit: Type: THERAPY  Noted: 2021  Today's Date: 2021  Patient seen for 4 sessions         Mary Kay Saab reports: her heel has been hurting lately. Wearing her boot when it does hurt or when she has to walk around a lot.    Subjective     Objective   See Exercise, Manual, and Modality Logs for complete treatment.       Assessment/Plan  Subjectively, pt reports no increase of pain or discomfort with interventions performed today. Pt unable to tolerate DF stretching, only able to stretch for about 3 rounds of 5 second hold. Continues to demonstrate limited DF ROM, that is difficult to improve due to pain. Positive response to manual therapy in regards to decreased discomfort. Continues to benefit from verbal/tactile cues to ensure proper form and technique for exercise performance.     Progress per Plan of Care           Manual Therapy:    15     mins  97141;  Therapeutic Exercise:         mins  52513;     Neuromuscular Lenny:        mins  07783;    Therapeutic Activity:          mins  71993;     Gait Training:           mins  36982;     Ultrasound:     12     mins  41816;    Electrical Stimulation:    15     mins  36377 ( );  Dry Needling          mins self-pay    Timed Treatment:   42   mins   Total Treatment:     42   mins    Chela Rodriguez PTA  Physical Therapist Assistant [id number]

## 2021-07-19 NOTE — PROGRESS NOTES
Physical Therapy Daily Progress Note  Visit: 5    Subjective Mary Kay Saab reports: wearing her boot at home     Objective   See Exercise, Manual, and Modality Logs for complete treatment.     Assessment/Plan: Compliant/cooperative with current rehab efforts.  Benefits from verbal/tactile cues to ensure correct exercise performance/technique, hold time and position. Plan details: Progress ROM / strengthening / stabilization / functional activity as tolerated     Manual Therapy:   15       mins  86401;  Therapeutic Exercise:          mins  51871;     Neuromuscular Lenny:         mins  53306;    Therapeutic Activity:           mins  86533;     Gait Training:            mins  45651;     Ultrasound:     10     mins  54459;    Electrical Stimulation:     15     mins  58200 ( );  Dry Needling           mins self-pay  Traction           mins 29672  Canalith Repositioning         mins 65163      Timed Treatment:   25   mins   Total Treatment:    40    mins    AMANDA Campa License #: [id number]    Physical Therapist

## 2021-07-22 NOTE — PROGRESS NOTES
Physical Therapy Daily Progress Note  Visit: 6    Subjective Mary Kay Saab reports: she has been wearing her boot more     Objective   See Exercise, Manual, and Modality Logs for complete treatment.     Assessment/Plan: Compliant/cooperative with current rehab efforts. Plan details: Progress ROM / strengthening / stabilization / functional activity as tolerated     Manual Therapy:  15        mins  71961;  Therapeutic Exercise:          mins  96849;     Neuromuscular Lenny:         mins  88202;    Therapeutic Activity:           mins  12542;     Gait Training:            mins  81523;     Ultrasound:    10       mins  72063;    Electrical Stimulation:    15      mins  78630 ( );  Dry Needling           mins self-pay  Traction           mins 46265  Canalith Repositioning         mins 84336      Timed Treatment: 25     mins   Total Treatment:    40    mins    AMANDA Campa License #: [id number]    Physical Therapist

## 2021-07-27 NOTE — PROGRESS NOTES
Physical Therapy Daily Progress Note  Visit: 7    Subjective Mary Kay Saab reports: her heel is getting better. Reports she cleaned on Saturday without increase in pain    Objective   See Exercise, Manual, and Modality Logs for complete treatment.     Assessment/Plan: Compliant/cooperative with current rehab efforts.  Plan details: Progress ROM / strengthening / stabilization / functional activity as tolerated     Manual Therapy:   15       mins  99622;  Therapeutic Exercise:          mins  08239;     Neuromuscular Lenny:         mins  18401;    Therapeutic Activity:           mins  05740;     Gait Training:            mins  10759;     Ultrasound:    10       mins  19833;    Electrical Stimulation:    15      mins  28726 ( );  Dry Needling           mins self-pay  Traction           mins 25411  Canalith Repositioning         mins 47629      Timed Treatment: 25     mins   Total Treatment:    40    mins    AMANDA Campa License #: [id number]    Physical Therapist

## 2021-08-02 NOTE — PROGRESS NOTES
Physical Therapy Initial Evaluation and Plan of Care  Patient: Mary Kay Saab   : 1945  Diagnosis/ICD-10 Code:  Chronic bilateral low back pain without sciatica [M54.5, G89.29]  Referring practitioner: Sim Kelly MD  Date of Initial Visit: 2021  Today's Date: 2021  Patient seen for 1 sessions           Subjective Questionnaire: Oswestry: 24/100      Subjective Evaluation    History of Present Illness  Mechanism of injury: 20+ years ago, picked up grandson and pulled R low back. Dx strain. Ever since then it nags at me. I was cleaning early , bent over, had trouble getting up. Describes L sided back pain. Pain is intermittent. I'd like to learn how to move better and avoid pain. Pain triggered by cleaning and carrying groceries. Pain riding in car >1 hr.     Recent PT for L achilles pain; no longer having pain w/ ADLs. Can wear normal shoes. Has prickly feeling in heel w/ no known trigger. Like pins and needles.    Per [facility]: XRAY: Plain film x-rays from April show a slight anterolisthesis of 5 1 and facet degeneration which reportedly was worse than 2015 x-rays.  Lumbar plain films obtained the office today demonstrate grade 1 anterolisthesis about 6 mm at L5-S1 degenerative changes unchanged from prior films and no new fractures.  Very well-maintained lordosis and coronal posture is good as well.    Pain  Current pain ratin  At worst pain ratin  Location: Across low back, R>L  Quality: dull ache, sharp and needle-like  Relieving factors: heat  Aggravating factors: prolonged positioning and lifting  Progression: no change    Social Support  Lives in: condominium  Lives with: spouse    Diagnostic Tests  X-ray: abnormal    Treatments  Current treatment: physical therapy  Patient Goals  Patient goals for therapy: decreased pain, improved balance, increased motion, increased strength and independence with ADLs/IADLs             Objective        Special  Questions      Additional Special Questions  Denies night      Palpation     Right Tenderness of the erector spinae.     Tenderness     Lumbar Spine  Tenderness in the facet joint.     Additional Tenderness Details  TTP L5/S1 B    Neurological Testing     Additional Neurological Details  Pins/needles L heel    Active Range of Motion     Lumbar   Normal active range of motion  Flexion: WFL  Extension: WFL  Left lateral flexion: Active left lumbar lateral flexion: R lumbar. WFL and with pain  Right lateral flexion: WFL  Left rotation: WFL  Right rotation: WFL    Strength/Myotome Testing     Left Hip   Planes of Motion   Flexion: 4  Extension: 4  Abduction: 4    Right Hip   Planes of Motion   Flexion: 4-  Extension: 4+  Abduction: 4+    Left Knee   Flexion: 5  Extension: 5    Right Knee   Flexion: 5  Extension: 5    Left Ankle/Foot   Dorsiflexion: 4-    Right Ankle/Foot   Dorsiflexion: 5    Tests       Thoracic   Negative slump.     Lumbar     Left   Positive passive SLR (0deg: ankle pain; 45deg: knee/lumbar pain ).     Right   Positive passive SLR (75deg: lumbar/posterior thigh pain).     Additional Tests Details  ALYX: anterior hip pain, low back pain B          Assessment & Plan     Assessment  Impairments: activity intolerance, impaired balance, impaired physical strength, lacks appropriate home exercise program and pain with function  Assessment details: Pt c/o intermittent low back pain with lifting and cleaning. Objective findings include pain w/ lumbar AROM, tenderness, LE weakness, positive passive SLR L, and positive ALYX B.  Pt will benefit from skilled PT services in order to address listed impairments and increase tolerance to normal daily activities including ADLs and recreational activities.    Prognosis: good  Functional Limitations: carrying objects, lifting, uncomfortable because of pain and stooping  Goals  Plan Goals: Short Term Goals: 4 weeks. Patient will:  1. Be independent with initial HEP  2.  Be instructed in posture and body mechanics  3. Demonstrate TrA contraction during exercises in clinic without need for cueing.  4. Report pain of </= 2/10 with daily activities    Long Term Goals: 8-12 weeks. Patient will:  1. Demonstrate improved Bilateral lower extremity/lower abdominal MMT of >/= 4+/5 to allow for performance of daily activities without pain.  2. Demonstrate lower extremity flexibility and lumbar ROM WFL to allow for return to household & recreational activities w/o increased symptoms  3. Report pain of </= 1/10 with all daily activities.  4. Perceived disability </= 10% as measured by Oswestry Questionnaire.  5. Be independent with long term HEP    Plan  Therapy options: will be seen for skilled physical therapy services  Planned modality interventions: cryotherapy, electrical stimulation/Russian stimulation, thermotherapy (hydrocollator packs), traction and ultrasound  Planned therapy interventions: abdominal trunk stabilization, ADL retraining, body mechanics training, balance/weight-bearing training, flexibility, functional ROM exercises, gait training, home exercise program, joint mobilization, manual therapy, neuromuscular re-education, soft tissue mobilization, spinal/joint mobilization, strengthening, stretching and therapeutic activities  Frequency: 2x week  Duration in weeks: 12  Treatment plan discussed with: patient        Manual Therapy:    0     mins  66078;  Therapeutic Exercise:    30     mins  16595;     Neuromuscular Lenny:    0    mins  26243;    Therapeutic Activity:     0     mins  38386;     Gait Trainin     mins  30407;     Ultrasound:     0     mins  79506;    Electrical Stimulation:    0     mins  23343 ( );  Dry Needling     0     mins self-pay    Timed Treatment:   30   mins   Total Treatment:     60   mins    PT SIGNATURE: Lisa Cuellar PT   DATE TREATMENT INITIATED: 2021    Initial Certification  Certification Period: 10/31/2021  I certify that  the therapy services are furnished while this patient is under my care.  The services outlined above are required by this patient, and will be reviewed every 90 days.     PHYSICIAN: Sim Kelly MD      DATE:     Please sign and return via fax to [phone].. Thank you, [facility].

## 2021-08-02 NOTE — PATIENT INSTRUCTIONS
Access Code: [email or web address]  URL: [email or web address]  Date: 08/02/2021  Prepared by: Lisa Guzmán    Exercises  Supine Posterior Pelvic Tilt - 1 x daily - 7 x weekly - 1 sets - 10 reps - 5s hold  Supine Hip Adduction Isometric with Ball - 1 x daily - 7 x weekly - 1 sets - 10 reps - 5s hold  Hooklying Single Leg Bent Knee Fallouts with Resistance - 1 x daily - 7 x weekly - 1 sets - 10 reps  Supine Lower Trunk Rotation - 1 x daily - 7 x weekly - 1 sets - 10 reps  Supine Figure 4 Piriformis Stretch - 1 x daily - 7 x weekly - 1 sets - 3 reps - 20s hold  Supine Piriformis Stretch with Foot on Ground - 1 x daily - 7 x weekly - 1 sets - 3 reps - 20s hold  Standing Marching - 1 x daily - 7 x weekly - 1 sets - 10 reps

## 2021-08-03 NOTE — PROGRESS NOTES
8/3/2021    Mary Kay Saab is here today for worsening knee pain. Pt has undergone injection of the knee in the past with good resolution of symptoms. Pt is requesting a repeat injection.     KNEE Injection Procedure Note:    Large Joint Arthrocentesis: L knee  Date/Time: 8/3/2021 9:00 AM  Consent given by: patient  Site marked: site marked  Timeout: Immediately prior to procedure a time out was called to verify the correct patient, procedure, equipment, support staff and site/side marked as required   Supporting Documentation  Indications: pain and joint swelling   Procedure Details  Location: knee - L knee  Preparation: Patient was prepped and draped in the usual sterile fashion  Needle size: 22 G  Approach: anterolateral  Medications administered: 88 mg Hyaluronan 88 MG/4ML  Patient tolerance: patient tolerated the procedure well with no immediate complications      3 views of left knee were ordered and reviewed today secondary to pain and show significant arthritic changes.  Compared to views are unchanged    At the conclusion of the injection I discussed the importance of continued quad strengthening exercises on a daily basis. I will see the patient back if the symptoms should fail to improve or worsen.    Sabrina Vicente, APRN  8/3/2021

## 2021-08-10 NOTE — PROGRESS NOTES
Physical Therapy Daily Treatment Note    Patient: Mary Kay Saab   : 1945  Diagnosis/ICD-10 Code:  Chronic bilateral low back pain without sciatica [M54.5, G89.29]  Referring practitioner: Sim Kelly MD  Date of Initial Evaluation:  Type: THERAPY  Noted: 2021  Visit # 2      Subjective   Low back hurt for a few days after eval last week. Feels ok today. I did pelvic tilts after mopping and that helped alleviate low back pain.    Objective   See Exercise, Manual, and Modality Logs for complete treatment.       Assessment/Plan  Reported R lower back pain w/ L lower trunk rotation. Some improvement with joint mob and STM. Updated HEP to include core strengthening progressions. Progress per POC.                 Manual Therapy:    10     mins  20167;  Therapeutic Exercise:    30     mins  44751;     Neuromuscular Lenny:    0    mins  09671;    Therapeutic Activity:     0     mins  23303;     Gait Trainin     mins  29291;     Ultrasound:     0     mins  80023;    Work Hardening           0      mins 36894  Iontophoresis               0   mins 25076    Timed Treatment:   40   mins   Total Treatment:     45   mins    Lisa Cuellar PT  Physical Therapist

## 2021-08-20 NOTE — PROGRESS NOTES
Physical Therapy Daily Treatment Note    Patient: Mary Kay Saab   : 1945  Diagnosis/ICD-10 Code:  Chronic bilateral low back pain without sciatica [M54.5, G89.29]  Referring practitioner: Sim Kelly MD  Date of Initial Evaluation:  Type: THERAPY  Noted: 2021  Visit # 4      Subjective   Pt reports a little bit of low back pain because she lifted a carton of drinks yesterday.    Objective   See Exercise, Manual, and Modality Logs for complete treatment.       Assessment/Plan  Excellent tolerance to core stability progressions and lifting education w/o c/o pain. Could not tolerate long axis lumbar traction due to ankle sensitivity. Updated HEP. Progress per POC.                 Manual Therapy:    0     mins  01928;  Therapeutic Exercise:    30     mins  07302;     Neuromuscular Lenny:    0    mins  40344;    Therapeutic Activity:     15     mins  13387;     Gait Trainin     mins  38981;     Ultrasound:     0     mins  30155;    Work Hardening           0      mins 31764  Iontophoresis               0   mins 52586    Timed Treatment:   45   mins   Total Treatment:     45   mins    Lisa Cuellar PT  Physical Therapist

## 2021-08-24 NOTE — PROGRESS NOTES
Physical Therapy Daily Treatment Note    Patient: Mary Kay Saab   : 1945  Diagnosis/ICD-10 Code:  Chronic bilateral low back pain without sciatica [M54.5, G89.29]  Referring practitioner: Sim Kelly MD  Date of Initial Evaluation:  Type: THERAPY  Noted: 2021  Visit # 5      Subjective   Back hurts a little today. Describes 2/10 pain across low back. Pt reports she cleaned house Saturday and felt like back was going to break in half. Pain was 9/10 Jaya along spine from neck to low back.  Pain reduced with heat and exercise. States she cannot take medication due to kidney disease.    Considering going back to Dr. Kelly to see if pain warrants MRI.    Objective   See Exercise, Manual, and Modality Logs for complete treatment.     Assessment/Plan  Back pain largely a result of poor ergonomics. Discussed better methods of cleaning to avoid prolonged lumbar flexion (stick vacuum vs handheld vacuum, knee pads for cleaning bathroom floor).    Excellent tolerance to core strength and endurance progressions. Progress per POC.           Manual Therapy:    0     mins  80062;  Therapeutic Exercise:    30     mins  99003;     Neuromuscular Lenny:    0    mins  56881;    Therapeutic Activity:     15     mins  39359;     Gait Trainin     mins  38002;     Ultrasound:     0     mins  57448;    Work Hardening           0      mins 11599  Iontophoresis               0   mins 91086    Timed Treatment:   45   mins   Total Treatment:     45   mins    Lisa Cuellar PT  Physical Therapist

## 2021-08-25 NOTE — PROGRESS NOTES
Re-Assessment / Re-Certification        Patient: Mary Kay Saab   : 1945  Diagnosis/ICD-10 Code:  Tendonitis, Achilles, left [M76.62]  Referring practitioner: Sim Kelly MD  Date of Initial Visit: Type: THERAPY  Noted: 2021  Today's Date: 2021  Patient seen for 8 sessions      Subjective:   Mary Kay Saab reports: continued fluctuating L heel pain.   Subjective Questionnaire: LEFS:   Clinical Progress: improved  Home Program Compliance: Yes  Treatment has included: therapeutic exercise, manual therapy, therapeutic activity, electrical stimulation, ultrasound and cryotherapy    Subjective   Pain  Current pain ratin  At best pain ratin  At worst pain ratin  Quality: discomfort, dull ache, tight and sharp  Relieving factors: change in position and ice  Aggravating factors: stairs, standing, ambulation, prolonged positioning and sleeping  Objective   Tenderness   Left Ankle/Foot   Tenderness in the Achilles insertion.      Active Range of Motion   Left Ankle/Foot   Normal active range of motion     Right Ankle/Foot   Normal active range of motion     Strength/Myotome Testing      Left Ankle/Foot   Normal strength     Right Ankle/Foot   Normal strength    Goals  Plan Goals: SHORT TERM GOALS: Time for Goal Achievement: 6 visits    1.  Patient to be compliant with HEP. Met  2.  Pt able to ascend/descend steps and transfer with less ankle pain < 5/10 Progressing       LONG TERM GOALS: Time for Goal Achievement: 12 visits  1.  Pt to score 45 or greater on LEFS   2.  Patient able to ascend/descend steps and prolonged standing & cooking with pain < 2/10  3.  Pt to demonstrate increased stability of the ankle  to balance on foam as needed to traverse uneven terrain .     Assessment/Plan  Progress toward previous goals: Progressing         Recommendations: Continue as planned  Timeframe: 1 month  Prognosis to achieve goals: good    PT Signature: Angelia Gomez, PT      Based upon review of  the patient's progress and continued therapy plan, it is my medical opinion that Mary Kay Saab should continue physical therapy treatment at [facility]  [address] [facility], [address] [address] [phone].    Signature: __________________________________  Sim Kelly MD    Manual Therapy:   15      mins  67428;  Therapeutic Exercise:         mins  61761;     Neuromuscular Lenny:        mins  30265;    Therapeutic Activity:          mins  22697;     Gait Training:           mins  78817;     Ultrasound:   10       mins  94427;    Electrical Stimulation:    15     mins  40410 ( );  Dry Needling          mins self-pay    Timed Treatment:  25    mins   Total Treatment:   40     mins

## 2021-08-26 NOTE — PROGRESS NOTES
Physical Therapy Daily Treatment Note    Patient: Mary Kay Saab   : 1945  Diagnosis/ICD-10 Code:  Chronic bilateral low back pain without sciatica [M54.5, G89.29]  Referring practitioner: Sim Kelly MD  Date of Initial Evaluation:  Type: THERAPY  Noted: 2021  Visit # 6      Subjective   Energy is low today.    Objective   See Exercise, Manual, and Modality Logs for complete treatment.     Assessment/Plan  Pt had some difficulty activating pelvic floor as well as sustaining transverse abdominis contraction w/ LE movement. Tolerates core strengthening well w/o c/o back pain. Progress per POC.           Manual Therapy:    0     mins  25722;  Therapeutic Exercise:    30     mins  48052;     Neuromuscular Lenny:    0    mins  56592;    Therapeutic Activity:     10     mins  18292;     Gait Trainin     mins  20849;     Ultrasound:     0     mins  19935;    Work Hardening           0      mins 48517  Iontophoresis               0   mins 22596    Timed Treatment:   40   mins   Total Treatment:     45   mins    Lisa Cuellar PT  Physical Therapist

## 2021-09-07 NOTE — PROGRESS NOTES
"      CARDIOLOGY    Karley Roman MD    ENCOUNTER DATE:  09/07/2021    Mary Kay Saab / 76 y.o. / female        CHIEF COMPLAINT / REASON FOR OFFICE VISIT     Shortness of Breath      HISTORY OF PRESENT ILLNESS       HPI    Mary Kay Saab is a 76 y.o. female     This is a lady who has previously followed with Dr. Vizcarra. She had quite a bit of heart testing done around 2016, including a heart catheterization, which showed normal coronary arteries. In 2017, she had a cardiopulmonary stress test, which was suboptimal. She had an echo, which was pretty benign. Then it looks like she saw Dr. Sim Roman earlier this summer and he ordered a nuclear stress test, which showed normal LV systolic function with a small fixed perfusion defect in the apex, which sounds like it is probably breast attenuation. Echocardiogram in August 2021 showed normal LV systolic function with grade 1 diastolic dysfunction, mild left atrial enlargement and no significant valvular heart diease.    She said she has been short of breath for the last couple of months. She has a chronic cough from allergies. No wheezing. No edema. She feels better when her blood pressure is elevated than when it is normal.         REVIEW OF SYSTEMS     Review of Systems   Constitutional: Negative for chills, fever, weight gain and weight loss.   Cardiovascular: Negative for leg swelling.   Respiratory: Negative for cough, snoring and wheezing.    Hematologic/Lymphatic: Negative for bleeding problem. Does not bruise/bleed easily.   Skin: Negative for color change.   Musculoskeletal: Negative for falls, joint pain and myalgias.   Gastrointestinal: Negative for melena.   Genitourinary: Negative for hematuria.   Neurological: Negative for excessive daytime sleepiness.   Psychiatric/Behavioral: Negative for depression. The patient is not nervous/anxious.          VITAL SIGNS     Visit Vitals  /66 (BP Location: Right arm)   Pulse 70   Ht 165.1 cm (65\")   Wt " 63.5 kg (140 lb)   BMI 23.30 kg/m²         Wt Readings from Last 3 Encounters:   09/07/21 63.5 kg (140 lb)   08/03/21 63.5 kg (140 lb)   06/22/21 63.5 kg (140 lb)     Body mass index is 23.3 kg/m².      PHYSICAL EXAMINATION     Constitutional:       General: Not in acute distress.  Neck:      Vascular: No carotid bruit or JVD.   Pulmonary:      Effort: Pulmonary effort is normal.      Breath sounds: Normal breath sounds.   Cardiovascular:      Normal rate. Regular rhythm.      Murmurs: There is no murmur.   Psychiatric:         Mood and Affect: Mood and affect normal.           REVIEWED DATA       ECG 12 Lead    Date/Time: 9/7/2021 1:59 PM  Performed by: Karley Roman MD  Authorized by: Karley Roman MD   Comparison: compared with previous ECG from 11/6/2018  Similar to previous ECG  Rhythm: sinus rhythm  BPM: 70  Conduction: conduction normal  ST Segments: ST segments normal  T Waves: T waves normal    Clinical impression: normal ECG                  Lab Results   Component Value Date    GLUCOSE 88 06/09/2021    BUN 47 (H) 06/09/2021    CREATININE 1.53 (H) 06/09/2021    EGFRIFNONA 33 (L) 06/09/2021    EGFRIFAFRI  09/15/2016      Comment:      <15 Indicative of kidney failure.    BCR 30.7 (H) 06/09/2021    K 3.4 (L) 06/09/2021    CO2 27.5 06/09/2021    CALCIUM 9.3 06/09/2021    ALBUMIN 4.20 06/09/2021    AST 19 06/09/2021    ALT 11 06/09/2021       ASSESSMENT & PLAN      Diagnosis Plan   1. Paroxysmal atrial fibrillation (CMS/HCC)     2. Mixed hyperlipidemia     3. Essential hypertension         1.  Dyspnea on exertion.  I reviewed her stress test, heart catheterization and echocardiogram, and do not see any cardiac cause for her symptoms.  I do not recommend any further testing at this point in time.  She is going to see a pulmonologist and I agree with this plan.    2.  Paroxysmal atrial fibrillation.  She previously followed with Dr. Vizcarra and I think it would be reasonable for her to follow back up  with him in 6 months or 1 year.  She does not have very often paroxysms of atrial fibrillation but she is quite symptomatic when she does have a paroxysm.  She has amiodarone and Toprol which she takes as a pill in the pocket approach it sounds like, but again I would like her to kind of follow back up with Dr. Vizcarra as far as these medications go.    3.  Hypertension.  Blood pressure is controlled.    4.  Hyperlipidemia.  On simvastatin.    5.  Chronic kidney disease.      6.  History of breast cancer treated by Dr. Cornejo.    I will plan on seeing her back in about a year but in the meantime have her reestablish care with Dr. Vizcarra.              Orders Placed This Encounter   Procedures   • ECG 12 Lead     This order was created via procedure documentation     Order Specific Question:   Release to patient     Answer:   Immediate           MEDICATIONS         Discharge Medications          Accurate as of September 7, 2021  2:00 PM. If you have any questions, ask your nurse or doctor.            Continue These Medications      Instructions Start Date   alendronate 70 MG tablet  Commonly known as: FOSAMAX   70 mg, Oral, Every 7 Days, sunday      alosetron 0.5 MG tablet  Commonly known as: LOTRONEX   0.05 mg, Oral, Daily PRN      ALPRAZolam 1 MG tablet  Commonly known as: XANAX   1 mg, Oral, Nightly      bumetanide 1 MG tablet  Commonly known as: BUMEX   1 mg, Oral, Daily      cephalexin 500 MG capsule  Commonly known as: KEFLEX   Take 4 po 1 hour prior to dental procedure      cetirizine 10 MG tablet  Commonly known as: zyrTEC   10 mg, Oral, Daily      clobetasol 0.05 % topical foam  Commonly known as: OLUX   As Needed      cyclobenzaprine 10 MG tablet  Commonly known as: FLEXERIL   cyclobenzaprine 10 mg tablet   TK 1 T PO Q 8 H PRN      cycloSPORINE 0.05 % ophthalmic emulsion  Commonly known as: RESTASIS   1 drop, Both Eyes, 2 Times Daily      dilTIAZem 60 MG tablet  Commonly known as: CARDIZEM   60 mg, Oral, 2  Times Daily, PATIENT TAKES ONE TABLET AT 5 PM AND ONE TAB HS      diphenoxylate-atropine 2.5-0.025 MG per tablet  Commonly known as: LOMOTIL   1 tablet, Oral, 4 Times Daily PRN      Eliquis 5 MG tablet tablet  Generic drug: apixaban   No dose, route, or frequency recorded.      esomeprazole 40 MG capsule  Commonly known as: nexIUM   40 mg, Oral, 2 Times Daily      fluconazole 100 MG tablet  Commonly known as: DIFLUCAN   100 mg, Oral, As Needed      Glucosamine 500 MG capsule   Oral      hydroCHLOROthiazide 12.5 MG tablet  Commonly known as: HYDRODIURIL   12.5 mg, Oral, Daily      ketoconazole 2 % shampoo  Commonly known as: NIZORAL   ketoconazole 2 % shampoo   APPLY EXTERNALLY TO THE SCALP 3 TIMES A WEEK. LEAVE IN 5 MINUTES BEFORE WASHING OUT      metoprolol tartrate 25 MG tablet  Commonly known as: LOPRESSOR   0.625 mg, Oral, As Needed      montelukast 10 MG tablet  Commonly known as: SINGULAIR   10 mg, Oral, Daily      Mucinex D Max Strength 120-1200 MG tablet sustained-release 12 hour  Generic drug: Pseudoephedrine-guaiFENesin ER   1 tablet, Oral, Every 12 Hours      multivitamin capsule   No dose, route, or frequency recorded.      potassium chloride 10 MEQ CR tablet   TAKE 2 TABLETS BY MOUTH EVERY DAY FOR 3 DAYS THEN EVERY DAY      Salicylic Acid 6 % shampoo   salicylic acid 6 % shampoo   APPLY EXTERNALLY TO THE SCALP 3 TIMES A WEEK. LEAVE IN 5 MINUTES BEFORE WASHING OUT      simvastatin 40 MG tablet  Commonly known as: ZOCOR   40 mg, Oral, Daily         Stop These Medications    dabigatran etexilate 150 MG capsu  Commonly known as: PRADAXA  Stopped by: Karley Roman MD     imiquimod 5 % cream  Commonly known as: ALDARA  Stopped by: Karley Roman MD     metroNIDAZOLE 0.75 % cream  Commonly known as: METROCREAM  Stopped by: Karley Roman MD     mupirocin 2 % ointment  Commonly known as: BACTROBAN  Stopped by: MD Karley De Luna MD  09/07/21  14:00 EDT    **[employer]  Disclaimer:   Much of this encounter note is an electronic transcription/translation of spoken language to printed text. The electronic translation of spoken language may permit erroneous, or at times, nonsensical words or phrases to be inadvertently transcribed. Although I have reviewed the note for such errors, some may still exist.

## 2021-09-09 NOTE — PROGRESS NOTES
Physical Therapy Daily Progress Note  Visit: 9    Subjective Mary Kay Saab reports: foot has been feeling better     Objective   See Exercise, Manual, and Modality Logs for complete treatment.     Assessment/Plan: Compliant/cooperative with current rehab efforts.  Benefits from verbal/tactile cues to ensure correct exercise performance/technique, hold time and position.     Manual Therapy:   15       mins  62160;  Therapeutic Exercise:          mins  56180;     Neuromuscular Lenny:         mins  93011;    Therapeutic Activity:           mins  52585;     Gait Training:            mins  89170;     Ultrasound:    10       mins  88823;    Electrical Stimulation:      15    mins  63747 ( );  Dry Needling           mins self-pay  Traction           mins 30248  Canalith Repositioning         mins 14275      Timed Treatment:   25   mins   Total Treatment:     40   mins    Angelia Gomez PT  [place] License #: [id number]    Physical Therapist

## 2021-09-18 NOTE — PROGRESS NOTES
Physical Therapy Daily Treatment Note    Patient: Mary Kay Saab   : 1945  Diagnosis/ICD-10 Code:  Chronic bilateral low back pain without sciatica [M54.5, G89.29]  Referring practitioner: Sim Kelly MD  Date of Initial Evaluation:  Type: THERAPY  Noted: 2021  Visit # 3      Subjective   Back feels a little better. Cannot use hands for exercise today.    Objective   See Exercise, Manual, and Modality Logs for complete treatment.       Assessment/Plan  Exceelnt tolerance to core strength/stabiltiy progressions. Updated HEP. Progress per POC.           Manual Therapy:    0     mins  66175;  Therapeutic Exercise:    30     mins  59221;     Neuromuscular Lenny:    10    mins  68546;    Therapeutic Activity:     0     mins  88953;     Gait Trainin     mins  51846;     Ultrasound:     0     mins  19916;    Work Hardening           0      mins 29888  Iontophoresis               0   mins 88547    Timed Treatment:   40   mins   Total Treatment:     40   mins    Lisa Cuellar PT  Physical Therapist   18-Sep-2021 10:43

## 2021-09-23 NOTE — PROGRESS NOTES
Re-Assessment / Re-Certification        Patient: Mary Kay Saab   : 1945  Diagnosis/ICD-10 Code:  Chronic bilateral low back pain without sciatica [M54.5, G89.29]  Referring practitioner: Sim Kelly MD  Date of Initial Evaluation:  Type: THERAPY  Noted: 2021  Patient seen for 7 sessions      Subjective:   Mary Kay Saab reports: Less pain carrying groceries and cleaning. Rates pain 3/10.  Subjective Questionnaire: Oswestry: 10/100  Clinical Progress: improved  Home Program Compliance: No; some exercises every other day  Treatment has included: therapeutic exercise, manual therapy and therapeutic activity    Subjective   Objective          Strength/Myotome Testing     Left Hip   Planes of Motion   Flexion: 5    Right Hip   Planes of Motion   Flexion: 5    Left Knee   Flexion: 5  Extension: 5    Right Knee   Flexion: 5  Extension: 4+    Left Ankle/Foot   Dorsiflexion: 5    Right Ankle/Foot   Dorsiflexion: 5      Lumbar AROM WNL  Mild R low back pain flexion, R SB  ASIS level    Goals est 21  Plan Goals: Short Term Goals: 4 weeks. Patient will:  1. Be independent with initial HEP MET  2. Be instructed in posture and body mechanics MET  3. Demonstrate TrA contraction during exercises in clinic without need for cueing. MET  4. Report pain of </= 2/10 with daily activities NOT MET    Long Term Goals: 8-12 weeks. Patient will:  1. Demonstrate improved Bilateral lower extremity/lower abdominal MMT of >/= 4+/5 to allow for performance of daily activities without pain. MET  2. Demonstrate lower extremity flexibility and lumbar ROM WFL to allow for return to household & recreational activities w/o increased symptoms NOT MET  3. Report pain of </= 1/10 with all daily activities. NOT MET  4. Perceived disability </= 10% as measured by Oswestry Questionnaire. MET  5. Be independent with long term HEP NOT MET    Assessment/Plan  Progress toward previous goals: Partially Met    Subjective reports of pain  improved. Pt required verbal cuing for exercise program. Plan to review HEP at next visit to improve compliance.      Recommendations: Continue as planned  Timeframe: 1 month  Prognosis to achieve goals: fair    PT Signature: Lisa Cuellar PT      Based upon review of the patient's progress and continued therapy plan, it is my medical opinion that Mary Kay Saab should continue physical therapy treatment at [facility]  [address] [place] [address] [phone].    Signature: __________________________________  Sim Kelly MD    Manual Therapy:    0     mins  29114;  Therapeutic Exercise:    45     mins  41419;     Neuromuscular Lenny:    0    mins  25118;    Therapeutic Activity:     0     mins  30179;     Gait Trainin     mins  26683;     Ultrasound:     0     mins  60182;    Work Hardening           0      mins 25961  Iontophoresis               0   mins 21771    Timed Treatment:   45   mins   Total Treatment:     45   mins

## 2021-09-28 NOTE — PROGRESS NOTES
Physical Therapy Daily Progress Note  Visit: 10    Subjective Mary Kay Saab reports: had some pain in L foot yesterday from walking in different shoes    Objective   See Exercise, Manual, and Modality Logs for complete treatment.     Assessment/Plan: Compliant/cooperative with current rehab efforts.  Plan details: Progress ROM / strengthening / stabilization / functional activity as tolerated       Manual Therapy:    20      mins  12005;  Therapeutic Exercise:          mins  62146;     Neuromuscular Lenny:         mins  47590;    Therapeutic Activity:           mins  42760;     Gait Training:            mins  71257;     Ultrasound:     10      mins  74170;    Electrical Stimulation:  15        mins  33972 ( );  Dry Needling           mins self-pay  Traction           mins 53576  Canalith Repositioning         mins 90756      Timed Treatment:   30   mins   Total Treatment:    45    mins    AMANDA Campa License #: [id number]    Physical Therapist

## 2021-10-14 NOTE — PROGRESS NOTES
October 14, 2021    Hello, may I speak with Mary Kay Saab?    My name is William SIMON Supervisor.      I am  with [facility]  [address] [facility] [address]  [facility] [address] [phone].    Before we get started may I verify your date of birth? 1945    I am calling to officially welcome you to our practice and ask about your recent visit. Is this a good time to talk? yes    Tell me about your visit with us. What things went well?  The doc explained my test results to me and she was very helpful.       We're always looking for ways to make our patients' experiences even better. Do you have recommendations on ways we may improve?  no    Overall were you satisfied with your first visit to our practice? yes       I appreciate you taking the time to speak with me today. Is there anything else I can do for you? no      Thank you, and have a great day.

## 2021-10-19 NOTE — PROGRESS NOTES
Re-Assessment / Re-Certification        Patient: Mary Kay Saab   : 1945  Diagnosis/ICD-10 Code:  Tendonitis, Achilles, left [M76.62]  Referring practitioner: Jann Rojas, *  Date of Initial Visit: Type: THERAPY  Noted: 2021  Today's Date: 10/19/2021  Patient seen for 11 sessions      Subjective:   Mary Kay Saab reports: her heel has improved. No longer wearing her boot at night. Gets aggravated after cleaning   Subjective Questionnaire: LEFS: 43  Clinical Progress: improved  Home Program Compliance: Yes  Treatment has included: manual therapy, electrical stimulation, ultrasound and cryotherapy    Subjective   Current pain ratin  At best pain ratin  At worst pain ratin  Quality: discomfort, dull ache, tight and sharp  Relieving factors: change in position and ice  Aggravating factors: stairs, standing, ambulation, prolonged positioning and sleeping  Objective   Left Ankle/Foot   Tenderness in the Achilles insertion.      Active Range of Motion   Left Ankle/Foot   Normal active range of motion     Right Ankle/Foot   Normal active range of motion     Strength/Myotome Testing      Left Ankle/Foot   Normal strength     Right Ankle/Foot   Normal strength     Goals  Plan Goals: SHORT TERM GOALS: Time for Goal Achievement: 6 visits    1.  Patient to be compliant with HEP. Met  2.  Pt able to ascend/descend steps and transfer with less ankle pain < 5/10 Progressing       LONG TERM GOALS: Time for Goal Achievement: 12 visits  1.  Pt to score 45 or greater on LEFS   2.  Patient able to ascend/descend steps and prolonged standing & cooking with pain < 2/10  3.  Pt to demonstrate increased stability of the ankle  to balance on foam as needed to traverse uneven terrain .     Assessment/Plan  Progress toward previous goals: Progressing        Recommendations: Continue as planned  Timeframe: 1 month  Prognosis to achieve goals: good    PT Signature: Angelia Gomez, PT      Based upon review  of the patient's progress and continued therapy plan, it is my medical opinion that Mary Kay Saab should continue physical therapy treatment at [facility]  [address] [facility], [address] [address] [phone].    Signature: __________________________________  Jann Rojas MD    Manual Therapy:    15    mins  61249;  Therapeutic Exercise:         mins  18484;     Neuromuscular Lenny:        mins  74314;    Therapeutic Activity:   15      mins  36365;     Gait Training:           mins  89420;     Ultrasound:   10       mins  61406;    Electrical Stimulation:   15      mins  30092 ( );  Dry Needling          mins self-pay    Timed Treatment:  40    mins   Total Treatment:    55    mins

## 2021-10-22 NOTE — TELEPHONE ENCOUNTER
Rx Refill Note  Requested Prescriptions     Pending Prescriptions Disp Refills   • Eliquis 5 MG tablet tablet 180 tablet 3     Sig: Take 1 tablet by mouth Every 12 (Twelve) Hours.      Last office visit with prescribing clinician: 9/7/2021      Next office visit with prescribing clinician: 9/8/2022     Gian Frederick MA  10/22/21, 13:43 EDT

## 2021-11-08 NOTE — TELEPHONE ENCOUNTER
CALLED PT BACK ON HER APPT AND ADVISED HER THAT WE HAD HER SCHEDULED AND I WENT OVER THE APPT DATE AND TIMES FOR THE PT AS WELL AS CONFIRMED HER INS

## 2021-11-08 NOTE — TELEPHONE ENCOUNTER
Caller: Mary Kay Saab    Relationship: Self    Best call back number: [phone]    What is the best time to reach you: ANY      What was the call regarding: PATIENT CALLED SAID SHE MISSED A CALL BUT NOT SURE WHO

## 2021-11-18 NOTE — PROGRESS NOTES
Re-Assessment / Re-Certification        Patient: Mary Kay Saab   : 1945  Diagnosis/ICD-10 Code:  Tendonitis, Achilles, left [M76.62]  Referring practitioner: Sim Kelly MD  Date of Initial Visit: Type: THERAPY  Noted: 2021  Today's Date: 2021  Patient seen for 12 sessions      Subjective:   Mary Kay Saab reports: her foot is doing better, has occasional pain if on it too much   Subjective Questionnaire: LEFS:   Clinical Progress: improved  Home Program Compliance: Yes  Treatment has included: manual therapy, electrical stimulation, ultrasound and cryotherapy    Subjective   Current pain ratin  At best pain ratin  At worst pain ratin  Quality: discomfort, dull ache, tight and sharp  Relieving factors: change in position and ice  Aggravating factors: stairs, standing, ambulation, prolonged positioning and sleeping  Objective   Left Ankle/Foot   Tenderness in the Achilles insertion.      Active Range of Motion   Left Ankle/Foot   Normal active range of motion     Right Ankle/Foot   Normal active range of motion     Strength/Myotome Testing      Left Ankle/Foot   Normal strength     Right Ankle/Foot   Normal strength     Goals  Plan Goals: SHORT TERM GOALS: Time for Goal Achievement: 6 visits    1.  Patient to be compliant with HEP. Met  2.  Pt able to ascend/descend steps and transfer with less ankle pain < 5/10 Progressing       LONG TERM GOALS: Time for Goal Achievement: 12 visits  1.  Pt to score 45 or greater on LEFS   2.  Patient able to ascend/descend steps and prolonged standing & cooking with pain < 2/10  3.  Pt to demonstrate increased stability of the ankle  to balance on foam as needed to traverse uneven terrain .     Assessment/Plan  Progress toward previous goals: Progressing        Recommendations: Continue as planned  Timeframe: 1 month  Prognosis to achieve goals: good    PT Signature: Angelia Gomez, PT      Based upon review of the patient's progress and  continued therapy plan, it is my medical opinion that Mary Kay Saab should continue physical therapy treatment at [facility] PHYSICAL THERAPY  [address] [place] [address], [address] [address] [phone].    Signature: __________________________________  iSm Kelly MD    Manual Therapy:  15      mins  87865;  Therapeutic Exercise:         mins  89899;     Neuromuscular Lenny:        mins  69222;    Therapeutic Activity:          mins  44765;     Gait Training:           mins  03958;     Ultrasound:   10       mins  53847;    Electrical Stimulation:     15    mins  91064 ( );  Dry Needling          mins self-pay    Timed Treatment:   25   mins   Total Treatment:     40   mins

## 2021-12-08 PROBLEM — R06.09 DYSPNEA ON EXERTION: Status: ACTIVE | Noted: 2021-01-01

## 2021-12-08 NOTE — PROGRESS NOTES
Date of Office Visit: 2021  Encounter Provider: Theodore Vizcarra MD  Place of Service: [facility] CARDIOLOGY  Patient Name: Mary Kay Saab  : 1945    Subjective:     Encounter Date:2021      Patient ID: Mary Kay Saab is a 76 y.o. female who has a cc of  PAF and is referred by Abel Cardenas     She has short lived episodes of palp. In  -- she had a longer episode of 3-4 hours. Almost always at night. Then none until the summer. Same scenario.     Main complaint -- exertional dyspnea. She gets winded with exertion.    Creat 1.5     Normal coronaries       No fainting,  No orthostasis.   No edema.   Exercise tolerance: limited by dyspnea.         There have been no hospital admission since the last visit.     There have been no bleeding events.       Past Medical History:   Diagnosis Date   • Anemia     Of chronic renal insufficiency   • Atrial fibrillation (HCC)    • Mcguire's esophagus    • Breast cancer (HCC)    • Chronic renal insufficiency     Stage 3   • MCCRAY (dyspnea on exertion)    • Dyspnea    • GERD (gastroesophageal reflux disease)    • H/O electrocardiogram    • History of staph infection    • Hx of being hospitalized     [facility] in 2010 and 10/2010 for atrial fibrillation, Dr. Perla Hurtado   • Hyperlipidemia    • Hypertension    • Hypoxia    • IBS (irritable bowel syndrome)    • Kidney dysfunction    • Lower extremity edema    • Lump or mass in breast    • Malignant neoplasm of breast (HCC)    • Osteoarthritis    • Osteopenia    • Osteoporosis    • Paroxysmal atrial fibrillation (HCC)    • Seizure (HCC)     AS A TEENAGER, NOT CURRENTLY   • Sinusitis    • SOB (shortness of breath)        Social History     Socioeconomic History   • Marital status:      Spouse name: Cuate   • Number of children: 1   • Years of education: 1 year of college   Tobacco Use   • Smoking status: Former Smoker     Quit date: 1975     Years since quitting:  "46.5   • Smokeless tobacco: Never Used   • Tobacco comment: QUIT SMOKING IN LATE 1970s   Vaping Use   • Vaping Use: Never used   Substance and Sexual Activity   • Alcohol use: Yes     Comment: social drinker   • Drug use: No   • Sexual activity: Defer       Family History   Problem Relation Age of Onset   • Heart disease Other    • Stroke Other    • Stroke Mother    • Heart disease Mother    • Hypertension Mother    • Leukemia Father         Mid 60s   • Hypertension Brother    • Cancer Paternal Aunt    • Cancer Paternal Grandfather    • Malig Hyperthermia Neg Hx        Review of Systems   Constitutional: Negative for fever and night sweats.   HENT: Negative for ear pain and stridor.    Eyes: Negative for discharge and visual halos.   Cardiovascular: Negative for cyanosis.   Respiratory: Negative for hemoptysis and sputum production.    Hematologic/Lymphatic: Negative for adenopathy.   Skin: Negative for nail changes and unusual hair distribution.   Musculoskeletal: Negative for gout and joint swelling.   Gastrointestinal: Negative for bowel incontinence and flatus.   Genitourinary: Negative for dysuria and flank pain.   Neurological: Negative for seizures and tremors.   Psychiatric/Behavioral: Negative for altered mental status. The patient is not nervous/anxious.             Objective:     Vitals:    12/08/21 0941   BP: 144/82   Pulse: 57   Weight: 64.4 kg (142 lb)   Height: 165.1 cm (65\")         Eyes:      General:         Right eye: No discharge.         Left eye: No discharge.   HENT:      Head: Normocephalic and atraumatic.   Neck:      Thyroid: No thyromegaly.      Vascular: No JVD.   Pulmonary:      Effort: Pulmonary effort is normal.      Breath sounds: Normal breath sounds. No rales.   Cardiovascular:      Normal rate. Regular rhythm.      No gallop.   Edema:     Peripheral edema absent.   Abdominal:      General: Bowel sounds are normal.      Palpations: Abdomen is soft.      Tenderness: There is no " abdominal tenderness.   Musculoskeletal: Normal range of motion.         General: No deformity. Skin:     General: Skin is warm and dry.      Findings: No erythema.   Neurological:      Mental Status: Alert and oriented to person, place, and time.      Motor: Normal muscle tone.   Psychiatric:         Behavior: Behavior normal.         Thought Content: Thought content normal.           ECG 12 Lead    Date/Time: 12/8/2021 10:45 AM  Performed by: Theodore Vizcarra MD  Authorized by: Theodore Vizcarra MD   Comparison: compared with previous ECG   Similar to previous ECG  Rhythm: sinus rhythm  Rate: normal  Conduction: conduction normal  ST Segments: ST segments normal  T Waves: T waves normal  QRS axis: normal    Clinical impression: normal ECG            Lab Review:       Assessment:          Diagnosis Plan   1. Paroxysmal atrial fibrillation (HCC)     2. Essential hypertension     3. Dyspnea on exertion            Plan:     PAF -- I will check a zio to assess AF burden, She is on apixaban and almost meets the criteria     CKD -- per dr zepeda.     MCCRAY -- probably multifactorial but I wonder about HFpEF and whether or not SGLT2i might help her. Indication could be CKD -- DAPA-CKD trial.     HTN -- I strongly rec stopping the pseudofed.

## 2021-12-14 NOTE — PROGRESS NOTES
Re-Assessment / Re-Certification      Patient: Mary Kay Saab   : 1945  Diagnosis/ICD-10 Code:  Tendonitis, Achilles, left [M76.62]  Referring practitioner: Sim Kelly MD  Date of Initial Visit: Type: THERAPY  Noted: 2021  Today's Date: 2021  Patient seen for 13 sessions      Subjective:   Mary Kay Saab reports: her foot feels better after PT. Has been a little more painful recently with increased activity such as shopping more   Subjective Questionnaire: LEFS:   Clinical Progress: improved  Home Program Compliance: Yes  Treatment has included: manual therapy, electrical stimulation, ultrasound and cryotherapy    Subjective   Subjective   Current pain ratin  At best pain ratin  At worst pain ratin  Quality: discomfort, dull ache, tight and sharp  Relieving factors: change in position and ice  Aggravating factors: stairs, standing, ambulation, prolonged positioning and sleeping  Objective   Left Ankle/Foot   Tenderness in the Achilles insertion.      Active Range of Motion   Left Ankle/Foot   Normal active range of motion     Right Ankle/Foot   Normal active range of motion     Strength/Myotome Testing      Left Ankle/Foot   Normal strength     Right Ankle/Foot   Normal strength     Goals  Plan Goals: SHORT TERM GOALS: Time for Goal Achievement: 6 visits    1.  Patient to be compliant with HEP. Met  2.  Pt able to ascend/descend steps and transfer with less ankle pain < 5/10 Progressing       LONG TERM GOALS: Time for Goal Achievement: 12 visits  1.  Pt to score 45 or greater on LEFS  Progressing   2.  Patient able to ascend/descend steps and prolonged standing & cooking with pain   3.  Pt to demonstrate increased stability of the ankle  to balance on foam as needed to traverse uneven terrain .     Assessment/Plan  Progress toward previous goals: Partially Met        Recommendations: Continue as planned  Timeframe: 1 month  Prognosis to achieve goals: good    PT Signature:  Angelia Gomez PT      Based upon review of the patient's progress and continued therapy plan, it is my medical opinion that Mary Kay Saab should continue physical therapy treatment at [facility] PHYSICAL THERAPY  [address] [place] [id number] [phone].    Signature: __________________________________  Sim Kelly MD    Manual Therapy:  15      mins  57370;  Therapeutic Exercise:         mins  60194;     Neuromuscular Lenny:        mins  58304;    Therapeutic Activity:          mins  47386;     Gait Training:           mins  79623;     Ultrasound:   10       mins  13799;    Electrical Stimulation:    15     mins  20223 ( );  Dry Needling          mins self-pay    Timed Treatment:  25    mins   Total Treatment:   40    mins

## 2021-12-20 NOTE — PROGRESS NOTES
Subjective .     REASONS FOR FOLLOWUP:  Breast cancer, anemia    HISTORY OF PRESENT ILLNESS:  The patient is a 76 y.o. year old female  who is here for follow-up with the above-mentioned history.    She last saw me March 2019.  The plan was annual follow-up.    Since she saw me last, she has had issues with SOA and been following with pulmonology.  They have her on an inhaler.    No bleeding, chest pain.    Past Medical History:   Diagnosis Date   • Anemia     Of chronic renal insufficiency   • Atrial fibrillation (HCC)    • Mcguire's esophagus    • Breast cancer (HCC)    • Chronic renal insufficiency     Stage 3   • MCCRAY (dyspnea on exertion)    • Dyspnea    • GERD (gastroesophageal reflux disease)    • H/O electrocardiogram    • History of staph infection    • Hx of being hospitalized     [facility] in 09/2010 and 10/2010 for atrial fibrillation, Dr. Perla Hurtado   • Hyperlipidemia    • Hypertension    • Hypoxia    • IBS (irritable bowel syndrome)    • Kidney dysfunction    • Lower extremity edema    • Lump or mass in breast    • Malignant neoplasm of breast (HCC)    • Osteoarthritis    • Osteopenia    • Osteoporosis    • Paroxysmal atrial fibrillation (HCC)    • Seizure (HCC)     AS A TEENAGER, NOT CURRENTLY   • Sinusitis    • SOB (shortness of breath)      Past Surgical History:   Procedure Laterality Date   • BREAST LUMPECTOMY  1990    For LCIS   • CARDIAC CATHETERIZATION Left 4/27/2016    Procedure: Cardiac catheterization;  Surgeon: Kevin Guillen MD;  Location: [facility] INVASIVE LOCATION;  Service:    • FOOT SURGERY     • HAND ARTHROPLASTY Right    • KNEE ARTHROSCOPY     • OTHER SURGICAL HISTORY      LYMPHATIC SURGERY   • TOTAL HIP ARTHROPLASTY Right 7/12/2017    Procedure: TOTAL HIP ARTHROPLASTY ANTERIOR WITH HANA TABLE;  Surgeon: Van Mcguire MD;  Location: [facility] OR;  Service:    • TOTAL KNEE ARTHROPLASTY Right 11/19/2018    Procedure: TOTAL KNEE ARTHROPLASTY;  Surgeon:  Van Mcguire MD;  Location: [facility] OR;  Service: Orthopedics       HEMATOLOGIC/ONCOLOGIC HISTORY:  (History from previous dates can be found in the separate document.)    MEDICATIONS    Current Outpatient Medications:   •  alendronate (FOSAMAX) 70 MG tablet, Take 70 mg by mouth Every 7 (Seven) Days. sunday, Disp: , Rfl: 12  •  alosetron (LOTRONEX) 0.5 MG tablet, Take 0.05 mg by mouth Daily As Needed., Disp: , Rfl:   •  ALPRAZolam (XANAX) 1 MG tablet, Take 1 mg by mouth every night., Disp: , Rfl:   •  bumetanide (BUMEX) 1 MG tablet, Take 1 mg by mouth Daily., Disp: , Rfl:   •  cephalexin (KEFLEX) 500 MG capsule, TAKE 4 CAPSULES BY MOUTH 1 HOUR BEFORE DENTAL PROCEDURE, Disp: 4 capsule, Rfl: 5  •  cetirizine (ZyrTEC) 10 MG tablet, Take 10 mg by mouth Daily., Disp: , Rfl:   •  clobetasol (OLUX) 0.05 % topical foam, As Needed., Disp: , Rfl:   •  cyclobenzaprine (FLEXERIL) 10 MG tablet, cyclobenzaprine 10 mg tablet  TK 1 T PO Q 8 H PRN, Disp: , Rfl:   •  cycloSPORINE (RESTASIS) 0.05 % ophthalmic emulsion, Administer 1 drop to both eyes 2 (Two) Times a Day., Disp: , Rfl:   •  Dapagliflozin Propanediol 10 MG tablet, Take 10 mg by mouth Daily., Disp: 90 tablet, Rfl: 1  •  diltiaZEM (CARDIZEM) 60 MG tablet, Take 60 mg by mouth 2 (Two) Times a Day. PATIENT TAKES ONE TABLET AT 5 PM AND ONE TAB HS, Disp: , Rfl:   •  diphenoxylate-atropine (LOMOTIL) 2.5-0.025 MG per tablet, Take 1 tablet by mouth 4 (Four) Times a Day As Needed for Diarrhea., Disp: , Rfl:   •  Eliquis 5 MG tablet tablet, Take 1 tablet by mouth Every 12 (Twelve) Hours., Disp: 180 tablet, Rfl: 3  •  esomeprazole (NexIUM) 40 MG capsule, Take 40 mg by mouth 2 (two) times a day., Disp: , Rfl:   •  Glucosamine 500 MG capsule, Take  by mouth., Disp: , Rfl:   •  hydrochlorothiazide (HYDRODIURIL) 12.5 MG tablet, Take 12.5 mg by mouth Daily., Disp: , Rfl:   •  ketoconazole (NIZORAL) 2 % shampoo, ketoconazole 2 % shampoo  APPLY EXTERNALLY TO THE SCALP 3 TIMES A WEEK.  LEAVE IN 5 MINUTES BEFORE WASHING OUT, Disp: , Rfl:   •  metoprolol tartrate (LOPRESSOR) 25 MG tablet, Take 0.625 mg by mouth As Needed., Disp: , Rfl:   •  montelukast (SINGULAIR) 10 MG tablet, Take 10 mg by mouth Daily., Disp: , Rfl:   •  Mucinex D Max Strength 120-1200 MG tablet sustained-release 12 hour, Take 1 tablet by mouth Every 12 (Twelve) Hours., Disp: , Rfl:   •  Multiple Vitamin (MULTIVITAMIN) capsule, , Disp: , Rfl:   •  Salicylic Acid 6 % shampoo, salicylic acid 6 % shampoo  APPLY EXTERNALLY TO THE SCALP 3 TIMES A WEEK. LEAVE IN 5 MINUTES BEFORE WASHING OUT, Disp: , Rfl:   •  simvastatin (ZOCOR) 40 MG tablet, Take 40 mg by mouth Daily., Disp: , Rfl: 3  No current facility-administered medications for this visit.    Facility-Administered Medications Ordered in Other Visits:   •  mupirocin (BACTROBAN) 2 % nasal ointment, , Nasal, BID, Van Mcguire MD    ALLERGIES:     Allergies   Allergen Reactions   • Penicillins Anaphylaxis     Reactions: syncope and seizures   • Adhesive Tape Hives     Tears skin   • Atropine Hives   • Epinephrine Palpitations     Patient states will go into A-Fib   • Sulfa Antibiotics Nausea Only     STATES SHE DOES FINE WITH CERTAIN SULFA DRUGS       SOCIAL HISTORY:       Social History     Socioeconomic History   • Marital status:      Spouse name: Cuate   • Number of children: 1   • Years of education: 1 year of college   Tobacco Use   • Smoking status: Former Smoker     Quit date: 1975     Years since quittin.5   • Smokeless tobacco: Never Used   • Tobacco comment: QUIT SMOKING IN LATE    Vaping Use   • Vaping Use: Never used   Substance and Sexual Activity   • Alcohol use: Yes     Comment: social drinker   • Drug use: No   • Sexual activity: Defer         FAMILY HISTORY:  Family History   Problem Relation Age of Onset   • Heart disease Other    • Stroke Other    • Stroke Mother    • Heart disease Mother    • Hypertension Mother    • Leukemia Father          "Mid 60s   • Hypertension Brother    • Cancer Paternal Aunt    • Cancer Paternal Grandfather    • Malig Hyperthermia Neg Hx        REVIEW OF SYSTEMS:    Review of Systems   Constitutional: Negative for activity change.   HENT: Negative for nosebleeds and trouble swallowing.    Respiratory: Negative for shortness of breath and wheezing.    Cardiovascular: Negative for chest pain and palpitations.   Gastrointestinal: Negative for constipation, diarrhea and nausea.   Genitourinary: Negative for dysuria and hematuria.   Musculoskeletal: Negative for arthralgias and myalgias.   Skin: Negative for rash and wound.   Neurological: Negative for seizures and syncope.   Hematological: Negative for adenopathy. Does not bruise/bleed easily.   Psychiatric/Behavioral: Negative for confusion.           Objective    Vitals:    12/21/21 1542   BP: 127/56   Pulse: 64   Resp: 18   Temp: 97.3 °F (36.3 °C)   TempSrc: Temporal   SpO2: 100%   Weight: 65.4 kg (144 lb 3.2 oz)   Height: 165.1 cm (65\")   PainSc: 0-No pain     Current Status 12/21/2021   ECOG score 0      PHYSICAL EXAM:        CONSTITUTIONAL:  Vital signs reviewed.  No distress, looks comfortable.  EYES:  Conjunctiva and lids unremarkable.  PERRLA  EARS,NOSE,MOUTH,THROAT:  Ears and nose appear unremarkable.  Lips, teeth, gums appear unremarkable.  RESPIRATORY:  Normal respiratory effort.  Lungs clear to auscultation bilaterally.  CARDIOVASCULAR:  Normal S1, S2.  No murmurs rubs or gallops.  No significant lower extremity edema.   BREAST: Declined as she states she had this recently with her GYN MD  GASTROINTESTINAL: Abdomen appears unremarkable.  Nontender.  No hepatomegaly.  No splenomegaly.  LYMPHATIC:  No cervical, supraclavicular, axillary lymphadenopathy.  SKIN:  Warm.  No rashes.  PSYCHIATRIC:  Normal judgment and insight.  Normal mood and affect.         RECENT LABS:        WBC   Date/Time Value Ref Range Status   11/29/2021 01:18 PM 7.15 3.40 - 10.80 10*3/mm3 Final "   07/23/2021 11:57 AM 7.53 4.5 - 11.0 10*3/uL Final     Hemoglobin   Date/Time Value Ref Range Status   11/29/2021 01:18 PM 12.0 12.0 - 15.9 g/dL Final   07/23/2021 11:57 AM 11.7 (L) 12.0 - 16.0 g/dL Final     Platelets   Date/Time Value Ref Range Status   11/29/2021 01:18  140 - 450 10*3/mm3 Final   07/23/2021 11:57  140 - 440 10*3/uL Final       Assessment/Plan     ASSESSMENT:  Iron deficiency anemia, unspecified iron deficiency anemia type  - Ferritin  - Iron Profile  - Retic With IRF & RET-He  - CBC & Differential      *Stage IB ([id number]) left breast cancer. Grade 2, 1.9 cm, 1 mm micrometastasis in 1 out of 2 sentinel nodes. ER positive, HER-2 negative. OncoType DX: low risk category.   Tamoxifen 10/11/2013 through November 2013. Just a few days or so of Aromasin in January 2014. Could not tolerate hormonal therapy due to vaginal dryness and irritation and declined any more hormonal therapy.   On Fosamax and Evista through Dr. Perla Garza.  No signs of recurrence.  Remission remains.    * Atrial fibrillation, on Eliquis through Dr. Haley Roman.     *Anemia of stage 3 chronic renal insufficiency (Dr. Rell Shannon is her nephrologist whom she follows up with). (Evaluation negative for B12 or folate deficiency, hemolysis or multiple myeloma. She has not had a bone marrow biopsy. She responds well to Procrit).   · She received Procrit 12/2014 and 1 dose 06/2015 and 1 dose 09/2015 and 1 dose on 10/28/2015 and some weekly doses of Procrit after hip replacement July 2017.  · She responds to Procrit.   · No need for Procrit recently.  Hb normal today.    * Iron deficiency anemia. does not tolerate oral iron due to significant abdominal pain and diarrhea. She refuses any further Benadryl as a pre-medicine because it made her feel very sleepy for over 24-hours.  Today's iron labs normal.    *Fibrocystic changes in both breasts.     *Dyspnea on exertion  · Previously complained of hypoxia with exertion,  1/18/2017.  · Follows with Dr. Nguyen.   · Dr. Nguyen noted 11/1/2021: Spirometry normal that day, but 5 years prior showed some obstruction with reversibility.  11/1/2021: Started an inhaler  · Due to anemia, he referred her back to me again.  Hb that day, 11/1/2021, 9.6.  He noted she is on oral anticoagulation through cardiology.  However, after that visit, on 11/29/2021, Hb 12    PLAN:   · MD 1 year.  One week prior: Ferritin, iron panel, CBC, reticulated hemoglobin  · If HP drops below 10, she could return for stat iron labs and consider resuming Procrit.  · Of course, if she wants to come back earlier for labs that is fine as well.  · Last mammogram September 2021, unremarkable per her report.  I have requested this.    41 minutes.  Total time.  Same day.

## 2022-01-01 ENCOUNTER — TELEPHONE (OUTPATIENT)
Dept: CARDIOLOGY | Facility: CLINIC | Age: 77
End: 2022-01-01

## 2022-01-01 ENCOUNTER — TRANSCRIBE ORDERS (OUTPATIENT)
Dept: ADMINISTRATIVE | Facility: HOSPITAL | Age: 77
End: 2022-01-01

## 2022-01-01 ENCOUNTER — APPOINTMENT (OUTPATIENT)
Dept: MRI IMAGING | Facility: HOSPITAL | Age: 77
End: 2022-01-01

## 2022-01-01 ENCOUNTER — LAB (OUTPATIENT)
Dept: OTHER | Facility: HOSPITAL | Age: 77
End: 2022-01-01

## 2022-01-01 ENCOUNTER — TELEPHONE (OUTPATIENT)
Dept: INTERVENTIONAL RADIOLOGY/VASCULAR | Facility: HOSPITAL | Age: 77
End: 2022-01-01

## 2022-01-01 ENCOUNTER — HOSPITAL ENCOUNTER (OUTPATIENT)
Dept: CT IMAGING | Facility: HOSPITAL | Age: 77
Discharge: HOME | End: 2022-06-06
Admitting: RADIOLOGY

## 2022-01-01 ENCOUNTER — TREATMENT (OUTPATIENT)
Dept: PHYSICAL THERAPY | Facility: CLINIC | Age: 77
End: 2022-01-01

## 2022-01-01 ENCOUNTER — HOSPITAL ENCOUNTER (INPATIENT)
Facility: HOSPITAL | Age: 77
LOS: 1 days | End: 2022-07-09
Attending: EMERGENCY MEDICINE | Admitting: INTERNAL MEDICINE

## 2022-01-01 ENCOUNTER — TELEPHONE (OUTPATIENT)
Dept: ONCOLOGY | Facility: CLINIC | Age: 77
End: 2022-01-01

## 2022-01-01 ENCOUNTER — APPOINTMENT (OUTPATIENT)
Dept: GENERAL RADIOLOGY | Facility: HOSPITAL | Age: 77
End: 2022-01-01

## 2022-01-01 ENCOUNTER — DOCUMENTATION (OUTPATIENT)
Dept: ONCOLOGY | Facility: CLINIC | Age: 77
End: 2022-01-01

## 2022-01-01 ENCOUNTER — OFFICE VISIT (OUTPATIENT)
Dept: ONCOLOGY | Facility: CLINIC | Age: 77
End: 2022-01-01

## 2022-01-01 ENCOUNTER — OFFICE VISIT (OUTPATIENT)
Dept: CARDIOLOGY | Facility: CLINIC | Age: 77
End: 2022-01-01

## 2022-01-01 ENCOUNTER — INFUSION (OUTPATIENT)
Dept: ONCOLOGY | Facility: HOSPITAL | Age: 77
End: 2022-01-01

## 2022-01-01 ENCOUNTER — HOSPITAL ENCOUNTER (OUTPATIENT)
Dept: CARDIOLOGY | Facility: HOSPITAL | Age: 77
Discharge: HOME | End: 2022-02-02

## 2022-01-01 ENCOUNTER — HOSPITAL ENCOUNTER (OUTPATIENT)
Dept: ULTRASOUND IMAGING | Facility: HOSPITAL | Age: 77
Discharge: HOME | End: 2022-02-28
Admitting: INTERNAL MEDICINE

## 2022-01-01 ENCOUNTER — HOSPITAL ENCOUNTER (OUTPATIENT)
Dept: GENERAL RADIOLOGY | Facility: HOSPITAL | Age: 77
Discharge: HOME | End: 2022-07-08

## 2022-01-01 ENCOUNTER — APPOINTMENT (OUTPATIENT)
Dept: ONCOLOGY | Facility: HOSPITAL | Age: 77
End: 2022-01-01

## 2022-01-01 ENCOUNTER — LAB (OUTPATIENT)
Dept: LAB | Facility: HOSPITAL | Age: 77
End: 2022-01-01

## 2022-01-01 ENCOUNTER — APPOINTMENT (OUTPATIENT)
Dept: CARDIOLOGY | Facility: HOSPITAL | Age: 77
End: 2022-01-01

## 2022-01-01 ENCOUNTER — HOSPITAL ENCOUNTER (OUTPATIENT)
Dept: NUCLEAR MEDICINE | Facility: HOSPITAL | Age: 77
Discharge: HOME | End: 2022-03-21

## 2022-01-01 ENCOUNTER — APPOINTMENT (OUTPATIENT)
Dept: OTHER | Facility: HOSPITAL | Age: 77
End: 2022-01-01

## 2022-01-01 ENCOUNTER — CLINICAL SUPPORT (OUTPATIENT)
Dept: ORTHOPEDIC SURGERY | Facility: CLINIC | Age: 77
End: 2022-01-01

## 2022-01-01 ENCOUNTER — HOSPITAL ENCOUNTER (OUTPATIENT)
Dept: CT IMAGING | Facility: HOSPITAL | Age: 77
Discharge: HOME | End: 2022-03-21
Admitting: INTERNAL MEDICINE

## 2022-01-01 ENCOUNTER — APPOINTMENT (OUTPATIENT)
Dept: ULTRASOUND IMAGING | Facility: HOSPITAL | Age: 77
End: 2022-01-01

## 2022-01-01 ENCOUNTER — PATIENT MESSAGE (OUTPATIENT)
Dept: CARDIOLOGY | Facility: CLINIC | Age: 77
End: 2022-01-01

## 2022-01-01 ENCOUNTER — CLINICAL SUPPORT (OUTPATIENT)
Dept: ONCOLOGY | Facility: HOSPITAL | Age: 77
End: 2022-01-01

## 2022-01-01 ENCOUNTER — OFFICE VISIT (OUTPATIENT)
Dept: ORTHOPEDIC SURGERY | Facility: CLINIC | Age: 77
End: 2022-01-01

## 2022-01-01 ENCOUNTER — HOSPITAL ENCOUNTER (OUTPATIENT)
Dept: GENERAL RADIOLOGY | Facility: HOSPITAL | Age: 77
Discharge: HOME | End: 2022-06-20
Admitting: INTERNAL MEDICINE

## 2022-01-01 ENCOUNTER — HOSPITAL ENCOUNTER (OUTPATIENT)
Dept: MRI IMAGING | Facility: HOSPITAL | Age: 77
Discharge: HOME | End: 2022-02-09
Admitting: INTERNAL MEDICINE

## 2022-01-01 ENCOUNTER — HOSPITAL ENCOUNTER (OUTPATIENT)
Dept: GENERAL RADIOLOGY | Facility: HOSPITAL | Age: 77
Discharge: HOME | End: 2022-02-02

## 2022-01-01 ENCOUNTER — TRANSCRIBE ORDERS (OUTPATIENT)
Dept: PHYSICAL THERAPY | Facility: CLINIC | Age: 77
End: 2022-01-01

## 2022-01-01 ENCOUNTER — APPOINTMENT (OUTPATIENT)
Dept: PREADMISSION TESTING | Facility: HOSPITAL | Age: 77
End: 2022-01-01

## 2022-01-01 ENCOUNTER — TELEPHONE (OUTPATIENT)
Dept: PHYSICAL THERAPY | Facility: CLINIC | Age: 77
End: 2022-01-01

## 2022-01-01 ENCOUNTER — HOSPITAL ENCOUNTER (OUTPATIENT)
Facility: HOSPITAL | Age: 77
Setting detail: HOSPITAL OUTPATIENT SURGERY
End: 2022-01-01
Attending: OPHTHALMOLOGY | Admitting: OPHTHALMOLOGY

## 2022-01-01 ENCOUNTER — TELEPHONE (OUTPATIENT)
Dept: ORTHOPEDIC SURGERY | Facility: CLINIC | Age: 77
End: 2022-01-01

## 2022-01-01 VITALS
SYSTOLIC BLOOD PRESSURE: 149 MMHG | RESPIRATION RATE: 16 BRPM | TEMPERATURE: 96.9 F | WEIGHT: 147 LBS | HEIGHT: 65 IN | BODY MASS INDEX: 24.49 KG/M2 | HEART RATE: 63 BPM | DIASTOLIC BLOOD PRESSURE: 55 MMHG | OXYGEN SATURATION: 99 %

## 2022-01-01 VITALS
BODY MASS INDEX: 24.66 KG/M2 | HEIGHT: 65 IN | SYSTOLIC BLOOD PRESSURE: 178 MMHG | WEIGHT: 148 LBS | HEART RATE: 52 BPM | RESPIRATION RATE: 18 BRPM | OXYGEN SATURATION: 100 % | TEMPERATURE: 96.4 F | DIASTOLIC BLOOD PRESSURE: 69 MMHG

## 2022-01-01 VITALS — WEIGHT: 145 LBS | TEMPERATURE: 96.9 F | HEIGHT: 65 IN | BODY MASS INDEX: 24.16 KG/M2

## 2022-01-01 VITALS
OXYGEN SATURATION: 98 % | DIASTOLIC BLOOD PRESSURE: 74 MMHG | SYSTOLIC BLOOD PRESSURE: 120 MMHG | BODY MASS INDEX: 23.82 KG/M2 | RESPIRATION RATE: 16 BRPM | HEART RATE: 69 BPM | HEIGHT: 65 IN | WEIGHT: 143 LBS

## 2022-01-01 VITALS
SYSTOLIC BLOOD PRESSURE: 153 MMHG | DIASTOLIC BLOOD PRESSURE: 66 MMHG | BODY MASS INDEX: 24.46 KG/M2 | TEMPERATURE: 96.7 F | OXYGEN SATURATION: 100 % | WEIGHT: 146.8 LBS | HEART RATE: 63 BPM | HEIGHT: 65 IN | RESPIRATION RATE: 18 BRPM

## 2022-01-01 VITALS
DIASTOLIC BLOOD PRESSURE: 85 MMHG | OXYGEN SATURATION: 99 % | RESPIRATION RATE: 18 BRPM | HEART RATE: 51 BPM | BODY MASS INDEX: 25.45 KG/M2 | TEMPERATURE: 96.6 F | WEIGHT: 149.1 LBS | HEIGHT: 64 IN | SYSTOLIC BLOOD PRESSURE: 168 MMHG

## 2022-01-01 VITALS
SYSTOLIC BLOOD PRESSURE: 159 MMHG | RESPIRATION RATE: 10 BRPM | WEIGHT: 150 LBS | TEMPERATURE: 98 F | HEART RATE: 53 BPM | HEIGHT: 65 IN | DIASTOLIC BLOOD PRESSURE: 116 MMHG | BODY MASS INDEX: 24.99 KG/M2 | OXYGEN SATURATION: 97 %

## 2022-01-01 VITALS
BODY MASS INDEX: 24.93 KG/M2 | WEIGHT: 149.6 LBS | RESPIRATION RATE: 18 BRPM | SYSTOLIC BLOOD PRESSURE: 174 MMHG | HEIGHT: 65 IN | HEART RATE: 60 BPM | OXYGEN SATURATION: 99 % | DIASTOLIC BLOOD PRESSURE: 69 MMHG | TEMPERATURE: 96.9 F

## 2022-01-01 VITALS
BODY MASS INDEX: 24.98 KG/M2 | HEIGHT: 64 IN | SYSTOLIC BLOOD PRESSURE: 135 MMHG | TEMPERATURE: 97.3 F | OXYGEN SATURATION: 99 % | DIASTOLIC BLOOD PRESSURE: 70 MMHG | HEART RATE: 64 BPM | WEIGHT: 146.3 LBS | RESPIRATION RATE: 16 BRPM

## 2022-01-01 VITALS
HEART RATE: 49 BPM | BODY MASS INDEX: 25.85 KG/M2 | HEIGHT: 64 IN | WEIGHT: 151.4 LBS | SYSTOLIC BLOOD PRESSURE: 130 MMHG | DIASTOLIC BLOOD PRESSURE: 82 MMHG

## 2022-01-01 VITALS
TEMPERATURE: 97 F | OXYGEN SATURATION: 99 % | HEART RATE: 68 BPM | RESPIRATION RATE: 17 BRPM | HEIGHT: 64 IN | WEIGHT: 149.8 LBS | DIASTOLIC BLOOD PRESSURE: 69 MMHG | BODY MASS INDEX: 25.57 KG/M2 | SYSTOLIC BLOOD PRESSURE: 151 MMHG

## 2022-01-01 VITALS
SYSTOLIC BLOOD PRESSURE: 71 MMHG | RESPIRATION RATE: 25 BRPM | HEART RATE: 78 BPM | OXYGEN SATURATION: 93 % | DIASTOLIC BLOOD PRESSURE: 44 MMHG | TEMPERATURE: 97.9 F

## 2022-01-01 VITALS — BODY MASS INDEX: 23.82 KG/M2 | TEMPERATURE: 96.6 F | HEIGHT: 65 IN | WEIGHT: 143 LBS

## 2022-01-01 DIAGNOSIS — G45.9 TIA (TRANSIENT ISCHEMIC ATTACK): Primary | ICD-10-CM

## 2022-01-01 DIAGNOSIS — M79.671 PAIN IN BOTH FEET: ICD-10-CM

## 2022-01-01 DIAGNOSIS — D64.9 ANEMIA, UNSPECIFIED TYPE: Primary | ICD-10-CM

## 2022-01-01 DIAGNOSIS — C50.412 MALIGNANT NEOPLASM OF UPPER-OUTER QUADRANT OF LEFT BREAST IN FEMALE, ESTROGEN RECEPTOR POSITIVE: ICD-10-CM

## 2022-01-01 DIAGNOSIS — M79.10 MUSCLE PAIN: ICD-10-CM

## 2022-01-01 DIAGNOSIS — Z17.0 MALIGNANT NEOPLASM OF UPPER-OUTER QUADRANT OF LEFT BREAST IN FEMALE, ESTROGEN RECEPTOR POSITIVE: Primary | ICD-10-CM

## 2022-01-01 DIAGNOSIS — M79.10 PAIN IN THE MUSCLES: ICD-10-CM

## 2022-01-01 DIAGNOSIS — D72.829 LEUKOCYTOSIS, UNSPECIFIED TYPE: ICD-10-CM

## 2022-01-01 DIAGNOSIS — D72.829 LEUKOCYTOSIS, UNSPECIFIED TYPE: Primary | ICD-10-CM

## 2022-01-01 DIAGNOSIS — I95.9 HYPOTENSION, UNSPECIFIED HYPOTENSION TYPE: ICD-10-CM

## 2022-01-01 DIAGNOSIS — Z78.9 IMPAIRED ACTIVITIES OF DAILY LIVING: ICD-10-CM

## 2022-01-01 DIAGNOSIS — Z17.0 MALIGNANT NEOPLASM OF UPPER-OUTER QUADRANT OF LEFT BREAST IN FEMALE, ESTROGEN RECEPTOR POSITIVE: ICD-10-CM

## 2022-01-01 DIAGNOSIS — M31.6 TEMPORAL ARTERITIS: ICD-10-CM

## 2022-01-01 DIAGNOSIS — M76.61 ACHILLES TENDONITIS, BILATERAL: Primary | ICD-10-CM

## 2022-01-01 DIAGNOSIS — C50.412 MALIGNANT NEOPLASM OF UPPER-OUTER QUADRANT OF LEFT BREAST IN FEMALE, ESTROGEN RECEPTOR POSITIVE: Primary | ICD-10-CM

## 2022-01-01 DIAGNOSIS — S29.9XXA INJURY OF THORAX, INITIAL ENCOUNTER: Primary | ICD-10-CM

## 2022-01-01 DIAGNOSIS — N18.30 ANEMIA DUE TO STAGE 3 CHRONIC KIDNEY DISEASE TREATED WITH ERYTHROPOIETIN: ICD-10-CM

## 2022-01-01 DIAGNOSIS — R53.83 FATIGUE, UNSPECIFIED TYPE: ICD-10-CM

## 2022-01-01 DIAGNOSIS — R68.89 DECREASED FUNCTIONAL ACTIVITY TOLERANCE: ICD-10-CM

## 2022-01-01 DIAGNOSIS — M31.6 TEMPORAL ARTERITIS: Primary | ICD-10-CM

## 2022-01-01 DIAGNOSIS — M76.62 TENDONITIS, ACHILLES, LEFT: Primary | ICD-10-CM

## 2022-01-01 DIAGNOSIS — D50.9 IRON DEFICIENCY ANEMIA, UNSPECIFIED IRON DEFICIENCY ANEMIA TYPE: ICD-10-CM

## 2022-01-01 DIAGNOSIS — D63.1 ANEMIA DUE TO STAGE 3 CHRONIC KIDNEY DISEASE TREATED WITH ERYTHROPOIETIN: Primary | ICD-10-CM

## 2022-01-01 DIAGNOSIS — E78.2 MIXED HYPERLIPIDEMIA: ICD-10-CM

## 2022-01-01 DIAGNOSIS — R29.6 REPEATED FALLS: ICD-10-CM

## 2022-01-01 DIAGNOSIS — H46.9 OPTIC NEURITIS: Primary | ICD-10-CM

## 2022-01-01 DIAGNOSIS — H46.9 OPTIC NEUROPATHY: Primary | ICD-10-CM

## 2022-01-01 DIAGNOSIS — D64.9 ANEMIA, UNSPECIFIED TYPE: ICD-10-CM

## 2022-01-01 DIAGNOSIS — D63.1 ANEMIA DUE TO STAGE 3 CHRONIC KIDNEY DISEASE TREATED WITH ERYTHROPOIETIN: ICD-10-CM

## 2022-01-01 DIAGNOSIS — M13.80 OTHER SPECIFIED ARTHRITIS, UNSPECIFIED SITE: ICD-10-CM

## 2022-01-01 DIAGNOSIS — M76.62 ACHILLES TENDONITIS, BILATERAL: Primary | ICD-10-CM

## 2022-01-01 DIAGNOSIS — N17.9 ACUTE KIDNEY INJURY (NONTRAUMATIC): Primary | ICD-10-CM

## 2022-01-01 DIAGNOSIS — H46.9 OPTIC NEURITIS: ICD-10-CM

## 2022-01-01 DIAGNOSIS — N18.30 ANEMIA DUE TO STAGE 3 CHRONIC KIDNEY DISEASE TREATED WITH ERYTHROPOIETIN: Primary | ICD-10-CM

## 2022-01-01 DIAGNOSIS — M76.62 ACHILLES TENDINITIS OF BOTH LOWER EXTREMITIES: Primary | ICD-10-CM

## 2022-01-01 DIAGNOSIS — M76.61 ACHILLES TENDINITIS OF BOTH LOWER EXTREMITIES: Primary | ICD-10-CM

## 2022-01-01 DIAGNOSIS — I65.22 CAROTID ARTERY STENOSIS, SYMPTOMATIC, LEFT: Primary | ICD-10-CM

## 2022-01-01 DIAGNOSIS — D69.6 THROMBOCYTOPENIA: ICD-10-CM

## 2022-01-01 DIAGNOSIS — E87.6 HYPOKALEMIA: ICD-10-CM

## 2022-01-01 DIAGNOSIS — H46.9 OPTIC NEUROPATHY: ICD-10-CM

## 2022-01-01 DIAGNOSIS — M79.672 PAIN IN BOTH FEET: ICD-10-CM

## 2022-01-01 DIAGNOSIS — M17.12 PRIMARY OSTEOARTHRITIS OF LEFT KNEE: ICD-10-CM

## 2022-01-01 DIAGNOSIS — M79.672 LEFT FOOT PAIN: Primary | ICD-10-CM

## 2022-01-01 DIAGNOSIS — G45.3 AMAUROSIS FUGAX: Primary | ICD-10-CM

## 2022-01-01 DIAGNOSIS — G45.9 TIA (TRANSIENT ISCHEMIC ATTACK): ICD-10-CM

## 2022-01-01 DIAGNOSIS — S29.9XXA INJURY OF THORAX, INITIAL ENCOUNTER: ICD-10-CM

## 2022-01-01 DIAGNOSIS — I65.22 CAROTID ARTERY STENOSIS, SYMPTOMATIC, LEFT: ICD-10-CM

## 2022-01-01 DIAGNOSIS — G45.3 AMAUROSIS FUGAX: ICD-10-CM

## 2022-01-01 DIAGNOSIS — L03.115 CELLULITIS OF RIGHT LOWER EXTREMITY: Primary | ICD-10-CM

## 2022-01-01 DIAGNOSIS — M25.552 LEFT HIP PAIN: Primary | ICD-10-CM

## 2022-01-01 DIAGNOSIS — M79.672 LEFT FOOT PAIN: ICD-10-CM

## 2022-01-01 DIAGNOSIS — R29.6 REPEATED FALLS: Primary | ICD-10-CM

## 2022-01-01 DIAGNOSIS — N18.4 CHRONIC KIDNEY DISEASE, STAGE IV (SEVERE): Primary | ICD-10-CM

## 2022-01-01 DIAGNOSIS — M76.62 TENDONITIS, ACHILLES, LEFT: ICD-10-CM

## 2022-01-01 DIAGNOSIS — A41.9 SEPTIC SHOCK: ICD-10-CM

## 2022-01-01 DIAGNOSIS — N17.9 ACUTE KIDNEY INJURY (NONTRAUMATIC): ICD-10-CM

## 2022-01-01 DIAGNOSIS — A41.9 SEPSIS, DUE TO UNSPECIFIED ORGANISM, UNSPECIFIED WHETHER ACUTE ORGAN DYSFUNCTION PRESENT: ICD-10-CM

## 2022-01-01 DIAGNOSIS — D70.9 NEUTROPENIA, UNSPECIFIED TYPE: ICD-10-CM

## 2022-01-01 DIAGNOSIS — Z01.810 PREOP CARDIOVASCULAR EXAM: ICD-10-CM

## 2022-01-01 DIAGNOSIS — I10 ESSENTIAL HYPERTENSION: ICD-10-CM

## 2022-01-01 DIAGNOSIS — D69.6 THROMBOCYTOPENIA: Primary | ICD-10-CM

## 2022-01-01 DIAGNOSIS — R65.21 SEPTIC SHOCK: ICD-10-CM

## 2022-01-01 DIAGNOSIS — J34.89 SINUS PRESSURE: Primary | ICD-10-CM

## 2022-01-01 DIAGNOSIS — I48.0 PAROXYSMAL ATRIAL FIBRILLATION: ICD-10-CM

## 2022-01-01 DIAGNOSIS — N18.4 CHRONIC KIDNEY DISEASE, STAGE IV (SEVERE): ICD-10-CM

## 2022-01-01 DIAGNOSIS — I63.89 OTHER CEREBRAL INFARCTION: ICD-10-CM

## 2022-01-01 DIAGNOSIS — J34.89 SINUS PRESSURE: ICD-10-CM

## 2022-01-01 LAB
25(OH)D3 SERPL-MCNC: 38.5 NG/ML (ref 30–100)
ALBUMIN SERPL-MCNC: 2.8 G/DL (ref 3.5–5.2)
ALBUMIN SERPL-MCNC: 3.2 G/DL (ref 3.5–5.2)
ALBUMIN SERPL-MCNC: 3.5 G/DL (ref 3.5–5.2)
ALBUMIN SERPL-MCNC: 3.6 G/DL (ref 3.5–5.2)
ALBUMIN SERPL-MCNC: 3.9 G/DL (ref 3.5–5.2)
ALBUMIN SERPL-MCNC: 4.1 G/DL (ref 3.5–5.2)
ALBUMIN/GLOB SERPL: 1.4 G/DL
ALBUMIN/GLOB SERPL: 1.8 G/DL
ALBUMIN/GLOB SERPL: 1.8 G/DL
ALBUMIN/GLOB SERPL: 1.9 G/DL
ALBUMIN/GLOB SERPL: 2.4 G/DL
ALBUMIN/GLOB SERPL: 2.8 G/DL
ALP SERPL-CCNC: 100 U/L (ref 39–117)
ALP SERPL-CCNC: 110 U/L (ref 39–117)
ALP SERPL-CCNC: 119 U/L (ref 39–117)
ALP SERPL-CCNC: 129 U/L (ref 39–117)
ALP SERPL-CCNC: 55 U/L (ref 39–117)
ALP SERPL-CCNC: 84 U/L (ref 39–117)
ALT SERPL W P-5'-P-CCNC: 18 U/L (ref 1–33)
ALT SERPL W P-5'-P-CCNC: 20 U/L (ref 1–33)
ALT SERPL W P-5'-P-CCNC: 32 U/L (ref 1–33)
ALT SERPL W P-5'-P-CCNC: 40 U/L (ref 1–33)
ALT SERPL W P-5'-P-CCNC: 44 U/L (ref 1–33)
ALT SERPL W P-5'-P-CCNC: 50 U/L (ref 1–33)
ANA SER QL: NEGATIVE
ANION GAP SERPL CALCULATED.3IONS-SCNC: 11 MMOL/L (ref 5–15)
ANION GAP SERPL CALCULATED.3IONS-SCNC: 12 MMOL/L (ref 5–15)
ANION GAP SERPL CALCULATED.3IONS-SCNC: 12.5 MMOL/L (ref 5–15)
ANION GAP SERPL CALCULATED.3IONS-SCNC: 14 MMOL/L (ref 5–15)
ANION GAP SERPL CALCULATED.3IONS-SCNC: 15.3 MMOL/L (ref 5–15)
ANION GAP SERPL CALCULATED.3IONS-SCNC: 17.4 MMOL/L (ref 5–15)
ANION GAP SERPL CALCULATED.3IONS-SCNC: 24 MMOL/L (ref 5–15)
ANISOCYTOSIS BLD QL: ABNORMAL
APTT PPP: 30.1 SECONDS (ref 22.7–35.4)
ARTERIAL PATENCY WRIST A: POSITIVE
AST SERPL-CCNC: 15 U/L (ref 1–32)
AST SERPL-CCNC: 18 U/L (ref 1–32)
AST SERPL-CCNC: 21 U/L (ref 1–32)
AST SERPL-CCNC: 23 U/L (ref 1–32)
AST SERPL-CCNC: 23 U/L (ref 1–32)
AST SERPL-CCNC: 29 U/L (ref 1–32)
ATMOSPHERIC PRESS: 753.8 MMHG
B PARAPERT DNA SPEC QL NAA+PROBE: NOT DETECTED
B PERT DNA SPEC QL NAA+PROBE: NOT DETECTED
BASE EXCESS BLDA CALC-SCNC: -16.2 MMOL/L (ref 0–2)
BASOPHILS # BLD AUTO: 0.03 10*3/MM3 (ref 0–0.2)
BASOPHILS # BLD AUTO: 0.03 10*3/MM3 (ref 0–0.2)
BASOPHILS # BLD AUTO: 0.04 10*3/MM3 (ref 0–0.2)
BASOPHILS # BLD AUTO: 0.05 10*3/MM3 (ref 0–0.2)
BASOPHILS # BLD AUTO: 0.06 10*3/MM3 (ref 0–0.2)
BASOPHILS # BLD AUTO: 0.11 10*3/MM3 (ref 0–0.2)
BASOPHILS # BLD MANUAL: 0.22 10*3/MM3 (ref 0–0.2)
BASOPHILS NFR BLD AUTO: 0.2 % (ref 0–1.5)
BASOPHILS NFR BLD AUTO: 0.3 % (ref 0–1.5)
BASOPHILS NFR BLD AUTO: 0.3 % (ref 0–1.5)
BASOPHILS NFR BLD AUTO: 0.4 % (ref 0–1.5)
BASOPHILS NFR BLD AUTO: 0.5 % (ref 0–1.5)
BASOPHILS NFR BLD AUTO: 0.5 % (ref 0–1.5)
BASOPHILS NFR BLD MANUAL: 2 % (ref 0–1.5)
BDY SITE: ABNORMAL
BH CV LOWER VASCULAR LEFT COMMON FEMORAL AUGMENT: NORMAL
BH CV LOWER VASCULAR LEFT COMMON FEMORAL COMPETENT: NORMAL
BH CV LOWER VASCULAR LEFT COMMON FEMORAL COMPRESS: NORMAL
BH CV LOWER VASCULAR LEFT COMMON FEMORAL PHASIC: NORMAL
BH CV LOWER VASCULAR LEFT COMMON FEMORAL SPONT: NORMAL
BH CV LOWER VASCULAR RIGHT COMMON FEMORAL AUGMENT: NORMAL
BH CV LOWER VASCULAR RIGHT COMMON FEMORAL COMPETENT: NORMAL
BH CV LOWER VASCULAR RIGHT COMMON FEMORAL COMPRESS: NORMAL
BH CV LOWER VASCULAR RIGHT COMMON FEMORAL PHASIC: NORMAL
BH CV LOWER VASCULAR RIGHT COMMON FEMORAL SPONT: NORMAL
BH CV LOWER VASCULAR RIGHT DISTAL FEMORAL COMPRESS: NORMAL
BH CV LOWER VASCULAR RIGHT GASTRONEMIUS COMPRESS: NORMAL
BH CV LOWER VASCULAR RIGHT GREATER SAPH AK COMPRESS: NORMAL
BH CV LOWER VASCULAR RIGHT GREATER SAPH BK COMPRESS: NORMAL
BH CV LOWER VASCULAR RIGHT LESSER SAPH COMPRESS: NORMAL
BH CV LOWER VASCULAR RIGHT MID FEMORAL AUGMENT: NORMAL
BH CV LOWER VASCULAR RIGHT MID FEMORAL COMPETENT: NORMAL
BH CV LOWER VASCULAR RIGHT MID FEMORAL COMPRESS: NORMAL
BH CV LOWER VASCULAR RIGHT MID FEMORAL PHASIC: NORMAL
BH CV LOWER VASCULAR RIGHT MID FEMORAL SPONT: NORMAL
BH CV LOWER VASCULAR RIGHT POPLITEAL AUGMENT: NORMAL
BH CV LOWER VASCULAR RIGHT POPLITEAL COMPETENT: NORMAL
BH CV LOWER VASCULAR RIGHT POPLITEAL COMPRESS: NORMAL
BH CV LOWER VASCULAR RIGHT POPLITEAL PHASIC: NORMAL
BH CV LOWER VASCULAR RIGHT POPLITEAL SPONT: NORMAL
BH CV LOWER VASCULAR RIGHT POSTERIOR TIBIAL COMPRESS: NORMAL
BH CV LOWER VASCULAR RIGHT PROFUNDA FEMORAL COMPRESS: NORMAL
BH CV LOWER VASCULAR RIGHT PROXIMAL FEMORAL COMPRESS: NORMAL
BH CV LOWER VASCULAR RIGHT SAPHENOFEMORAL JUNCTION COMPRESS: NORMAL
BH CV XLRA MEAS LEFT DIST CCA EDV: 19.2 CM/SEC
BH CV XLRA MEAS LEFT DIST CCA PSV: 95.5 CM/SEC
BH CV XLRA MEAS LEFT DIST ICA EDV: 26.9 CM/SEC
BH CV XLRA MEAS LEFT DIST ICA PSV: 153 CM/SEC
BH CV XLRA MEAS LEFT ICA/CCA RATIO: 1.6
BH CV XLRA MEAS LEFT MID ICA EDV: 16.8 CM/SEC
BH CV XLRA MEAS LEFT MID ICA PSV: 96.3 CM/SEC
BH CV XLRA MEAS LEFT PROX CCA EDV: 13.7 CM/SEC
BH CV XLRA MEAS LEFT PROX CCA PSV: 90 CM/SEC
BH CV XLRA MEAS LEFT PROX ECA EDV: 12.6 CM/SEC
BH CV XLRA MEAS LEFT PROX ECA PSV: 95.5 CM/SEC
BH CV XLRA MEAS LEFT PROX ICA EDV: 21.7 CM/SEC
BH CV XLRA MEAS LEFT PROX ICA PSV: 98.8 CM/SEC
BH CV XLRA MEAS LEFT PROX SCLA PSV: 187 CM/SEC
BH CV XLRA MEAS LEFT VERTEBRAL A EDV: 5.78 CM/SEC
BH CV XLRA MEAS LEFT VERTEBRAL A PSV: 27.6 CM/SEC
BH CV XLRA MEAS RIGHT DIST CCA EDV: 15.9 CM/SEC
BH CV XLRA MEAS RIGHT DIST CCA PSV: 81.2 CM/SEC
BH CV XLRA MEAS RIGHT DIST ICA EDV: 22.7 CM/SEC
BH CV XLRA MEAS RIGHT DIST ICA PSV: 87.5 CM/SEC
BH CV XLRA MEAS RIGHT ICA/CCA RATIO: 1.08
BH CV XLRA MEAS RIGHT MID ICA EDV: 19.2 CM/SEC
BH CV XLRA MEAS RIGHT MID ICA PSV: 77.9 CM/SEC
BH CV XLRA MEAS RIGHT PROX CCA EDV: 18 CM/SEC
BH CV XLRA MEAS RIGHT PROX CCA PSV: 115 CM/SEC
BH CV XLRA MEAS RIGHT PROX ECA EDV: 11.5 CM/SEC
BH CV XLRA MEAS RIGHT PROX ECA PSV: 94.4 CM/SEC
BH CV XLRA MEAS RIGHT PROX ICA EDV: 19.2 CM/SEC
BH CV XLRA MEAS RIGHT PROX ICA PSV: 86.2 CM/SEC
BH CV XLRA MEAS RIGHT PROX SCLA PSV: 218 CM/SEC
BH CV XLRA MEAS RIGHT VERTEBRAL A EDV: 8.78 CM/SEC
BH CV XLRA MEAS RIGHT VERTEBRAL A PSV: 50 CM/SEC
BILIRUB SERPL-MCNC: 0.3 MG/DL (ref 0–1.2)
BILIRUB SERPL-MCNC: 0.3 MG/DL (ref 0–1.2)
BILIRUB SERPL-MCNC: 0.4 MG/DL (ref 0–1.2)
BILIRUB SERPL-MCNC: 0.5 MG/DL (ref 0–1.2)
BILIRUB SERPL-MCNC: 0.5 MG/DL (ref 0–1.2)
BILIRUB SERPL-MCNC: 0.6 MG/DL (ref 0–1.2)
BUN SERPL-MCNC: 30 MG/DL (ref 8–23)
BUN SERPL-MCNC: 36 MG/DL (ref 8–23)
BUN SERPL-MCNC: 42 MG/DL (ref 8–23)
BUN SERPL-MCNC: 42 MG/DL (ref 8–23)
BUN SERPL-MCNC: 44 MG/DL (ref 8–23)
BUN SERPL-MCNC: 47 MG/DL (ref 8–23)
BUN SERPL-MCNC: 52 MG/DL (ref 8–23)
BUN/CREAT SERPL: 20.1 (ref 7–25)
BUN/CREAT SERPL: 23.1 (ref 7–25)
BUN/CREAT SERPL: 23.5 (ref 7–25)
BUN/CREAT SERPL: 26.3 (ref 7–25)
BUN/CREAT SERPL: 26.9 (ref 7–25)
BUN/CREAT SERPL: 28.6 (ref 7–25)
BUN/CREAT SERPL: 31.9 (ref 7–25)
C PNEUM DNA NPH QL NAA+NON-PROBE: NOT DETECTED
CALCIUM SPEC-SCNC: 7.1 MG/DL (ref 8.6–10.5)
CALCIUM SPEC-SCNC: 7.9 MG/DL (ref 8.6–10.5)
CALCIUM SPEC-SCNC: 7.9 MG/DL (ref 8.6–10.5)
CALCIUM SPEC-SCNC: 8.5 MG/DL (ref 8.6–10.5)
CALCIUM SPEC-SCNC: 9.1 MG/DL (ref 8.6–10.5)
CALCIUM SPEC-SCNC: 9.2 MG/DL (ref 8.6–10.5)
CALCIUM SPEC-SCNC: 9.3 MG/DL (ref 8.6–10.5)
CALCIUM SPEC-SCNC: 9.6 MG/DL (ref 8.6–10.5)
CENTROMERE B AB SER-ACNC: <0.2 AI (ref 0–0.9)
CHLORIDE SERPL-SCNC: 100 MMOL/L (ref 98–107)
CHLORIDE SERPL-SCNC: 103 MMOL/L (ref 98–107)
CHLORIDE SERPL-SCNC: 104 MMOL/L (ref 98–107)
CHLORIDE SERPL-SCNC: 106 MMOL/L (ref 98–107)
CHLORIDE SERPL-SCNC: 98 MMOL/L (ref 98–107)
CHLORIDE SERPL-SCNC: 99 MMOL/L (ref 98–107)
CHLORIDE SERPL-SCNC: 99 MMOL/L (ref 98–107)
CHROMATIN AB SERPL-ACNC: 15 IU/ML (ref 0–14)
CHROMATIN AB SERPL-ACNC: <0.2 AI (ref 0–0.9)
CO2 SERPL-SCNC: 17.6 MMOL/L (ref 22–29)
CO2 SERPL-SCNC: 17.7 MMOL/L (ref 22–29)
CO2 SERPL-SCNC: 21.5 MMOL/L (ref 22–29)
CO2 SERPL-SCNC: 22 MMOL/L (ref 22–29)
CO2 SERPL-SCNC: 22 MMOL/L (ref 22–29)
CO2 SERPL-SCNC: 25 MMOL/L (ref 22–29)
CO2 SERPL-SCNC: 8 MMOL/L (ref 22–29)
CREAT BLDA-MCNC: 1.2 MG/DL (ref 0.6–1.3)
CREAT SERPL-MCNC: 1.3 MG/DL (ref 0.57–1)
CREAT SERPL-MCNC: 1.34 MG/DL (ref 0.57–1)
CREAT SERPL-MCNC: 1.47 MG/DL (ref 0.57–1)
CREAT SERPL-MCNC: 1.6 MG/DL (ref 0.57–1)
CREAT SERPL-MCNC: 1.63 MG/DL (ref 0.57–1)
CREAT SERPL-MCNC: 1.87 MG/DL (ref 0.57–1)
CREAT SERPL-MCNC: 2.34 MG/DL (ref 0.57–1)
CREAT UR-MCNC: 81.7 MG/DL
CRP SERPL-MCNC: 0.61 MG/DL (ref 0–0.5)
CRP SERPL-MCNC: 1.57 MG/DL (ref 0–0.5)
CRP SERPL-MCNC: 4.16 MG/DL (ref 0–0.5)
CRP SERPL-MCNC: <0.3 MG/DL (ref 0–0.5)
D-LACTATE SERPL-SCNC: 1.7 MMOL/L (ref 0.5–2)
D-LACTATE SERPL-SCNC: 1.9 MMOL/L (ref 0.5–2)
D-LACTATE SERPL-SCNC: 8.2 MMOL/L (ref 0.5–2)
DAT POLY-SP REAG RBC QL: NEGATIVE
DAT POLY-SP REAG RBC QL: NEGATIVE
DEPRECATED RDW RBC AUTO: 40.7 FL (ref 37–54)
DEPRECATED RDW RBC AUTO: 58 FL (ref 37–54)
DEPRECATED RDW RBC AUTO: 59.2 FL (ref 37–54)
DEPRECATED RDW RBC AUTO: 60.5 FL (ref 37–54)
DEPRECATED RDW RBC AUTO: 61.2 FL (ref 37–54)
DEPRECATED RDW RBC AUTO: 62.4 FL (ref 37–54)
DEPRECATED RDW RBC AUTO: 63.6 FL (ref 37–54)
DEPRECATED RDW RBC AUTO: 64.4 FL (ref 37–54)
DEPRECATED RDW RBC AUTO: 65.3 FL (ref 37–54)
DEPRECATED RDW RBC AUTO: 66.8 FL (ref 37–54)
DEPRECATED RDW RBC AUTO: 67.2 FL (ref 37–54)
DEPRECATED RDW RBC AUTO: 71.7 FL (ref 37–54)
DEPRECATED RDW RBC AUTO: 72.1 FL (ref 37–54)
DEPRECATED RDW RBC AUTO: 84.1 FL (ref 37–54)
DEPRECATED RDW RBC AUTO: 90.1 FL (ref 37–54)
DSDNA AB SER-ACNC: <1 IU/ML (ref 0–9)
EGFRCR SERPLBLD CKD-EPI 2021: 20.8 ML/MIN/1.73
EGFRCR SERPLBLD CKD-EPI 2021: 27.3 ML/MIN/1.73
EGFRCR SERPLBLD CKD-EPI 2021: 32.9 ML/MIN/1.73
EGFRCR SERPLBLD CKD-EPI 2021: 36.4 ML/MIN/1.73
EGFRCR SERPLBLD CKD-EPI 2021: 40.7 ML/MIN/1.73
EGFRCR SERPLBLD CKD-EPI 2021: 42.2 ML/MIN/1.73
ENA JO1 AB SER-ACNC: <0.2 AI (ref 0–0.9)
ENA RNP AB SER-ACNC: <0.2 AI (ref 0–0.9)
ENA SCL70 AB SER-ACNC: <0.2 AI (ref 0–0.9)
ENA SM AB SER-ACNC: <0.2 AI (ref 0–0.9)
ENA SS-A AB SER-ACNC: <0.2 AI (ref 0–0.9)
ENA SS-B AB SER-ACNC: <0.2 AI (ref 0–0.9)
EOSINOPHIL # BLD AUTO: 0 10*3/MM3 (ref 0–0.4)
EOSINOPHIL # BLD AUTO: 0 10*3/MM3 (ref 0–0.4)
EOSINOPHIL # BLD AUTO: 0.02 10*3/MM3 (ref 0–0.4)
EOSINOPHIL # BLD AUTO: 0.08 10*3/MM3 (ref 0–0.4)
EOSINOPHIL # BLD AUTO: 0.18 10*3/MM3 (ref 0–0.4)
EOSINOPHIL # BLD AUTO: 0.44 10*3/MM3 (ref 0–0.4)
EOSINOPHIL # BLD MANUAL: 0.01 10*3/MM3 (ref 0–0.4)
EOSINOPHIL NFR BLD AUTO: 0 % (ref 0.3–6.2)
EOSINOPHIL NFR BLD AUTO: 0 % (ref 0.3–6.2)
EOSINOPHIL NFR BLD AUTO: 0.1 % (ref 0.3–6.2)
EOSINOPHIL NFR BLD AUTO: 0.7 % (ref 0.3–6.2)
EOSINOPHIL NFR BLD AUTO: 1.6 % (ref 0.3–6.2)
EOSINOPHIL NFR BLD AUTO: 1.9 % (ref 0.3–6.2)
EOSINOPHIL NFR BLD MANUAL: 1 % (ref 0.3–6.2)
ERYTHROCYTE [DISTWIDTH] IN BLOOD BY AUTOMATED COUNT: 13.9 % (ref 12.3–15.4)
ERYTHROCYTE [DISTWIDTH] IN BLOOD BY AUTOMATED COUNT: 18.4 % (ref 12.3–15.4)
ERYTHROCYTE [DISTWIDTH] IN BLOOD BY AUTOMATED COUNT: 18.4 % (ref 12.3–15.4)
ERYTHROCYTE [DISTWIDTH] IN BLOOD BY AUTOMATED COUNT: 18.6 % (ref 12.3–15.4)
ERYTHROCYTE [DISTWIDTH] IN BLOOD BY AUTOMATED COUNT: 19.3 % (ref 12.3–15.4)
ERYTHROCYTE [DISTWIDTH] IN BLOOD BY AUTOMATED COUNT: 19.4 % (ref 12.3–15.4)
ERYTHROCYTE [DISTWIDTH] IN BLOOD BY AUTOMATED COUNT: 19.6 % (ref 12.3–15.4)
ERYTHROCYTE [DISTWIDTH] IN BLOOD BY AUTOMATED COUNT: 19.7 % (ref 12.3–15.4)
ERYTHROCYTE [DISTWIDTH] IN BLOOD BY AUTOMATED COUNT: 19.8 % (ref 12.3–15.4)
ERYTHROCYTE [DISTWIDTH] IN BLOOD BY AUTOMATED COUNT: 20 % (ref 12.3–15.4)
ERYTHROCYTE [DISTWIDTH] IN BLOOD BY AUTOMATED COUNT: 20.4 % (ref 12.3–15.4)
ERYTHROCYTE [DISTWIDTH] IN BLOOD BY AUTOMATED COUNT: 22.9 % (ref 12.3–15.4)
ERYTHROCYTE [DISTWIDTH] IN BLOOD BY AUTOMATED COUNT: 24.2 % (ref 12.3–15.4)
ERYTHROCYTE [SEDIMENTATION RATE] IN BLOOD: 1 MM/HR (ref 0–30)
ERYTHROCYTE [SEDIMENTATION RATE] IN BLOOD: 36 MM/HR (ref 0–30)
ERYTHROCYTE [SEDIMENTATION RATE] IN BLOOD: 39 MM/HR (ref 0–30)
ERYTHROCYTE [SEDIMENTATION RATE] IN BLOOD: 4 MM/HR (ref 0–30)
ERYTHROCYTE [SEDIMENTATION RATE] IN BLOOD: 4 MM/HR (ref 0–30)
ERYTHROCYTE [SEDIMENTATION RATE] IN BLOOD: 8 MM/HR (ref 0–30)
ERYTHROCYTE [SEDIMENTATION RATE] IN BLOOD: <1 MM/HR (ref 0–30)
ERYTHROCYTE [SEDIMENTATION RATE] IN BLOOD: <1 MM/HR (ref 0–30)
FERRITIN SERPL-MCNC: 1012.7 NG/ML (ref 13–150)
FERRITIN SERPL-MCNC: 1135 NG/ML (ref 13–150)
FERRITIN SERPL-MCNC: 41 NG/ML (ref 13–150)
FERRITIN SERPL-MCNC: 521.6 NG/ML (ref 13–150)
FERRITIN SERPL-MCNC: 811.3 NG/ML (ref 13–150)
FLUAV SUBTYP SPEC NAA+PROBE: NOT DETECTED
FLUBV RNA ISLT QL NAA+PROBE: NOT DETECTED
FOLATE BLD-MCNC: >620 NG/ML
FOLATE BLD-MCNC: >620 NG/ML
FOLATE RBC-MCNC: >2160 NG/ML
FOLATE RBC-MCNC: >2375 NG/ML
FOLATE SERPL-MCNC: 13.5 NG/ML (ref 4.78–24.2)
GAS FLOW AIRWAY: 2 LPM
GFR SERPL CREATININE-BSD FRML MDRD: 31 ML/MIN/1.73
GLOBULIN UR ELPH-MCNC: 1.3 GM/DL
GLOBULIN UR ELPH-MCNC: 1.6 GM/DL
GLOBULIN UR ELPH-MCNC: 1.7 GM/DL
GLOBULIN UR ELPH-MCNC: 1.7 GM/DL
GLOBULIN UR ELPH-MCNC: 2 GM/DL
GLOBULIN UR ELPH-MCNC: 2.7 GM/DL
GLUCOSE BLDC GLUCOMTR-MCNC: 129 MG/DL (ref 70–130)
GLUCOSE SERPL-MCNC: 108 MG/DL (ref 65–99)
GLUCOSE SERPL-MCNC: 109 MG/DL (ref 65–99)
GLUCOSE SERPL-MCNC: 45 MG/DL (ref 65–99)
GLUCOSE SERPL-MCNC: 77 MG/DL (ref 65–99)
GLUCOSE SERPL-MCNC: 84 MG/DL (ref 65–99)
GLUCOSE SERPL-MCNC: 87 MG/DL (ref 65–99)
GLUCOSE SERPL-MCNC: 89 MG/DL (ref 65–99)
HADV DNA SPEC NAA+PROBE: NOT DETECTED
HAPTOGLOB SERPL-MCNC: 247 MG/DL (ref 30–200)
HAPTOGLOB SERPL-MCNC: 380 MG/DL (ref 30–200)
HCO3 BLDA-SCNC: 11.1 MMOL/L (ref 22–28)
HCOV 229E RNA SPEC QL NAA+PROBE: NOT DETECTED
HCOV HKU1 RNA SPEC QL NAA+PROBE: NOT DETECTED
HCOV NL63 RNA SPEC QL NAA+PROBE: NOT DETECTED
HCOV OC43 RNA SPEC QL NAA+PROBE: NOT DETECTED
HCT VFR BLD AUTO: 25.6 % (ref 34–46.6)
HCT VFR BLD AUTO: 26.1 % (ref 34–46.6)
HCT VFR BLD AUTO: 26.5 % (ref 34–46.6)
HCT VFR BLD AUTO: 27 % (ref 34–46.6)
HCT VFR BLD AUTO: 28.2 % (ref 34–46.6)
HCT VFR BLD AUTO: 28.4 % (ref 34–46.6)
HCT VFR BLD AUTO: 28.7 % (ref 34–46.6)
HCT VFR BLD AUTO: 28.9 % (ref 34–46.6)
HCT VFR BLD AUTO: 29.6 % (ref 34–46.6)
HCT VFR BLD AUTO: 29.8 % (ref 34–46.6)
HCT VFR BLD AUTO: 31.9 % (ref 34–46.6)
HCT VFR BLD AUTO: 34.2 % (ref 34–46.6)
HCT VFR BLD AUTO: 34.5 % (ref 34–46.6)
HCT VFR BLD AUTO: 34.5 % (ref 34–46.6)
HCT VFR BLD AUTO: 36.2 % (ref 34–46.6)
HCT VFR BLD AUTO: 36.7 % (ref 34–46.6)
HCT VFR BLD AUTO: 38.7 % (ref 34–46.6)
HGB BLD-MCNC: 10.3 G/DL (ref 12–15.9)
HGB BLD-MCNC: 10.6 G/DL (ref 12–15.9)
HGB BLD-MCNC: 11 G/DL (ref 12–15.9)
HGB BLD-MCNC: 11 G/DL (ref 12–15.9)
HGB BLD-MCNC: 11.8 G/DL (ref 12–15.9)
HGB BLD-MCNC: 11.8 G/DL (ref 12–15.9)
HGB BLD-MCNC: 12.1 G/DL (ref 12–15.9)
HGB BLD-MCNC: 12.6 G/DL (ref 12–15.9)
HGB BLD-MCNC: 8.3 G/DL (ref 12–15.9)
HGB BLD-MCNC: 8.7 G/DL (ref 12–15.9)
HGB BLD-MCNC: 8.8 G/DL (ref 12–15.9)
HGB BLD-MCNC: 9.3 G/DL (ref 12–15.9)
HGB BLD-MCNC: 9.4 G/DL (ref 12–15.9)
HGB BLD-MCNC: 9.8 G/DL (ref 12–15.9)
HGB BLD-MCNC: 9.9 G/DL (ref 12–15.9)
HGB RETIC QN AUTO: 35.3 PG (ref 29.8–36.1)
HGB RETIC QN AUTO: 37.2 PG (ref 29.8–36.1)
HGB RETIC QN AUTO: 39.1 PG (ref 29.8–36.1)
HMPV RNA NPH QL NAA+NON-PROBE: NOT DETECTED
HOLD SPECIMEN: NORMAL
HPIV1 RNA ISLT QL NAA+PROBE: NOT DETECTED
HPIV2 RNA SPEC QL NAA+PROBE: NOT DETECTED
HPIV3 RNA NPH QL NAA+PROBE: NOT DETECTED
HPIV4 P GENE NPH QL NAA+PROBE: NOT DETECTED
IMM GRANULOCYTES # BLD AUTO: 0.16 10*3/MM3 (ref 0–0.05)
IMM GRANULOCYTES # BLD AUTO: 0.19 10*3/MM3 (ref 0–0.05)
IMM GRANULOCYTES # BLD AUTO: 0.33 10*3/MM3 (ref 0–0.05)
IMM GRANULOCYTES # BLD AUTO: 0.5 10*3/MM3 (ref 0–0.05)
IMM GRANULOCYTES # BLD AUTO: 0.51 10*3/MM3 (ref 0–0.05)
IMM GRANULOCYTES # BLD AUTO: 0.69 10*3/MM3 (ref 0–0.05)
IMM GRANULOCYTES NFR BLD AUTO: 1.4 % (ref 0–0.5)
IMM GRANULOCYTES NFR BLD AUTO: 1.7 % (ref 0–0.5)
IMM GRANULOCYTES NFR BLD AUTO: 2.2 % (ref 0–0.5)
IMM GRANULOCYTES NFR BLD AUTO: 2.4 % (ref 0–0.5)
IMM GRANULOCYTES NFR BLD AUTO: 3.4 % (ref 0–0.5)
IMM GRANULOCYTES NFR BLD AUTO: 7.9 % (ref 0–0.5)
IMM RETICS NFR: 13.3 % (ref 3–15.8)
IMM RETICS NFR: 34.1 % (ref 3–15.8)
IMM RETICS NFR: 37.4 % (ref 3–15.8)
INR PPP: 1.39 (ref 0.9–1.1)
INR PPP: 2.45 (ref 0.9–1.1)
IRON 24H UR-MRATE: 118 MCG/DL (ref 37–145)
IRON 24H UR-MRATE: 124 MCG/DL (ref 37–145)
IRON 24H UR-MRATE: 134 MCG/DL (ref 37–145)
IRON 24H UR-MRATE: 42 MCG/DL (ref 37–145)
IRON 24H UR-MRATE: 49 MCG/DL (ref 37–145)
IRON SATN MFR SERPL: 10 % (ref 20–50)
IRON SATN MFR SERPL: 15 % (ref 20–50)
IRON SATN MFR SERPL: 36 % (ref 20–50)
IRON SATN MFR SERPL: 40 % (ref 20–50)
IRON SATN MFR SERPL: 41 % (ref 20–50)
LDH SERPL-CCNC: 181 U/L (ref 135–214)
LDH SERPL-CCNC: 539 U/L (ref 135–214)
LEFT ARM BP: NORMAL MMHG
LYMPHOCYTES # BLD AUTO: 0.67 10*3/MM3 (ref 0.7–3.1)
LYMPHOCYTES # BLD AUTO: 1.07 10*3/MM3 (ref 0.7–3.1)
LYMPHOCYTES # BLD AUTO: 1.15 10*3/MM3 (ref 0.7–3.1)
LYMPHOCYTES # BLD AUTO: 1.31 10*3/MM3 (ref 0.7–3.1)
LYMPHOCYTES # BLD AUTO: 1.84 10*3/MM3 (ref 0.7–3.1)
LYMPHOCYTES # BLD AUTO: 2.07 10*3/MM3 (ref 0.7–3.1)
LYMPHOCYTES # BLD MANUAL: 0.3 10*3/MM3 (ref 0.7–3.1)
LYMPHOCYTES # BLD MANUAL: 0.9 10*3/MM3 (ref 0.7–3.1)
LYMPHOCYTES # BLD MANUAL: 1.13 10*3/MM3 (ref 0.7–3.1)
LYMPHOCYTES # BLD MANUAL: 1.31 10*3/MM3 (ref 0.7–3.1)
LYMPHOCYTES # BLD MANUAL: 1.92 10*3/MM3 (ref 0.7–3.1)
LYMPHOCYTES # BLD MANUAL: 2.36 10*3/MM3 (ref 0.7–3.1)
LYMPHOCYTES # BLD MANUAL: 3.51 10*3/MM3 (ref 0.7–3.1)
LYMPHOCYTES # BLD MANUAL: 4.05 10*3/MM3 (ref 0.7–3.1)
LYMPHOCYTES NFR BLD AUTO: 13.4 % (ref 19.6–45.3)
LYMPHOCYTES NFR BLD AUTO: 15 % (ref 19.6–45.3)
LYMPHOCYTES NFR BLD AUTO: 18.8 % (ref 19.6–45.3)
LYMPHOCYTES NFR BLD AUTO: 2.9 % (ref 19.6–45.3)
LYMPHOCYTES NFR BLD AUTO: 7.8 % (ref 19.6–45.3)
LYMPHOCYTES NFR BLD AUTO: 9.3 % (ref 19.6–45.3)
LYMPHOCYTES NFR BLD MANUAL: 1 % (ref 5–12)
LYMPHOCYTES NFR BLD MANUAL: 2 % (ref 5–12)
LYMPHOCYTES NFR BLD MANUAL: 2 % (ref 5–12)
LYMPHOCYTES NFR BLD MANUAL: 4.1 % (ref 5–12)
LYMPHOCYTES NFR BLD MANUAL: 5 % (ref 5–12)
LYMPHOCYTES NFR BLD MANUAL: 5 % (ref 5–12)
LYMPHOCYTES NFR BLD MANUAL: 7 % (ref 5–12)
LYMPHOCYTES NFR BLD MANUAL: 9 % (ref 5–12)
Lab: NORMAL
M PNEUMO IGG SER IA-ACNC: NOT DETECTED
MACROCYTES BLD QL SMEAR: ABNORMAL
MAGNESIUM SERPL-MCNC: 2 MG/DL (ref 1.6–2.4)
MAXIMAL PREDICTED HEART RATE: 142 BPM
MAXIMAL PREDICTED HEART RATE: 143 BPM
MCH RBC QN AUTO: 26.5 PG (ref 26.6–33)
MCH RBC QN AUTO: 27.7 PG (ref 26.6–33)
MCH RBC QN AUTO: 28.4 PG (ref 26.6–33)
MCH RBC QN AUTO: 28.9 PG (ref 26.6–33)
MCH RBC QN AUTO: 29 PG (ref 26.6–33)
MCH RBC QN AUTO: 29.5 PG (ref 26.6–33)
MCH RBC QN AUTO: 29.6 PG (ref 26.6–33)
MCH RBC QN AUTO: 30.8 PG (ref 26.6–33)
MCH RBC QN AUTO: 31.6 PG (ref 26.6–33)
MCH RBC QN AUTO: 31.9 PG (ref 26.6–33)
MCH RBC QN AUTO: 32.5 PG (ref 26.6–33)
MCH RBC QN AUTO: 33.1 PG (ref 26.6–33)
MCH RBC QN AUTO: 33.3 PG (ref 26.6–33)
MCH RBC QN AUTO: 33.5 PG (ref 26.6–33)
MCH RBC QN AUTO: 34 PG (ref 26.6–33)
MCHC RBC AUTO-ENTMCNC: 31.4 G/DL (ref 31.5–35.7)
MCHC RBC AUTO-ENTMCNC: 31.9 G/DL (ref 31.5–35.7)
MCHC RBC AUTO-ENTMCNC: 32.2 G/DL (ref 31.5–35.7)
MCHC RBC AUTO-ENTMCNC: 32.4 G/DL (ref 31.5–35.7)
MCHC RBC AUTO-ENTMCNC: 32.6 G/DL (ref 31.5–35.7)
MCHC RBC AUTO-ENTMCNC: 33.1 G/DL (ref 31.5–35.7)
MCHC RBC AUTO-ENTMCNC: 33.2 G/DL (ref 31.5–35.7)
MCHC RBC AUTO-ENTMCNC: 33.2 G/DL (ref 31.5–35.7)
MCHC RBC AUTO-ENTMCNC: 33.4 G/DL (ref 31.5–35.7)
MCHC RBC AUTO-ENTMCNC: 33.9 G/DL (ref 31.5–35.7)
MCHC RBC AUTO-ENTMCNC: 34.2 G/DL (ref 31.5–35.7)
MCHC RBC AUTO-ENTMCNC: 34.6 G/DL (ref 31.5–35.7)
MCHC RBC AUTO-ENTMCNC: 35.1 G/DL (ref 31.5–35.7)
MCV RBC AUTO: 101.1 FL (ref 79–97)
MCV RBC AUTO: 102 FL (ref 79–97)
MCV RBC AUTO: 102.3 FL (ref 79–97)
MCV RBC AUTO: 103.4 FL (ref 79–97)
MCV RBC AUTO: 81.5 FL (ref 79–97)
MCV RBC AUTO: 87.2 FL (ref 79–97)
MCV RBC AUTO: 88.1 FL (ref 79–97)
MCV RBC AUTO: 88.3 FL (ref 79–97)
MCV RBC AUTO: 88.4 FL (ref 79–97)
MCV RBC AUTO: 90.1 FL (ref 79–97)
MCV RBC AUTO: 90.8 FL (ref 79–97)
MCV RBC AUTO: 91.4 FL (ref 79–97)
MCV RBC AUTO: 92.2 FL (ref 79–97)
MCV RBC AUTO: 92.5 FL (ref 79–97)
MCV RBC AUTO: 94.1 FL (ref 79–97)
METAMYELOCYTES NFR BLD MANUAL: 1 % (ref 0–0)
METAMYELOCYTES NFR BLD MANUAL: 1 % (ref 0–0)
METAMYELOCYTES NFR BLD MANUAL: 3 % (ref 0–0)
METAMYELOCYTES NFR BLD MANUAL: 3 % (ref 0–0)
METAMYELOCYTES NFR BLD MANUAL: 3.1 % (ref 0–0)
METAMYELOCYTES NFR BLD MANUAL: 4 % (ref 0–0)
MODALITY: ABNORMAL
MONOCYTES # BLD AUTO: 0.51 10*3/MM3 (ref 0.1–0.9)
MONOCYTES # BLD AUTO: 0.61 10*3/MM3 (ref 0.1–0.9)
MONOCYTES # BLD AUTO: 0.83 10*3/MM3 (ref 0.1–0.9)
MONOCYTES # BLD AUTO: 1.08 10*3/MM3 (ref 0.1–0.9)
MONOCYTES # BLD AUTO: 1.35 10*3/MM3 (ref 0.1–0.9)
MONOCYTES # BLD AUTO: 2.51 10*3/MM3 (ref 0.1–0.9)
MONOCYTES # BLD: 0.12 10*3/MM3 (ref 0.1–0.9)
MONOCYTES # BLD: 0.13 10*3/MM3 (ref 0.1–0.9)
MONOCYTES # BLD: 0.2 10*3/MM3 (ref 0.1–0.9)
MONOCYTES # BLD: 0.29 10*3/MM3 (ref 0.1–0.9)
MONOCYTES # BLD: 0.45 10*3/MM3 (ref 0.1–0.9)
MONOCYTES # BLD: 0.53 10*3/MM3 (ref 0.1–0.9)
MONOCYTES # BLD: 0.61 10*3/MM3 (ref 0.1–0.9)
MONOCYTES # BLD: 0.61 10*3/MM3 (ref 0.1–0.9)
MONOCYTES NFR BLD AUTO: 11 % (ref 5–12)
MONOCYTES NFR BLD AUTO: 11.8 % (ref 5–12)
MONOCYTES NFR BLD AUTO: 4.1 % (ref 5–12)
MONOCYTES NFR BLD AUTO: 5.8 % (ref 5–12)
MONOCYTES NFR BLD AUTO: 7.5 % (ref 5–12)
MONOCYTES NFR BLD AUTO: 7.8 % (ref 5–12)
MYELOCYTES NFR BLD MANUAL: 1 % (ref 0–0)
MYELOCYTES NFR BLD MANUAL: 2 % (ref 0–0)
MYELOCYTES NFR BLD MANUAL: 6 % (ref 0–0)
NEUTROPHILS # BLD AUTO: 0.89 10*3/MM3 (ref 1.7–7)
NEUTROPHILS # BLD AUTO: 10.13 10*3/MM3 (ref 1.7–7)
NEUTROPHILS # BLD AUTO: 6.04 10*3/MM3 (ref 1.7–7)
NEUTROPHILS # BLD AUTO: 7.9 10*3/MM3 (ref 1.7–7)
NEUTROPHILS # BLD AUTO: 7.99 10*3/MM3 (ref 1.7–7)
NEUTROPHILS # BLD AUTO: 8.75 10*3/MM3 (ref 1.7–7)
NEUTROPHILS # BLD AUTO: 8.79 10*3/MM3 (ref 1.7–7)
NEUTROPHILS # BLD AUTO: 8.84 10*3/MM3 (ref 1.7–7)
NEUTROPHILS NFR BLD AUTO: 10.49 10*3/MM3 (ref 1.7–7)
NEUTROPHILS NFR BLD AUTO: 12.48 10*3/MM3 (ref 1.7–7)
NEUTROPHILS NFR BLD AUTO: 18.68 10*3/MM3 (ref 1.7–7)
NEUTROPHILS NFR BLD AUTO: 6.21 10*3/MM3 (ref 1.7–7)
NEUTROPHILS NFR BLD AUTO: 7.79 10*3/MM3 (ref 1.7–7)
NEUTROPHILS NFR BLD AUTO: 70.8 % (ref 42.7–76)
NEUTROPHILS NFR BLD AUTO: 71 % (ref 42.7–76)
NEUTROPHILS NFR BLD AUTO: 75.5 % (ref 42.7–76)
NEUTROPHILS NFR BLD AUTO: 76.2 % (ref 42.7–76)
NEUTROPHILS NFR BLD AUTO: 8.66 10*3/MM3 (ref 1.7–7)
NEUTROPHILS NFR BLD AUTO: 81.5 % (ref 42.7–76)
NEUTROPHILS NFR BLD AUTO: 84.3 % (ref 42.7–76)
NEUTROPHILS NFR BLD MANUAL: 62 % (ref 42.7–76)
NEUTROPHILS NFR BLD MANUAL: 66 % (ref 42.7–76)
NEUTROPHILS NFR BLD MANUAL: 69 % (ref 42.7–76)
NEUTROPHILS NFR BLD MANUAL: 70 % (ref 42.7–76)
NEUTROPHILS NFR BLD MANUAL: 74 % (ref 42.7–76)
NEUTROPHILS NFR BLD MANUAL: 74 % (ref 42.7–76)
NEUTROPHILS NFR BLD MANUAL: 79.6 % (ref 42.7–76)
NEUTROPHILS NFR BLD MANUAL: 88 % (ref 42.7–76)
NRBC BLD AUTO-RTO: 0 /100 WBC (ref 0–0.2)
NRBC BLD AUTO-RTO: 0 /100 WBC (ref 0–0.2)
NRBC BLD AUTO-RTO: 0.1 /100 WBC (ref 0–0.2)
NRBC BLD AUTO-RTO: 0.4 /100 WBC (ref 0–0.2)
NRBC BLD AUTO-RTO: 0.5 /100 WBC (ref 0–0.2)
NRBC BLD AUTO-RTO: 1.6 /100 WBC (ref 0–0.2)
NRBC BLD AUTO-RTO: 17.8 /100 WBC (ref 0–0.2)
NRBC SPEC MANUAL: 10 /100 WBC (ref 0–0.2)
NRBC SPEC MANUAL: 2 /100 WBC (ref 0–0.2)
NT-PROBNP SERPL-MCNC: 8594 PG/ML (ref 0–1800)
PCO2 BLDA: 30.9 MM HG (ref 35–45)
PH BLDA: 7.17 PH UNITS (ref 7.35–7.45)
PHOSPHATE SERPL-MCNC: 2.1 MG/DL (ref 2.5–4.5)
PHOSPHATE SERPL-MCNC: 3.6 MG/DL (ref 2.5–4.5)
PLAT MORPH BLD: NORMAL
PLATELET # BLD AUTO: 101 10*3/MM3 (ref 140–450)
PLATELET # BLD AUTO: 106 10*3/MM3 (ref 140–450)
PLATELET # BLD AUTO: 107 10*3/MM3 (ref 140–450)
PLATELET # BLD AUTO: 149 10*3/MM3 (ref 140–450)
PLATELET # BLD AUTO: 154 10*3/MM3 (ref 140–450)
PLATELET # BLD AUTO: 154 10*3/MM3 (ref 140–450)
PLATELET # BLD AUTO: 155 10*3/MM3 (ref 140–450)
PLATELET # BLD AUTO: 164 10*3/MM3 (ref 140–450)
PLATELET # BLD AUTO: 164 10*3/MM3 (ref 140–450)
PLATELET # BLD AUTO: 190 10*3/MM3 (ref 140–450)
PLATELET # BLD AUTO: 260 10*3/MM3 (ref 140–450)
PLATELET # BLD AUTO: 265 10*3/MM3 (ref 140–450)
PLATELET # BLD AUTO: 297 10*3/MM3 (ref 140–450)
PLATELET # BLD AUTO: 349 10*3/MM3 (ref 140–450)
PLATELET # BLD AUTO: 373 10*3/MM3 (ref 140–450)
PLATELET # BLD AUTO: 93 10*3/MM3 (ref 140–450)
PLATELET # BLD AUTO: 93 10*3/MM3 (ref 140–450)
PLATELETS.RETICULATED NFR BLD AUTO: 2.2 % (ref 0.9–6.5)
PLATELETS.RETICULATED NFR BLD AUTO: 2.6 % (ref 0.9–6.5)
PLATELETS.RETICULATED NFR BLD AUTO: 2.7 % (ref 0.9–6.5)
PLATELETS.RETICULATED NFR BLD AUTO: 3 % (ref 0.9–6.5)
PMV BLD AUTO: 10.2 FL (ref 6–12)
PMV BLD AUTO: 10.2 FL (ref 6–12)
PMV BLD AUTO: 8.9 FL (ref 6–12)
PMV BLD AUTO: 8.9 FL (ref 6–12)
PMV BLD AUTO: 9 FL (ref 6–12)
PMV BLD AUTO: 9.2 FL (ref 6–12)
PMV BLD AUTO: 9.2 FL (ref 6–12)
PMV BLD AUTO: 9.5 FL (ref 6–12)
PMV BLD AUTO: 9.5 FL (ref 6–12)
PMV BLD AUTO: 9.6 FL (ref 6–12)
PMV BLD AUTO: 9.7 FL (ref 6–12)
PMV BLD AUTO: 9.9 FL (ref 6–12)
PMV BLD AUTO: 9.9 FL (ref 6–12)
PO2 BLDA: 28.8 MM HG (ref 80–100)
POLYCHROMASIA BLD QL SMEAR: ABNORMAL
POTASSIUM SERPL-SCNC: 2.7 MMOL/L (ref 3.5–5.2)
POTASSIUM SERPL-SCNC: 2.9 MMOL/L (ref 3.5–5.2)
POTASSIUM SERPL-SCNC: 3.3 MMOL/L (ref 3.5–5.2)
POTASSIUM SERPL-SCNC: 3.9 MMOL/L (ref 3.5–5.2)
POTASSIUM SERPL-SCNC: 4.7 MMOL/L (ref 3.5–5.2)
POTASSIUM SERPL-SCNC: 4.7 MMOL/L (ref 3.5–5.2)
POTASSIUM SERPL-SCNC: 5.3 MMOL/L (ref 3.5–5.2)
PROCALCITONIN SERPL-MCNC: 4.55 NG/ML (ref 0–0.25)
PROMYELOCYTES NFR BLD MANUAL: 1 % (ref 0–0)
PROT ?TM UR-MCNC: 11.5 MG/DL
PROT SERPL-MCNC: 4.4 G/DL (ref 6–8.5)
PROT SERPL-MCNC: 4.9 G/DL (ref 6–8.5)
PROT SERPL-MCNC: 4.9 G/DL (ref 6–8.5)
PROT SERPL-MCNC: 5.5 G/DL (ref 6–8.5)
PROT SERPL-MCNC: 5.8 G/DL (ref 6–8.5)
PROT SERPL-MCNC: 6.6 G/DL (ref 6–8.5)
PROTHROMBIN TIME: 16.9 SECONDS (ref 11.7–14.2)
PROTHROMBIN TIME: 26 SECONDS (ref 11.7–14.2)
PTH-INTACT SERPL-MCNC: 75 PG/ML (ref 15–65)
QT INTERVAL: 347 MS
RBC # BLD AUTO: 2.51 10*6/MM3 (ref 3.77–5.28)
RBC # BLD AUTO: 2.59 10*6/MM3 (ref 3.77–5.28)
RBC # BLD AUTO: 2.61 10*6/MM3 (ref 3.77–5.28)
RBC # BLD AUTO: 2.81 10*6/MM3 (ref 3.77–5.28)
RBC # BLD AUTO: 3.05 10*6/MM3 (ref 3.77–5.28)
RBC # BLD AUTO: 3.07 10*6/MM3 (ref 3.77–5.28)
RBC # BLD AUTO: 3.26 10*6/MM3 (ref 3.77–5.28)
RBC # BLD AUTO: 3.36 10*6/MM3 (ref 3.77–5.28)
RBC # BLD AUTO: 3.66 10*6/MM3 (ref 3.77–5.28)
RBC # BLD AUTO: 3.8 10*6/MM3 (ref 3.77–5.28)
RBC # BLD AUTO: 3.83 10*6/MM3 (ref 3.77–5.28)
RBC # BLD AUTO: 3.87 10*6/MM3 (ref 3.77–5.28)
RBC # BLD AUTO: 3.98 10*6/MM3 (ref 3.77–5.28)
RBC # BLD AUTO: 4.1 10*6/MM3 (ref 3.77–5.28)
RBC # BLD AUTO: 4.75 10*6/MM3 (ref 3.77–5.28)
RBC MORPH BLD: NORMAL
REF LAB TEST METHOD: NORMAL
RETICS # AUTO: 0.14 10*6/MM3 (ref 0.02–0.13)
RETICS # AUTO: 0.17 10*6/MM3 (ref 0.02–0.13)
RETICS # AUTO: 0.25 10*6/MM3 (ref 0.02–0.13)
RETICS/RBC NFR AUTO: 3.54 % (ref 0.7–1.9)
RETICS/RBC NFR AUTO: 6.99 % (ref 0.7–1.9)
RETICS/RBC NFR AUTO: 9.54 % (ref 0.7–1.9)
RHINOVIRUS RNA SPEC NAA+PROBE: NOT DETECTED
RIGHT ARM BP: NORMAL MMHG
RSV RNA NPH QL NAA+NON-PROBE: NOT DETECTED
SAO2 % BLDCOA: 40.5 % (ref 92–99)
SARS-COV-2 RNA NPH QL NAA+NON-PROBE: NOT DETECTED
SCAN SLIDE: NORMAL
SODIUM SERPL-SCNC: 132 MMOL/L (ref 136–145)
SODIUM SERPL-SCNC: 134 MMOL/L (ref 136–145)
SODIUM SERPL-SCNC: 135 MMOL/L (ref 136–145)
SODIUM SERPL-SCNC: 135 MMOL/L (ref 136–145)
SODIUM SERPL-SCNC: 136 MMOL/L (ref 136–145)
SODIUM SERPL-SCNC: 138 MMOL/L (ref 136–145)
SODIUM SERPL-SCNC: 139 MMOL/L (ref 136–145)
SPHEROCYTES BLD QL SMEAR: ABNORMAL
STRESS TARGET HR: 121 BPM
STRESS TARGET HR: 122 BPM
THROMBIN TIME: 17.3 SECONDS (ref 15.7–20.4)
TIBC SERPL-MCNC: 280 MCG/DL (ref 298–536)
TIBC SERPL-MCNC: 308 MCG/DL (ref 298–536)
TIBC SERPL-MCNC: 323 MCG/DL (ref 298–536)
TIBC SERPL-MCNC: 332 MCG/DL (ref 298–536)
TIBC SERPL-MCNC: 472 MCG/DL (ref 298–536)
TOTAL RATE: 26 BREATHS/MINUTE
TRANSFERRIN SERPL-MCNC: 188 MG/DL (ref 200–360)
TRANSFERRIN SERPL-MCNC: 207 MG/DL (ref 200–360)
TRANSFERRIN SERPL-MCNC: 217 MG/DL (ref 200–360)
TRANSFERRIN SERPL-MCNC: 223 MG/DL (ref 200–360)
TRANSFERRIN SERPL-MCNC: 317 MG/DL (ref 200–360)
TROPONIN T SERPL-MCNC: <0.01 NG/ML (ref 0–0.03)
VARIANT LYMPHS NFR BLD MANUAL: 1 % (ref 0–5)
VARIANT LYMPHS NFR BLD MANUAL: 10.2 % (ref 19.6–45.3)
VARIANT LYMPHS NFR BLD MANUAL: 15 % (ref 19.6–45.3)
VARIANT LYMPHS NFR BLD MANUAL: 18 % (ref 19.6–45.3)
VARIANT LYMPHS NFR BLD MANUAL: 20 % (ref 19.6–45.3)
VARIANT LYMPHS NFR BLD MANUAL: 20 % (ref 19.6–45.3)
VARIANT LYMPHS NFR BLD MANUAL: 28 % (ref 19.6–45.3)
VARIANT LYMPHS NFR BLD MANUAL: 29 % (ref 19.6–45.3)
VARIANT LYMPHS NFR BLD MANUAL: 9 % (ref 19.6–45.3)
VIT B12 BLD-MCNC: 599 PG/ML (ref 211–946)
VIT B12 BLD-MCNC: 677 PG/ML (ref 211–946)
VIT B12 BLD-MCNC: 800 PG/ML (ref 211–946)
WBC MORPH BLD: NORMAL
WBC NRBC COR # BLD: 1.18 10*3/MM3 (ref 3.4–10.8)
WBC NRBC COR # BLD: 1.43 10*3/MM3 (ref 3.4–10.8)
WBC NRBC COR # BLD: 10.04 10*3/MM3 (ref 3.4–10.8)
WBC NRBC COR # BLD: 10.68 10*3/MM3 (ref 3.4–10.8)
WBC NRBC COR # BLD: 11.02 10*3/MM3 (ref 3.4–10.8)
WBC NRBC COR # BLD: 11.04 10*3/MM3 (ref 3.4–10.8)
WBC NRBC COR # BLD: 11.47 10*3/MM3 (ref 3.4–10.8)
WBC NRBC COR # BLD: 11.82 10*3/MM3 (ref 3.4–10.8)
WBC NRBC COR # BLD: 12.1 10*3/MM3 (ref 3.4–10.8)
WBC NRBC COR # BLD: 13.77 10*3/MM3 (ref 3.4–10.8)
WBC NRBC COR # BLD: 14.47 10*3/MM3 (ref 3.4–10.8)
WBC NRBC COR # BLD: 14.55 10*3/MM3 (ref 3.4–10.8)
WBC NRBC COR # BLD: 14.8 10*3/MM3 (ref 3.4–10.8)
WBC NRBC COR # BLD: 22.92 10*3/MM3 (ref 3.4–10.8)
WBC NRBC COR # BLD: 8.75 10*3/MM3 (ref 3.4–10.8)
WHOLE BLOOD HOLD COAG: NORMAL
WHOLE BLOOD HOLD SPECIMEN: NORMAL

## 2022-01-01 PROCEDURE — 85007 BL SMEAR W/DIFF WBC COUNT: CPT | Performed by: INTERNAL MEDICINE

## 2022-01-01 PROCEDURE — 96372 THER/PROPH/DIAG INJ SC/IM: CPT

## 2022-01-01 PROCEDURE — 82728 ASSAY OF FERRITIN: CPT | Performed by: INTERNAL MEDICINE

## 2022-01-01 PROCEDURE — 85007 BL SMEAR W/DIFF WBC COUNT: CPT | Performed by: RADIOLOGY

## 2022-01-01 PROCEDURE — 85652 RBC SED RATE AUTOMATED: CPT

## 2022-01-01 PROCEDURE — 93005 ELECTROCARDIOGRAM TRACING: CPT

## 2022-01-01 PROCEDURE — 94799 UNLISTED PULMONARY SVC/PX: CPT

## 2022-01-01 PROCEDURE — 93000 ELECTROCARDIOGRAM COMPLETE: CPT | Performed by: NURSE PRACTITIONER

## 2022-01-01 PROCEDURE — 83540 ASSAY OF IRON: CPT | Performed by: INTERNAL MEDICINE

## 2022-01-01 PROCEDURE — 82746 ASSAY OF FOLIC ACID SERUM: CPT

## 2022-01-01 PROCEDURE — 0202U NFCT DS 22 TRGT SARS-COV-2: CPT | Performed by: INTERNAL MEDICINE

## 2022-01-01 PROCEDURE — 86880 COOMBS TEST DIRECT: CPT | Performed by: INTERNAL MEDICINE

## 2022-01-01 PROCEDURE — 80053 COMPREHEN METABOLIC PANEL: CPT | Performed by: INTERNAL MEDICINE

## 2022-01-01 PROCEDURE — 88341 IMHCHEM/IMCYTCHM EA ADD ANTB: CPT

## 2022-01-01 PROCEDURE — 84165 PROTEIN E-PHORESIS SERUM: CPT | Performed by: INTERNAL MEDICINE

## 2022-01-01 PROCEDURE — 84466 ASSAY OF TRANSFERRIN: CPT | Performed by: INTERNAL MEDICINE

## 2022-01-01 PROCEDURE — 97140 MANUAL THERAPY 1/> REGIONS: CPT | Performed by: PHYSICAL THERAPIST

## 2022-01-01 PROCEDURE — 99215 OFFICE O/P EST HI 40 MIN: CPT | Performed by: INTERNAL MEDICINE

## 2022-01-01 PROCEDURE — 86235 NUCLEAR ANTIGEN ANTIBODY: CPT

## 2022-01-01 PROCEDURE — 82784 ASSAY IGA/IGD/IGG/IGM EACH: CPT | Performed by: INTERNAL MEDICINE

## 2022-01-01 PROCEDURE — 85300 ANTITHROMBIN III ACTIVITY: CPT | Performed by: INTERNAL MEDICINE

## 2022-01-01 PROCEDURE — 83615 LACTATE (LD) (LDH) ENZYME: CPT | Performed by: INTERNAL MEDICINE

## 2022-01-01 PROCEDURE — 86147 CARDIOLIPIN ANTIBODY EA IG: CPT | Performed by: INTERNAL MEDICINE

## 2022-01-01 PROCEDURE — 36415 COLL VENOUS BLD VENIPUNCTURE: CPT | Performed by: INTERNAL MEDICINE

## 2022-01-01 PROCEDURE — 85046 RETICYTE/HGB CONCENTRATE: CPT | Performed by: INTERNAL MEDICINE

## 2022-01-01 PROCEDURE — 85613 RUSSELL VIPER VENOM DILUTED: CPT | Performed by: INTERNAL MEDICINE

## 2022-01-01 PROCEDURE — 36415 COLL VENOUS BLD VENIPUNCTURE: CPT

## 2022-01-01 PROCEDURE — 85025 COMPLETE CBC W/AUTO DIFF WBC: CPT

## 2022-01-01 PROCEDURE — G0283 ELEC STIM OTHER THAN WOUND: HCPCS | Performed by: PHYSICAL THERAPIST

## 2022-01-01 PROCEDURE — 85610 PROTHROMBIN TIME: CPT | Performed by: INTERNAL MEDICINE

## 2022-01-01 PROCEDURE — 25010000002 CHLOROPROCAINE HCL (PF) 3 % SOLUTION: Performed by: RADIOLOGY

## 2022-01-01 PROCEDURE — 82570 ASSAY OF URINE CREATININE: CPT

## 2022-01-01 PROCEDURE — 84466 ASSAY OF TRANSFERRIN: CPT

## 2022-01-01 PROCEDURE — 86140 C-REACTIVE PROTEIN: CPT

## 2022-01-01 PROCEDURE — 99214 OFFICE O/P EST MOD 30 MIN: CPT | Performed by: NURSE PRACTITIONER

## 2022-01-01 PROCEDURE — 82595 ASSAY OF CRYOGLOBULIN: CPT | Performed by: INTERNAL MEDICINE

## 2022-01-01 PROCEDURE — 0 TECHNETIUM MEDRONATE KIT: Performed by: INTERNAL MEDICINE

## 2022-01-01 PROCEDURE — 88184 FLOWCYTOMETRY/ TC 1 MARKER: CPT

## 2022-01-01 PROCEDURE — 83605 ASSAY OF LACTIC ACID: CPT

## 2022-01-01 PROCEDURE — 86146 BETA-2 GLYCOPROTEIN ANTIBODY: CPT | Performed by: INTERNAL MEDICINE

## 2022-01-01 PROCEDURE — 85670 THROMBIN TIME PLASMA: CPT | Performed by: INTERNAL MEDICINE

## 2022-01-01 PROCEDURE — 83880 ASSAY OF NATRIURETIC PEPTIDE: CPT

## 2022-01-01 PROCEDURE — 85014 HEMATOCRIT: CPT | Performed by: INTERNAL MEDICINE

## 2022-01-01 PROCEDURE — 02HV33Z INSERTION OF INFUSION DEVICE INTO SUPERIOR VENA CAVA, PERCUTANEOUS APPROACH: ICD-10-PCS | Performed by: INTERNAL MEDICINE

## 2022-01-01 PROCEDURE — 25010000002 ONDANSETRON PER 1 MG: Performed by: INTERNAL MEDICINE

## 2022-01-01 PROCEDURE — 83521 IG LIGHT CHAINS FREE EACH: CPT | Performed by: INTERNAL MEDICINE

## 2022-01-01 PROCEDURE — 85055 RETICULATED PLATELET ASSAY: CPT | Performed by: INTERNAL MEDICINE

## 2022-01-01 PROCEDURE — 88300 SURGICAL PATH GROSS: CPT | Performed by: INTERNAL MEDICINE

## 2022-01-01 PROCEDURE — 85007 BL SMEAR W/DIFF WBC COUNT: CPT

## 2022-01-01 PROCEDURE — 25010000002 MIDAZOLAM PER 1 MG: Performed by: RADIOLOGY

## 2022-01-01 PROCEDURE — 82607 VITAMIN B-12: CPT | Performed by: INTERNAL MEDICINE

## 2022-01-01 PROCEDURE — 85732 THROMBOPLASTIN TIME PARTIAL: CPT | Performed by: INTERNAL MEDICINE

## 2022-01-01 PROCEDURE — 25010000002 VANCOMYCIN 10 G RECONSTITUTED SOLUTION: Performed by: EMERGENCY MEDICINE

## 2022-01-01 PROCEDURE — 93971 EXTREMITY STUDY: CPT

## 2022-01-01 PROCEDURE — 25010000002 FERRIC CARBOXYMALTOSE 750 MG/15ML SOLUTION 15 ML VIAL: Performed by: INTERNAL MEDICINE

## 2022-01-01 PROCEDURE — 25010000002 HEPARIN (PORCINE) PER 1000 UNITS: Performed by: INTERNAL MEDICINE

## 2022-01-01 PROCEDURE — 88341 IMHCHEM/IMCYTCHM EA ADD ANTB: CPT | Performed by: INTERNAL MEDICINE

## 2022-01-01 PROCEDURE — 77012 CT SCAN FOR NEEDLE BIOPSY: CPT

## 2022-01-01 PROCEDURE — 70551 MRI BRAIN STEM W/O DYE: CPT

## 2022-01-01 PROCEDURE — 97035 APP MDLTY 1+ULTRASOUND EA 15: CPT | Performed by: PHYSICAL THERAPIST

## 2022-01-01 PROCEDURE — 85705 THROMBOPLASTIN INHIBITION: CPT | Performed by: INTERNAL MEDICINE

## 2022-01-01 PROCEDURE — 84145 PROCALCITONIN (PCT): CPT | Performed by: EMERGENCY MEDICINE

## 2022-01-01 PROCEDURE — 84100 ASSAY OF PHOSPHORUS: CPT

## 2022-01-01 PROCEDURE — 25010000002 EPOETIN ALFA PER 1000 UNITS

## 2022-01-01 PROCEDURE — 96374 THER/PROPH/DIAG INJ IV PUSH: CPT

## 2022-01-01 PROCEDURE — 82306 VITAMIN D 25 HYDROXY: CPT

## 2022-01-01 PROCEDURE — 88264 CHROMOSOME ANALYSIS 20-25: CPT

## 2022-01-01 PROCEDURE — 87147 CULTURE TYPE IMMUNOLOGIC: CPT | Performed by: EMERGENCY MEDICINE

## 2022-01-01 PROCEDURE — 88377 M/PHMTRC ALYS ISHQUANT/SEMIQ: CPT

## 2022-01-01 PROCEDURE — 94660 CPAP INITIATION&MGMT: CPT

## 2022-01-01 PROCEDURE — 87040 BLOOD CULTURE FOR BACTERIA: CPT | Performed by: EMERGENCY MEDICINE

## 2022-01-01 PROCEDURE — 85025 COMPLETE CBC W/AUTO DIFF WBC: CPT | Performed by: EMERGENCY MEDICINE

## 2022-01-01 PROCEDURE — 85025 COMPLETE CBC W/AUTO DIFF WBC: CPT | Performed by: INTERNAL MEDICINE

## 2022-01-01 PROCEDURE — 71111 X-RAY EXAM RIBS/CHEST4/> VWS: CPT

## 2022-01-01 PROCEDURE — 25010000002 ALBUMIN HUMAN 25% PER 50 ML: Performed by: INTERNAL MEDICINE

## 2022-01-01 PROCEDURE — 71045 X-RAY EXAM CHEST 1 VIEW: CPT

## 2022-01-01 PROCEDURE — 86225 DNA ANTIBODY NATIVE: CPT

## 2022-01-01 PROCEDURE — 36600 WITHDRAWAL OF ARTERIAL BLOOD: CPT

## 2022-01-01 PROCEDURE — 0 HYDROCORTISONE SOD SUCCINATE PF 250 MG RECONSTITUTED SOLUTION: Performed by: INTERNAL MEDICINE

## 2022-01-01 PROCEDURE — 25010000002 FENTANYL CITRATE (PF) 50 MCG/ML SOLUTION

## 2022-01-01 PROCEDURE — 88344 IMHCHEM/IMCYTCHM EA MLT ANTB: CPT

## 2022-01-01 PROCEDURE — 25010000002 AZITHROMYCIN PER 500 MG: Performed by: INTERNAL MEDICINE

## 2022-01-01 PROCEDURE — 85610 PROTHROMBIN TIME: CPT

## 2022-01-01 PROCEDURE — 93880 EXTRACRANIAL BILAT STUDY: CPT

## 2022-01-01 PROCEDURE — P9047 ALBUMIN (HUMAN), 25%, 50ML: HCPCS | Performed by: INTERNAL MEDICINE

## 2022-01-01 PROCEDURE — 84484 ASSAY OF TROPONIN QUANT: CPT

## 2022-01-01 PROCEDURE — 86038 ANTINUCLEAR ANTIBODIES: CPT

## 2022-01-01 PROCEDURE — 25010000002 CEFEPIME PER 500 MG: Performed by: EMERGENCY MEDICINE

## 2022-01-01 PROCEDURE — 25010000002 EPOETIN ALFA PER 1000 UNITS: Performed by: INTERNAL MEDICINE

## 2022-01-01 PROCEDURE — 20610 DRAIN/INJ JOINT/BURSA W/O US: CPT | Performed by: NURSE PRACTITIONER

## 2022-01-01 PROCEDURE — 86334 IMMUNOFIX E-PHORESIS SERUM: CPT | Performed by: INTERNAL MEDICINE

## 2022-01-01 PROCEDURE — 88323 CONSLTJ&REPRT MATRL PREP SLD: CPT

## 2022-01-01 PROCEDURE — 83010 ASSAY OF HAPTOGLOBIN QUANT: CPT | Performed by: INTERNAL MEDICINE

## 2022-01-01 PROCEDURE — 82607 VITAMIN B-12: CPT

## 2022-01-01 PROCEDURE — 25010000002 PHENYLEPHRINE 10 MG/ML SOLUTION: Performed by: INTERNAL MEDICINE

## 2022-01-01 PROCEDURE — 80053 COMPREHEN METABOLIC PANEL: CPT

## 2022-01-01 PROCEDURE — 85730 THROMBOPLASTIN TIME PARTIAL: CPT | Performed by: INTERNAL MEDICINE

## 2022-01-01 PROCEDURE — 83605 ASSAY OF LACTIC ACID: CPT | Performed by: EMERGENCY MEDICINE

## 2022-01-01 PROCEDURE — 88305 TISSUE EXAM BY PATHOLOGIST: CPT | Performed by: INTERNAL MEDICINE

## 2022-01-01 PROCEDURE — 63710000001 PREDNISONE PER 1 MG: Performed by: EMERGENCY MEDICINE

## 2022-01-01 PROCEDURE — 88182 CELL MARKER STUDY: CPT

## 2022-01-01 PROCEDURE — 88237 TISSUE CULTURE BONE MARROW: CPT

## 2022-01-01 PROCEDURE — 99214 OFFICE O/P EST MOD 30 MIN: CPT | Performed by: INTERNAL MEDICINE

## 2022-01-01 PROCEDURE — 76775 US EXAM ABDO BACK WALL LIM: CPT

## 2022-01-01 PROCEDURE — 25010000002 IOPAMIDOL 61 % SOLUTION: Performed by: INTERNAL MEDICINE

## 2022-01-01 PROCEDURE — A9503 TC99M MEDRONATE: HCPCS | Performed by: INTERNAL MEDICINE

## 2022-01-01 PROCEDURE — 83605 ASSAY OF LACTIC ACID: CPT | Performed by: INTERNAL MEDICINE

## 2022-01-01 PROCEDURE — 82747 ASSAY OF FOLIC ACID RBC: CPT | Performed by: INTERNAL MEDICINE

## 2022-01-01 PROCEDURE — 78306 BONE IMAGING WHOLE BODY: CPT

## 2022-01-01 PROCEDURE — 83970 ASSAY OF PARATHORMONE: CPT

## 2022-01-01 PROCEDURE — 0 DIATRIZOATE MEGLUMINE & SODIUM PER 1 ML: Performed by: INTERNAL MEDICINE

## 2022-01-01 PROCEDURE — 88342 IMHCHEM/IMCYTCHM 1ST ANTB: CPT | Performed by: INTERNAL MEDICINE

## 2022-01-01 PROCEDURE — 73522 X-RAY EXAM HIPS BI 3-4 VIEWS: CPT | Performed by: NURSE PRACTITIONER

## 2022-01-01 PROCEDURE — 85025 COMPLETE CBC W/AUTO DIFF WBC: CPT | Performed by: RADIOLOGY

## 2022-01-01 PROCEDURE — 88185 FLOWCYTOMETRY/TC ADD-ON: CPT

## 2022-01-01 PROCEDURE — 84156 ASSAY OF PROTEIN URINE: CPT

## 2022-01-01 PROCEDURE — 74177 CT ABD & PELVIS W/CONTRAST: CPT

## 2022-01-01 PROCEDURE — 88313 SPECIAL STAINS GROUP 2: CPT | Performed by: INTERNAL MEDICINE

## 2022-01-01 PROCEDURE — 93010 ELECTROCARDIOGRAM REPORT: CPT | Performed by: INTERNAL MEDICINE

## 2022-01-01 PROCEDURE — 82803 BLOOD GASES ANY COMBINATION: CPT

## 2022-01-01 PROCEDURE — G0463 HOSPITAL OUTPT CLINIC VISIT: HCPCS

## 2022-01-01 PROCEDURE — 99285 EMERGENCY DEPT VISIT HI MDM: CPT

## 2022-01-01 PROCEDURE — 82565 ASSAY OF CREATININE: CPT

## 2022-01-01 PROCEDURE — 72170 X-RAY EXAM OF PELVIS: CPT

## 2022-01-01 PROCEDURE — 86431 RHEUMATOID FACTOR QUANT: CPT

## 2022-01-01 PROCEDURE — 82962 GLUCOSE BLOOD TEST: CPT

## 2022-01-01 PROCEDURE — 0 POTASSIUM CHLORIDE 10 MEQ/100ML SOLUTION: Performed by: EMERGENCY MEDICINE

## 2022-01-01 PROCEDURE — 70220 X-RAY EXAM OF SINUSES: CPT

## 2022-01-01 PROCEDURE — 85240 CLOT FACTOR VIII AHG 1 STAGE: CPT | Performed by: INTERNAL MEDICINE

## 2022-01-01 PROCEDURE — 83540 ASSAY OF IRON: CPT

## 2022-01-01 PROCEDURE — 83735 ASSAY OF MAGNESIUM: CPT | Performed by: EMERGENCY MEDICINE

## 2022-01-01 PROCEDURE — 82728 ASSAY OF FERRITIN: CPT

## 2022-01-01 PROCEDURE — 99223 1ST HOSP IP/OBS HIGH 75: CPT | Performed by: INTERNAL MEDICINE

## 2022-01-01 PROCEDURE — 0 HYDROCORTISONE SOD SUCCINATE PF 250 MG RECONSTITUTED SOLUTION: Performed by: EMERGENCY MEDICINE

## 2022-01-01 PROCEDURE — 85027 COMPLETE CBC AUTOMATED: CPT

## 2022-01-01 PROCEDURE — 93000 ELECTROCARDIOGRAM COMPLETE: CPT | Performed by: INTERNAL MEDICINE

## 2022-01-01 PROCEDURE — 71260 CT THORAX DX C+: CPT

## 2022-01-01 PROCEDURE — 84100 ASSAY OF PHOSPHORUS: CPT | Performed by: EMERGENCY MEDICINE

## 2022-01-01 PROCEDURE — 25010000002 FENTANYL CITRATE (PF) 50 MCG/ML SOLUTION: Performed by: RADIOLOGY

## 2022-01-01 RX ORDER — ALUMINA, MAGNESIA, AND SIMETHICONE 2400; 2400; 240 MG/30ML; MG/30ML; MG/30ML
15 SUSPENSION ORAL EVERY 6 HOURS PRN
Status: DISCONTINUED | OUTPATIENT
Start: 2022-01-01 | End: 2022-07-10 | Stop reason: HOSPADM

## 2022-01-01 RX ORDER — CHLOROPROCAINE HYDROCHLORIDE 30 MG/ML
15 INJECTION, SOLUTION EPIDURAL; INFILTRATION; INTRACAUDAL; PERINEURAL ONCE
Status: COMPLETED | OUTPATIENT
Start: 2022-01-01 | End: 2022-01-01

## 2022-01-01 RX ORDER — FENTANYL CITRATE 50 UG/ML
INJECTION, SOLUTION INTRAMUSCULAR; INTRAVENOUS
Status: COMPLETED | OUTPATIENT
Start: 2022-01-01 | End: 2022-01-01

## 2022-01-01 RX ORDER — FENTANYL CITRATE 50 UG/ML
25 INJECTION, SOLUTION INTRAMUSCULAR; INTRAVENOUS ONCE
Status: COMPLETED | OUTPATIENT
Start: 2022-01-01 | End: 2022-01-01

## 2022-01-01 RX ORDER — LIDOCAINE HYDROCHLORIDE 20 MG/ML
2 INJECTION, SOLUTION EPIDURAL; INFILTRATION; INTRACAUDAL; PERINEURAL
Status: COMPLETED | OUTPATIENT
Start: 2022-01-01 | End: 2022-01-01

## 2022-01-01 RX ORDER — SODIUM CHLORIDE 0.9 % (FLUSH) 0.9 %
3 SYRINGE (ML) INJECTION EVERY 12 HOURS SCHEDULED
Status: DISCONTINUED | OUTPATIENT
Start: 2022-01-01 | End: 2022-01-01 | Stop reason: HOSPADM

## 2022-01-01 RX ORDER — MIDAZOLAM HYDROCHLORIDE 1 MG/ML
INJECTION INTRAMUSCULAR; INTRAVENOUS
Status: COMPLETED | OUTPATIENT
Start: 2022-01-01 | End: 2022-01-01

## 2022-01-01 RX ORDER — SODIUM CHLORIDE 9 MG/ML
250 INJECTION, SOLUTION INTRAVENOUS ONCE
Status: COMPLETED | OUTPATIENT
Start: 2022-01-01 | End: 2022-01-01

## 2022-01-01 RX ORDER — FENTANYL CITRATE 50 UG/ML
INJECTION, SOLUTION INTRAMUSCULAR; INTRAVENOUS
Status: COMPLETED
Start: 2022-01-01 | End: 2022-01-01

## 2022-01-01 RX ORDER — CHLOROPROCAINE HYDROCHLORIDE 30 MG/ML
20 INJECTION, SOLUTION EPIDURAL; INFILTRATION; INTRACAUDAL; PERINEURAL ONCE
Status: DISCONTINUED | OUTPATIENT
Start: 2022-01-01 | End: 2022-01-01 | Stop reason: HOSPADM

## 2022-01-01 RX ORDER — SODIUM CHLORIDE 0.9 % (FLUSH) 0.9 %
10 SYRINGE (ML) INJECTION EVERY 12 HOURS SCHEDULED
Status: DISCONTINUED | OUTPATIENT
Start: 2022-01-01 | End: 2022-07-10 | Stop reason: HOSPADM

## 2022-01-01 RX ORDER — FLUOROMETHOLONE 0.1 %
SUSPENSION, DROPS(FINAL DOSAGE FORM)(ML) OPHTHALMIC (EYE)
COMMUNITY
Start: 2022-01-01

## 2022-01-01 RX ORDER — CEFDINIR 300 MG/1
300 CAPSULE ORAL EVERY 12 HOURS
COMMUNITY
Start: 2022-01-01 | End: 2022-01-01

## 2022-01-01 RX ORDER — PROCHLORPERAZINE MALEATE 5 MG/1
10 TABLET ORAL ONCE
Status: DISCONTINUED | OUTPATIENT
Start: 2022-01-01 | End: 2022-01-01 | Stop reason: HOSPADM

## 2022-01-01 RX ORDER — LABETALOL HYDROCHLORIDE 5 MG/ML
20 INJECTION, SOLUTION INTRAVENOUS
Status: DISCONTINUED | OUTPATIENT
Start: 2022-01-01 | End: 2022-07-10 | Stop reason: HOSPADM

## 2022-01-01 RX ORDER — PROMETHAZINE HYDROCHLORIDE 25 MG/1
TABLET ORAL DAILY
COMMUNITY
End: 2022-01-01

## 2022-01-01 RX ORDER — POTASSIUM CHLORIDE 7.45 MG/ML
10 INJECTION INTRAVENOUS
Status: DISPENSED | OUTPATIENT
Start: 2022-01-01 | End: 2022-01-01

## 2022-01-01 RX ORDER — LOSARTAN POTASSIUM 25 MG/1
TABLET ORAL
COMMUNITY
End: 2022-01-01

## 2022-01-01 RX ORDER — PREDNISONE 10 MG/1
1 TABLET ORAL DAILY
COMMUNITY
Start: 2022-01-01

## 2022-01-01 RX ORDER — LOSARTAN POTASSIUM 25 MG/1
25 TABLET ORAL DAILY
COMMUNITY
Start: 2022-01-01 | End: 2022-01-01 | Stop reason: SDDI

## 2022-01-01 RX ORDER — POTASSIUM CHLORIDE 7.45 MG/ML
10 INJECTION INTRAVENOUS
Status: ACTIVE | OUTPATIENT
Start: 2022-01-01 | End: 2022-01-01

## 2022-01-01 RX ORDER — SODIUM CHLORIDE 0.9 % (FLUSH) 0.9 %
10 SYRINGE (ML) INJECTION AS NEEDED
Status: DISCONTINUED | OUTPATIENT
Start: 2022-01-01 | End: 2022-07-10 | Stop reason: HOSPADM

## 2022-01-01 RX ORDER — PHENYLEPHRINE HCL IN 0.9% NACL 0.5 MG/5ML
.5-3 SYRINGE (ML) INTRAVENOUS
Status: DISCONTINUED | OUTPATIENT
Start: 2022-01-01 | End: 2022-07-10 | Stop reason: HOSPADM

## 2022-01-01 RX ORDER — PREDNISONE 20 MG/1
20 TABLET ORAL ONCE
Status: COMPLETED | OUTPATIENT
Start: 2022-01-01 | End: 2022-01-01

## 2022-01-01 RX ORDER — CEPHALEXIN 500 MG/1
CAPSULE ORAL
Qty: 4 CAPSULE | Refills: 5 | Status: SHIPPED | OUTPATIENT
Start: 2022-01-01

## 2022-01-01 RX ORDER — HYDRALAZINE HYDROCHLORIDE 25 MG/1
TABLET, FILM COATED ORAL
COMMUNITY

## 2022-01-01 RX ORDER — METHYLPREDNISOLONE 4 MG/1
TABLET ORAL
Qty: 21 TABLET | Refills: 0 | Status: SHIPPED | OUTPATIENT
Start: 2022-01-01 | End: 2022-01-01

## 2022-01-01 RX ORDER — DEXTROSE MONOHYDRATE 25 G/50ML
25 INJECTION, SOLUTION INTRAVENOUS
Status: DISCONTINUED | OUTPATIENT
Start: 2022-01-01 | End: 2022-07-10 | Stop reason: HOSPADM

## 2022-01-01 RX ORDER — POTASSIUM CHLORIDE 750 MG/1
40 TABLET, FILM COATED, EXTENDED RELEASE ORAL ONCE
Status: DISCONTINUED | OUTPATIENT
Start: 2022-01-01 | End: 2022-01-01

## 2022-01-01 RX ORDER — SODIUM CHLORIDE 0.9 % (FLUSH) 0.9 %
10 SYRINGE (ML) INJECTION AS NEEDED
Status: DISCONTINUED | OUTPATIENT
Start: 2022-01-01 | End: 2022-01-01 | Stop reason: HOSPADM

## 2022-01-01 RX ORDER — ALBUMIN (HUMAN) 12.5 G/50ML
25 SOLUTION INTRAVENOUS ONCE
Status: COMPLETED | OUTPATIENT
Start: 2022-01-01 | End: 2022-01-01

## 2022-01-01 RX ORDER — ONDANSETRON 2 MG/ML
4 INJECTION INTRAMUSCULAR; INTRAVENOUS EVERY 6 HOURS PRN
Status: DISCONTINUED | OUTPATIENT
Start: 2022-01-01 | End: 2022-07-10 | Stop reason: HOSPADM

## 2022-01-01 RX ORDER — TOCILIZUMAB 180 MG/ML
162 INJECTION, SOLUTION SUBCUTANEOUS
COMMUNITY
Start: 2022-01-01

## 2022-01-01 RX ORDER — SODIUM BICARBONATE IN D5W 150/1000ML
150 PLASTIC BAG, INJECTION (ML) INTRAVENOUS CONTINUOUS
Status: DISCONTINUED | OUTPATIENT
Start: 2022-01-01 | End: 2022-07-10 | Stop reason: HOSPADM

## 2022-01-01 RX ORDER — PREDNISONE 20 MG/1
30 TABLET ORAL 3 TIMES DAILY
COMMUNITY
Start: 2022-01-01

## 2022-01-01 RX ORDER — TC 99M MEDRONATE 20 MG/10ML
21.6 INJECTION, POWDER, LYOPHILIZED, FOR SOLUTION INTRAVENOUS
Status: COMPLETED | OUTPATIENT
Start: 2022-01-01 | End: 2022-01-01

## 2022-01-01 RX ORDER — HEPARIN SODIUM 5000 [USP'U]/ML
5000 INJECTION, SOLUTION INTRAVENOUS; SUBCUTANEOUS EVERY 8 HOURS SCHEDULED
Status: DISCONTINUED | OUTPATIENT
Start: 2022-01-01 | End: 2022-07-10 | Stop reason: HOSPADM

## 2022-01-01 RX ORDER — APIXABAN 5 MG/1
5 TABLET, FILM COATED ORAL EVERY 12 HOURS SCHEDULED
Qty: 180 TABLET | Refills: 3 | Status: SHIPPED | OUTPATIENT
Start: 2022-01-01 | End: 2022-01-01

## 2022-01-01 RX ORDER — PREDNISONE 10 MG/1
TABLET ORAL
COMMUNITY
Start: 2022-01-01 | End: 2022-01-01

## 2022-01-01 RX ORDER — HYDROCORTISONE ACETATE 25 MG
SUPPOSITORY, RECTAL RECTAL
COMMUNITY
Start: 2022-01-01

## 2022-01-01 RX ORDER — SODIUM CHLORIDE 9 MG/ML
25 INJECTION, SOLUTION INTRAVENOUS ONCE
Status: COMPLETED | OUTPATIENT
Start: 2022-01-01 | End: 2022-01-01

## 2022-01-01 RX ORDER — NOREPINEPHRINE BIT/0.9 % NACL 8 MG/250ML
.02-.3 INFUSION BOTTLE (ML) INTRAVENOUS
Status: DISCONTINUED | OUTPATIENT
Start: 2022-01-01 | End: 2022-07-10 | Stop reason: HOSPADM

## 2022-01-01 RX ORDER — AMIODARONE HYDROCHLORIDE 200 MG/1
TABLET ORAL
COMMUNITY

## 2022-01-01 RX ADMIN — DEXTROSE MONOHYDRATE 25 G: 25 INJECTION, SOLUTION INTRAVENOUS at 19:05

## 2022-01-01 RX ADMIN — Medication 0.04 MCG/KG/MIN: at 14:34

## 2022-01-01 RX ADMIN — CHLOROPROCAINE HYDROCHLORIDE 20 MG: 30 INJECTION, SOLUTION EPIDURAL; INFILTRATION; INTRACAUDAL; PERINEURAL at 08:05

## 2022-01-01 RX ADMIN — MIDAZOLAM 0.5 MG: 1 INJECTION INTRAMUSCULAR; INTRAVENOUS at 08:12

## 2022-01-01 RX ADMIN — SODIUM CHLORIDE 250 ML: 9 INJECTION, SOLUTION INTRAVENOUS at 12:13

## 2022-01-01 RX ADMIN — POTASSIUM PHOSPHATE, MONOBASIC AND POTASSIUM PHOSPHATE, DIBASIC 9 MMOL: 224; 236 INJECTION, SOLUTION, CONCENTRATE INTRAVENOUS at 16:01

## 2022-01-01 RX ADMIN — ALBUMIN HUMAN 25 G: 0.25 SOLUTION INTRAVENOUS at 16:36

## 2022-01-01 RX ADMIN — POTASSIUM CHLORIDE 10 MEQ: 7.46 INJECTION, SOLUTION INTRAVENOUS at 13:21

## 2022-01-01 RX ADMIN — DIATRIZOATE MEGLUMINE AND DIATRIZOATE SODIUM 30 ML: 600; 100 SOLUTION ORAL; RECTAL at 10:57

## 2022-01-01 RX ADMIN — IOPAMIDOL 85 ML: 612 INJECTION, SOLUTION INTRAVENOUS at 10:54

## 2022-01-01 RX ADMIN — PHENYLEPHRINE HYDROCHLORIDE 0.5 MCG/KG/MIN: 50 INJECTION INTRAVENOUS at 18:14

## 2022-01-01 RX ADMIN — SODIUM CHLORIDE 250 ML: 9 INJECTION, SOLUTION INTRAVENOUS at 14:10

## 2022-01-01 RX ADMIN — HYDROCORTISONE SODIUM SUCCINATE 100 MG: 250 INJECTION, POWDER, FOR SOLUTION INTRAMUSCULAR; INTRAVENOUS at 12:43

## 2022-01-01 RX ADMIN — ERYTHROPOIETIN 11000 UNITS: 20000 INJECTION, SOLUTION INTRAVENOUS; SUBCUTANEOUS at 14:07

## 2022-01-01 RX ADMIN — SODIUM CHLORIDE 25 ML/HR: 9 INJECTION, SOLUTION INTRAVENOUS at 07:59

## 2022-01-01 RX ADMIN — AZITHROMYCIN 500 MG: 500 INJECTION, POWDER, LYOPHILIZED, FOR SOLUTION INTRAVENOUS at 18:18

## 2022-01-01 RX ADMIN — VANCOMYCIN HYDROCHLORIDE 1250 MG: 10 INJECTION, POWDER, LYOPHILIZED, FOR SOLUTION INTRAVENOUS at 11:09

## 2022-01-01 RX ADMIN — CEFEPIME HYDROCHLORIDE 2 G: 2 INJECTION, POWDER, FOR SOLUTION INTRAVENOUS at 13:21

## 2022-01-01 RX ADMIN — ERYTHROPOIETIN 20000 UNITS: 20000 INJECTION, SOLUTION INTRAVENOUS; SUBCUTANEOUS at 11:29

## 2022-01-01 RX ADMIN — FENTANYL CITRATE 25 MCG: 50 INJECTION INTRAMUSCULAR; INTRAVENOUS at 17:08

## 2022-01-01 RX ADMIN — MIDAZOLAM 1 MG: 1 INJECTION INTRAMUSCULAR; INTRAVENOUS at 08:07

## 2022-01-01 RX ADMIN — HEPARIN SODIUM 5000 UNITS: 5000 INJECTION INTRAVENOUS; SUBCUTANEOUS at 16:36

## 2022-01-01 RX ADMIN — FENTANYL CITRATE 25 MCG: 50 INJECTION, SOLUTION INTRAMUSCULAR; INTRAVENOUS at 17:08

## 2022-01-01 RX ADMIN — FERRIC CARBOXYMALTOSE INJECTION 750 MG: 50 INJECTION, SOLUTION INTRAVENOUS at 14:11

## 2022-01-01 RX ADMIN — LIDOCAINE HYDROCHLORIDE 2 ML: 20 INJECTION, SOLUTION EPIDURAL; INFILTRATION; INTRACAUDAL; PERINEURAL at 10:16

## 2022-01-01 RX ADMIN — HYDROCORTISONE SODIUM SUCCINATE 100 MG: 250 INJECTION, POWDER, FOR SOLUTION INTRAMUSCULAR; INTRAVENOUS at 16:35

## 2022-01-01 RX ADMIN — FENTANYL CITRATE 25 MCG: 0.05 INJECTION, SOLUTION INTRAMUSCULAR; INTRAVENOUS at 08:12

## 2022-01-01 RX ADMIN — Medication 21.6 MILLICURIE: at 10:00

## 2022-01-01 RX ADMIN — SODIUM CHLORIDE, POTASSIUM CHLORIDE, SODIUM LACTATE AND CALCIUM CHLORIDE 2001 ML: 600; 310; 30; 20 INJECTION, SOLUTION INTRAVENOUS at 14:03

## 2022-01-01 RX ADMIN — ONDANSETRON 4 MG: 2 INJECTION INTRAMUSCULAR; INTRAVENOUS at 17:55

## 2022-01-01 RX ADMIN — SODIUM BICARBONATE 50 MEQ: 84 INJECTION INTRAVENOUS at 18:22

## 2022-01-01 RX ADMIN — FENTANYL CITRATE 50 MCG: 0.05 INJECTION, SOLUTION INTRAMUSCULAR; INTRAVENOUS at 08:07

## 2022-01-01 RX ADMIN — SODIUM CHLORIDE 500 ML: 9 INJECTION, SOLUTION INTRAVENOUS at 12:43

## 2022-01-01 RX ADMIN — Medication 150 MEQ: at 18:26

## 2022-01-01 RX ADMIN — ERYTHROPOIETIN 20000 UNITS: 20000 INJECTION, SOLUTION INTRAVENOUS; SUBCUTANEOUS at 11:48

## 2022-01-01 RX ADMIN — PREDNISONE 20 MG: 20 TABLET ORAL at 11:12

## 2022-01-11 NOTE — PROGRESS NOTES
CARDIOLOGY    Karley Roman MD    ENCOUNTER DATE:  01/11/2022    Mary Kay Saab / 76 y.o. / female        CHIEF COMPLAINT / REASON FOR OFFICE VISIT     Heart Problem (patient is here today to discuss a possible TIA she is concerned due to family history of strokes )      HISTORY OF PRESENT ILLNESS       HPI    Mary Kay Saab is a 76 y.o. female     This is a lady who has previously followed with Dr. Vizcarra. She had quite a bit of heart testing done around 2016, including a heart catheterization, which showed normal coronary arteries. In 2017, she had a cardiopulmonary stress test, which was suboptimal. She had an echo, which was pretty benign. Then it looks like she saw Dr. Sim Roman earlier this summer and he ordered a nuclear stress test, which showed normal LV systolic function with a small fixed perfusion defect in the apex, which sounds like it is probably breast attenuation. Echocardiogram in August 2021 showed normal LV systolic function with grade 1 diastolic dysfunction, mild left atrial enlargement and no significant valvular heart diease.    She had an episode where she had some garbled speech.  She knew what she was trying to say but was not coming out correctly.  It only lasted for a few minutes but she has a family history of TIAs and stroke and was very concerned about this anomaly.  She had an echo at the [facility] which I reviewed and was overall unremarkable.    REVIEW OF SYSTEMS     Review of Systems   Constitutional: Negative for chills, fever, weight gain and weight loss.   Cardiovascular: Negative for leg swelling.   Respiratory: Negative for cough, snoring and wheezing.    Hematologic/Lymphatic: Negative for bleeding problem. Does not bruise/bleed easily.   Skin: Negative for color change.   Musculoskeletal: Positive for joint pain. Negative for falls and myalgias.   Gastrointestinal: Negative for melena.   Genitourinary: Negative for hematuria.   Neurological: Negative  "for excessive daytime sleepiness.   Psychiatric/Behavioral: Negative for depression. The patient is not nervous/anxious.          VITAL SIGNS     Visit Vitals  /74 (BP Location: Left arm, Patient Position: Sitting, Cuff Size: Adult)   Pulse 69   Resp 16   Ht 165.1 cm (65\")   Wt 64.9 kg (143 lb)   SpO2 98%   BMI 23.80 kg/m²         Wt Readings from Last 3 Encounters:   01/11/22 64.9 kg (143 lb)   12/21/21 65.4 kg (144 lb 3.2 oz)   12/08/21 64.4 kg (142 lb)     Body mass index is 23.8 kg/m².      PHYSICAL EXAMINATION     Constitutional:       General: Not in acute distress.  Neck:      Vascular: No carotid bruit or JVD.   Pulmonary:      Effort: Pulmonary effort is normal.      Breath sounds: Normal breath sounds.   Cardiovascular:      Normal rate. Regular rhythm.      Murmurs: There is no murmur.   Psychiatric:         Mood and Affect: Mood and affect normal.           REVIEWED DATA       ECG 12 Lead    Date/Time: 1/11/2022 12:33 PM  Performed by: Karley Roman MD  Authorized by: Karley Roman MD   Comparison: not compared with previous ECG   Previous ECG: no previous ECG available  Rhythm: sinus rhythm  Conduction: conduction normal  ST Segments: ST segments normal  T Waves: T waves normal    Clinical impression: normal ECG              Lipid Panel    Lipid Panel 9/9/21   Total Cholesterol 141   Triglycerides 78   HDL Cholesterol 66 (A)   VLDL Cholesterol 15   LDL Cholesterol  60   LDL/HDL Ratio 0.90   (A) Abnormal value              Lab Results   Component Value Date    GLUCOSE 93 09/09/2021    BUN 39 (H) 09/09/2021    CREATININE 1.70 (H) 09/15/2021    EGFRIFNONA 33 (L) 09/09/2021    EGFRIFAFRI  09/15/2016      Comment:      <15 Indicative of kidney failure.    BCR 25.3 (H) 09/09/2021    K 4.3 09/09/2021    CO2 20.7 (L) 09/09/2021    CALCIUM 9.6 09/09/2021    ALBUMIN 4.20 09/09/2021    AST 19 09/09/2021    ALT 13 09/09/2021       ASSESSMENT & PLAN      Diagnosis Plan   1. TIA (transient ischemic " attack)  US Carotid Bilateral    MRI brain wo contrast   2. Other cerebral infarction (HCC)   US Carotid Bilateral         1.  TIA symptoms.  Check a carotid Doppler.  MRI of the brain.  Reviewed her echo from [facility] and unremarkable.  2.  Paroxysmal atrial fibrillation.  Follows with Dr. Vizcarra.  Continue current medications including Eliquis  3.  Systemic hypertension.  Controlled  4.  Hyperlipidemia.  On statin therapy.  5.  Chronic kidney disease  6.  History of breast cancer followed by Dr. Cornejo.    Keep appointment with me in September.  Review her carotid Doppler and MRI of the brain as they become available.      Orders Placed This Encounter   Procedures   • US Carotid Bilateral     Standing Status:   Future     Standing Expiration Date:   1/11/2023     Order Specific Question:   Reason for Exam:     Answer:   crotid stenosis     Order Specific Question:   Release to patient     Answer:   Immediate   • MRI brain wo contrast     Standing Status:   Future     Standing Expiration Date:   1/11/2023     Scheduling Instructions:      Schedule for [facility]     Order Specific Question:   Release to patient     Answer:   Immediate   • ECG 12 Lead     This order was created via procedure documentation     Order Specific Question:   Release to patient     Answer:   Immediate           MEDICATIONS         Discharge Medications          Accurate as of January 11, 2022 12:34 PM. If you have any questions, ask your nurse or doctor.            Continue These Medications      Instructions Start Date   alendronate 70 MG tablet  Commonly known as: FOSAMAX   70 mg, Oral, Every 7 Days, sunday      alosetron 0.5 MG tablet  Commonly known as: LOTRONEX   0.05 mg, Oral, Daily PRN      ALPRAZolam 1 MG tablet  Commonly known as: XANAX   1 mg, Oral, Nightly      bumetanide 1 MG tablet  Commonly known as: BUMEX   1 mg, Oral, Daily      cephalexin 500 MG capsule  Commonly known as: KEFLEX   TAKE 4 CAPSULES BY MOUTH 1 HOUR BEFORE  DENTAL PROCEDURE      cetirizine 10 MG tablet  Commonly known as: zyrTEC   10 mg, Oral, Daily      clobetasol 0.05 % topical foam  Commonly known as: OLUX   As Needed      cyclobenzaprine 10 MG tablet  Commonly known as: FLEXERIL   cyclobenzaprine 10 mg tablet   TK 1 T PO Q 8 H PRN      cycloSPORINE 0.05 % ophthalmic emulsion  Commonly known as: RESTASIS   1 drop, Both Eyes, 2 Times Daily      dilTIAZem 60 MG tablet  Commonly known as: CARDIZEM   60 mg, Oral, 2 Times Daily, PATIENT TAKES ONE TABLET AT 5 PM AND ONE TAB HS      diphenoxylate-atropine 2.5-0.025 MG per tablet  Commonly known as: LOMOTIL   1 tablet, Oral, 4 Times Daily PRN      Eliquis 5 MG tablet tablet  Generic drug: apixaban   5 mg, Oral, Every 12 Hours Scheduled      esomeprazole 40 MG capsule  Commonly known as: nexIUM   40 mg, Oral, 2 Times Daily      Glucosamine 500 MG capsule   Oral      hydroCHLOROthiazide 12.5 MG tablet  Commonly known as: HYDRODIURIL   12.5 mg, Oral, Daily      ketoconazole 2 % shampoo  Commonly known as: NIZORAL   ketoconazole 2 % shampoo   APPLY EXTERNALLY TO THE SCALP 3 TIMES A WEEK. LEAVE IN 5 MINUTES BEFORE WASHING OUT      metoprolol tartrate 25 MG tablet  Commonly known as: LOPRESSOR   0.625 mg, Oral, As Needed      montelukast 10 MG tablet  Commonly known as: SINGULAIR   10 mg, Oral, Daily      Mucinex D Max Strength 120-1200 MG tablet sustained-release 12 hour  Generic drug: Pseudoephedrine-guaiFENesin ER   1 tablet, Oral, Every 12 Hours      multivitamin capsule   No dose, route, or frequency recorded.      Salicylic Acid 6 % shampoo   salicylic acid 6 % shampoo   APPLY EXTERNALLY TO THE SCALP 3 TIMES A WEEK. LEAVE IN 5 MINUTES BEFORE WASHING OUT      simvastatin 40 MG tablet  Commonly known as: ZOCOR   40 mg, Oral, Daily               Karley Roman MD  01/11/22  12:34 EST    **[employer] Disclaimer:   Much of this encounter note is an electronic transcription/translation of spoken language to printed text. The  electronic translation of spoken language may permit erroneous, or at times, nonsensical words or phrases to be inadvertently transcribed. Although I have reviewed the note for such errors, some may still exist.

## 2022-01-11 NOTE — PROGRESS NOTES
Re-Assessment / Re-Certification        Patient: Mary Kay Saab   : 1945  Diagnosis/ICD-10 Code:  Tendonitis, Achilles, left [M76.62]  Referring practitioner: Sim Kelly MD  Date of Initial Visit: Type: THERAPY  Noted: 2021  Today's Date: 2022  Patient seen for 14 sessions      Subjective:   Mary Kay Saab reports: her achilles was sore the last few days after doing some cleaning and wearing different shoes.   Subjective Questionnaire: LEFS: 47   (43 on initial evaluation)    Clinical Progress: improved  Home Program Compliance: Yes  Treatment has included: manual therapy, electrical stimulation and ultrasound    Subjective   Current pain ratin  At best pain ratin  At worst pain ratin  Quality: discomfort, dull ache, tight and sharp  Relieving factors: change in position and ice  Aggravating factors: stairs, standing, ambulation, prolonged positioning and sleeping  Objective   Left Ankle/Foot   Tenderness in the Achilles insertion.      Active Range of Motion   Left Ankle/Foot   Normal active range of motion     Right Ankle/Foot   Normal active range of motion     Strength/Myotome Testing      Left Ankle/Foot   Normal strength     Right Ankle/Foot   Normal strength     Goals  Plan Goals: SHORT TERM GOALS: Time for Goal Achievement: 6 visits    1.  Patient to be compliant with HEP. Met  2.  Pt able to ascend/descend steps and transfer with less ankle pain < 5/10 Progressing       LONG TERM GOALS: Time for Goal Achievement: 12 visits  1.  Pt to score 45 or greater on LEFS  Progressing   2.  Patient able to ascend/descend steps and prolonged standing & cooking with pain   3.  Pt to demonstrate increased stability of the ankle  to balance on foam as needed to traverse uneven terrain .     Assessment/Plan  Progress toward previous goals: Partially Met        Recommendations: Continue as planned  Timeframe: 1 month  Prognosis to achieve goals: good    PT Signature: Angelia Gomez,  PT      Based upon review of the patient's progress and continued therapy plan, it is my medical opinion that Mary Kay Saab should continue physical therapy treatment at [facility] PH[facility] TH[facility]  [address] [place] [id number] [phone].    Signature: __________________________________  Sim Kelly MD    Manual Therapy:    15     mins  31328;  Therapeutic Exercise:         mins  36302;     Neuromuscular Lenny:        mins  95383;    Therapeutic Activity:          mins  48268;     Gait Training:           mins  57748;     Ultrasound:     10     mins  14824;    Electrical Stimulation:     15    mins  10588 ( );  Dry Needling          mins self-pay    Timed Treatment:  25   mins   Total Treatment:     40   mins

## 2022-01-24 NOTE — TELEPHONE ENCOUNTER
From: Mary Kay Saab  To: Karley Roman MD  Sent: 1/21/2022 3:01 PM EST  Subject: Doppler carotid test    I saw Dr. Roman about 2 wks.ago. I’ve been trying ever since to get my carotid test scheduled. The hospital called to schedule it & when they entered it, it wouldn’t let her schedule it. I’ve tried calling the office 3x to no avail. How or who do I talk to to get this done?  I would appreciate a response asap since I’m already 2 wks. down the road.  Mary Kay Saab  BD: [birth year]  Thank you

## 2022-02-03 NOTE — PROGRESS NOTES
Please call the patient and tell her we will arrange 1 dose Injectafer.  Please arrange this.    ===View-only below this line===  ----- Message -----  From: Alla Nieves RN  Sent: 2/3/2022  11:46 AM EST  To: Diego Cornejo II, MD    Pt phoned office and wanted to inform Dr. Cornejo of her labs drawn yesterday ordered by her nephrologist. Nephrologist office informed her of low iron labs and pt stated Dr. Cornejo advised her to call the office if iron labs were low. She has appt. With her nephrologist on 02/09. Dr. Cornejo notified will call pt with any further instructions.

## 2022-02-03 NOTE — TELEPHONE ENCOUNTER
LM for pt to return call. Pt had labs drawn yesterday by PCP. Will instruct pt to call PCP for results and further instructions.

## 2022-02-03 NOTE — TELEPHONE ENCOUNTER
Pt returned call and wanted to inform Dr. Cornejo of her labs drawn yesterday ordered by her nephrologist. Nephrologist office informed her of her low iron labs and pt stated Dr. Cornejo advised her to call the office if iron labs were low. She has appt. With her nephrologist on 02/09. Dr. Cornejo notified will call pt with any further instructions.

## 2022-02-07 NOTE — TELEPHONE ENCOUNTER
----- Message from April Colleen Nieves RN sent at 2/7/2022  8:57 AM EST -----  Please call and schedule injectafer 1 dose. Has been approved.  Thanks, April   ----- Message -----  From: Diego Cornejo II, MD  Sent: 2/3/2022  11:49 AM EST  To: April Colleen Nieves RN    Please call the patient and tell her we will arrange 1 dose Injectafer.  Please arrange this.  ----- Message -----  From: Lzibeth, April ALFRED Macias  Sent: 2/3/2022  11:46 AM EST  To: Diego Cornejo II, MD    Pt phoned office and wanted to inform Dr. Cornejo of her labs drawn yesterday ordered by her nephrologist. Nephrologist office informed her of low iron labs and pt stated Dr. Cornejo advised her to call the office if iron labs were low. She has appt. With her nephrologist on 02/09. Dr. Cornejo notified will call pt with any further instructions.

## 2022-02-08 NOTE — PROGRESS NOTES
Re-Assessment / Re-Certification        Patient: Mary Kay Saab   : 1945  Diagnosis/ICD-10 Code:  Left foot pain [M79.672]  Referring practitioner: Jann Rojas, *  Date of Initial Visit: Type: THERAPY  Noted: 2021  Today's Date: 2022  Patient seen for 15 sessions      Subjective:   Mary Kay Saab reports: her foot has been doing better overall, occasional discomfort. Feels like attending PT helps.   Subjective Questionnaire: LEFS: 47  Clinical Progress: improved  Home Program Compliance: Yes  Treatment has included: manual therapy, electrical stimulation, ultrasound and cryotherapy    Subjective   Objective   Left Ankle/Foot   Tenderness in the Achilles insertion.      Active Range of Motion   Left Ankle/Foot   Normal active range of motion     Right Ankle/Foot   Normal active range of motion     Strength/Myotome Testing      Left Ankle/Foot   Normal strength     Right Ankle/Foot   Normal strength     Goals  Plan Goals: SHORT TERM GOALS: Time for Goal Achievement: 6 visits    1.  Patient to be compliant with HEP. Met  2.  Pt able to ascend/descend steps and transfer with less ankle pain < 5/10 Progressing       LONG TERM GOALS: Time for Goal Achievement: 12 visits  1.  Pt to score 45 or greater on LEFS  Met  2.  Patient able to ascend/descend steps and prolonged standing & cooking with pain   3.  Pt to demonstrate increased stability of the ankle  to balance on foam as needed to traverse uneven terrain .     Assessment/Plan  Progress toward previous goals: Partially Met        Recommendations: Continue as planned  Timeframe: 1 month  Prognosis to achieve goals: good    PT Signature: Angelia Gomez, PT      Based upon review of the patient's progress and continued therapy plan, it is my medical opinion that Mary Kay Saab should continue physical therapy treatment at [facility] PH[facility] TH[facility]  [address] [place] [address]  [id number]  [phone].    Signature: __________________________________  Jann Rojas MD    Manual Therapy:  15      mins  05044;  Therapeutic Exercise:         mins  64091;     Neuromuscular Lenny:        mins  32746;    Therapeutic Activity:          mins  61034;     Gait Training:           mins  75227;     Ultrasound:     10     mins  91447;    Electrical Stimulation:   15      mins  97814 ( );  Dry Needling          mins self-pay    Timed Treatment:   25   mins   Total Treatment:    40    mins

## 2022-02-08 NOTE — PROGRESS NOTES
2/8/2022    Mary Kay Saab is here today for worsening knee pain. Pt has undergone injection of the knee in the past with good resolution of symptoms. Pt is requesting a repeat injection.     KNEE Injection Procedure Note:    Large Joint Arthrocentesis: L knee  Date/Time: 2/8/2022 10:16 AM  Consent given by: patient  Site marked: site marked  Timeout: Immediately prior to procedure a time out was called to verify the correct patient, procedure, equipment, support staff and site/side marked as required   Supporting Documentation  Indications: pain and joint swelling   Procedure Details  Location: knee - L knee  Preparation: Patient was prepped and draped in the usual sterile fashion  Needle size: 22 G  Approach: anterolateral  Medications administered: 88 mg Hyaluronan 88 MG/4ML; 2 mL lidocaine PF 2% 2 %  Patient tolerance: patient tolerated the procedure well with no immediate complications          At the conclusion of the injection I discussed the importance of continued quad strengthening exercises on a daily basis. I will see the patient back if the symptoms should fail to improve or worsen.    Sabrina Vicente, APRN  2/8/2022

## 2022-02-10 NOTE — TELEPHONE ENCOUNTER
----- Message from YUSEF Momin sent at 2/10/2022  7:35 AM EST -----  Please inform patient there is no acute stroke noted on MRI of the brain thatDr. Roman ordered. 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 She must continue Eliquis and keep her scheduled appointment

## 2022-02-10 NOTE — TELEPHONE ENCOUNTER
Left voicemail for Mary Kay Saab requesting callback.    Thank you,  Maria Del Rosario Maradiaga RN  Triage Nurse

## 2022-02-10 NOTE — TELEPHONE ENCOUNTER
Reviewed results and recommendations with Mary Kay Saab.  Patient verbalized understanding.    Thank you,  Maria Del Rosario Maradiaga RN  Triage Nurse

## 2022-03-02 NOTE — PROGRESS NOTES
Re-Assessment / Re-Certification        Patient: Mary Kay Saab   : 1945  Diagnosis/ICD-10 Code:  Achilles tendonitis, bilateral [M76.61, M76.62]  Referring practitioner: Jann Rojas, *  Date of Initial Visit: Type: THERAPY  Noted: 2021  Today's Date: 3/2/2022  Patient seen for 16 sessions      Subjective:   Mary Kay Saab reports: Pt now having pain in R achilles as well and has new order for PT for both feet   Subjective Questionnaire: LEFS: 47  Clinical Progress: improved  Home Program Compliance: Yes  Treatment has included: manual therapy, electrical stimulation, ultrasound and cryotherapy    Subjective   R achilles pain: 3  L achilles pain: 0  Objective   Active Range of Motion   Left Ankle/Foot   Normal active range of motion     Right Ankle/Foot   Normal active range of motion     Strength/Myotome Testing      Left Ankle/Foot   Normal strength     Right Ankle/Foot   Normal strength     Goals  Plan Goals: SHORT TERM GOALS: Time for Goal Achievement: 6 visits    1.  Patient to be compliant with HEP. Met  2.  Pt able to ascend/descend steps and transfer with less ankle pain < 5/10 Progressing       LONG TERM GOALS: Time for Goal Achievement: 12 visits  1.  Pt to score 48 or greater on LEFS    2.  Patient able to ascend/descend steps and prolonged standing & cooking with pain </=2  3.  Pt to demonstrate increased stability of the ankle  to balance on foam as needed to traverse uneven terrain .     Assessment/Plan  Progress toward previous goals: Partially Met        Recommendations: Continue as planned  Timeframe: 1 month  Prognosis to achieve goals: good    PT Signature: Angelia Gomez, PT      Based upon review of the patient's progress and continued therapy plan, it is my medical opinion that Mary Kay Saab should continue physical therapy treatment at [facility] PH[facility] THERAP[facility]  [address] [place] [address]  [address]  [phone].    Signature: __________________________________  Jann Rojas MD    Manual Therapy:   10      mins  62620;  Therapeutic Exercise:         mins  86568;     Neuromuscular Lenny:        mins  63863;    Therapeutic Activity:          mins  97074;     Gait Training:           mins  41743;     Ultrasound:     20     mins  24292;    Electrical Stimulation:     15    mins  49160 ( );  Dry Needling          mins self-pay    Timed Treatment:  30    mins   Total Treatment:    45    mins

## 2022-03-08 NOTE — PROGRESS NOTES
Patient: Mary Kay Saab  YOB: 1945 77 y.o. female  Medical Record Number: [id number]    Chief Complaints:   Chief Complaint   Patient presents with   • Left Hip - Follow-up   • Right Hip - Follow-up       History of Present Illness:Mary Kay Saab is a 77 y.o. female who presents with new complaint of left hip pain.  Patient had previous right total hip replacement July 2017, reports she started with some pain in her lower back also bilateral hips several days ago.  Left much worse than the right.  Actually right hip is feeling better.  Patient denies any injury.  Describes the left hip pain as a moderate to severe constant ache worse with any standing walking, only slightly better with rest.    Allergies:   Allergies   Allergen Reactions   • Penicillins Anaphylaxis     Reactions: syncope and seizures   • Adhesive Tape Hives     Tears skin   • Atropine Hives   • Epinephrine Palpitations     Patient states will go into A-Fib   • Sulfa Antibiotics Nausea Only     STATES SHE DOES FINE WITH CERTAIN SULFA DRUGS       Medications:   Current Outpatient Medications   Medication Sig Dispense Refill   • alendronate (FOSAMAX) 70 MG tablet Take 70 mg by mouth Every 7 (Seven) Days. sunday 12   • alosetron (LOTRONEX) 0.5 MG tablet Take 0.05 mg by mouth Daily As Needed.     • ALPRAZolam (XANAX) 1 MG tablet Take 1 mg by mouth every night.     • bumetanide (BUMEX) 1 MG tablet Take 1 mg by mouth Daily.     • cephalexin (KEFLEX) 500 MG capsule TAKE 4 CAPSULES BY MOUTH 1 HOUR BEFORE DENTAL PROCEDURE 4 capsule 5   • cetirizine (ZyrTEC) 10 MG tablet Take 10 mg by mouth Daily.     • clobetasol (OLUX) 0.05 % topical foam As Needed.     • cyclobenzaprine (FLEXERIL) 10 MG tablet cyclobenzaprine 10 mg tablet   TK 1 T PO Q 8 H PRN     • cycloSPORINE (RESTASIS) 0.05 % ophthalmic emulsion Administer 1 drop to both eyes 2 (Two) Times a Day.     • diltiaZEM (CARDIZEM) 60 MG tablet Take 60 mg by mouth 2 (Two) Times a Day. PATIENT  TAKES ONE TABLET AT 5 PM AND ONE TAB HS     • diphenoxylate-atropine (LOMOTIL) 2.5-0.025 MG per tablet Take 1 tablet by mouth 4 (Four) Times a Day As Needed for Diarrhea.     • Eliquis 5 MG tablet tablet Take 1 tablet by mouth Every 12 (Twelve) Hours. 180 tablet 3   • esomeprazole (NexIUM) 40 MG capsule Take 40 mg by mouth 2 (two) times a day.     • Glucosamine 500 MG capsule Take  by mouth.     • hydrochlorothiazide (HYDRODIURIL) 12.5 MG tablet Take 12.5 mg by mouth Daily.     • ketoconazole (NIZORAL) 2 % shampoo ketoconazole 2 % shampoo   APPLY EXTERNALLY TO THE SCALP 3 TIMES A WEEK. LEAVE IN 5 MINUTES BEFORE WASHING OUT     • methylPREDNISolone (MEDROL) 4 MG dose pack Use as directed by package instructions 21 tablet 0   • metoprolol tartrate (LOPRESSOR) 25 MG tablet Take 0.625 mg by mouth As Needed.     • montelukast (SINGULAIR) 10 MG tablet Take 10 mg by mouth Daily.     • Mucinex D Max Strength 120-1200 MG tablet sustained-release 12 hour Take 1 tablet by mouth Every 12 (Twelve) Hours.     • Multiple Vitamin (MULTIVITAMIN) capsule      • Salicylic Acid 6 % shampoo salicylic acid 6 % shampoo   APPLY EXTERNALLY TO THE SCALP 3 TIMES A WEEK. LEAVE IN 5 MINUTES BEFORE WASHING OUT     • simvastatin (ZOCOR) 40 MG tablet Take 40 mg by mouth Daily.  3     No current facility-administered medications for this visit.     Facility-Administered Medications Ordered in Other Visits   Medication Dose Route Frequency Provider Last Rate Last Admin   • mupirocin (BACTROBAN) 2 % nasal ointment   Nasal BID Van Mcguire MD             The following portions of the patient's history were reviewed and updated as appropriate: allergies, current medications, past family history, past medical history, past social history, past surgical history and problem list.    Review of Systems:   A 14 point review of systems was performed. All systems negative except pertinent positives/negative listed in HPI above    Physical Exam:   Vitals:     "03/08/22 0830   Temp: 96.9 °F (36.1 °C)   Weight: 65.8 kg (145 lb)   Height: 163.8 cm (64.5\")       General: A and O x 3, ASA, NAD    SCLERA:    Normal    Skin clear no unusual lesions noted  Right hip patient is well-healed surgical incision noted excellent range of motion with no instability calf is soft and nontender she is however tender over the right hip greater trochanteric bursa  Left hip patient also tender over left hip greater trochanteric bursa but has significant pain with internal and external rotation with a positive Stinchfield positive logroll calf is soft and nontender         Radiology:  Xrays bilateral hips were ordered and reviewed today secondary to pain, 2 views, and show well-placed well-positioned right total hip replacement she does have some arthritic changes noted of left hip with no obvious fracture seen.  She also has significant arthritic changes noted of the lower lumbar spine was only able be partially visualized on hip x-rays compared to views are unchanged    Assessment/Plan: Left hip pain    Patient I discussed options, at this point given the amount of pain she is having we will proceed with an MRI of the left hip to be done as soon as possible.  We will try Medrol Dosepak in case some of it could be coming from her back but given the positive hip examination today I think it is most important to further evaluate that left hip.  Patient will take Tylenol as needed.  She will call me a couple days after the MRI regarding results and treatment options.  If the MRI of the left hip is normal it is possible it could be coming from her lower back, she seen Dr. Rowan previously for her back and we would refer her to see him again if necessary.    Sabrina Vicente, APRN  3/8/2022  "

## 2022-03-10 NOTE — TELEPHONE ENCOUNTER
Returned call to patient, who is reporting her lab work that was completed at Dr Rojas's office 3/2/2022 through [facility]. Her hemoglobin is 10.8, RBC 3.83, Total Iron 65, TIBC 259, Iron saturation was 25.  She did not have a Ferritin level at this appointment, but has been reported with her February lab work performed at her Nephrologist office. She had one Injectafer Infusion and she really has not felt any better since that infusion.   She states that she is more fatigued, falls asleep earlier in the evening and wants to know if she should come in to our office to have more lab work or to be seen to discuss what could possibly be done.  She is aware that Dr. Cornejo is out of the office today and we will follow up with her tomorrow.  Please advise.

## 2022-03-11 NOTE — TELEPHONE ENCOUNTER
----- Message from Gaye Latif RN sent at 3/11/2022  2:37 PM EST -----  Regarding: Labs and follow up  Per Dr Cornejo Please schedule as soon as his schedule will allow for a follow up visit with Dr Cornejo and schedule the patient the day before to have labs drawn.  Thanks

## 2022-03-11 NOTE — PROGRESS NOTES
Knowing she received 1 dose Injectafer last month, it is unlikely she would be iron deficient at this time.  Therefore, I suspect we will not have a good fix for her fatigue.  However, if she would like to come to the office we can certainly assess her.  If so, please arrange an appointment with me.  At least 1 day prior: Ferritin, iron panel, CBC, reticulate hemoglobin    ===View-only below this line===  ----- Message -----  From: Gaye Latif RN  Sent: 3/11/2022  11:48 AM EST  To: Diego Cornejo II, MD      ----- Message -----  From: Rocio Acosta RN  Sent: 3/10/2022   3:45 PM EST  To: Gaye Latif RN    ----- Message from Rocio Acosta RN sent at 3/10/2022  3:45 PM EST -----  She knows Dr. Cornejo is not in today and we will get back with her tomorrow.

## 2022-03-11 NOTE — TELEPHONE ENCOUNTER
"Per Dr Code \"knowing patient received 1 dose Injectafer last month, it is unlikely she would be iron deficient at this time. However, if she would like to come to the office we can certainly assess her.  If so, please arrange an appointment with me.  At least 1 day prior: Ferritin, iron panel, CBC, reticulate hemoglobin\".  Patient verbalized she would like to schedule labs and a follow up visit. Message sent to appointment desk to schedule.  "

## 2022-03-18 NOTE — PROGRESS NOTES
Subjective .     REASONS FOR FOLLOWUP:  Breast cancer, anemia    HISTORY OF PRESENT ILLNESS:  The patient is a 77 y.o. year old female  who is here for follow-up with the above-mentioned history.    She has been very tired.  Recent labs showed mild iron deficiency.  She was given 1 dose of Injectafer.  She is only become more tired.  Denies bleeding.  Denies chest pain.    She has been having low back pain and right shoulder pain.        Past Medical History:   Diagnosis Date   • Anemia     Of chronic renal insufficiency   • Atrial fibrillation (HCC)    • Mcguire's esophagus    • Breast cancer (HCC)    • Chronic renal insufficiency     Stage 3   • MCCRAY (dyspnea on exertion)    • Dyspnea    • GERD (gastroesophageal reflux disease)    • H/O electrocardiogram    • History of staph infection    • History of stroke     right PICA noted on MRI 02/2022- old not acute    • Hx of being hospitalized     [facility] in 09/2010 and 10/2010 for atrial fibrillation, Dr. Perla Hurtado   • Hyperlipidemia    • Hypertension    • Hypoxia    • IBS (irritable bowel syndrome)    • Kidney dysfunction    • Lower extremity edema    • Lump or mass in breast    • Malignant neoplasm of breast (HCC)    • Osteoarthritis    • Osteopenia    • Osteoporosis    • Paroxysmal atrial fibrillation (HCC)    • Seizure (HCC)     AS A TEENAGER, NOT CURRENTLY   • Sinusitis    • SOB (shortness of breath)      Past Surgical History:   Procedure Laterality Date   • BREAST LUMPECTOMY  1990    For LCIS   • CARDIAC CATHETERIZATION Left 4/27/2016    Procedure: Cardiac catheterization;  Surgeon: Kevin Guillen MD;  Location: [facility] INVASIVE LOCATION;  Service:    • FOOT SURGERY     • HAND ARTHROPLASTY Right    • KNEE ARTHROSCOPY     • OTHER SURGICAL HISTORY      LYMPHATIC SURGERY   • TOTAL HIP ARTHROPLASTY Right 7/12/2017    Procedure: TOTAL HIP ARTHROPLASTY ANTERIOR WITH HANA TABLE;  Surgeon: Van Mcguire MD;  Location: [facility] OR;   Service:    • TOTAL KNEE ARTHROPLASTY Right 11/19/2018    Procedure: TOTAL KNEE ARTHROPLASTY;  Surgeon: Van Mcguire MD;  Location: [facility] OR;  Service: Orthopedics       HEMATOLOGIC/ONCOLOGIC HISTORY:  (History from previous dates can be found in the separate document.)    MEDICATIONS    Current Outpatient Medications:   •  alendronate (FOSAMAX) 70 MG tablet, Take 70 mg by mouth Every 7 (Seven) Days. sunday, Disp: , Rfl: 12  •  alosetron (LOTRONEX) 0.5 MG tablet, Take 0.05 mg by mouth Daily As Needed., Disp: , Rfl:   •  ALPRAZolam (XANAX) 1 MG tablet, Take 1 mg by mouth every night., Disp: , Rfl:   •  amiodarone (PACERONE) 200 MG tablet, Take  by mouth., Disp: , Rfl:   •  Anoro Ellipta 62.5-25 MCG/INH aerosol powder  inhaler, Inhale 1 puff Daily., Disp: , Rfl:   •  bumetanide (BUMEX) 1 MG tablet, Take 1 mg by mouth Daily., Disp: , Rfl:   •  cephalexin (KEFLEX) 500 MG capsule, TAKE 4 CAPSULES BY MOUTH 1 HOUR BEFORE DENTAL PROCEDURE, Disp: 4 capsule, Rfl: 5  •  cetirizine (ZyrTEC) 10 MG tablet, Take 10 mg by mouth Daily., Disp: , Rfl:   •  clobetasol (OLUX) 0.05 % topical foam, As Needed., Disp: , Rfl:   •  cyclobenzaprine (FLEXERIL) 10 MG tablet, cyclobenzaprine 10 mg tablet  TK 1 T PO Q 8 H PRN, Disp: , Rfl:   •  cycloSPORINE (RESTASIS) 0.05 % ophthalmic emulsion, Administer 1 drop to both eyes 2 (Two) Times a Day., Disp: , Rfl:   •  diltiaZEM (CARDIZEM) 60 MG tablet, Take 60 mg by mouth 2 (Two) Times a Day. PATIENT TAKES ONE TABLET AT 5 PM AND ONE TAB HS, Disp: , Rfl:   •  diphenoxylate-atropine (LOMOTIL) 2.5-0.025 MG per tablet, Take 1 tablet by mouth 4 (Four) Times a Day As Needed for Diarrhea., Disp: , Rfl:   •  Eliquis 5 MG tablet tablet, Take 1 tablet by mouth Every 12 (Twelve) Hours., Disp: 180 tablet, Rfl: 3  •  esomeprazole (NexIUM) 40 MG capsule, Take 40 mg by mouth 2 (two) times a day., Disp: , Rfl:   •  Glucosamine 500 MG capsule, Take  by mouth., Disp: , Rfl:   •  hydrochlorothiazide  (HYDRODIURIL) 12.5 MG tablet, Take 12.5 mg by mouth Daily., Disp: , Rfl:   •  ketoconazole (NIZORAL) 2 % shampoo, ketoconazole 2 % shampoo  APPLY EXTERNALLY TO THE SCALP 3 TIMES A WEEK. LEAVE IN 5 MINUTES BEFORE WASHING OUT, Disp: , Rfl:   •  losartan (COZAAR) 25 MG tablet, Take 25 mg by mouth Daily., Disp: , Rfl:   •  methylPREDNISolone (MEDROL) 4 MG dose pack, Use as directed by package instructions, Disp: 21 tablet, Rfl: 0  •  metoprolol tartrate (LOPRESSOR) 25 MG tablet, Take 0.625 mg by mouth As Needed., Disp: , Rfl:   •  montelukast (SINGULAIR) 10 MG tablet, Take 10 mg by mouth Daily., Disp: , Rfl:   •  Mucinex D Max Strength 120-1200 MG tablet sustained-release 12 hour, Take 1 tablet by mouth Every 12 (Twelve) Hours., Disp: , Rfl:   •  Multiple Vitamin (MULTIVITAMIN) capsule, , Disp: , Rfl:   •  Multiple Vitamins-Iron tablet, Take  by mouth Daily., Disp: , Rfl:   •  predniSONE (DELTASONE) 10 MG tablet, TAKE 4 TABLETS BY MOUTH DAILY FOR 3 DAYS, THEN 3 TABS DAILY FOR 3 DAYS, THEN 2 TABS DAILY FOR 3 DAYS THEN 1 TAB DAILY UNTIL FINISHED, Disp: , Rfl:   •  Salicylic Acid 6 % shampoo, salicylic acid 6 % shampoo  APPLY EXTERNALLY TO THE SCALP 3 TIMES A WEEK. LEAVE IN 5 MINUTES BEFORE WASHING OUT, Disp: , Rfl:   •  simvastatin (ZOCOR) 40 MG tablet, Take 40 mg by mouth Daily., Disp: , Rfl: 3  •  apixaban (Eliquis) 5 MG tablet tablet, Take 1 tablet by mouth Every 12 (Twelve) Hours., Disp: , Rfl:   •  losartan (COZAAR) 25 MG tablet, losartan 25 mg tablet  TAKE 1 TABLET BY MOUTH ONCE DAILY FOR 90 DAYS, Disp: , Rfl:   No current facility-administered medications for this visit.    Facility-Administered Medications Ordered in Other Visits:   •  mupirocin (BACTROBAN) 2 % nasal ointment, , Nasal, BID, Van Mcguire MD    ALLERGIES:     Allergies   Allergen Reactions   • Penicillins Anaphylaxis     Reactions: syncope and seizures   • Adhesive Tape Hives     Tears skin   • Lidocaine-Epinephrine Unknown - Low Severity   •  Pb-Hyoscy-Atropine-Scopolamine Unknown - Low Severity   • Atropine Hives   • Epinephrine Palpitations     Patient states will go into A-Fib   • Sulfa Antibiotics Nausea Only     STATES SHE DOES FINE WITH CERTAIN SULFA DRUGS       SOCIAL HISTORY:       Social History     Socioeconomic History   • Marital status:      Spouse name: Cuate   • Number of children: 1   • Years of education: 1 year of college   Tobacco Use   • Smoking status: Former Smoker     Quit date: 1975     Years since quittin.8   • Smokeless tobacco: Never Used   • Tobacco comment: QUIT SMOKING IN LATE    Vaping Use   • Vaping Use: Never used   Substance and Sexual Activity   • Alcohol use: Yes     Comment: social drinker   • Drug use: No   • Sexual activity: Defer         FAMILY HISTORY:  Family History   Problem Relation Age of Onset   • Heart disease Other    • Stroke Other    • Stroke Mother    • Heart disease Mother    • Hypertension Mother    • Leukemia Father         Mid 60s   • Hypertension Brother    • Cancer Paternal Aunt    • Cancer Paternal Grandfather    • Malig Hyperthermia Neg Hx        REVIEW OF SYSTEMS:    Review of Systems   Constitutional: Negative for activity change.   HENT: Negative for nosebleeds and trouble swallowing.    Respiratory: Negative for shortness of breath and wheezing.    Cardiovascular: Negative for chest pain and palpitations.   Gastrointestinal: Negative for constipation, diarrhea and nausea.   Genitourinary: Negative for dysuria and hematuria.   Musculoskeletal: Negative for arthralgias and myalgias.   Skin: Negative for rash and wound.   Neurological: Negative for seizures and syncope.   Hematological: Negative for adenopathy. Does not bruise/bleed easily.   Psychiatric/Behavioral: Negative for confusion.           Objective    Vitals:    22 1248   BP: 151/69   Pulse: 68   Resp: 17   Temp: 97 °F (36.1 °C)   TempSrc: Temporal   SpO2: 99%   Weight: 67.9 kg (149 lb 12.8 oz)   Height:  "163.8 cm (64.49\")   PainSc: 0-No pain     Current Status 3/18/2022   ECOG score 0      PHYSICAL EXAM:        CONSTITUTIONAL:  Vital signs reviewed.  No distress, looks comfortable.  EYES:  Conjunctiva and lids unremarkable.  PERRLA  EARS,NOSE,MOUTH,THROAT:  Ears and nose appear unremarkable.  Lips, teeth, gums appear unremarkable.  RESPIRATORY:  Normal respiratory effort.  Lungs clear to auscultation bilaterally.  CARDIOVASCULAR:  Normal S1, S2.  No murmurs rubs or gallops.  No significant lower extremity edema.  GASTROINTESTINAL: Abdomen appears unremarkable.  Nontender.  No hepatomegaly.  No splenomegaly.  LYMPHATIC:  No cervical, supraclavicular, axillary lymphadenopathy.  SKIN:  Warm.  No rashes.  PSYCHIATRIC:  Normal judgment and insight.  Normal mood and affect.     RECENT LABS:        WBC   Date/Time Value Ref Range Status   03/17/2022 12:40 PM 11.47 (H) 3.40 - 10.80 10*3/mm3 Final   07/23/2021 11:57 AM 7.53 4.5 - 11.0 10*3/uL Final     Hemoglobin   Date/Time Value Ref Range Status   03/17/2022 12:40 PM 9.3 (L) 12.0 - 15.9 g/dL Final   07/23/2021 11:57 AM 11.7 (L) 12.0 - 16.0 g/dL Final     Platelets   Date/Time Value Ref Range Status   03/17/2022 12:40  140 - 450 10*3/mm3 Final   07/23/2021 11:57  140 - 440 10*3/uL Final       Assessment/Plan     ASSESSMENT:  Malignant neoplasm of upper-outer quadrant of left breast in female, estrogen receptor positive (HCC)  - Vitamin B12  - Folate RBC  - Comprehensive Metabolic Panel  - Lactate Dehydrogenase  - Haptoglobin  - Direct Antiglobulin Test ([facility])  - CT Abdomen Pelvis With Contrast  - CT Chest With Contrast  - NM Bone Scan Whole Body  - CBC & Differential    Fatigue, unspecified type  - CT Abdomen Pelvis With Contrast  - CT Chest With Contrast  - NM Bone Scan Whole Body  - CBC & Differential    Anemia, unspecified type  - CT Abdomen Pelvis With Contrast  - CT Chest With Contrast  - NM Bone Scan Whole Body  - CBC & Differential      *Stage IB " ([id number]) left breast cancer. Grade 2, 1.9 cm, 1 mm micrometastasis in 1 out of 2 sentinel nodes. ER positive, HER-2 negative. OncoType DX: low risk category.   Tamoxifen 10/11/2013 through November 2013. Just a few days or so of Aromasin in January 2014. Could not tolerate hormonal therapy due to vaginal dryness and irritation and declined any more hormonal therapy.   On Fosamax and Evista through Dr. Perla Garza.  · 3/18/2022: Concerned for possible recurrence based on significant drop in Hb with no other explanation and low back pain and right shoulder pain with a hormone positive breast cancer which can recur years later.  Check scans.    * Atrial fibrillation, on Eliquis through Dr. Haley Roman.   No bleeding.    *Anemia of stage 3 chronic renal insufficiency (Dr. Rell Shannon is her nephrologist whom she follows up with). (Evaluation negative for B12 or folate deficiency, hemolysis or multiple myeloma. She has not had a bone marrow biopsy. She responds well to Procrit).   · She received Procrit 12/2014 and 1 dose 06/2015 and 1 dose 09/2015 and 1 dose on 10/28/2015 and some weekly doses of Procrit after hip replacement July 2017.  · She responds to Procrit.   · 3/18/2022: Hb down to 9.3.  Restart Procrit again    * Iron deficiency anemia. does not tolerate oral iron due to significant abdominal pain and diarrhea. She refuses any further Benadryl as a pre-medicine because it made her feel very sleepy for over 24-hours.  Today's iron labs normal.  · 2/2/2022: Ferritin 41, 10% saturation, Hb 12.6.  1 dose Injectafer given.  · 3/17/2022: Ferritin 1013, 15% saturation, Hb 9.3.  No need for IV iron.    *Anemia, for reasons other than iron deficiency and anemia of stage III chronic kidney disease.  Hb 9.3 on 3/17/2022, lowest value we have since 2017.  Hb was normal, 12.6, 2/2/2022.    *Fibrocystic changes in both breasts.     *Dyspnea on exertion  · Previously complained of hypoxia with exertion,  1/18/2017.  · Follows with Dr. Nguyen.   · Dr. Nguyen noted 11/1/2021: Spirometry normal that day, but 5 years prior showed some obstruction with reversibility.  11/1/2021: Started an inhaler  · Due to anemia, he referred her back to me again.  Hb that day, 11/1/2021, 9.6.  He noted she is on oral anticoagulation through cardiology.  However, after that visit, on 11/29/2021, Hb 12    PLAN:   This is the lowest her Hb has been since orthopedic surgery in 2017.  She does have a remote history of a hormone positive breast cancer (which can recur several years later).  Low back pain and right shoulder pain.  No other apparent reason for the significant drop in hemoglobin and significant fatigue.  Check CT chest abdomen and pelvis and bone scan.  Appointment with me a few days later.  Also check additional anemia labs including hemolysis labs with haptoglobin, LDH, [facility], and B12 and RBC folate    41 minutes.  Total time.  Same day    Prior plan was:  · MD 1 year.  One week prior: Ferritin, iron panel, CBC, reticulated hemoglobin  · If HP drops below 10, she could return for stat iron labs and consider resuming Procrit.  · Of course, if she wants to come back earlier for labs that is fine as well.  · Last mammogram September 2021, unremarkable per her report.  I have requested this.    41 minutes.  Total time.  Same day.

## 2022-03-18 NOTE — NURSING NOTE
Procrit approval good per Lulu.  Procrit given in right arm without incidence. Pt sent to scheduling to make future appts and instructed to call with concerns and she v/u.

## 2022-03-24 NOTE — PROGRESS NOTES
Subjective .     REASONS FOR FOLLOWUP:  Breast cancer, anemia    HISTORY OF PRESENT ILLNESS:  The patient is a 77 y.o. year old female  who is here for follow-up with the above-mentioned history.    She states beginning the night of the CT and bone scan, she began to have flashing lights and blurry vision when she goes from a dark room into a bright room.  Left eye more than the right eye.  She also woke up the next morning with significant TMJ pain.  States she has clenched her teeth in the past during the day that she has noticed.  Her  has not noticed any grinding of teeth.  She has seen her eye doctor for this eye issue and is probably going to see them again next week because the symptoms persist.  She is going to see her dentist regarding the TMJ discomfort    No bleeding, chest pain, SOA    Past Medical History:   Diagnosis Date   • Anemia     Of chronic renal insufficiency   • Atrial fibrillation (HCC)    • Mcguire's esophagus    • Breast cancer (HCC)    • Chronic renal insufficiency     Stage 3   • MCCRAY (dyspnea on exertion)    • Dyspnea    • GERD (gastroesophageal reflux disease)    • H/O electrocardiogram    • History of staph infection    • History of stroke     right PICA noted on MRI 02/2022- old not acute    • Hx of being hospitalized     [facility] in 09/2010 and 10/2010 for atrial fibrillation, Dr. Perla Hurtado   • Hyperlipidemia    • Hypertension    • Hypoxia    • IBS (irritable bowel syndrome)    • Kidney dysfunction    • Lower extremity edema    • Lump or mass in breast    • Malignant neoplasm of breast (HCC)    • Osteoarthritis    • Osteopenia    • Osteoporosis    • Paroxysmal atrial fibrillation (HCC)    • Seizure (HCC)     AS A TEENAGER, NOT CURRENTLY   • Sinusitis    • SOB (shortness of breath)      Past Surgical History:   Procedure Laterality Date   • BREAST LUMPECTOMY  1990    For LCIS   • CARDIAC CATHETERIZATION Left 4/27/2016    Procedure: Cardiac  catheterization;  Surgeon: Kevin Guillen MD;  Location: [facility] INVASIVE LOCATION;  Service:    • FOOT SURGERY     • HAND ARTHROPLASTY Right    • KNEE ARTHROSCOPY     • OTHER SURGICAL HISTORY      LYMPHATIC SURGERY   • TOTAL HIP ARTHROPLASTY Right 7/12/2017    Procedure: TOTAL HIP ARTHROPLASTY ANTERIOR WITH HANA TABLE;  Surgeon: Van Mcguire MD;  Location: [facility] MAIN OR;  Service:    • TOTAL KNEE ARTHROPLASTY Right 11/19/2018    Procedure: TOTAL KNEE ARTHROPLASTY;  Surgeon: aVn Mcguire MD;  Location: [facility] OR;  Service: Orthopedics       HEMATOLOGIC/ONCOLOGIC HISTORY:  (History from previous dates can be found in the separate document.)    MEDICATIONS    Current Outpatient Medications:   •  alendronate (FOSAMAX) 70 MG tablet, Take 70 mg by mouth Every 7 (Seven) Days. sunday, Disp: , Rfl: 12  •  alosetron (LOTRONEX) 0.5 MG tablet, Take 0.05 mg by mouth Daily As Needed., Disp: , Rfl:   •  ALPRAZolam (XANAX) 1 MG tablet, Take 1 mg by mouth every night., Disp: , Rfl:   •  amiodarone (PACERONE) 200 MG tablet, Take  by mouth., Disp: , Rfl:   •  Anoro Ellipta 62.5-25 MCG/INH aerosol powder  inhaler, Inhale 1 puff Daily., Disp: , Rfl:   •  apixaban (ELIQUIS) 5 MG tablet tablet, Take 1 tablet by mouth Every 12 (Twelve) Hours., Disp: , Rfl:   •  bumetanide (BUMEX) 1 MG tablet, Take 1 mg by mouth Daily., Disp: , Rfl:   •  cefdinir (OMNICEF) 300 MG capsule, Take 300 mg by mouth Every 12 (Twelve) Hours., Disp: , Rfl:   •  cephalexin (KEFLEX) 500 MG capsule, TAKE 4 CAPSULES BY MOUTH 1 HOUR BEFORE DENTAL PROCEDURE, Disp: 4 capsule, Rfl: 5  •  cetirizine (ZyrTEC) 10 MG tablet, Take 10 mg by mouth Daily., Disp: , Rfl:   •  clobetasol (OLUX) 0.05 % topical foam, As Needed., Disp: , Rfl:   •  cyclobenzaprine (FLEXERIL) 10 MG tablet, cyclobenzaprine 10 mg tablet  TK 1 T PO Q 8 H PRN, Disp: , Rfl:   •  cycloSPORINE (RESTASIS) 0.05 % ophthalmic emulsion, Administer 1 drop to both eyes 2 (Two) Times a Day., Disp: , Rfl:    •  diltiaZEM (CARDIZEM) 60 MG tablet, Take 60 mg by mouth 2 (Two) Times a Day. PATIENT TAKES ONE TABLET AT 5 PM AND ONE TAB HS, Disp: , Rfl:   •  diphenoxylate-atropine (LOMOTIL) 2.5-0.025 MG per tablet, Take 1 tablet by mouth 4 (Four) Times a Day As Needed for Diarrhea., Disp: , Rfl:   •  Eliquis 5 MG tablet tablet, Take 1 tablet by mouth Every 12 (Twelve) Hours., Disp: 180 tablet, Rfl: 3  •  esomeprazole (NexIUM) 40 MG capsule, Take 40 mg by mouth 2 (two) times a day., Disp: , Rfl:   •  Glucosamine 500 MG capsule, Take  by mouth., Disp: , Rfl:   •  hydrochlorothiazide (HYDRODIURIL) 12.5 MG tablet, Take 12.5 mg by mouth Daily., Disp: , Rfl:   •  ketoconazole (NIZORAL) 2 % shampoo, ketoconazole 2 % shampoo  APPLY EXTERNALLY TO THE SCALP 3 TIMES A WEEK. LEAVE IN 5 MINUTES BEFORE WASHING OUT, Disp: , Rfl:   •  losartan (COZAAR) 25 MG tablet, losartan 25 mg tablet  TAKE 1 TABLET BY MOUTH ONCE DAILY FOR 90 DAYS, Disp: , Rfl:   •  methylPREDNISolone (MEDROL) 4 MG dose pack, Use as directed by package instructions, Disp: 21 tablet, Rfl: 0  •  metoprolol tartrate (LOPRESSOR) 25 MG tablet, Take 0.625 mg by mouth As Needed., Disp: , Rfl:   •  montelukast (SINGULAIR) 10 MG tablet, Take 10 mg by mouth Daily., Disp: , Rfl:   •  Mucinex D Max Strength 120-1200 MG tablet sustained-release 12 hour, Take 1 tablet by mouth Every 12 (Twelve) Hours., Disp: , Rfl:   •  Multiple Vitamin (MULTIVITAMIN) capsule, , Disp: , Rfl:   •  Multiple Vitamins-Iron tablet, Take  by mouth Daily., Disp: , Rfl:   •  predniSONE (DELTASONE) 10 MG tablet, TAKE 4 TABLETS BY MOUTH DAILY FOR 3 DAYS, THEN 3 TABS DAILY FOR 3 DAYS, THEN 2 TABS DAILY FOR 3 DAYS THEN 1 TAB DAILY UNTIL FINISHED, Disp: , Rfl:   •  Salicylic Acid 6 % shampoo, salicylic acid 6 % shampoo  APPLY EXTERNALLY TO THE SCALP 3 TIMES A WEEK. LEAVE IN 5 MINUTES BEFORE WASHING OUT, Disp: , Rfl:   •  simvastatin (ZOCOR) 40 MG tablet, Take 40 mg by mouth Daily., Disp: , Rfl: 3  No current  facility-administered medications for this visit.    Facility-Administered Medications Ordered in Other Visits:   •  mupirocin (BACTROBAN) 2 % nasal ointment, , Nasal, BID, Van Mcguire MD    ALLERGIES:     Allergies   Allergen Reactions   • Penicillins Anaphylaxis     Reactions: syncope and seizures   • Adhesive Tape Hives     Tears skin   • Lidocaine-Epinephrine Unknown - Low Severity   • Pb-Hyoscy-Atropine-Scopolamine Unknown - Low Severity   • Atropine Hives   • Epinephrine Palpitations     Patient states will go into A-Fib   • Sulfa Antibiotics Nausea Only     STATES SHE DOES FINE WITH CERTAIN SULFA DRUGS       SOCIAL HISTORY:       Social History     Socioeconomic History   • Marital status:      Spouse name: Cuate   • Number of children: 1   • Years of education: 1 year of college   Tobacco Use   • Smoking status: Former Smoker     Quit date: 1975     Years since quittin.8   • Smokeless tobacco: Never Used   • Tobacco comment: QUIT SMOKING IN LATE 1970s   Vaping Use   • Vaping Use: Never used   Substance and Sexual Activity   • Alcohol use: Yes     Comment: social drinker   • Drug use: No   • Sexual activity: Defer         FAMILY HISTORY:  Family History   Problem Relation Age of Onset   • Heart disease Other    • Stroke Other    • Stroke Mother    • Heart disease Mother    • Hypertension Mother    • Leukemia Father         Mid 60s   • Hypertension Brother    • Cancer Paternal Aunt    • Cancer Paternal Grandfather    • Malig Hyperthermia Neg Hx        REVIEW OF SYSTEMS:    Review of Systems   Constitutional: Negative for activity change.   HENT: Negative for nosebleeds and trouble swallowing.    Respiratory: Negative for shortness of breath and wheezing.    Cardiovascular: Negative for chest pain and palpitations.   Gastrointestinal: Negative for constipation, diarrhea and nausea.   Genitourinary: Negative for dysuria and hematuria.   Musculoskeletal: Negative for arthralgias and myalgias.  "  Skin: Negative for rash and wound.   Neurological: Negative for seizures and syncope.   Hematological: Negative for adenopathy. Does not bruise/bleed easily.   Psychiatric/Behavioral: Negative for confusion.           Objective    Vitals:    03/25/22 1039   BP: 135/70   Pulse: 64   Resp: 16   Temp: 97.3 °F (36.3 °C)   TempSrc: Temporal   SpO2: 99%   Weight: 66.4 kg (146 lb 4.8 oz)   Height: 163.8 cm (64.49\")   PainSc: 0-No pain     Current Status 3/18/2022   ECOG score 0      PHYSICAL EXAM:        CONSTITUTIONAL:  Vital signs reviewed.  No distress, looks comfortable.  EYES:  Conjunctiva and lids unremarkable.  PERRLA  EARS,NOSE,MOUTH,THROAT:  Ears and nose appear unremarkable.  Lips, teeth, gums appear unremarkable.  RESPIRATORY:  Normal respiratory effort.  Lungs clear to auscultation bilaterally.  CARDIOVASCULAR:  Normal S1, S2.  No murmurs rubs or gallops.  No significant lower extremity edema.  GASTROINTESTINAL: Abdomen appears unremarkable.  Nontender.  No hepatomegaly.  No splenomegaly.  LYMPHATIC:  No cervical, supraclavicular, axillary lymphadenopathy.  SKIN:  Warm.  No rashes.  PSYCHIATRIC:  Normal judgment and insight.  Normal mood and affect.        RECENT LABS:        WBC   Date/Time Value Ref Range Status   03/25/2022 10:32 AM 22.92 (H) 3.40 - 10.80 10*3/mm3 Final   07/23/2021 11:57 AM 7.53 4.5 - 11.0 10*3/uL Final     Hemoglobin   Date/Time Value Ref Range Status   03/25/2022 10:32 AM 11.0 (L) 12.0 - 15.9 g/dL Final   07/23/2021 11:57 AM 11.7 (L) 12.0 - 16.0 g/dL Final     Platelets   Date/Time Value Ref Range Status   03/25/2022 10:32  140 - 450 10*3/mm3 Final   07/23/2021 11:57  140 - 440 10*3/uL Final       Assessment/Plan     ASSESSMENT:  Malignant neoplasm of upper-outer quadrant of left breast in female, estrogen receptor positive (HCC)  - CBC & Differential  - Ferritin  - Iron Profile  - Retic With IRF & RET-He    Anemia due to stage 3 chronic kidney disease treated with " erythropoietin (HCC)  - CBC & Differential  - Ferritin  - Iron Profile  - Retic With IRF & RET-He      *Stage IB ([id number]) left breast cancer. Grade 2, 1.9 cm, 1 mm micrometastasis in 1 out of 2 sentinel nodes. ER positive, HER-2 negative. OncoType DX: low risk category.   Tamoxifen 10/11/2013 through November 2013. Just a few days or so of Aromasin in January 2014. Could not tolerate hormonal therapy due to vaginal dryness and irritation and declined any more hormonal therapy.   On Fosamax and Evista through Dr. Perla Garza.  · 3/18/2022: Concerned for possible recurrence based on significant drop in Hb with no other explanation and low back pain and right shoulder pain with a hormone positive breast cancer which can recur years later.  Check scans.  · Bone scan 3/21/2022: Negative  · CT 3/21/2022: Right pulmonary nodule stable from 12/2/2016.  No evidence of metastatic disease.  Therefore, no signs of recurrence of breast cancer.    * Atrial fibrillation, on Eliquis through Dr. Haley Roman.   No bleeding.    *Anemia of stage 3 chronic renal insufficiency (Dr. Rell Shannon is her nephrologist whom she follows up with). (Evaluation negative for B12 or folate deficiency, hemolysis or multiple myeloma. She has not had a bone marrow biopsy. She responds well to Procrit).   · She received Procrit 12/2014 and 1 dose 06/2015 and 1 dose 09/2015 and 1 dose on 10/28/2015 and some weekly doses of Procrit after hip replacement July 2017.  · She responds to Procrit.   · 3/18/2022: Hb down to 9.3.  Restart Procrit again  · 3/25/2022: Hb 11 (better after Procrit)    * Iron deficiency anemia. does not tolerate oral iron due to significant abdominal pain and diarrhea. She refuses any further Benadryl as a pre-medicine because it made her feel very sleepy for over 24-hours.  Today's iron labs normal.  · 2/2/2022: Ferritin 41, 10% saturation, Hb 12.6.  1 dose Injectafer given.  · 3/17/2022: Ferritin 1013, 15% saturation, Hb 9.3.   No need for IV iron.  · 3/25/2022: Hb 11    *Anemia, for reasons other than iron deficiency and anemia of stage III chronic kidney disease.  · Hb 9.3 on 3/17/2022, lowest value we have since 2017.  Hb was normal, 12.6, 2/2/2022.  · 3/18/2022, unremarkable: B12, RBC folate, bilirubin, LDH, haptoglobin, direct [place]  · 3/25/2022: Hb 11    *Leukocytosis  · WBC became elevated, 2/2/2022, at 11 (states she had 2 rounds of steroids in January for sinus infection)  · Predominance of mature segmented neutrophils.  Therefore, appears reactive  · 3/25/2022: WBC 22.9 (currently on steroids through PCP for TMJ pain and face swelling)    *Fibrocystic changes in both breasts.     *Dyspnea on exertion  · Previously complained of hypoxia with exertion, 1/18/2017.  · Follows with Dr. Nguyen.   · Dr. Nguyen noted 11/1/2021: Spirometry normal that day, but 5 years prior showed some obstruction with reversibility.  11/1/2021: Started an inhaler  · Due to anemia, he referred her back to me again.  Hb that day, 11/1/2021, 9.6.  He noted she is on oral anticoagulation through cardiology.  However, after that visit, on 11/29/2021, Hb 12    *Flashing lights and blurry vision when going from a dark room to a bright room and awoke with TMJ pain all beginning the night of 3/21/2022 (the day of CT and bone scans).  (Left more so than right)  · 3/25/2022: States she has seen her ophthalmologist and no abnormalities were found.  Plans to follow-up with him again.  Plans to see her dentist for the TMJ discomfort.  I told her I suspect she was under more stress due to imaging to look for recurrence of breast cancer.  She states she has found herself clenching her teeth in the past.    PLAN:   · MD CBC stat ferritin, stat iron panel, reticulate hemoglobin, possible Procrit 5 weeks  · (Procrit just restarted, 3/18/2022 due to Hb down to 9.3, the lowest it has been since orthopedic surgery in 2017)  · If Hb drops to around 9 or lower again, could  consider a bone marrow biopsy        Prior plan was:  · MD 1 year.  One week prior: Ferritin, iron panel, CBC, reticulated hemoglobin  · If HP drops below 10, she could return for stat iron labs and consider resuming Procrit.  · Of course, if she wants to come back earlier for labs that is fine as well.  · Last mammogram September 2021, unremarkable per her report.  I have requested this.    42 minutes.  Total time.  Same day.    Send a copy this to Dr. Mike Rojas, PCP and Dr. Perla Garza, OB/GYN

## 2022-04-04 NOTE — TELEPHONE ENCOUNTER
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 She is concerned of her Hgb dropping and wants to inform Dr. Cornejo and wants his advice if she should has an appt. Sooner than 04/26. She is aware Dr. Cornejo is out of town and would like a return call next week.

## 2022-04-04 NOTE — TELEPHONE ENCOUNTER
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  The left optic nerve damaged and can not be reversed.  She has been placed on steroids which is the recommendation for treatment.  Pt is taking Eliquis and will need instruction on how many days to hold prior to the procedure.     Note: Pt would like to eventually s/w you regarding the condition. She was informed that this condition could affect the rest of the arteries as well.  She will call back to set up an appt.

## 2022-04-04 NOTE — TELEPHONE ENCOUNTER
She needs an appointment to see me or Jenny.  Not sure what I can do for her over the phone so she just needs to come in.  I do not know how long she needs to hold anticoagulation because I do not know what kind of procedure she is talking about. JL

## 2022-04-04 NOTE — TELEPHONE ENCOUNTER
I called pt to offer an appt for tomorrow with Jenny, but she was not able to accept.  Pt can not see to drive.  Pt is not sure when she would be able to make an appt at this time.        I called pt's ophthalmologist  office, Dr. Mina Muro to see to request an office note for Dr. Roman, but had to leave a vm.      Dr. Muro's office: [phone].

## 2022-04-04 NOTE — TELEPHONE ENCOUNTER
Returned call to patient who is reporting that she wanted to speak with Dr. Cornejo. I got cut off accidentally while talking to the patient.  I called her back and she stated to just have Dr. Cornejo's nurse call her back next week.

## 2022-04-05 NOTE — PROGRESS NOTES
Date of Office Visit: 22  Encounter Provider: YUSEF Momin  Place of Service: [facility] CARDIOLOGY  Patient Name: Mary Kay Saab  :1945    Chief Complaint   Patient presents with   • surgery clearance   • Follow-up   • Atrial Fibrillation   • Hypertension   :     HPI: Mary Kay Saab is a 77 y.o. female  with paroxysmal atrial fibrillation, TIA, breast cancer, temporal arteritis, anemia, GERD, IBS,and osteoarthritis.    She is followed by Dr. Roman and I will visit with her for the first time.  I have reviewed her medical record    She had a false positive stress test in .  The coronary arteries were normal on cardiac catheterization.    She wore a 13-day mobile telemetry which showed nonspecific atrial tachycardia but no atrial fibrillation.  There was no significant bradycardia and it was overall felt to be a benign monitor.  Her average heart rate was 58. she had symptoms of a TIA and had a carotid duplex 2022 which showed mild left internal carotid artery stenosis as well as some distal moderate left internal carotid artery stenosis.  She now presents for evaluation and needs surgical clearance for temporal artery biopsy.  She had vision changes of the left eye and then lost her vision.  She was found to have elevated inflammatory markers including mildly elevated RA factor and has been treated with steroid.  She denies chest pain , edema, or near syncope.  Allergies   Allergen Reactions   • Penicillins Anaphylaxis     Reactions: syncope and seizures   • Adhesive Tape Hives     Tears skin   • Lidocaine-Epinephrine Unknown - Low Severity   • Pb-Hyoscy-Atropine-Scopolamine Unknown - Low Severity   • Atropine Hives   • Epinephrine Palpitations     Patient states will go into A-Fib   • Sulfa Antibiotics Nausea Only     STATES SHE DOES FINE WITH CERTAIN SULFA DRUGS           Family and social history reviewed.     ROS  All other systems were reviewed and  "are negative          Objective:     Vitals:    04/05/22 1426   BP: 130/82   BP Location: Right arm   Patient Position: Sitting   Pulse: (!) 49   Weight: 68.7 kg (151 lb 6.4 oz)   Height: 163.8 cm (64.49\")     Body mass index is 25.59 kg/m².    PHYSICAL EXAM:  Pulmonary:      Effort: Pulmonary effort is normal.   Cardiovascular:      Regular rhythm.           ECG 12 Lead    Date/Time: 4/5/2022 4:37 PM  Performed by: Jenny Sams APRN  Authorized by: Jenny Sams APRN   Comparison: compared with previous ECG   Similar to previous ECG  Rhythm: sinus bradycardia  Rate: normal  Comments: No significant change              Current Outpatient Medications   Medication Sig Dispense Refill   • alendronate (FOSAMAX) 70 MG tablet Take 70 mg by mouth Every 7 (Seven) Days. sunday 12   • alosetron (LOTRONEX) 0.5 MG tablet Take 0.05 mg by mouth Daily As Needed.     • ALPRAZolam (XANAX) 1 MG tablet Take 1 mg by mouth every night.     • amiodarone (PACERONE) 200 MG tablet Take  by mouth.     • apixaban (ELIQUIS) 5 MG tablet tablet Take 1 tablet by mouth Every 12 (Twelve) Hours.     • bumetanide (BUMEX) 1 MG tablet Take 1 mg by mouth Daily.     • cephalexin (KEFLEX) 500 MG capsule TAKE 4 CAPSULES BY MOUTH 1 HOUR BEFORE DENTAL PROCEDURE 4 capsule 5   • cetirizine (ZyrTEC) 10 MG tablet Take 10 mg by mouth Daily.     • clobetasol (OLUX) 0.05 % topical foam As Needed.     • cycloSPORINE (RESTASIS) 0.05 % ophthalmic emulsion Administer 1 drop to both eyes 2 (Two) Times a Day.     • diltiaZEM (CARDIZEM) 60 MG tablet Take 60 mg by mouth 2 (Two) Times a Day. PATIENT TAKES ONE TABLET AT 5 PM AND ONE TAB HS     • diphenoxylate-atropine (LOMOTIL) 2.5-0.025 MG per tablet Take 1 tablet by mouth 4 (Four) Times a Day As Needed for Diarrhea.     • Eliquis 5 MG tablet tablet Take 1 tablet by mouth Every 12 (Twelve) Hours. 180 tablet 3   • esomeprazole (NexIUM) 40 MG capsule Take 40 mg by mouth 2 (two) times a day.     • Glucosamine 500 MG " capsule Take  by mouth.     • hydrochlorothiazide (HYDRODIURIL) 12.5 MG tablet Take 12.5 mg by mouth Daily.     • ketoconazole (NIZORAL) 2 % shampoo ketoconazole 2 % shampoo   APPLY EXTERNALLY TO THE SCALP 3 TIMES A WEEK. LEAVE IN 5 MINUTES BEFORE WASHING OUT     • metoprolol tartrate (LOPRESSOR) 25 MG tablet Take 0.625 mg by mouth As Needed.     • montelukast (SINGULAIR) 10 MG tablet Take 10 mg by mouth Daily.     • Mucinex D Max Strength 120-1200 MG tablet sustained-release 12 hour Take 1 tablet by mouth Every 12 (Twelve) Hours.     • Multiple Vitamin (MULTIVITAMIN) capsule      • predniSONE (DELTASONE) 10 MG tablet TAKE 4 TABLETS BY MOUTH DAILY FOR 3 DAYS, THEN 3 TABS DAILY FOR 3 DAYS, THEN 2 TABS DAILY FOR 3 DAYS THEN 1 TAB DAILY UNTIL FINISHED     • Salicylic Acid 6 % shampoo salicylic acid 6 % shampoo   APPLY EXTERNALLY TO THE SCALP 3 TIMES A WEEK. LEAVE IN 5 MINUTES BEFORE WASHING OUT     • simvastatin (ZOCOR) 40 MG tablet Take 40 mg by mouth Daily.  3     No current facility-administered medications for this visit.     Facility-Administered Medications Ordered in Other Visits   Medication Dose Route Frequency Provider Last Rate Last Admin   • mupirocin (BACTROBAN) 2 % nasal ointment   Nasal BID Van Mcguire MD         Assessment:      No diagnosis found.     Orders Placed This Encounter   Procedures   • ECG 12 Lead     This order was created via procedure documentation     Order Specific Question:   Release to patient     Answer:   Immediate         Plan:   1.  77-year-old female with paroxysmal atrial fibrillation.  She has history of TIA.  QTC is stable on amiodarone she is continued on Eliquis  2.  Normal coronary arteries on cardiac catheterization in 2016.  She had a false positive stress test preceding that  3.  Hyperlipidemia on simvastatin 40 mg  4.  Left carotid artery stenosis mild proximal and moderate distally February 2022  5.  History of GERD  6.  IBS  7.  Anemia-follows with Dr. Cornejo-  receives occasional procrit  8. History of breast cancer  9 Elevated inflammatory markers-treated with prednisone  10.  Bilateral temporal arteritis with inflammatory markers-she is to have a bilateral temporal artery biopsy.  Her EKG is without ischemic change.  She has no chest pain.  She is cleared to proceed as scheduled.  May hold Eliquis 2 days prior.  10.  Borderline elevated rheumatoid factor-she is to discuss further evaluation with her PCP    Follow-up in September as scheduled          It has been a pleasure to participate in this patient's care.      Thank you,  YUSEF Momin      **I used Dragon to dictate this note:**

## 2022-04-19 NOTE — PROGRESS NOTES
Re-Assessment / Re-Certification        Patient: Mary Kay Saab   : 1945  Diagnosis/ICD-10 Code:  Achilles tendonitis, bilateral [M76.61, M76.62]  Referring practitioner: YUSEF Vázquez  Date of Initial Visit: Type: THERAPY  Noted: 2021  Today's Date: 2022  Patient seen for 17 sessions      Subjective:   Mary Kay Saab reports: she has had some medical issues recently and had to cancel some appointments.   Subjective Questionnaire: LEFS: 52  (47 last visit)  Clinical Progress: improved  Home Program Compliance: Yes  Treatment has included: manual therapy, therapeutic activity, electrical stimulation, ultrasound and cryotherapy    Subjective   Pain currently: R: 1  L: 1  Pain at worst: R: 6: L 6  Objective   Active Range of Motion   Left Ankle/Foot   Normal active range of motion     Right Ankle/Foot   Normal active range of motion     Strength/Myotome Testing      Left Ankle/Foot   Normal strength     Right Ankle/Foot   Normal strength     Goals  Plan Goals: SHORT TERM GOALS: Time for Goal Achievement: 6 visits    1.  Patient to be compliant with HEP. Met  2.  Pt able to ascend/descend steps and transfer with less ankle pain < 5/10 Progressing       LONG TERM GOALS: Time for Goal Achievement: 12 visits  1.  Pt to score 48 or greater on LEFS  Met  2.  Patient able to ascend/descend steps and prolonged standing & cooking with pain </=2  3.  Pt to demonstrate increased stability of the ankle  to balance on foam as needed to traverse uneven terrain .     Assessment/Plan  Progress toward previous goals: Partially Met        Recommendations: Continue as planned  Timeframe: 1 month  Prognosis to achieve goals: good    PT Signature: Angelia Gomez PT      Based upon review of the patient's progress and continued therapy plan, it is my medical opinion that Mary Kay Saab should continue physical therapy treatment at [facility] ([place], [place]) THER [facility] PHYSICAL THERAPY  [address] [place] [address]  [address] [place] [id number]  [phone].    Signature: __________________________________  Beatriz Beal APRN    Manual Therapy:   10      mins  30303;  Therapeutic Exercise:         mins  54975;     Neuromuscular Lenny:        mins  38079;    Therapeutic Activity:          mins  71492;     Gait Training:           mins  59519;     Ultrasound:   20       mins  76376;    Electrical Stimulation:   15      mins  86180 ( );  Dry Needling          mins self-pay    Timed Treatment:  30    mins   Total Treatment:    45   mins

## 2022-04-21 NOTE — PROGRESS NOTES
Physical Therapy Daily Progress Note  Visit: 18    Subjective Mary Kay Saab reports: her feet feel better after PT    Objective   See Exercise, Manual, and Modality Logs for complete treatment.     Assessment/Plan: Compliant/cooperative with current rehab efforts.  Plan details: Progress ROM / strengthening / stabilization / functional activity as tolerated       Manual Therapy:    10      mins  33690;  Therapeutic Exercise:          mins  25122;     Neuromuscular Lenny:         mins  34038;    Therapeutic Activity:           mins  15180;     Gait Training:            mins  16067;     Ultrasound:     20      mins  76089;    Electrical Stimulation:    15      mins  10193 ( );  Dry Needling           mins self-pay  Traction           mins 94441  Canalith Repositioning         mins 65348      Timed Treatment:  30    mins   Total Treatment:   45     mins    Angelia Gomez PT  [place] License #: [id number]    Physical Therapist

## 2022-04-25 NOTE — PROGRESS NOTES
Subjective .     REASONS FOR FOLLOWUP:  Breast cancer, anemia    HISTORY OF PRESENT ILLNESS:  The patient is a 77 y.o. year old female  who is here for follow-up with the above-mentioned history.    She states she suddenly completely lost vision in her left eye, 3/23/2022.  She states work-up revealed temporal arteritis.  She has been on prednisone 60 mg daily since 3/24/2022.  She states she is seeing Dr. Mina Muro.  She states he told her she would not regain vision in her left eye, but they are trying to prevent the same problem from occurring in the right eye.  She has an appoint to establish care with a rheumatologist, Dr. Fabienne Castro.    PLT is newly low today.  Denies bleeding.    Past Medical History:   Diagnosis Date   • Anemia     Of chronic renal insufficiency   • Atrial fibrillation (HCC)    • Mcguire's esophagus    • Breast cancer (HCC)    • Chronic renal insufficiency     Stage 3   • MCCRAY (dyspnea on exertion)    • Dyspnea    • GERD (gastroesophageal reflux disease)    • H/O electrocardiogram    • History of staph infection    • History of stroke     right PICA noted on MRI 02/2022- old not acute    • Hx of being hospitalized     Psychiatric in 09/2010 and 10/2010 for atrial fibrillation, Dr. Perla Hurtado   • Hyperlipidemia    • Hypertension    • Hypoxia    • IBS (irritable bowel syndrome)    • Kidney dysfunction    • Lower extremity edema    • Lump or mass in breast    • Malignant neoplasm of breast (HCC)    • Osteoarthritis    • Osteopenia    • Osteoporosis    • Paroxysmal atrial fibrillation (HCC)    • Seizure (HCC)     AS A TEENAGER, NOT CURRENTLY   • Sinusitis    • SOB (shortness of breath)      Past Surgical History:   Procedure Laterality Date   • BREAST LUMPECTOMY  1990    For LCIS   • CARDIAC CATHETERIZATION Left 4/27/2016    Procedure: Cardiac catheterization;  Surgeon: Kevin Guillen MD;  Location: [facility] INVASIVE LOCATION;  Service:    • FOOT SURGERY     • HAND  ARTHROPLASTY Right    • KNEE ARTHROSCOPY     • OTHER SURGICAL HISTORY      LYMPHATIC SURGERY   • TOTAL HIP ARTHROPLASTY Right 7/12/2017    Procedure: TOTAL HIP ARTHROPLASTY ANTERIOR WITH HANA TABLE;  Surgeon: Van Mcguire MD;  Location: [facility] OR;  Service:    • TOTAL KNEE ARTHROPLASTY Right 11/19/2018    Procedure: TOTAL KNEE ARTHROPLASTY;  Surgeon: Van Mcguire MD;  Location: [facility] OR;  Service: Orthopedics       HEMATOLOGIC/ONCOLOGIC HISTORY:  (History from previous dates can be found in the separate document.)    MEDICATIONS    Current Outpatient Medications:   •  alendronate (FOSAMAX) 70 MG tablet, Take 70 mg by mouth Every 7 (Seven) Days. sunday, Disp: , Rfl: 12  •  alosetron (LOTRONEX) 0.5 MG tablet, Take 0.05 mg by mouth Daily As Needed., Disp: , Rfl:   •  ALPRAZolam (XANAX) 1 MG tablet, Take 1 mg by mouth every night., Disp: , Rfl:   •  amiodarone (PACERONE) 200 MG tablet, Take  by mouth., Disp: , Rfl:   •  Anucort-HC 25 MG suppository, INSERT 1 SUPPOSITORY RECTALLY ONCE DAILY FOR 6 DAYS, Disp: , Rfl:   •  apixaban (ELIQUIS) 5 MG tablet tablet, Take 1 tablet by mouth Every 12 (Twelve) Hours., Disp: , Rfl:   •  bumetanide (BUMEX) 1 MG tablet, Take 1 mg by mouth Daily., Disp: , Rfl:   •  cephalexin (KEFLEX) 500 MG capsule, TAKE 4 CAPSULES BY MOUTH 1 HOUR BEFORE DENTAL PROCEDURE, Disp: 4 capsule, Rfl: 5  •  cetirizine (ZyrTEC) 10 MG tablet, Take 10 mg by mouth Daily., Disp: , Rfl:   •  clobetasol (OLUX) 0.05 % topical foam, As Needed., Disp: , Rfl:   •  cycloSPORINE (RESTASIS) 0.05 % ophthalmic emulsion, Administer 1 drop to both eyes 2 (Two) Times a Day., Disp: , Rfl:   •  diltiaZEM (CARDIZEM) 60 MG tablet, Take 60 mg by mouth 2 (Two) Times a Day. PATIENT TAKES ONE TABLET AT 5 PM AND ONE TAB HS, Disp: , Rfl:   •  diphenoxylate-atropine (LOMOTIL) 2.5-0.025 MG per tablet, Take 1 tablet by mouth 4 (Four) Times a Day As Needed for Diarrhea., Disp: , Rfl:   •  Eliquis 5 MG tablet tablet, Take 1  tablet by mouth Every 12 (Twelve) Hours., Disp: 180 tablet, Rfl: 3  •  esomeprazole (NexIUM) 40 MG capsule, Take 40 mg by mouth 2 (two) times a day., Disp: , Rfl:   •  Glucosamine 500 MG capsule, Take  by mouth., Disp: , Rfl:   •  hydrALAZINE (APRESOLINE) 25 MG tablet, hydralazine 25 mg tablet  TAKE 1 TABLET BY MOUTH TWICE DAILY, Disp: , Rfl:   •  hydrochlorothiazide (HYDRODIURIL) 12.5 MG tablet, Take 12.5 mg by mouth Daily., Disp: , Rfl:   •  ketoconazole (NIZORAL) 2 % shampoo, ketoconazole 2 % shampoo  APPLY EXTERNALLY TO THE SCALP 3 TIMES A WEEK. LEAVE IN 5 MINUTES BEFORE WASHING OUT, Disp: , Rfl:   •  metoprolol tartrate (LOPRESSOR) 25 MG tablet, Take 0.625 mg by mouth As Needed., Disp: , Rfl:   •  montelukast (SINGULAIR) 10 MG tablet, Take 10 mg by mouth Daily., Disp: , Rfl:   •  Mucinex D Max Strength 120-1200 MG tablet sustained-release 12 hour, Take 1 tablet by mouth Every 12 (Twelve) Hours., Disp: , Rfl:   •  Multiple Vitamin (MULTIVITAMIN) capsule, , Disp: , Rfl:   •  predniSONE (DELTASONE) 20 MG tablet, Take 20 mg by mouth 3 (Three) Times a Day., Disp: , Rfl:   •  Salicylic Acid 6 % shampoo, salicylic acid 6 % shampoo  APPLY EXTERNALLY TO THE SCALP 3 TIMES A WEEK. LEAVE IN 5 MINUTES BEFORE WASHING OUT, Disp: , Rfl:   •  simvastatin (ZOCOR) 40 MG tablet, Take 40 mg by mouth Daily., Disp: , Rfl: 3  No current facility-administered medications for this visit.    Facility-Administered Medications Ordered in Other Visits:   •  mupirocin (BACTROBAN) 2 % nasal ointment, , Nasal, BID, Van Mcguire MD    ALLERGIES:     Allergies   Allergen Reactions   • Penicillins Anaphylaxis     Reactions: syncope and seizures   • Adhesive Tape Hives     Tears skin   • Lidocaine-Epinephrine Unknown - Low Severity   • Pb-Hyoscy-Atropine-Scopolamine Unknown - Low Severity   • Atropine Hives   • Epinephrine Palpitations     Patient states will go into A-Fib   • Sulfa Antibiotics Nausea Only     STATES SHE DOES FINE WITH CERTAIN  "SULFA DRUGS       SOCIAL HISTORY:       Social History     Socioeconomic History   • Marital status:      Spouse name: Cuate   • Number of children: 1   • Years of education: 1 year of college   Tobacco Use   • Smoking status: Former Smoker     Quit date: 1975     Years since quittin.9   • Smokeless tobacco: Never Used   • Tobacco comment: QUIT SMOKING IN LATE 1970s   Vaping Use   • Vaping Use: Never used   Substance and Sexual Activity   • Alcohol use: Yes     Comment: social drinker   • Drug use: No   • Sexual activity: Defer         FAMILY HISTORY:  Family History   Problem Relation Age of Onset   • Heart disease Other    • Stroke Other    • Stroke Mother    • Heart disease Mother    • Hypertension Mother    • Leukemia Father         Mid 60s   • Hypertension Brother    • Cancer Paternal Aunt    • Cancer Paternal Grandfather    • Malig Hyperthermia Neg Hx        REVIEW OF SYSTEMS:    Review of Systems   Constitutional: Negative for activity change.   HENT: Negative for nosebleeds and trouble swallowing.    Respiratory: Negative for shortness of breath and wheezing.    Cardiovascular: Negative for chest pain and palpitations.   Gastrointestinal: Negative for constipation, diarrhea and nausea.   Genitourinary: Negative for dysuria and hematuria.   Musculoskeletal: Negative for arthralgias and myalgias.   Skin: Negative for rash and wound.   Neurological: Negative for seizures and syncope.   Hematological: Negative for adenopathy. Does not bruise/bleed easily.   Psychiatric/Behavioral: Negative for confusion.           Objective    Vitals:    22 1049   BP: 168/85   Pulse: 51   Resp: 18   Temp: 96.6 °F (35.9 °C)   TempSrc: Temporal   SpO2: 99%   Weight: 67.6 kg (149 lb 1.6 oz)   Height: 163.8 cm (64.49\")   PainSc:   7   PainLoc: Generalized     Current Status 2022   ECOG score 0      PHYSICAL EXAM:        CONSTITUTIONAL:  Vital signs reviewed.  No distress, looks comfortable.  EYES:  " Conjunctiva and lids unremarkable.  PERRLA  EARS,NOSE,MOUTH,THROAT:  Ears and nose appear unremarkable.  Lips, teeth, gums appear unremarkable.  RESPIRATORY:  Normal respiratory effort.  Lungs clear to auscultation bilaterally.  CARDIOVASCULAR:  Normal S1, S2.  No murmurs rubs or gallops.  No significant lower extremity edema.  GASTROINTESTINAL: Abdomen appears unremarkable.  Nontender.  No hepatomegaly.  No splenomegaly.  LYMPHATIC:  No cervical, supraclavicular, axillary lymphadenopathy.  SKIN:  Warm.  No rashes.  PSYCHIATRIC:  Normal judgment and insight.  Normal mood and affect.         RECENT LABS:        WBC   Date/Time Value Ref Range Status   04/26/2022 10:41 AM 12.10 (H) 3.40 - 10.80 10*3/mm3 Final   07/23/2021 11:57 AM 7.53 4.5 - 11.0 10*3/uL Final     Hemoglobin   Date/Time Value Ref Range Status   04/26/2022 10:41 AM 11.8 (L) 12.0 - 15.9 g/dL Final   07/23/2021 11:57 AM 11.7 (L) 12.0 - 16.0 g/dL Final     Platelets   Date/Time Value Ref Range Status   04/26/2022 10:41  (L) 140 - 450 10*3/mm3 Final     Comment:     Modified report. Previous result was 85 10*3/mm3 on 4/26/2022 at 1057 EDT.   07/23/2021 11:57  140 - 440 10*3/uL Final       Assessment/Plan     ASSESSMENT:  Malignant neoplasm of upper-outer quadrant of left breast in female, estrogen receptor positive (HCC)  - CBC & Differential  - Immature Platelet Fraction    Iron deficiency anemia, unspecified iron deficiency anemia type  - CBC & Differential  - Immature Platelet Fraction      *Stage IB ([id number]) left breast cancer. Grade 2, 1.9 cm, 1 mm micrometastasis in 1 out of 2 sentinel nodes. ER positive, HER-2 negative. OncoType DX: low risk category.   Tamoxifen 10/11/2013 through November 2013. Just a few days or so of Aromasin in January 2014. Could not tolerate hormonal therapy due to vaginal dryness and irritation and declined any more hormonal therapy.   On Fosamax and Evista through Dr. Perla Garza.  · 3/18/2022: Concerned  for possible recurrence based on significant drop in Hb with no other explanation and low back pain and right shoulder pain with a hormone positive breast cancer which can recur years later.  Check scans.  · Bone scan 3/21/2022: Negative  · CT 3/21/2022: Right pulmonary nodule stable from 12/2/2016.  No evidence of metastatic disease.  Therefore, no evidence of recurrence of breast cancer.    * Atrial fibrillation, on Eliquis through Dr. Haley Roman.   No bleeding issues.    *Anemia of stage 3 chronic renal insufficiency (Dr. Rell Shannon is her nephrologist whom she follows up with). (Evaluation negative for B12 or folate deficiency, hemolysis or multiple myeloma. She has not had a bone marrow biopsy. She responds well to Procrit).   · She received Procrit 12/2014 and 1 dose 06/2015 and 1 dose 09/2015 and 1 dose on 10/28/2015 and some weekly doses of Procrit after hip replacement July 2017.  · She responds to Procrit.   · 3/18/2022: Hb down to 9.3.  Restart Procrit again  · 3/25/2022: Hb 11 (better after Procrit)  · 4/26/2022: Hb 11.8    * Iron deficiency anemia. does not tolerate oral iron due to significant abdominal pain and diarrhea. She refuses any further Benadryl as a pre-medicine because it made her feel very sleepy for over 24-hours.  Today's iron labs normal.  · 2/2/2022: Ferritin 41, 10% saturation, Hb 12.6.  1 dose Injectafer given.  · 3/17/2022: Ferritin 1013, 15% saturation, Hb 9.3.  No need for IV iron.  · 3/25/2022: Hb 11  · 4/26/2022: Hb 11.8    *Anemia, for reasons other than iron deficiency and anemia of stage III chronic kidney disease.  · Hb 9.3 on 3/17/2022, lowest value we have since 2017.  Hb was normal, 12.6, 2/2/2022.  · 3/18/2022, unremarkable: B12, RBC folate, bilirubin, LDH, haptoglobin, direct [place]  · 3/25/2022: Hb 11  · 4/26/2022: Hb 11.8    *Leukocytosis  · WBC became elevated, 2/2/2022, at 11 (states she had 2 rounds of steroids in January for sinus infection)  · Predominance of  mature segmented neutrophils.  Therefore, appears reactive  · 3/25/2022: WBC 22.9 (currently on steroids through PCP for TMJ pain and face swelling)  · WBC 12.1    *Thrombocytopenia  · B12 and folate unremarkable.  · On Eliquis.  New issue on 4/26/2022,     *Fibrocystic changes in both breasts.     *Dyspnea on exertion  · Previously complained of hypoxia with exertion, 1/18/2017.  · Follows with Dr. Nguyen.   · Dr. Nguyen noted 11/1/2021: Spirometry normal that day, but 5 years prior showed some obstruction with reversibility.  11/1/2021: Started an inhaler  · Due to anemia, he referred her back to me again.  Hb that day, 11/1/2021, 9.6.  He noted she is on oral anticoagulation through cardiology.  However, after that visit, on 11/29/2021, Hb 12    *Flashing lights and blurry vision when going from a dark room to a bright room and awoke with TMJ pain all beginning the night of 3/21/2022 (the day of CT and bone scans).  (Left more so than right)  · 3/25/2022: States she has seen her ophthalmologist and no abnormalities were found.  Plans to follow-up with him again.  Plans to see her dentist for the TMJ discomfort.  I told her I suspect she was under more stress due to imaging to look for recurrence of breast cancer.  She states she has found herself clenching her teeth in the past.    *Temporal arteritis, causing loss of vision left eye 3/23/2022.  · On prednisone 60 mg daily since 3/24/2022.  Follows with Dr. Mina Muro, ophthalmology.  · Has an appointment to establish care with Dr. Fabienne Castro, rheumatology    PLAN:   · Because of the new thrombocytopenia, check CBC and IPF every 2 weeks.  MD CBC IPF 6 to 8 weeks.  · If PLT <60, stop Eliquis and change to weekly follow-ups.  · Received Procrit 3/18/2022 for Hb of 9.3.  Hb subsequently >10 the next several weeks.  · Could restart Procrit if needed in the future.  · If Hb drops to around 9 or lower again, or if PLT considerably drops, consider a bone marrow  biopsy (patient is aware and open to this if numbers significantly fall)        Prior plan was:  · MD 1 year.  One week prior: Ferritin, iron panel, CBC, reticulated hemoglobin  · If HP drops below 10, she could return for stat iron labs and consider resuming Procrit.  · Of course, if she wants to come back earlier for labs that is fine as well.  · Last mammogram September 2021, unremarkable per her report.  I have requested this.    43 minutes.  Total time.  Same day.    Send a copy this to Dr. Mike Rojas, PCP and Dr. Perla Garza, OB/GYN, and Dr. Mina Muro, ophthalmology, and Dr. Fabienne Castro, rheumatology

## 2022-04-28 NOTE — PROGRESS NOTES
Physical Therapy Daily Progress Note  Visit: 19    Subjective Mary Kay Saab reports: her heels are a little sore from cleaning yesterday     Objective   See Exercise, Manual, and Modality Logs for complete treatment.     Assessment/Plan: Compliant/cooperative with current rehab efforts.  Plan details: Progress ROM / strengthening / stabilization / functional activity as tolerated       Manual Therapy:     10     mins  44199;  Therapeutic Exercise:          mins  59161;     Neuromuscular Lenny:         mins  82632;    Therapeutic Activity:           mins  58297;     Gait Training:            mins  04236;     Ultrasound:     20      mins  85367;    Electrical Stimulation:   15       mins  12317 ( );  Dry Needling           mins self-pay  Traction           mins 67892  Canalith Repositioning         mins 43261      Timed Treatment:   30   mins   Total Treatment:    45    mins    Angelia Gomez, PT  [place] License #: [id number]    Physical Therapist

## 2022-05-03 NOTE — PROGRESS NOTES
Physical Therapy Daily Progress Note  Visit: 20    Subjective Mary Kay Saab reports: her achilles are a little more painful today due to having to walk barefoot at home due to swelling in her feet.     Objective   See Exercise, Manual, and Modality Logs for complete treatment.     Assessment/Plan: Compliant/cooperative with current rehab efforts.  Plan details: Progress ROM / strengthening / stabilization / functional activity as tolerated       Manual Therapy:    12      mins  36295;  Therapeutic Exercise:          mins  41076;     Neuromuscular Lenny:         mins  09850;    Therapeutic Activity:           mins  56243;     Gait Training:            mins  98381;     Ultrasound:   20        mins  55792;    Electrical Stimulation:    15      mins  28579 ( );  Dry Needling           mins self-pay  Traction           mins 73989  Canalith Repositioning         mins 53023      Timed Treatment:  32    mins   Total Treatment:   47     mins    AMANDA Campa License #: [id number]    Physical Therapist

## 2022-05-05 NOTE — TELEPHONE ENCOUNTER
Notified patient of recommendations. She verbalized understanding.     Marisela Godinez RN  Triage [facility] [place]

## 2022-05-05 NOTE — TELEPHONE ENCOUNTER
Triage- let her know I am aware of low platelets and I give the ok to try lower dose Eliquis at 2.5 twice daily due to thrombocytopenia. New script sent. May try splitting 5 mg tablets to use old supply.

## 2022-05-05 NOTE — TELEPHONE ENCOUNTER
Patient called to report her platelets are low per Dr. Cornejo, Hematologist.  She had a cut and it bleed for hours.  She would like to discuss Eliquis with you.  She asks if there is a lower dose she should try.  Please call patient at [phone]./ MANNY

## 2022-05-05 NOTE — TELEPHONE ENCOUNTER
Caller: Mary Kay Saab    Relationship to patient: Self    Best call back number: [phone]    Chief complaint: PATIENT NEEDS TO RESCHEDULE APPOINTMENT. HUB UNABLE TO DUE TO PATIENT IN ACTIVE TREATMENT    Type of visit: LAB AND RN REVIEW    Requested date: 5-11-22 AT 9:30 OR 10:00  OR CAN DO 5-13-22     If rescheduling, when is the original appointment: 5-12-22     Additional notes:PLEASE CALL PATIENT TO ADVISE

## 2022-05-11 NOTE — NURSING NOTE
Lab Results   Component Value Date    WBC 12.10 (H) 04/26/2022    HGB 11.8 (L) 04/26/2022    HCT 36.7 04/26/2022    MCV 92.2 04/26/2022     (L) 05/11/2022     Patient here for CBC with RN review.  Patient having no new complaints today.  Patient states that she is scheduled to see a rheumatologist tomorrow regarding her eyes.  CBC stable for patient at this time.  Advised patient to keep appts as scheduled and to call the office sooner with any questions/concerns.  Patient v/u and was discharged in stable condition.

## 2022-05-17 NOTE — TELEPHONE ENCOUNTER
Caller: Mary Kay Saab    Relationship: Self    Best call back number: [phone]    What is the best time to reach you: ANYTIME    Who are you requesting to speak with (clinical staff, provider,  specific staff member): DR ROJAS OR NURSE    What was the call regarding: PT HAS SOME ABNORMAL LABS THAT SHE WOULD LIKE TO SEE DR ROJAS FOR - SHE WILL BE IN THE OFFICE 5/24 FOR LABS AND RN REVIEW IF HE COULD SEE HER THEN. SHE DOES NOT WANT TO WAIT UNTIL HER 6/7 APPT, WOULD LIKE TO BE SEEN SOONER.    PLEASE CALL PT TO ADVISE.     Do you require a callback: YES

## 2022-05-17 NOTE — TELEPHONE ENCOUNTER
"Patient is wanting to come in sooner to see Dr. Cornejo on the date that she is coming in for CBC/RN review on 5/24/2022 to discuss her abnormal labs that have been done in our office and also her Rheumatologist.  We do not have her Rheumatology labs. Patient did not tell me who her Rheumatologist is even though I asked.  She stated that they are sending her labs in the mail and she will give them to \"the people who need them\".  She stated that she was told that she has abnormal \"liver tests\".  She just wants to see Dr. Cornejo and discuss whether or not she needs to have a bone marrow biopsy. I did tell her Dr. Cornejo is out of the office today.  "

## 2022-05-18 NOTE — PROGRESS NOTES
"I have an opening later today if she wants to come in today?   We could also do a video visit if she cannot come in today but she wants to see me today.     Alternatively, if she cannot make it today, feel free to schedule her earlier than planned     2 units.  2 units.  2 units    Message text       Alla Nieves RN routed conversation to You 40 minutes ago (11:12 AM)     Rocio Acosta RN routed conversation to Alla Nieves RN Yesterday (9:54 AM)     Rocio Acosta RN Yesterday (9:50 AM)     SK       Patient is wanting to come in sooner to see Dr. Cornejo on the date that she is coming in for CBC/RN review on 5/24/2022 to discuss her abnormal labs that have been done in our office and also her Rheumatologist.  We do not have her Rheumatology labs. Patient did not tell me who her Rheumatologist is even though I asked.  She stated that they are sending her labs in the mail and she will give them to \"the people who need them\".  She stated that she was told that she has abnormal \"liver tests\".  She just wants to see Dr. Cornejo and discuss whether or not she needs to have a bone marrow biopsy. I did tell her Dr. Cornejo is out of the office today.          "

## 2022-05-24 NOTE — PROGRESS NOTES
Subjective .     REASONS FOR FOLLOWUP:  Breast cancer, anemia    HISTORY OF PRESENT ILLNESS:  The patient is a 77 y.o. year old female  who is here for follow-up with the above-mentioned history.    She called in requesting an earlier appointment due to low blood counts and plans to begin Actemra through Dr. PARADISE Lyn, rheumatology    No bleeding.  No fever, chills, weight loss, night sweats.    Past Medical History:   Diagnosis Date   • Anemia     Of chronic renal insufficiency   • Atrial fibrillation (HCC)    • Mcguire's esophagus    • Breast cancer (HCC)    • Chronic renal insufficiency     Stage 3   • MCCRAY (dyspnea on exertion)    • Dyspnea    • GERD (gastroesophageal reflux disease)    • H/O electrocardiogram    • History of staph infection    • History of stroke     right PICA noted on MRI 02/2022- old not acute    • Hx of being hospitalized     [facility] in 09/2010 and 10/2010 for atrial fibrillation, Dr. Perla Hurtado   • Hyperlipidemia    • Hypertension    • Hypoxia    • IBS (irritable bowel syndrome)    • Kidney dysfunction    • Lower extremity edema    • Lump or mass in breast    • Malignant neoplasm of breast (HCC)    • Osteoarthritis    • Osteopenia    • Osteoporosis    • Paroxysmal atrial fibrillation (HCC)    • Seizure (HCC)     AS A TEENAGER, NOT CURRENTLY   • Sinusitis    • SOB (shortness of breath)      Past Surgical History:   Procedure Laterality Date   • BREAST LUMPECTOMY  1990    For LCIS   • CARDIAC CATHETERIZATION Left 4/27/2016    Procedure: Cardiac catheterization;  Surgeon: Kevin Guillen MD;  Location: [facility] INVASIVE LOCATION;  Service:    • FOOT SURGERY     • HAND ARTHROPLASTY Right    • KNEE ARTHROSCOPY     • OTHER SURGICAL HISTORY      LYMPHATIC SURGERY   • TOTAL HIP ARTHROPLASTY Right 7/12/2017    Procedure: TOTAL HIP ARTHROPLASTY ANTERIOR WITH HANA TABLE;  Surgeon: Van Mcguire MD;  Location: [facility] OR;  Service:    • TOTAL KNEE ARTHROPLASTY Right  11/19/2018    Procedure: TOTAL KNEE ARTHROPLASTY;  Surgeon: Van Mcguire MD;  Location: [facility];  Service: Orthopedics       HEMATOLOGIC/ONCOLOGIC HISTORY:  (History from previous dates can be found in the separate document.)    MEDICATIONS    Current Outpatient Medications:   •  alendronate (FOSAMAX) 70 MG tablet, Take 70 mg by mouth Every 7 (Seven) Days. sunday, Disp: , Rfl: 12  •  alosetron (LOTRONEX) 0.5 MG tablet, Take 0.05 mg by mouth Daily As Needed., Disp: , Rfl:   •  ALPRAZolam (XANAX) 1 MG tablet, Take 1 mg by mouth every night., Disp: , Rfl:   •  amiodarone (PACERONE) 200 MG tablet, Take  by mouth., Disp: , Rfl:   •  Anucort-HC 25 MG suppository, INSERT 1 SUPPOSITORY RECTALLY ONCE DAILY FOR 6 DAYS, Disp: , Rfl:   •  apixaban (ELIQUIS) 2.5 MG tablet tablet, Take 1 tablet by mouth 2 (Two) Times a Day., Disp: 60 tablet, Rfl: 5  •  bumetanide (BUMEX) 1 MG tablet, Take 1 mg by mouth Daily., Disp: , Rfl:   •  cephalexin (KEFLEX) 500 MG capsule, TAKE 4 CAPSULES BY MOUTH 1 HOUR BEFORE DENTAL PROCEDURE, Disp: 4 capsule, Rfl: 5  •  cetirizine (ZyrTEC) 10 MG tablet, Take 10 mg by mouth Daily., Disp: , Rfl:   •  clobetasol (OLUX) 0.05 % topical foam, As Needed., Disp: , Rfl:   •  cycloSPORINE (RESTASIS) 0.05 % ophthalmic emulsion, Administer 1 drop to both eyes 2 (Two) Times a Day., Disp: , Rfl:   •  diltiaZEM (CARDIZEM) 60 MG tablet, Take 60 mg by mouth 2 (Two) Times a Day. PATIENT TAKES ONE TABLET AT 5 PM AND ONE TAB HS, Disp: , Rfl:   •  diphenoxylate-atropine (LOMOTIL) 2.5-0.025 MG per tablet, Take 1 tablet by mouth 4 (Four) Times a Day As Needed for Diarrhea., Disp: , Rfl:   •  esomeprazole (NexIUM) 40 MG capsule, Take 40 mg by mouth 2 (two) times a day., Disp: , Rfl:   •  Glucosamine 500 MG capsule, Take  by mouth., Disp: , Rfl:   •  hydrALAZINE (APRESOLINE) 25 MG tablet, hydralazine 25 mg tablet  TAKE 1 TABLET BY MOUTH TWICE DAILY, Disp: , Rfl:   •  hydrochlorothiazide (HYDRODIURIL) 12.5 MG tablet,  Take 12.5 mg by mouth Daily., Disp: , Rfl:   •  ketoconazole (NIZORAL) 2 % shampoo, ketoconazole 2 % shampoo  APPLY EXTERNALLY TO THE SCALP 3 TIMES A WEEK. LEAVE IN 5 MINUTES BEFORE WASHING OUT, Disp: , Rfl:   •  metoprolol tartrate (LOPRESSOR) 25 MG tablet, Take 0.625 mg by mouth As Needed., Disp: , Rfl:   •  montelukast (SINGULAIR) 10 MG tablet, Take 10 mg by mouth Daily., Disp: , Rfl:   •  Mucinex D Max Strength 120-1200 MG tablet sustained-release 12 hour, Take 1 tablet by mouth Every 12 (Twelve) Hours., Disp: , Rfl:   •  Multiple Vitamin (MULTIVITAMIN) capsule, , Disp: , Rfl:   •  predniSONE (DELTASONE) 20 MG tablet, Take 20 mg by mouth 3 (Three) Times a Day., Disp: , Rfl:   •  Salicylic Acid 6 % shampoo, salicylic acid 6 % shampoo  APPLY EXTERNALLY TO THE SCALP 3 TIMES A WEEK. LEAVE IN 5 MINUTES BEFORE WASHING OUT, Disp: , Rfl:   •  simvastatin (ZOCOR) 40 MG tablet, Take 40 mg by mouth Daily., Disp: , Rfl: 3  No current facility-administered medications for this visit.    Facility-Administered Medications Ordered in Other Visits:   •  mupirocin (BACTROBAN) 2 % nasal ointment, , Nasal, BID, Van Mcguire MD    ALLERGIES:     Allergies   Allergen Reactions   • Penicillins Anaphylaxis     Reactions: syncope and seizures   • Adhesive Tape Hives     Tears skin   • Lidocaine-Epinephrine Unknown - Low Severity   • Pb-Hyoscy-Atropine-Scopolamine Unknown - Low Severity   • Atropine Hives   • Epinephrine Palpitations     Patient states will go into A-Fib   • Sulfa Antibiotics Nausea Only     STATES SHE DOES FINE WITH CERTAIN SULFA DRUGS       SOCIAL HISTORY:       Social History     Socioeconomic History   • Marital status:      Spouse name: Cuate   • Number of children: 1   • Years of education: 1 year of college   Tobacco Use   • Smoking status: Former Smoker     Quit date: 1975     Years since quittin.9   • Smokeless tobacco: Never Used   • Tobacco comment: QUIT SMOKING IN LATE 1970s   Vaping Use  "  • Vaping Use: Never used   Substance and Sexual Activity   • Alcohol use: Yes     Comment: social drinker   • Drug use: No   • Sexual activity: Defer         FAMILY HISTORY:  Family History   Problem Relation Age of Onset   • Heart disease Other    • Stroke Other    • Stroke Mother    • Heart disease Mother    • Hypertension Mother    • Leukemia Father         Mid 60s   • Hypertension Brother    • Cancer Paternal Aunt    • Cancer Paternal Grandfather    • Malig Hyperthermia Neg Hx        REVIEW OF SYSTEMS:    Review of Systems   Constitutional: Negative for activity change.   HENT: Negative for nosebleeds and trouble swallowing.    Respiratory: Negative for shortness of breath and wheezing.    Cardiovascular: Negative for chest pain and palpitations.   Gastrointestinal: Negative for constipation, diarrhea and nausea.   Genitourinary: Negative for dysuria and hematuria.   Musculoskeletal: Negative for arthralgias and myalgias.   Skin: Negative for rash and wound.   Neurological: Negative for seizures and syncope.   Hematological: Negative for adenopathy. Does not bruise/bleed easily.   Psychiatric/Behavioral: Negative for confusion.           Objective    Vitals:    05/24/22 0918   BP: 174/69   Pulse: 60   Resp: 18   Temp: 96.9 °F (36.1 °C)   TempSrc: Temporal   SpO2: 99%   Weight: 67.9 kg (149 lb 9.6 oz)   Height: 165.1 cm (65\")   PainSc: 0-No pain     Current Status 5/24/2022   ECOG score 0      PHYSICAL EXAM:        CONSTITUTIONAL:  Vital signs reviewed.  No distress, looks comfortable.  EYES:  Conjunctiva and lids unremarkable.  PERRLA  EARS,NOSE,MOUTH,THROAT:  Ears and nose appear unremarkable.  Lips, teeth, gums appear unremarkable.  RESPIRATORY:  Normal respiratory effort.  Lungs clear to auscultation bilaterally.  CARDIOVASCULAR:  Normal S1, S2.  No murmurs rubs or gallops.  No significant lower extremity edema.  GASTROINTESTINAL: Abdomen appears unremarkable.  Nontender.  No hepatomegaly.  No " splenomegaly.  LYMPHATIC:  No cervical, supraclavicular, axillary lymphadenopathy.  SKIN:  Warm.  No rashes.  PSYCHIATRIC:  Normal judgment and insight.  Normal mood and affect.          RECENT LABS:        WBC   Date/Time Value Ref Range Status   05/24/2022 09:08 AM 11.82 (H) 3.40 - 10.80 10*3/mm3 Final   07/23/2021 11:57 AM 7.53 4.5 - 11.0 10*3/uL Final     Hemoglobin   Date/Time Value Ref Range Status   05/24/2022 09:08 AM 10.3 (L) 12.0 - 15.9 g/dL Final   07/23/2021 11:57 AM 11.7 (L) 12.0 - 16.0 g/dL Final     Platelets   Date/Time Value Ref Range Status   05/24/2022 09:08 AM 93 (L) 140 - 450 10*3/mm3 Final   05/24/2022 09:08 AM 93 (L) 140 - 450 10*3/mm3 Final   07/23/2021 11:57  140 - 440 10*3/uL Final       Assessment/Plan     ASSESSMENT:  Thrombocytopenia (HCC)  - CT Guided Biopsy Bone Marrow      *Stage IB ([id number]) left breast cancer. Grade 2, 1.9 cm, 1 mm micrometastasis in 1 out of 2 sentinel nodes. ER positive, HER-2 negative. OncoType DX: low risk category.   Tamoxifen 10/11/2013 through November 2013. Just a few days or so of Aromasin in January 2014. Could not tolerate hormonal therapy due to vaginal dryness and irritation and declined any more hormonal therapy.   On Fosamax and Evista through Dr. Perla Garza.  · 3/18/2022: Concerned for possible recurrence based on significant drop in Hb with no other explanation and low back pain and right shoulder pain with a hormone positive breast cancer which can recur years later.  Check scans.  · Bone scan 3/21/2022: Negative  · CT 3/21/2022: Right pulmonary nodule stable from 12/2/2016.  No evidence of metastatic disease.  Therefore, no signs of recurrence of breast cancer.    * Atrial fibrillation, on Eliquis through Dr. Haley Roman.   No bleeding problems.    *Anemia of stage 3 chronic renal insufficiency (Dr. Rell Shannon is her nephrologist whom she follows up with). (Evaluation negative for B12 or folate deficiency, hemolysis or multiple  myeloma. She has not had a bone marrow biopsy. She responds well to Procrit).   · She received Procrit 12/2014 and 1 dose 06/2015 and 1 dose 09/2015 and 1 dose on 10/28/2015 and some weekly doses of Procrit after hip replacement July 2017.  · She responds to Procrit.   · 3/18/2022: Hb down to 9.3.  Restart Procrit again  · 3/25/2022: Hb 11 (better after Procrit)  · 4/26/2022: Hb 11.8  · 5/12/2022 (rheumatology): Hb 11.6  · 5/24/2022: Hb 10.3    * Iron deficiency anemia. does not tolerate oral iron due to significant abdominal pain and diarrhea. She refuses any further Benadryl as a pre-medicine because it made her feel very sleepy for over 24-hours.  Today's iron labs normal.  · 2/2/2022: Ferritin 41, 10% saturation, Hb 12.6.  1 dose Injectafer given.  · 3/17/2022: Ferritin 1013, 15% saturation, Hb 9.3.  No need for IV iron.  · 3/25/2022: Hb 11  · 4/26/2022: Ferritin 1135, 41% saturation, Hb 11.8  · 5/24/2022: Hb 10.3    *Anemia, for reasons other than iron deficiency and anemia of stage III chronic kidney disease.  · Hb 9.3 on 3/17/2022, lowest value we have since 2017.  Hb was normal, 12.6, 2/2/2022.  · 3/18/2022, unremarkable: B12, RBC folate, bilirubin, LDH, haptoglobin, direct [place]  · 3/25/2022: Hb 11  · 4/26/2022: Hb 11.8  · 5/24/2022: Hb 10.3    *Leukocytosis  · WBC became elevated, 2/2/2022, at 11 (states she had 2 rounds of steroids in January for sinus infection)  · Predominance of mature segmented neutrophils.  Therefore, appears reactive  · 3/25/2022: WBC 22.9 (currently on steroids through PCP for TMJ pain and face swelling)  · WBC 12.1  · 5/12/2022 (rheumatology labs): WBC 13.5.  1% promyelocytes, 1% myelocytes, 1% metamyelocytes.  · 5/24/2022: WBC 11.8.    *Thrombocytopenia  · B12 and folate unremarkable.  · On Eliquis.  New issue on 4/26/2022,   5/12/2022 (rheumatology labs): PLT 98  5/24/2022: PLT 93.  IPF 3%.    *Fibrocystic changes in both breasts.     *Dyspnea on exertion  · Previously  complained of hypoxia with exertion, 1/18/2017.  · Follows with Dr. Nguyen.   · Dr. Nguyen noted 11/1/2021: Spirometry normal that day, but 5 years prior showed some obstruction with reversibility.  11/1/2021: Started an inhaler  · Due to anemia, he referred her back to me again.  Hb that day, 11/1/2021, 9.6.  He noted she is on oral anticoagulation through cardiology.  However, after that visit, on 11/29/2021, Hb 12    *Flashing lights and blurry vision when going from a dark room to a bright room and awoke with TMJ pain all beginning the night of 3/21/2022 (the day of CT and bone scans).  (Left more so than right)  · 3/25/2022: States she has seen her ophthalmologist and no abnormalities were found.  Plans to follow-up with him again.  Plans to see her dentist for the TMJ discomfort.  I told her I suspect she was under more stress due to imaging to look for recurrence of breast cancer.  She states she has found herself clenching her teeth in the past.    *Temporal arteritis, causing loss of vision left eye 3/23/2022.  · On prednisone 60 mg daily since 3/24/2022.  Follows with Dr. Mina Muro, ophthalmology.  · 5/24/2022: Patient reports Dr. PARADISE Lyn, rheumatology, is planning Actemra.  Patient notes Actemra is associated with thrombocytopenia 1 to 4% of the time.    PLAN:   · Because blood counts have dropped some, and she is planning to begin Actemra for temporal arteritis (in order to wean off prednisone.  Currently on 50 mg prednisone, from 60 mg previously), check bone marrow biopsy  · MD CBC IPF roughly 2 weeks after the bone marrow biopsy    · If PLT <60, stop Eliquis and change to weekly follow-ups.  · Received Procrit 3/18/2022 for Hb of 9.3.  Hb subsequently >10 the next several weeks.  · Could restart Procrit if needed in the future.    Prior plan was:  · MD 1 year.  One week prior: Ferritin, iron panel, CBC, reticulated hemoglobin  · If HP drops below 10, she could return for stat iron labs and consider  resuming Procrit.  · Of course, if she wants to come back earlier for labs that is fine as well.  · Last mammogram September 2021, unremarkable per her report.  I have requested this.    41 minutes.  Total time.  Same day.    Send a copy this to   Dr. Mike Rojas, PCP and   Dr. Perla Garza, OB/GYN, and   Dr. Mina Muro, ophthalmology, and   Dr. PARADISE Lyn, rheumatology

## 2022-05-25 NOTE — TELEPHONE ENCOUNTER
Caller: Mary Kay Saab    Relationship to patient: Self    Best call back number: [phone]    Chief complaint: GEL INJECTIONS    Type of visit: INJECTIONS    Requested date: SAME DAY AS , ROGELIO SAAB, 06- - HIS IS SCHEDULED FOR 8.2.2022 -- PATIENT WOULD LIKE BOTH SCHEDULED FOR 8TH IF POSSIBLE DO TO TRANSPORTATION- PATIENT CAN NO LONGER DRIVE- PLEASE CALL AND ADVISE PATIENT . TY!

## 2022-05-26 NOTE — TELEPHONE ENCOUNTER
05/25/2022 LVM @ 4:25pm letting patient know we have moved Cuate's appt to 08/08/2022 @ 8:30 w/ Jules for gel injection//I will reach out to patient again to confirm message received and appt time is acceptable.

## 2022-06-02 NOTE — TELEPHONE ENCOUNTER
"Called pt back with Dr. Cornejo's response to her questions. She v/u and declined appt with APRN at this time re: her \"red marks\".   "

## 2022-06-02 NOTE — TELEPHONE ENCOUNTER
"Pt called to let us know her PCP did labs yesterday and her hgb is now less than 10. She would like to know if Dr. Cornejo wants her to have a procrit shot this week. She also states she has developed some \"red marks\" on her body on her stomach and arms. Not a rash, or painful or itchy and she says it is not a bruise. She thinks this may be from her eliquis as this happed when she was first started on it. She would just like Dr. Cornejo's opinion on this. Message sent to Dr. Cornejo with her questions and I will return her call with is response.  "

## 2022-06-02 NOTE — TELEPHONE ENCOUNTER
"Returned call to patient who wants to remind Dr. Cornejo that she has Giant Cell Arteritis and she is blind in her left eye.  She stated that she went to bed one night and woke up blind in her eye.  She is stating that she does not want to go to sleep and be blind in both eyes tomorrow or any time in the future.  She stated that with her eye disease, when there is \"not enough hemoglobin to feed the retinal artery, this could happen\".  She wants to know since her hemoglobin has dropped 2 grams in 2 weeks if she can be brought in tomorrow for a CBC and possible Procrit. Please advise.  "

## 2022-06-02 NOTE — TELEPHONE ENCOUNTER
"----- Message from Diego Cornejo II, MD sent at 6/2/2022 11:43 AM EDT -----  Regarding: RE: lab resuts/procrit question  She is just barely below 10.  I do not think she needs to begin Procrit right now.  I cannot make much comments about the red marks without her coming in to be seen.  If she wants to come in for an appointment, she could see a nurse practitioner.  I would base that decision on how extensive the red marks seem to be.      ----- Message -----  From: Zulema Huitron RN  Sent: 6/2/2022   8:47 AM EDT  To: Diego Cornejo II, MD  Subject: lab resuts/procrit question                      Ms Saab called in this morning and asked if you would please review her lab results drawn by her PCP.  She is wondering if she should come in for a procrit now that her hgb is under 10? She is also curious if receiving procrit would effect her bone marrow biopsy on Monday.    Her other question is that she is on a blood thinner and has develped \"red marks\" on her body. Not bruises, not hives, no pain or itching. She said this happened when she was on eliquis 5mg and it went away when her cardiologist decreased her to 2.5 tabs, but it has started again. I told her I had never heard of this but I would ask you for her.      "

## 2022-06-02 NOTE — PROGRESS NOTES
"She is just barely below 10.  I do not think she needs to begin Procrit right now.  I cannot make much comments about the red marks without her coming in to be seen.  If she wants to come in for an appointment, she could see a nurse practitioner.  I would base that decision on how extensive the red marks seem to be.      ===View-only below this line===  ----- Message -----  From: Zulema Huitron RN  Sent: 6/2/2022   8:47 AM EDT  To: Diego Cornejo II, MD  Subject: lab resuts/procrit question                      Ms Saab called in this morning and asked if you would please review her lab results drawn by her PCP.  She is wondering if she should come in for a procrit now that her hgb is under 10? She is also curious if receiving procrit would effect her bone marrow biopsy on Monday.    Her other question is that she is on a blood thinner and has develped \"red marks\" on her body. Not bruises, not hives, no pain or itching. She said this happened when she was on eliquis 5mg and it went away when her cardiologist decreased her to 2.5 tabs, but it has started again. I told her I had never heard of this but I would ask you for her.      "

## 2022-06-06 NOTE — NURSING NOTE
Patient arrived to radiology bay 7.  Patient and  are mask compliant.  I am wearing a mask and eye protection to care for patient.

## 2022-06-06 NOTE — POST-PROCEDURE NOTE
POST PROCEDURE NOTE    Procedure: BM biopsy    Pre-Procedure Diagnosis: thromb.[email or web address].penia    Post-procedure Diagnosis: same    Findings: [id number]    Complications: none    Blood loss: min    Specimen Removed: aspirate&core    Disposition:   Expected discharge home

## 2022-06-06 NOTE — NURSING NOTE
Patient dressed and to wheelchair, taken to patient discharge with staff member, her  is driving her home now.

## 2022-06-06 NOTE — DISCHARGE INSTRUCTIONS

## 2022-06-07 NOTE — TELEPHONE ENCOUNTER
Rx Refill Note  Requested Prescriptions     Pending Prescriptions Disp Refills   • apixaban (ELIQUIS) 2.5 MG tablet tablet 180 tablet 3     Sig: Take 1 tablet by mouth 2 (Two) Times a Day.      Last office visit with prescribing clinician: 1/11/2022      Next office visit with prescribing clinician: 9/13/2022            Lola Salas MA  06/07/22, 10:22 EDT

## 2022-06-23 NOTE — PROGRESS NOTES
Subjective .     REASONS FOR FOLLOWUP:  Breast cancer, anemia    HISTORY OF PRESENT ILLNESS:  The patient is a 77 y.o. year old female  who is here for follow-up with the above-mentioned history.    Feeling more tired.  No bleeding.  No chest pain or SOA.  No fever, chills, weight loss, night sweats.  Here to review bone marrow results    Past Medical History:   Diagnosis Date   • Anemia     Of chronic renal insufficiency   • Atrial fibrillation (HCC)    • Mcguire's esophagus    • Breast cancer (HCC)    • Chronic renal insufficiency     Stage 3   • MCCRAY (dyspnea on exertion)    • Dyspnea    • GERD (gastroesophageal reflux disease)    • H/O electrocardiogram    • History of staph infection    • History of stroke     right PICA noted on MRI 02/2022- old not acute    • Hx of being hospitalized     [facility] in 09/2010 and 10/2010 for atrial fibrillation, Dr. Perla Hurtado   • Hyperlipidemia    • Hypertension    • Hypoxia    • IBS (irritable bowel syndrome)    • Kidney dysfunction    • Lower extremity edema    • Lump or mass in breast    • Malignant neoplasm of breast (HCC)    • Osteoarthritis    • Osteopenia    • Osteoporosis    • Paroxysmal atrial fibrillation (HCC)    • Seizure (HCC)     AS A TEENAGER, NOT CURRENTLY   • Sinusitis    • SOB (shortness of breath)      Past Surgical History:   Procedure Laterality Date   • BREAST LUMPECTOMY  1990    For LCIS   • CARDIAC CATHETERIZATION Left 4/27/2016    Procedure: Cardiac catheterization;  Surgeon: Kevin Guillen MD;  Location: [facility] INVASIVE LOCATION;  Service:    • FOOT SURGERY     • HAND ARTHROPLASTY Right    • KNEE ARTHROSCOPY     • OTHER SURGICAL HISTORY      LYMPHATIC SURGERY   • TOTAL HIP ARTHROPLASTY Right 7/12/2017    Procedure: TOTAL HIP ARTHROPLASTY ANTERIOR WITH HANA TABLE;  Surgeon: Van Mcguire MD;  Location: [facility] OR;  Service:    • TOTAL KNEE ARTHROPLASTY Right 11/19/2018    Procedure: TOTAL KNEE ARTHROPLASTY;  Surgeon:  Van Mcguire MD;  Location: [facility] OR;  Service: Orthopedics       HEMATOLOGIC/ONCOLOGIC HISTORY:  (History from previous dates can be found in the separate document.)    MEDICATIONS    Current Outpatient Medications:   •  alendronate (FOSAMAX) 70 MG tablet, Take 70 mg by mouth Every 7 (Seven) Days. sunday, Disp: , Rfl: 12  •  alosetron (LOTRONEX) 0.5 MG tablet, Take 0.05 mg by mouth Daily As Needed., Disp: , Rfl:   •  ALPRAZolam (XANAX) 1 MG tablet, Take 1 mg by mouth every night., Disp: , Rfl:   •  amiodarone (PACERONE) 200 MG tablet, Take  by mouth., Disp: , Rfl:   •  Anucort-HC 25 MG suppository, INSERT 1 SUPPOSITORY RECTALLY ONCE DAILY FOR 6 DAYS, Disp: , Rfl:   •  apixaban (ELIQUIS) 2.5 MG tablet tablet, Take 1 tablet by mouth 2 (Two) Times a Day., Disp: 180 tablet, Rfl: 3  •  bumetanide (BUMEX) 1 MG tablet, Take 1 mg by mouth Daily., Disp: , Rfl:   •  cephalexin (KEFLEX) 500 MG capsule, TAKE 4 CAPSULES BY MOUTH 1 HOUR BEFORE DENTAL PROCEDURE, Disp: 4 capsule, Rfl: 5  •  cetirizine (ZyrTEC) 10 MG tablet, Take 10 mg by mouth Daily., Disp: , Rfl:   •  clobetasol (OLUX) 0.05 % topical foam, As Needed., Disp: , Rfl:   •  cycloSPORINE (RESTASIS) 0.05 % ophthalmic emulsion, Administer 1 drop to both eyes 2 (Two) Times a Day., Disp: , Rfl:   •  diltiaZEM (CARDIZEM) 60 MG tablet, Take 60 mg by mouth 2 (Two) Times a Day. PATIENT TAKES ONE TABLET AT 5 PM AND ONE TAB HS, Disp: , Rfl:   •  diphenoxylate-atropine (LOMOTIL) 2.5-0.025 MG per tablet, Take 1 tablet by mouth 4 (Four) Times a Day As Needed for Diarrhea., Disp: , Rfl:   •  esomeprazole (NexIUM) 40 MG capsule, Take 40 mg by mouth 2 (two) times a day., Disp: , Rfl:   •  fluorometholone (FML) 0.1 % ophthalmic suspension, , Disp: , Rfl:   •  Glucosamine 500 MG capsule, Take  by mouth., Disp: , Rfl:   •  hydrALAZINE (APRESOLINE) 25 MG tablet, hydralazine 25 mg tablet  TAKE 1 TABLET BY MOUTH TWICE DAILY, Disp: , Rfl:   •  hydrochlorothiazide (HYDRODIURIL) 12.5  MG tablet, Take 12.5 mg by mouth Daily., Disp: , Rfl:   •  ketoconazole (NIZORAL) 2 % shampoo, ketoconazole 2 % shampoo  APPLY EXTERNALLY TO THE SCALP 3 TIMES A WEEK. LEAVE IN 5 MINUTES BEFORE WASHING OUT, Disp: , Rfl:   •  metoprolol tartrate (LOPRESSOR) 25 MG tablet, Take 0.625 mg by mouth As Needed., Disp: , Rfl:   •  montelukast (SINGULAIR) 10 MG tablet, Take 10 mg by mouth Daily., Disp: , Rfl:   •  Mucinex D Max Strength 120-1200 MG tablet sustained-release 12 hour, Take 1 tablet by mouth Every 12 (Twelve) Hours., Disp: , Rfl:   •  Multiple Vitamin (MULTIVITAMIN) capsule, , Disp: , Rfl:   •  Multiple Vitamin (MULTIVITAMINS PO), , Disp: , Rfl:   •  predniSONE (DELTASONE) 20 MG tablet, Take 30 mg by mouth 3 (Three) Times a Day. For two weeks, Disp: , Rfl:   •  Salicylic Acid 6 % shampoo, salicylic acid 6 % shampoo  APPLY EXTERNALLY TO THE SCALP 3 TIMES A WEEK. LEAVE IN 5 MINUTES BEFORE WASHING OUT, Disp: , Rfl:   •  simvastatin (ZOCOR) 40 MG tablet, Take 40 mg by mouth Daily., Disp: , Rfl: 3  •  Tocilizumab (Actemra ACTPen) 162 MG/0.9ML solution auto-injector injection, Inject 162 mg under the skin into the appropriate area as directed., Disp: , Rfl:   •  predniSONE (DELTASONE) 10 MG tablet, Take 1 tablet by mouth Daily., Disp: , Rfl:   No current facility-administered medications for this visit.    Facility-Administered Medications Ordered in Other Visits:   •  mupirocin (BACTROBAN) 2 % nasal ointment, , Nasal, BID, Van Mcguire MD    ALLERGIES:     Allergies   Allergen Reactions   • Penicillins Anaphylaxis     Reactions: syncope and seizures   • Adhesive Tape Hives     Tears skin   • Lidocaine-Epinephrine Unknown - Low Severity   • Pb-Hyoscy-Atropine-Scopolamine Unknown - Low Severity   • Atropine Hives   • Epinephrine Palpitations     Patient states will go into A-Fib   • Sulfa Antibiotics Nausea Only     STATES SHE DOES FINE WITH CERTAIN SULFA DRUGS       SOCIAL HISTORY:       Social History  "    Socioeconomic History   • Marital status:      Spouse name: Cuate   • Number of children: 1   • Years of education: 1 year of college   Tobacco Use   • Smoking status: Former Smoker     Quit date: 1975     Years since quittin.0   • Smokeless tobacco: Never Used   • Tobacco comment: QUIT SMOKING IN LATE 1970s   Vaping Use   • Vaping Use: Never used   Substance and Sexual Activity   • Alcohol use: Yes     Comment: social drinker   • Drug use: No   • Sexual activity: Defer         FAMILY HISTORY:  Family History   Problem Relation Age of Onset   • Heart disease Other    • Stroke Other    • Stroke Mother    • Heart disease Mother    • Hypertension Mother    • Leukemia Father         Mid 60s   • Hypertension Brother    • Cancer Paternal Aunt    • Cancer Paternal Grandfather    • Malig Hyperthermia Neg Hx        REVIEW OF SYSTEMS:    Review of Systems   Constitutional: Negative for activity change.   HENT: Negative for nosebleeds and trouble swallowing.    Respiratory: Negative for shortness of breath and wheezing.    Cardiovascular: Negative for chest pain and palpitations.   Gastrointestinal: Negative for constipation, diarrhea and nausea.   Genitourinary: Negative for dysuria and hematuria.   Musculoskeletal: Negative for arthralgias and myalgias.   Skin: Negative for rash and wound.   Neurological: Negative for seizures and syncope.   Hematological: Negative for adenopathy. Does not bruise/bleed easily.   Psychiatric/Behavioral: Negative for confusion.           Objective    Vitals:    22 0942   BP: 149/55   Pulse: 63   Resp: 16   Temp: 96.9 °F (36.1 °C)   TempSrc: Temporal   SpO2: 99%   Weight: 66.7 kg (147 lb)  Comment: doesnt feel confortable standing with w/c and boot   Height: 165.1 cm (65\")   PainSc: 0-No pain     Current Status 2022   ECOG score 1      PHYSICAL EXAM:        CONSTITUTIONAL:  Vital signs reviewed.  No distress, looks comfortable.  EYES:  Conjunctiva and lids " unremarkable.  PERRLA  EARS,NOSE,MOUTH,THROAT:  Ears and nose appear unremarkable.  Lips, teeth, gums appear unremarkable.  RESPIRATORY:  Normal respiratory effort.  Lungs clear to auscultation bilaterally.  CARDIOVASCULAR:  Normal S1, S2.  No murmurs rubs or gallops.  No significant lower extremity edema.  GASTROINTESTINAL: Abdomen appears unremarkable.  Nontender.  No hepatomegaly.  No splenomegaly.  LYMPHATIC:  No cervical, supraclavicular, axillary lymphadenopathy.  SKIN:  Warm.  No rashes.  PSYCHIATRIC:  Normal judgment and insight.  Normal mood and affect.      RECENT LABS:        WBC   Date/Time Value Ref Range Status   06/24/2022 09:33 AM 8.75 3.40 - 10.80 10*3/mm3 Final   07/23/2021 11:57 AM 7.53 4.5 - 11.0 10*3/uL Final     Hemoglobin   Date/Time Value Ref Range Status   06/24/2022 09:33 AM 8.3 (L) 12.0 - 15.9 g/dL Final   07/23/2021 11:57 AM 11.7 (L) 12.0 - 16.0 g/dL Final     Platelets   Date/Time Value Ref Range Status   06/24/2022 09:33  140 - 450 10*3/mm3 Final   06/24/2022 09:33  140 - 450 10*3/mm3 Final   07/23/2021 11:57  140 - 440 10*3/uL Final       Assessment/Plan     ASSESSMENT:  Malignant neoplasm of upper-outer quadrant of left breast in female, estrogen receptor positive (HCC)  - Ferritin  - Iron Profile  - Retic With IRF & RET-He  - Vitamin B12  - Folate RBC  - Comprehensive Metabolic Panel  - Lactate Dehydrogenase  - Haptoglobin  - Direct Antiglobulin Test ([facility])  - Ferritin  - Retic With IRF & RET-He  - Iron Profile  - CBC & Differential  - Ambulatory Referral to Hematology / Oncology    Anemia due to stage 3 chronic kidney disease treated with erythropoietin (CMS/HCC)  - Ferritin  - Iron Profile  - Retic With IRF & RET-He  - Vitamin B12  - Folate RBC  - Comprehensive Metabolic Panel  - Lactate Dehydrogenase  - Haptoglobin  - Direct Antiglobulin Test ([facility])  - Ferritin  - Retic With IRF & RET-He  - Iron Profile  - CBC & Differential  - Ambulatory  Referral to Hematology / Oncology    Iron deficiency anemia, unspecified iron deficiency anemia type  - Ferritin  - Iron Profile  - Retic With IRF & RET-He  - Vitamin B12  - Folate RBC  - Comprehensive Metabolic Panel  - Lactate Dehydrogenase  - Haptoglobin  - Direct Antiglobulin Test ([facility])  - Ferritin  - Retic With IRF & RET-He  - Iron Profile  - CBC & Differential  - Ambulatory Referral to Hematology / Oncology      *Stage IB ([id number]) left breast cancer. Grade 2, 1.9 cm, 1 mm micrometastasis in 1 out of 2 sentinel nodes. ER positive, HER-2 negative. OncoType DX: low risk category.   Tamoxifen 10/11/2013 through November 2013. Just a few days or so of Aromasin in January 2014. Could not tolerate hormonal therapy due to vaginal dryness and irritation and declined any more hormonal therapy.   On Fosamax and Evista through Dr. Perla Garza.  · 3/18/2022: Concerned for possible recurrence based on significant drop in Hb with no other explanation and low back pain and right shoulder pain with a hormone positive breast cancer which can recur years later.  Check scans.  · Bone scan 3/21/2022: Negative  · CT 3/21/2022: Right pulmonary nodule stable from 12/2/2016.  No evidence of metastatic disease.  Therefore, no evidence of recurrence of breast cancer.    * Atrial fibrillation, on Eliquis through Dr. Haley Roman.   No bleeding problems.    *Anemia of stage 3 chronic renal insufficiency (Dr. Rell Shannon is her nephrologist whom she follows up with). (Evaluation negative for B12 or folate deficiency, hemolysis or multiple myeloma. She has not had a bone marrow biopsy. She responds well to Procrit).   · She received Procrit 12/2014 and 1 dose 06/2015 and 1 dose 09/2015 and 1 dose on 10/28/2015 and some weekly doses of Procrit after hip replacement July 2017.  · She responds to Procrit.   · 3/18/2022: Hb down to 9.3.  Restart Procrit again  · 3/25/2022: Hb 11 (better after Procrit)  · 4/26/2022: Hb  11.8  · 5/12/2022 (rheumatology): Hb 11.6  · 5/24/2022: Hb 10.3  · 6/24/2022: Hb down to 8.3.  Restarted Procrit.    * Iron deficiency anemia. does not tolerate oral iron due to significant abdominal pain and diarrhea. She refuses any further Benadryl as a pre-medicine because it made her feel very sleepy for over 24-hours.  Today's iron labs normal.  · 2/2/2022: Ferritin 41, 10% saturation, Hb 12.6.  1 dose Injectafer given.  · 3/17/2022: Ferritin 1013, 15% saturation, Hb 9.3.  No need for IV iron.  · 3/25/2022: Hb 11  · 4/26/2022: Ferritin 1135, 41% saturation, Hb 11.8  · 6/24/2022: Ferritin 811, 40% saturation, Hb 8.3    *Anemia, for reasons other than iron deficiency and anemia of stage III chronic kidney disease.  · Hb 9.3 on 3/17/2022, lowest value we have since 2017.  Hb was normal, 12.6, 2/2/2022.  · 3/18/2022, unremarkable: B12, RBC folate, bilirubin, LDH, haptoglobin, direct [facility]  · 3/25/2022: Hb 11  · 4/26/2022: Hb 11.8  · 5/24/2022: Hb 10.3  · 6/24/2022, unremarkable: B12, iron studies   · 6/24/2022: Hb trended down to 8.3    *Possible hemolysis  · Bone marrow 6/6/2022: Normal trilineage hematopoiesis (suggesting production intact)  · Reticulocyte 6/24/2022: 7% (suggesting production intact  · NRBC 2%, 6/24/2022, trending upward, suggesting increased production from marrow  · However, haptoglobin elevated at 247, bilirubin only 0.5, and direct [facility] negative.  Spleen unremarkable on CT March 2022.    *Perhaps destruction of WBCs, which could be the reason for the young circulating WBCs?    *Leukocytosis  · WBC became elevated, 2/2/2022, at 11 (states she had 2 rounds of steroids in January for sinus infection)  · Predominance of mature segmented neutrophils.  Therefore, appears reactive  · 3/25/2022: WBC 22.9 (currently on steroids through PCP for TMJ pain and face swelling)  · WBC 12.1  · 5/12/2022 (rheumatology labs): WBC 13.5.  1% promyelocytes, 1% myelocytes, 1% metamyelocytes.  · 5/24/2022: WBC  11.8.  · WBC down to normal, 8.8, 16/24/22    *Circulating promyelocytes, myelocytes, metamyelocytes, NRBC  · Seen on rheumatology labs 5/12/2022 and 6/13/2022  · Bone marrow biopsy 6/6/2022: Normal trilineage hematopoiesis.  FISH for BCR ABL negative.  Although biopsy showed no monoclonal population, bone marrow flow showed 8.2% of total events monoclonal B cells.  Pathologist stated this may be due to peripheral blood contamination.    *Thrombocytopenia  · B12 and folate unremarkable.  · On Eliquis.  · New issue on 4/26/2022,   · 5/12/2022 (rheumatology labs): PLT 98  · 5/24/2022: PLT 93.  IPF 3%.  · 6/13/2022 (rheumatology labs): , but may be higher as platelets clumped.  · I asked for sodium citrate tube to eliminate possible PLT clumping on the 6/24/2022 lab  · 6/24/2022: , normal.  IPF normal at 2.2%    *Monoclonal B-cell lymphocytosis, non-- CLL type  · Initially found on bone marrow flow, 6/6/2022, 8.2% of total events, but not found in bone marrow biopsy.  Therefore, pathologist felt this was due to peripheral blood contamination  · 6/24/2022: Peripheral blood flow pending    *Etiology of cytopenias  · Bone marrow 6/6/2022 (due to worsened cytopenias): Normocellular, 20%, with trilineage hematopoiesis.  Flow: 8.2% of total events monoclonal B cells without the typical immunophenotype of CLL, HCL, or mantle cell (monoclonal B-cell lymphocytosis, non-- CLL type.  Not found on bone marrow biopsy.  Therefore, pathologist stated this could be due to peripheral blood contamination normal cytogenetics.  FISH for BCR ABL negative.  · The timing of the circulating young WBCs, NRBCs, and drop in Hb, seems to correlate with the temporal cell arteritis, and the bone marrow with normal trilineage hematopoiesis, suggests a destructive problem with the marrow sending unusually younger cells into the blood to try to compensate.  Therefore, I would lean toward an autoimmune peripheral destruction, but  [facility] is negative.  Furthermore, haptoglobin is elevated.  Bilirubin is normal, but I have seen bilirubin normal with obvious hemolysis in other patients.  Furthermore, she has been on large doses of steroids for temporal arteritis for several weeks with no improvement in her CBC.  I would like her to have another opinion at the [facility] with Dr. Posadas.    *Fibrocystic changes in both breasts.     *Dyspnea on exertion  · Previously complained of hypoxia with exertion, 1/18/2017.  · Follows with Dr. Nguyen.   · Dr. Nguyen noted 11/1/2021: Spirometry normal that day, but 5 years prior showed some obstruction with reversibility.  11/1/2021: Started an inhaler  · Due to anemia, he referred her back to me again.  Hb that day, 11/1/2021, 9.6.  He noted she is on oral anticoagulation through cardiology.  However, after that visit, on 11/29/2021, Hb 12    *Flashing lights and blurry vision when going from a dark room to a bright room and awoke with TMJ pain all beginning the night of 3/21/2022 (the day of CT and bone scans).  (Left more so than right)  · 3/25/2022: States she has seen her ophthalmologist and no abnormalities were found.  Plans to follow-up with him again.  Plans to see her dentist for the TMJ discomfort.  I told her I suspect she was under more stress due to imaging to look for recurrence of breast cancer.  She states she has found herself clenching her teeth in the past.    *Temporal arteritis, causing loss of vision left eye 3/23/2022.  · On prednisone 60 mg daily since 3/24/2022.  Follows with Dr. Mina Muro, ophthalmology.  · 5/24/2022: Patient reports Dr. PARADISE Lyn, rheumatology, is planning Actemra.  Patient notes Actemra is associated with thrombocytopenia 1 to 4% of the time.    PLAN:   · Appoint with Dr. Posadas at the [facility] for another opinion regarding her circulating promyelocytes, myelocytes, metamyelocytes, NRBC, and quickly dropping Hb, with bone  marrow showing normal trilineage hematopoiesis but marrow flow showing a small monoclonal B-cell population with nonspecific immunophenotype  · weekly CBC with Procrit if Hb <10. MD CBC, 2 units, in roughly 6 weeks or so.  · Procrit if Hb <10  · Received Procrit 3/18/2022 for Hb of 9.3.  Hb subsequently >10 the next several weeks.  · If PLT <60, stop Eliquis and change to weekly follow-ups.  ·     Prior plan was:  · MD 1 year.  One week prior: Ferritin, iron panel, CBC, reticulated hemoglobin  · If HP drops below 10, she could return for stat iron labs and consider resuming Procrit.  · Of course, if she wants to come back earlier for labs that is fine as well.  · Last mammogram September 2021, unremarkable per her report.  I have requested this.    45 minutes.  Total time.  Same day.    Send a copy this to   Dr. Mike Rojas, PCP and   Dr. Perla Garza, OB/GYN, and   Dr. Mina Muro, ophthalmology, and   Dr. PARADISE Lyn, rheumatology  Dr. Tyrese Posadas, hematology [facility]

## 2022-06-28 NOTE — TELEPHONE ENCOUNTER
Phoned Mary Kay and informed her I do not have the info regarding her blood type. She has informed me, [facility] phoned her regarding the appt. With Dr. Posadas and they told her Dr. Posadas was no longer there and she was scheduled with another MD.

## 2022-06-30 NOTE — TELEPHONE ENCOUNTER
Caller: JUAN LUIS    Best call back number: [phone]    Who are you requesting to speak with (clinical staff, provider,  specific staff member): CLINICAL    What was the call regarding: LILLIANA SEES DR PARKS AND HE WANTING TO START HER ON ACTEMRA FOR HER DIAGNOSIS GCA, HE HAS BEEN HOLDING BECAUSE OF HER HEMATOLOGIC ISSUES. DR PARKS WANTED TO KNOW FROM DR ROJAS IF SHE CAN START THE MEDICATION    Do you require a callback: YES

## 2022-06-30 NOTE — TELEPHONE ENCOUNTER
· Spoke with Peggy, informed her Dr. Cornejo is out of there office until 07/14 his last note mentions 5/24/2022: Patient reports Dr. PARADISE Lyn, rheumatology, is planning Actemra.  Patient notes Actemra is associated with thrombocytopenia 1 to 4% of the time but would have to speak with Dr. Cornejo if agrees with starting the medication. Will call Peggy back when Dr. Cornejo returns.  :

## 2022-07-08 NOTE — TELEPHONE ENCOUNTER
Ms Saab is scheduled for Labs and possible Procrit today at 1100, she had a fall last night and her PCP is going to send her for some x-rays today.  She is In a great deal of pain and wants to cancel her appointment for today and will come in at her next scheduled appointment on 7/15/2022.  I told her I will send the message to the .  She v/u.

## 2022-07-09 PROBLEM — L03.115 CELLULITIS OF RIGHT LOWER EXTREMITY: Status: ACTIVE | Noted: 2022-01-01

## 2022-07-09 NOTE — ED TRIAGE NOTES
"Pt to ER from home via [facility] (incident # [id number]) with c/o RLE redness and swelling that has been ongoing for several \"days.\" Pt states pain was increasing in severity through tonight and that she was unable to bear weight.  Pt also reports soa on exertion.    EMS found pt to be in afib, hx of a fib.      Pt masked in triage, staff in appropriate ppe.    "

## 2022-07-09 NOTE — PROGRESS NOTES
[facility] Clinical Pharmacy Services: Vancomycin Pharmacokinetic Initial Consult Note    Mary Kay Saab is a 77 y.o. female who is on day 1 of pharmacy to dose vancomycin.    Indication: Sepsis  Consulting Provider: Dr. Rangel  Planned Duration of Therapy: 7 days   Loading Dose Ordered or Given:  7/9 @0854 Vancomycin 1250 mg x 1  Target: Intermittent dosing   Other Antimicrobials: Cefepime     Vitals/Labs  Ht:  ; Wt:    Temp Readings from Last 1 Encounters:   07/09/22 97.9 °F (36.6 °C) (Axillary)    Estimated Creatinine Clearance: 26.1 mL/min (A) (by C-G formula based on SCr of 1.87 mg/dL (H)).     Results from last 7 days   Lab Units 07/09/22  1038 07/09/22  0907 07/09/22  0638   CREATININE mg/dL 1.87*  --  1.60*   WBC 10*3/mm3  --  1.18* 1.43*     Assessment/Plan:    Patient received 1250 mg (~20 mg/kg) x 1 load in the ED prior to consult. Scr is elevated and labile. Initiate intermittent dosing at this time, no further doses are indicated today. Plan for random level timed ~24 hours from initial load.     Pharmacy will follow patient's kidney function and will adjust doses and obtain levels as necessary. Thank you for involving pharmacy in this patient's care. Please contact pharmacy with any questions or concerns.                           Vangie Nation [facility]  Clinical Pharmacist

## 2022-07-09 NOTE — ED NOTES
Pt placed on 2L NC r/t feeling SOB, pt is tachypneic and grunting with respirations. Pt 's oxygen saturation is 97% on 2L, however pt's BP is 87/68.

## 2022-07-09 NOTE — PLAN OF CARE
Goal Outcome Evaluation:  Plan of Care Reviewed With: patient           Outcome Evaluation: SLP received order for swallow eval due to failed screen. Pt is not ready per RN, likely moving to ICU w/significant tachypnea. Will f/u Mon as appropriate.

## 2022-07-09 NOTE — CONSULTS
[facility] Consult Note      Patient Name: Mary Kay Saab  : 1945  MRN: [id number]  Primary Care Physician:  Jann Rojas MD  Referring Physician: Khalif Rangel,*  Date of admission: 2022    Subjective     Reason for Consult:  JORDIN on CKD3    HPI:   Mary Kay Saba is a 77 y.o. female with CKD3 (baseline SCR 1.6; followed by Dr. Karley Young of our group), admitted for worsening leg swelling and pain, especially right lower leg, weakness, and shortness of breath.  Full PMH outlined below; pertinent is recent start of prednisone for temporal arteritis; breast cancer; and atrial fibrillation on AC.  SCR on arrival 1.6, with value 1.9 later today.  Hospital course notable for progressive hypoxia, lethargy, suspected DIC, severe acidemia (pH 7.17), and evolving septic shock attributed to severe cellulitis right lower leg.    Review of Systems:   Not obtainable from the patient    Personal History     Past Medical History:   Diagnosis Date   • Anemia     Of chronic renal insufficiency   • Atrial fibrillation (HCC)    • Mcguire's esophagus    • Breast cancer (HCC)    • Chronic renal insufficiency     Stage 3   • MCCRAY (dyspnea on exertion)    • Dyspnea    • GERD (gastroesophageal reflux disease)    • H/O electrocardiogram    • History of staph infection    • History of stroke     right PICA noted on MRI 2022- old not acute    • Hx of being hospitalized     [facility] in 2010 and 10/2010 for atrial fibrillation, Dr. Perla Hurtado   • Hyperlipidemia    • Hypertension    • Hypoxia    • IBS (irritable bowel syndrome)    • Kidney dysfunction    • Left carotid artery stenosis     mild proximal and moderate distally 2022   • Lower extremity edema    • Lump or mass in breast    • Malignant neoplasm of breast (HCC)    • Osteoarthritis    • Osteopenia    • Osteoporosis    • Paroxysmal atrial fibrillation (HCC)    • Seizure (HCC)     AS A  TEENAGER, NOT CURRENTLY   • Sinusitis    • SOB (shortness of breath)        Past Surgical History:   Procedure Laterality Date   • BREAST LUMPECTOMY  1990    For LCIS   • CARDIAC CATHETERIZATION Left 4/27/2016    Procedure: Cardiac catheterization;  Surgeon: Kevin Guillen MD;  Location: [facility] CATH INVASIVE LOCATION;  Service:    • FOOT SURGERY     • HAND ARTHROPLASTY Right    • KNEE ARTHROSCOPY     • OTHER SURGICAL HISTORY      LYMPHATIC SURGERY   • TOTAL HIP ARTHROPLASTY Right 7/12/2017    Procedure: TOTAL HIP ARTHROPLASTY ANTERIOR WITH HANA TABLE;  Surgeon: Van Mcguire MD;  Location: [facility] MAIN OR;  Service:    • TOTAL KNEE ARTHROPLASTY Right 11/19/2018    Procedure: TOTAL KNEE ARTHROPLASTY;  Surgeon: Van Mcguire MD;  Location: [facility] OR;  Service: Orthopedics       Family History: family history includes Cancer in her paternal aunt and paternal grandfather; Heart disease in her mother and another family member; Hypertension in her brother and mother; Leukemia in her father; Stroke in her mother and another family member.    Social History:  reports that she quit smoking about 47 years ago. She has never used smokeless tobacco. She reports current alcohol use. She reports that she does not use drugs.    Home Medications:  Prior to Admission medications    Medication Sig Start Date End Date Taking? Authorizing Provider   ALPRAZolam (XANAX) 1 MG tablet Take 1 mg by mouth every night.   Yes Yuriy Avila MD   apixaban (ELIQUIS) 2.5 MG tablet tablet Take 1 tablet by mouth 2 (Two) Times a Day. 6/7/22  Yes Karley Roman MD   cephalexin (KEFLEX) 500 MG capsule TAKE 4 CAPSULES BY MOUTH 1 HOUR BEFORE DENTAL PROCEDURE 2/10/22  Yes Sabrina Vicente APRN   cetirizine (ZyrTEC) 10 MG tablet Take 10 mg by mouth Daily. 4/24/13  Yes Yuriy Avila MD   cycloSPORINE (RESTASIS) 0.05 % ophthalmic emulsion Administer 1 drop to both eyes 2 (Two) Times a Day.   Yes Yuriy Avila MD   diltiaZEM  (CARDIZEM) 60 MG tablet Take 60 mg by mouth 2 (Two) Times a Day. PATIENT TAKES ONE TABLET AT 5 PM AND ONE TAB HS   Yes Yuriy Avila MD   esomeprazole (NexIUM) 40 MG capsule Take 40 mg by mouth 2 (two) times a day. 1/27/12  Yes Yuriy Avila MD   Glucosamine 500 MG capsule Take  by mouth. 1/15/15  Yes Yuriy Avila MD   hydrALAZINE (APRESOLINE) 25 MG tablet hydralazine 25 mg tablet   TAKE 1 TABLET BY MOUTH TWICE DAILY   Yes Yuriy Avila MD   montelukast (SINGULAIR) 10 MG tablet Take 10 mg by mouth Daily. 11/4/11  Yes Yuriy Avila MD   Mucinex D Max Strength 120-1200 MG tablet sustained-release 12 hour Take 1 tablet by mouth Every 12 (Twelve) Hours. 5/11/21  Yes Yuriy Avila MD   Multiple Vitamin (MULTIVITAMIN) capsule  1/15/15  Yes Yuriy Avila MD   Multiple Vitamin (MULTIVITAMINS PO)    Yes Yuriy Avila MD   predniSONE (DELTASONE) 20 MG tablet Take 30 mg by mouth 3 (Three) Times a Day. For two weeks 4/14/22  Yes Yuriy Avila MD   simvastatin (ZOCOR) 40 MG tablet Take 40 mg by mouth Daily. 3/4/19  Yes Yuriy Avila MD   alendronate (FOSAMAX) 70 MG tablet Take 70 mg by mouth Every 7 (Seven) Days. sunday 2/20/17   Yuriy Avila MD   alosetron (LOTRONEX) 0.5 MG tablet Take 0.05 mg by mouth Daily As Needed. 4/16/15   Yuriy Avila MD   amiodarone (PACERONE) 200 MG tablet Take  by mouth.    Yuriy Avila MD   Anucort-HC 25 MG suppository INSERT 1 SUPPOSITORY RECTALLY ONCE DAILY FOR 6 DAYS 4/12/22   Yuriy Avila MD   clobetasol (OLUX) 0.05 % topical foam As Needed. 11/24/20   Yuriy Avila MD   diphenoxylate-atropine (LOMOTIL) 2.5-0.025 MG per tablet Take 1 tablet by mouth 4 (Four) Times a Day As Needed for Diarrhea.    Yuriy Avila MD   fluorometholone (FML) 0.1 % ophthalmic suspension  6/21/22   Yuriy Avila MD   hydrochlorothiazide (HYDRODIURIL) 12.5 MG tablet Take 12.5 mg by  mouth Daily.    Yuriy Avila MD   ketoconazole (NIZORAL) 2 % shampoo ketoconazole 2 % shampoo   APPLY EXTERNALLY TO THE SCALP 3 TIMES A WEEK. LEAVE IN 5 MINUTES BEFORE WASHING OUT    Yuriy Avila MD   metoprolol tartrate (LOPRESSOR) 25 MG tablet Take 0.625 mg by mouth As Needed.    Yuriy Avila MD   predniSONE (DELTASONE) 10 MG tablet Take 1 tablet by mouth Daily. 5/23/22   Yuriy Avila MD   Salicylic Acid 6 % shampoo salicylic acid 6 % shampoo   APPLY EXTERNALLY TO THE SCALP 3 TIMES A WEEK. LEAVE IN 5 MINUTES BEFORE WASHING OUT    Yuriy Avila MD   Tocilizumab (Actemra ACTPen) 162 MG/0.9ML solution auto-injector injection Inject 162 mg under the skin into the appropriate area as directed. 5/12/22   Yuriy Avila MD   bumetanide (BUMEX) 1 MG tablet Take 1 mg by mouth Daily. 11/15/20 7/9/22  Yuriy Avila MD       Allergies:  Allergies   Allergen Reactions   • Penicillins Anaphylaxis     Reactions: syncope and seizures  Tolerates cephalosporins   • Adhesive Tape Hives     Tears skin   • Lidocaine-Epinephrine Unknown - Low Severity   • Pb-Hyoscy-Atropine-Scopolamine Unknown - Low Severity   • Atropine Hives   • Epinephrine Palpitations     Patient states will go into A-Fib   • Sulfa Antibiotics Nausea Only     STATES SHE DOES FINE WITH CERTAIN SULFA DRUGS       Objective     Vitals:   Temp:  [97.8 °F (36.6 °C)-97.9 °F (36.6 °C)] 97.9 °F (36.6 °C)  Heart Rate:  [] 89  Resp:  [18-34] 25  BP: ()/(30-93) 107/57  Flow (L/min):  [2-3] 3    Intake/Output Summary (Last 24 hours) at 7/9/2022 1854  Last data filed at 7/9/2022 1818  Gross per 24 hour   Intake 1800 ml   Output --   Net 1800 ml       Physical Exam:   Constitutional: On BiPAP; poorly responsive; pale; chronically ill  HEENT: Dry MM, cushingoid  Neck: Supple, no carotid bruit, trachea at midline, no JVD  Respiratory: Coarse breath sounds, nonlabored respiration  Cardiovascular: Irregularly  irregular, tachycardic, no rub  Gastrointestinal: BS +, soft, nontender and nondistended  : No palpable bladder  Musculoskeletal: +2 edema both legs, extensive mottling of both lower legs right worse than left, acral cyanosis  Psychiatric: Lethargic  Neurologic: Moving her hands  Skin: Cool, mottled, areas of kenney cyanosis on feet       Scheduled Meds:     azithromycin, 500 mg, Intravenous, Daily  [START ON 7/10/2022] cefepime, 2 g, Intravenous, Q24H  heparin (porcine), 5,000 Units, Subcutaneous, Q8H  hydrocortisone sodium succinate, 100 mg, Intravenous, Q8H  potassium chloride, 40 mEq, Oral, Once  potassium chloride, 10 mEq, Intravenous, Q1H  sodium chloride, 10 mL, Intravenous, Q12H  Vancomycin Pharmacy Intermittent/Pulse Dosing, , Does not apply, Daily      IV Meds:   norepinephrine, 0.02-0.3 mcg/kg/min, Last Rate: 0.3 mcg/kg/min (07/09/22 1843)  Pharmacy to dose vancomycin,   phenylephrine, 0.5-3 mcg/kg/min, Last Rate: 0.5 mcg/kg/min (07/09/22 1814)  sodium bicarbonate, 150 mEq, Last Rate: 150 mL/hr at 07/09/22 1842        Results Reviewed:   I have personally reviewed the results from the time of this admission to 7/9/2022 18:54 EDT     Lab Results   Component Value Date    GLUCOSE 77 07/09/2022    CALCIUM 7.9 (L) 07/09/2022     07/09/2022    K 2.9 (L) 07/09/2022    CO2 17.7 (L) 07/09/2022     07/09/2022    BUN 44 (H) 07/09/2022    CREATININE 1.87 (H) 07/09/2022    EGFRIFAFRI  09/15/2016      Comment:      <15 Indicative of kidney failure.    EGFRIFNONA 31 (L) 02/02/2022    BCR 23.5 07/09/2022    ANIONGAP 15.3 (H) 07/09/2022      Lab Results   Component Value Date    MG 2.0 07/09/2022    PHOS 2.1 (L) 07/09/2022    ALBUMIN 2.80 (L) 07/09/2022           Assessment / Plan     ASSESSMENT:  1.  JORDIN on CKD3, with no UOP recorded yet, worsening:  prerenal +/- ATN due to septic shock.  + AG metabolic acidosis with elevated lactate; evolving hyperkalemia.  Extensive edema  2.  Septic shock due to right  leg cellulitis with now multiple organ dysfunction  3.  Suspected DIC  4.  Temporal arteritis on prednisone since March '22  5.  Breast cancer  6.  Atrial fibrillation  7.  Hypoxia    PLAN:  1.  Sodium bicarbonate drip  2.  Broached subject of comfort-based care with family at bedside; they understand how gravely ill she is  3.  No CPR or intubation    Thank you for involving us in the care of Mary Kay Saab.  Please feel free to call with any questions.    Grao Horton MD  07/09/22  18:54 EDT    [facility]  [phone]      Much of this encounter note is an electronic transcription/translation of spoken language to printed text. The electronic translation of spoken language may permit erroneous, or at times, nonsensical words or phrases to be inadvertently transcribed; Although I have reviewed the note for such errors, some may still exist.

## 2022-07-09 NOTE — H&P
"[facility]    Patient Care Team:  Jann Rojas MD as PCP - General (Internal Medicine)  Perla Hurtado MD as Consulting Physician (Cardiology)  Diego Cornejo II, MD as Consulting Physician (Hematology and Oncology)  Rell Shannon MD as Consulting Physician (Nephrology)  Gian Stewart MD as Consulting Physician (Ophthalmology)  Van Mcguire MD as Referring Physician (Orthopedic Surgery)    CC: Worsening leg swelling, generalized weakness    HPI: Patient is 77-year-old white female with a past medical history significant for atrial fibrillation on anticoagulation, CKD 3, cytopenias with ongoing evaluation by Dr. Ludwig, recent diagnosis of temporal arteritis on steroids and Actemra, previous breast cancer who was reportedly doing well until around a week back when she started noticing some skin breakdown and oozing of clear liquid for a few days but this started to get worse over the last 24 hours significantly and \"blew up\" and she decided to come to the ER and patient reports painful leg mainly in the right.  Does not have any subjective fevers or chills does have some shortness of breath.  She is using her anticoagulation for A. fib.  Has been on chronic prednisone.  Reportedly has been on outpatient antibiotic Keflex from primary care.    Patient was noted to have concerns for sepsis and cellulitis and was started on IV fluid hydration but progressively declined in the ER not responding to IV fluids and having worsening respiratory failure chest x-ray changes concerning for pulmonary edema and eventually patient was started on pressors and we have been asked admit the patient to ICU.  Patient was evaluated and noted to have persistent pressor needs.  Does seem to be dehydrated even though his third spaced but exam is difficult due to adrenal facies and diffuse erythema as well as edema in the lower extremities.  Patient was started on broad-spectrum " antibiotics.  Repeat chest x-ray was ordered showing worsening infiltrates in the bases bilaterally and antibiotics were broadened and patient was volume resuscitated with colloid and also started on stress dose steroids.  Patient lactic acid initially was negative but repeat lactic acid is ordered stat and pending.  VBG shows evidence of severe metabolic acidosis with partial compensation and patient was placed on noninvasive ventilator to help with work of breathing.  Patient confirmed to be DNR/DNI by herself as well as  who understand that she is not doing well.  I discussed the case with ER physician as well as Dr. Montemayor was very concerned about her current clinical status.    I have reviewed (if any available) last discharge summaries as well as the outpatient notes from the patients other consultants available in the [facility] system as well previous notes from our group and summarized above    Objective     I have discussed the case with ED physician     Records Reviewed  Old medical records.  Nursing notes.  Previous radiology studies.    Review of Systems:  Unable to obtain more than about due to acute respiratory failure and septic shock along with encephalopathy    Past Medical History:   Diagnosis Date   • Anemia     Of chronic renal insufficiency   • Atrial fibrillation (HCC)    • Mcguire's esophagus    • Breast cancer (HCC)    • Chronic renal insufficiency     Stage 3   • MCCRAY (dyspnea on exertion)    • Dyspnea    • GERD (gastroesophageal reflux disease)    • H/O electrocardiogram    • History of staph infection    • History of stroke     right PICA noted on MRI 02/2022- old not acute    • Hx of being hospitalized     [facility] in 09/2010 and 10/2010 for atrial fibrillation, Dr. Perla Hurtado   • Hyperlipidemia    • Hypertension    • Hypoxia    • IBS (irritable bowel syndrome)    • Kidney dysfunction    • Left carotid artery stenosis     mild proximal and moderate distally  February 2022   • Lower extremity edema    • Lump or mass in breast    • Malignant neoplasm of breast (HCC)    • Osteoarthritis    • Osteopenia    • Osteoporosis    • Paroxysmal atrial fibrillation (HCC)    • Seizure (HCC)     AS A TEENAGER, NOT CURRENTLY   • Sinusitis    • SOB (shortness of breath)        Past Surgical History:   Procedure Laterality Date   • BREAST LUMPECTOMY  1990    For LCIS   • CARDIAC CATHETERIZATION Left 4/27/2016    Procedure: Cardiac catheterization;  Surgeon: Kevin Guillen MD;  Location: [facility] CATH INVASIVE LOCATION;  Service:    • FOOT SURGERY     • HAND ARTHROPLASTY Right    • KNEE ARTHROSCOPY     • OTHER SURGICAL HISTORY      LYMPHATIC SURGERY   • TOTAL HIP ARTHROPLASTY Right 7/12/2017    Procedure: TOTAL HIP ARTHROPLASTY ANTERIOR WITH HANA TABLE;  Surgeon: Van Mcguire MD;  Location: [facility] MAIN OR;  Service:    • TOTAL KNEE ARTHROPLASTY Right 11/19/2018    Procedure: TOTAL KNEE ARTHROPLASTY;  Surgeon: Van Mcguire MD;  Location: [facility] MAIN OR;  Service: Orthopedics       Prior to Admission Medications     Prescriptions Last Dose Informant Patient Reported? Taking?    ALPRAZolam (XANAX) 1 MG tablet 7/8/2022  Yes Yes    Take 1 mg by mouth every night.    apixaban (ELIQUIS) 2.5 MG tablet tablet 7/8/2022  No Yes    Take 1 tablet by mouth 2 (Two) Times a Day.    cephalexin (KEFLEX) 500 MG capsule 7/8/2022  No Yes    TAKE 4 CAPSULES BY MOUTH 1 HOUR BEFORE DENTAL PROCEDURE    cetirizine (ZyrTEC) 10 MG tablet 7/8/2022  Yes Yes    Take 10 mg by mouth Daily.    cycloSPORINE (RESTASIS) 0.05 % ophthalmic emulsion 7/8/2022  Yes Yes    Administer 1 drop to both eyes 2 (Two) Times a Day.    diltiaZEM (CARDIZEM) 60 MG tablet 7/9/2022  Yes Yes    Take 60 mg by mouth 2 (Two) Times a Day. PATIENT TAKES ONE TABLET AT 5 PM AND ONE TAB HS    esomeprazole (NexIUM) 40 MG capsule 7/8/2022  Yes Yes    Take 40 mg by mouth 2 (two) times a day.    Glucosamine 500 MG capsule 7/8/2022  Yes Yes    Take  by  mouth.    hydrALAZINE (APRESOLINE) 25 MG tablet 7/8/2022  Yes Yes    hydralazine 25 mg tablet   TAKE 1 TABLET BY MOUTH TWICE DAILY    montelukast (SINGULAIR) 10 MG tablet 7/8/2022  Yes Yes    Take 10 mg by mouth Daily.    Mucinex D Max Strength 120-1200 MG tablet sustained-release 12 hour 7/8/2022  Yes Yes    Take 1 tablet by mouth Every 12 (Twelve) Hours.    Multiple Vitamin (MULTIVITAMIN) capsule 7/8/2022  Yes Yes    Multiple Vitamin (MULTIVITAMINS PO) 7/8/2022  Yes Yes    predniSONE (DELTASONE) 20 MG tablet 7/8/2022  Yes Yes    Take 30 mg by mouth 3 (Three) Times a Day. For two weeks    simvastatin (ZOCOR) 40 MG tablet 7/8/2022  Yes Yes    Take 40 mg by mouth Daily.    alendronate (FOSAMAX) 70 MG tablet 7/4/2022  Yes No    Take 70 mg by mouth Every 7 (Seven) Days. sunday    alosetron (LOTRONEX) 0.5 MG tablet 7/6/2022  Yes No    Take 0.05 mg by mouth Daily As Needed.    amiodarone (PACERONE) 200 MG tablet 7/2/2022  Yes No    Take  by mouth.    Anucort-HC 25 MG suppository Unknown  Yes No    INSERT 1 SUPPOSITORY RECTALLY ONCE DAILY FOR 6 DAYS    clobetasol (OLUX) 0.05 % topical foam More than a month  Yes No    As Needed.    diphenoxylate-atropine (LOMOTIL) 2.5-0.025 MG per tablet More than a month  Yes No    Take 1 tablet by mouth 4 (Four) Times a Day As Needed for Diarrhea.    fluorometholone (FML) 0.1 % ophthalmic suspension   Yes No    hydrochlorothiazide (HYDRODIURIL) 12.5 MG tablet 7/3/2022  Yes No    Take 12.5 mg by mouth Daily.    ketoconazole (NIZORAL) 2 % shampoo   Yes No    ketoconazole 2 % shampoo   APPLY EXTERNALLY TO THE SCALP 3 TIMES A WEEK. LEAVE IN 5 MINUTES BEFORE WASHING OUT    metoprolol tartrate (LOPRESSOR) 25 MG tablet   Yes No    Take 0.625 mg by mouth As Needed.    predniSONE (DELTASONE) 10 MG tablet   Yes No    Take 1 tablet by mouth Daily.    Salicylic Acid 6 % shampoo 7/7/2022  Yes No    salicylic acid 6 % shampoo   APPLY EXTERNALLY TO THE SCALP 3 TIMES A WEEK. LEAVE IN 5 MINUTES BEFORE  WASHING OUT    Tocilizumab (Actemra ACTPen) 162 MG/0.9ML solution auto-injector injection   Yes No    Inject 162 mg under the skin into the appropriate area as directed.          Penicillins, Adhesive tape, Lidocaine-epinephrine, Pb-hyoscy-atropine-scopolamine, Atropine, Epinephrine, and Sulfa antibiotics    Family History   Problem Relation Age of Onset   • Heart disease Other    • Stroke Other    • Stroke Mother    • Heart disease Mother    • Hypertension Mother    • Leukemia Father         Mid 60s   • Hypertension Brother    • Cancer Paternal Aunt    • Cancer Paternal Grandfather    • Malig Hyperthermia Neg Hx        Social History     Socioeconomic History   • Marital status:      Spouse name: Cuate   • Number of children: 1   • Years of education: 1 year of college   Tobacco Use   • Smoking status: Former Smoker     Quit date: 1975     Years since quittin.1   • Smokeless tobacco: Never Used   • Tobacco comment: QUIT SMOKING IN LATE 1970s   Vaping Use   • Vaping Use: Never used   Substance and Sexual Activity   • Alcohol use: Yes     Comment: social drinker   • Drug use: No   • Sexual activity: Defer       Physical Exam  Temp:  [97.8 °F (36.6 °C)-97.9 °F (36.6 °C)] 97.9 °F (36.6 °C)  Heart Rate:  [] 87  Resp:  [18-34] 34  BP: ()/(30-82) 104/59       Vent Settings                                           PHYSICAL EXAM   Constitutional: Middle aged obese with adrenal facies pt in bed, mild - acute respiratory distress, + accessory muscle use  Head: - NCAT  Eyes: No pallor, Anicteric conjunctiva, EOMI.  ENMT:  Mallampati 4, no oral thrush.  Dry MM.   NECK: Trachea midline, No thyromegaly, no palpable cervical LNpathy  Heart: RRR, no murmur.  Bilateral 1+ pedal edema   Lungs: CARLOS +, decreased breath sounds at the bases.  No wheezes/ crackles heard    Abdomen: Soft.  Obese.  No tenderness, guarding or rigidity. No palpable masses  Extremities: Extremities warm and well perfused. No  cyanosis/ clubbing.  Diffusely erythematous bilateral lower extremities with warmth to touch as well as tenderness to palpation mainly in the right with some erythema in the left - extending into the thigh as well as  Neuro: Conscious, drowsy, no gross focal neuro deficits  Psych: Mood and affect -unable to obtain    PPE recommended per [facility] infectious disease Isolation protocol for the current clinical scenario(as mentioned below) was followed.     LABS:  Results from last 7 days   Lab Units 07/09/22  1038 07/09/22  0907 07/09/22  0638   SODIUM mmol/L 139  --  138   POTASSIUM mmol/L 2.9*  --  2.7*   CHLORIDE mmol/L 106  --  103   CO2 mmol/L 17.7*  --  17.6*   BUN mg/dL 44*  --  42*   CREATININE mg/dL 1.87*  --  1.60*   CALCIUM mg/dL 7.9*  --  7.9*   BILIRUBIN mg/dL 0.5  --  0.6   ALK PHOS U/L 110  --  129*   ALT (SGPT) U/L 32  --  40*   AST (SGOT) U/L 15  --  23   GLUCOSE mg/dL 77  --  108*   WBC 10*3/mm3  --  1.18* 1.43*   HEMOGLOBIN g/dL  --  8.8* 9.4*   PLATELETS 10*3/mm3  --  155 149   INR   --   --  1.39*   PROBNP pg/mL  --   --  8,594.0*   PROCALCITONIN ng/mL  --   --  4.55*       Lab Results   Component Value Date    CALCIUM 7.9 (L) 07/09/2022    PHOS 2.1 (L) 07/09/2022             Results from last 7 days   Lab Units 07/09/22  1624   PH, ARTERIAL pH units 7.165*   PO2 ART mm Hg 28.8*   PCO2, ARTERIAL mm Hg 30.9*   HCO3 ART mmol/L 11.1*        Result Review      I have personally reviewed the results from the time of this admission to 7/9/2022 16:32 EDT and agree with these findings:  [x]  Laboratory  [x]  Microbiology  [x]  Radiology  [x]  EKG/Telemetry   [x]  Cardiology/Vascular   []  Pathology  [x]  Old records  []  Other:    Assessment   Septic shock   Pneumonia vs cellulitis  Acute metabolic encephalopathy  Severe metabolic acidosis  Acute hypoxic respiratory failure  Pulmonary edema  JORDIN on CKD 3  Severe hypokalemia  Severe neutropenia  DIC  Recent temporal arteritis-on prednisone/  Actemra  Anemia-multifactorial  Atrial fibrillation on Eliquis  Previous breast cancer    PLAN:  Patient with septic shock likely from progressive cellulitis in the setting of chronic steroids as well as potential other immunosuppressive agents and severe neutropenia.  Continue with volume resuscitation even though patient is third spacing does seem to be clinically dehydrated.  We will add bicarb drip and give albumin.  Continue with broad-spectrum antibiotics and will consult infectious diseases given complex clinical scenario and await opinion at this point.  Severe metabolic acidosis.  Will give bicarb push.  We will consult her nephrologist given baseline renal failure.  We will continue with potassium repletion and defer further electrolyte management to them  Discussed with hematology and oncology about neutropenia and appreciate input and agree with concerns  Will continue with stress dose steroids currently  Hold anticoagulation for now  Liquid diet for now.  Mental status is inconsistent at this point to consider full diet and there is concern for aspiration pneumonia based on chest x-ray as well  Poor prognosis  DNR/DNI    DVT prophylaxis:  Heparin Sq    CODE STATUS:    Code Status and Medical Interventions:   Ordered at: 07/09/22 1523     Code Status (Patient has no pulse and is not breathing):    CPR (Attempt to Resuscitate)     Medical Interventions (Patient has pulse or is breathing):    Full Support     Patient did not receive the recommended 30ml/kg fluid bolus for sepsis because it would be harmful or detrimental to the patient. The patient has concern for fluid overload.   The patient was ordered more than 1L of fluids.    SEPTIC SHOCK FOCUSED EXAM ATTESTATION    I attest that I have reassessed tissue perfusion after the fluid bolus given.    Addendum-patient not doing well and having persistent septic shock not responding to IV fluid hydration.  We will add Chas-Synephrine and do albumin and start  bicarb drip as above.  Bedside central line placed uneventfully.  Answered questions of the family.  More family numbers are here and I expressed significant concern regarding high chance of mortality at this point.  The understanding appreciate the care and would like to continue with current treatment plan.    Cc- 77 mins not including procedures    I have discussed my findings and recommendations with patient, family, nursing staff and consulting provider.     Khalif Rangel MD  7/9/2022  16:32 EDT

## 2022-07-09 NOTE — PROGRESS NOTES
Patient sent for  Stat right lower extremity venous duplex. Scan completed and preliminary report of negative for DVT in the right leg called to Hillary Peterson MD in ER.

## 2022-07-09 NOTE — PROCEDURES
"Insert Central Line At Bedside    Date/Time: 7/9/2022 5:26 PM  Performed by: Khalif Rangel MD  Authorized by: Khalif Rangel MD   Consent: Verbal consent obtained.  Risks and benefits: risks, benefits and alternatives were discussed  Consent given by: guardian  Procedure consent: procedure consent matches procedure scheduled  Relevant documents: relevant documents present and verified  Test results: test results available and properly labeled  Site marked: the operative site was marked  Imaging studies: imaging studies available  Required items: required blood products, implants, devices, and special equipment available  Patient identity confirmed: arm band and hospital-assigned identification number  Time out: Immediately prior to procedure a \"time out\" was called to verify the correct patient, procedure, equipment, support staff and site/side marked as required.  Indications: vascular access  Anesthesia: see MAR for details    Sedation:  Patient sedated: no    Preparation: skin prepped with ChloraPrep  Skin prep agent dried: skin prep agent completely dried prior to procedure  Sterile barriers: all five maximum sterile barriers used - cap, mask, sterile gown, sterile gloves, and large sterile sheet  Hand hygiene: hand hygiene performed prior to central venous catheter insertion  Location details: right femoral  Patient position: flat  Procedural supplies: Quad lumen   Pre-procedure: landmarks identified  Ultrasound guidance: yes  Sterile ultrasound techniques: sterile gel and sterile probe covers were used  Number of attempts: 1  Successful placement: yes  Post-procedure: line sutured and dressing applied  Assessment: blood return through all ports and free fluid flow  Patient tolerance: patient tolerated the procedure well with no immediate complications        "

## 2022-07-09 NOTE — ED PROVIDER NOTES
EMERGENCY DEPARTMENT ENCOUNTER    CHIEF COMPLAINT  Chief Complaint: Right leg swelling  History given by: Patient  History limited by: Nothing  Room Number: 10/10  PMD: Jann Rojas MD  Oncologist: Dr. Mario hernandez  Cardiologist: Dr. Haley Roman  Nephrologist: Dr. Rell Carlos    HPI:  Pt is a 77 y.o. female presents complaining of right legs redness, tenderness, swelling worsening over the past day.  Patient reports she had a small area of skin breakdown with oozing of clear fluid for the past week with significant escalation of redness tracking up her leg over the past 24 hours.  Patient reports the leg is painful, denies fevers, chest pain, shortness of air, cough, abdominal pain, nausea/vomiting.  Patient and family reports she is on Eliquis twice daily, last dose 10 PM last night and chronic steroids for giant cell arteritis, due for 20 mg of prednisone this morning.    Duration: Drainage for 1 week, significant escalation of redness and swelling for 1 day  Associated Symptoms: Tenderness  Aggravating Factors: Nothing  Alleviating Factors: Nothing  Treatment before arrival: PCP, Dr. Rojas, called in Keflex and patient reports she has taken 4 doses with escalation of her symptoms    Upon review the patient's chart is noted:  Patient with history of left breast cancer, anemia secondary to stage III chronic kidney disease, A. fib on Eliquis  Patient noted to have a hemoglobin of 8.3 on 6/24/2022  Holter in January 2022 shows sinus rhythm with occasional short episodes of nonspecific atrial tachycardia lasting a few seconds  Patient with echo 2/27/2017 with an EF of 67%      PAST MEDICAL HISTORY  Active Ambulatory Problems     Diagnosis Date Noted   • Anemia due to stage 3 chronic kidney disease treated with erythropoietin (CMS/HCC) 02/08/2016   • Breast cancer of upper-outer quadrant of left female breast (HCC) 02/08/2016   • Paroxysmal atrial fibrillation (HCC) 02/09/2016   • Iron deficiency anemia  02/09/2016   • Osteopenia 02/09/2016   • Mixed hyperlipidemia 09/14/2016   • Essential hypertension 09/14/2016   • Hypoxia 12/07/2016   • Primary osteoarthritis of right hip 07/12/2017   • Primary osteoarthritis of right knee 09/11/2018   • OA (osteoarthritis) of knee 11/19/2018   • Dyspnea on exertion 12/08/2021     Resolved Ambulatory Problems     Diagnosis Date Noted   • 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   • Breast lump 09/14/2016     Past Medical History:   Diagnosis Date   • Anemia    • Atrial fibrillation (HCC)    • Mcguire's esophagus    • Breast cancer (HCC)    • Chronic renal insufficiency    • MCCRAY (dyspnea on exertion)    • Dyspnea    • GERD (gastroesophageal reflux disease)    • H/O electrocardiogram    • History of staph infection    • History of stroke    • Hx of being hospitalized    • Hyperlipidemia    • Hypertension    • IBS (irritable bowel syndrome)    • Kidney dysfunction    • Left carotid artery stenosis    • Lower extremity edema    • Lump or mass in breast    • Malignant neoplasm of breast (HCC)    • Osteoarthritis    • Osteoporosis    • Seizure (HCC)    • Sinusitis    • SOB (shortness of breath)        PAST SURGICAL HISTORY  Past Surgical History:   Procedure Laterality Date   • BREAST LUMPECTOMY  1990    For LCIS   • CARDIAC CATHETERIZATION Left 4/27/2016    Procedure: Cardiac catheterization;  Surgeon: Kevin Guillen MD;  Location: [facility] INVASIVE LOCATION;  Service:    • FOOT SURGERY     • HAND ARTHROPLASTY Right    • KNEE ARTHROSCOPY     • OTHER SURGICAL HISTORY      LYMPHATIC SURGERY   • TOTAL HIP ARTHROPLASTY Right 7/12/2017    Procedure: TOTAL HIP ARTHROPLASTY ANTERIOR WITH HANA TABLE;  Surgeon: Van Mcguire MD;  Location: [facility] OR;  Service:    • TOTAL KNEE ARTHROPLASTY Right 11/19/2018    Procedure: TOTAL KNEE ARTHROPLASTY;  Surgeon: Van Mcguire MD;  Location: [facility] OR;  Service: Orthopedics       FAMILY HISTORY  Family History   Problem Relation Age of Onset   •  Heart disease Other    • Stroke Other    • Stroke Mother    • Heart disease Mother    • Hypertension Mother    • Leukemia Father         Mid 60s   • Hypertension Brother    • Cancer Paternal Aunt    • Cancer Paternal Grandfather    • Malig Hyperthermia Neg Hx        SOCIAL HISTORY  Social History     Socioeconomic History   • Marital status:      Spouse name: Cuate   • Number of children: 1   • Years of education: 1 year of college   Tobacco Use   • Smoking status: Former Smoker     Quit date: 1975     Years since quittin.1   • Smokeless tobacco: Never Used   • Tobacco comment: QUIT SMOKING IN LATE 1970s   Vaping Use   • Vaping Use: Never used   Substance and Sexual Activity   • Alcohol use: Yes     Comment: social drinker   • Drug use: No   • Sexual activity: Defer       ALLERGIES  Penicillins, Adhesive tape, Lidocaine-epinephrine, Pb-hyoscy-atropine-scopolamine, Atropine, Epinephrine, and Sulfa antibiotics    REVIEW OF SYSTEMS  Review of Systems   Constitutional: Negative for chills and fever.   HENT: Negative for rhinorrhea and sore throat.    Eyes: Negative for visual disturbance.   Respiratory: Negative for cough and shortness of breath.    Cardiovascular: Positive for leg swelling. Negative for chest pain and palpitations.   Gastrointestinal: Negative for abdominal pain, diarrhea and vomiting.   Endocrine: Negative.    Genitourinary: Negative for decreased urine volume, dysuria and frequency.   Musculoskeletal: Negative for neck pain.   Skin: Positive for color change, rash and wound.   Neurological: Positive for weakness ( Generalized). Negative for syncope and headaches.   Psychiatric/Behavioral: Negative.    All other systems reviewed and are negative.      PHYSICAL EXAM  ED Triage Vitals   Temp Heart Rate Resp BP SpO2   22 0620 22 0620 22 0620 22 0620 22 0620   97.8 °F (36.6 °C) 112 18 136/82 95 %      Temp src Heart Rate Source Patient Position BP Location  FiO2 (%)   -- 07/09/22 0643 -- -- --    Monitor          Physical Exam  Vitals and nursing note reviewed.   Constitutional:       General: She is in acute distress (mild).      Appearance: She is not toxic-appearing.      Comments: Generalized weakness   HENT:      Head: Normocephalic and atraumatic.   Cardiovascular:      Rate and Rhythm: Normal rate and regular rhythm.      Pulses:           Posterior tibial pulses are 2+ on the right side and 2+ on the left side.      Heart sounds: Normal heart sounds. No murmur heard.  Pulmonary:      Effort: Pulmonary effort is normal. No respiratory distress.      Breath sounds: Examination of the right-lower field reveals decreased breath sounds. Examination of the left-lower field reveals decreased breath sounds. Decreased breath sounds present. No wheezing or rales.   Abdominal:      General: Bowel sounds are normal.      Palpations: Abdomen is soft.      Tenderness: There is no abdominal tenderness. There is no guarding or rebound.   Musculoskeletal:      Cervical back: Normal range of motion and neck supple.      Right lower leg: Tenderness present. Edema present.      Left lower leg: Edema present.   Skin:     General: Skin is warm and dry.      Capillary Refill: Capillary refill takes less than 2 seconds.      Comments: Patient with cellulitis right lower leg with surrounding erythema, small area of skin breakdown anterior right tibia with there is trace edema tracking up medial posterior area of right calf onto medial posterior right thigh   Neurological:      Mental Status: She is alert and oriented to person, place, and time.   Psychiatric:         Mood and Affect: Mood and affect normal.         LAB RESULTS  Lab Results (last 24 hours)     Procedure Component Value Units Date/Time    CBC & Differential [[id number]]  (Abnormal) Collected: 07/09/22 0638    Specimen: Blood Updated: 07/09/22 0724    Narrative:      The following orders were created for panel order CBC &  Differential.  Procedure                               Abnormality         Status                     ---------                               -----------         ------                     CBC Auto Differential[[id number]]        Abnormal            Final result                 Please view results for these tests on the individual orders.    Comprehensive Metabolic Panel [[id number]]  (Abnormal) Collected: 07/09/22 0638    Specimen: Blood Updated: 07/09/22 0750     Glucose 108 mg/dL      BUN 42 mg/dL      Creatinine 1.60 mg/dL      Sodium 138 mmol/L      Potassium 2.7 mmol/L      Chloride 103 mmol/L      CO2 17.6 mmol/L      Calcium 7.9 mg/dL      Total Protein 4.9 g/dL      Albumin 3.20 g/dL      ALT (SGPT) 40 U/L      AST (SGOT) 23 U/L      Alkaline Phosphatase 129 U/L      Total Bilirubin 0.6 mg/dL      Globulin 1.7 gm/dL      A/G Ratio 1.9 g/dL      BUN/Creatinine Ratio 26.3     Anion Gap 17.4 mmol/L      eGFR 32.9 mL/min/1.73      Comment: National Kidney Foundation and American Society of Nephrology (ASN) Task Force recommended calculation based on the Chronic Kidney Disease Epidemiology Collaboration (CKD-EPI) equation refit without adjustment for race.       Narrative:      GFR Normal >60  Chronic Kidney Disease <60  Kidney Failure <15      BNP [[id number]]  (Abnormal) Collected: 07/09/22 0638    Specimen: Blood Updated: 07/09/22 0723     proBNP 8,594.0 pg/mL     Narrative:      Among patients with dyspnea, NT-proBNP is highly sensitive for the detection of acute congestive heart failure. In addition NT-proBNP of <300 pg/ml effectively rules out acute congestive heart failure with 99% negative predictive value.    Results may be falsely decreased if patient taking Biotin.      Troponin [[id number]]  (Normal) Collected: 07/09/22 0638    Specimen: Blood Updated: 07/09/22 0751     Troponin T <0.010 ng/mL     Narrative:      Troponin T Reference Range:  <= 0.03 ng/mL-   Negative for AMI  >0.03 ng/mL-     Abnormal  for myocardial necrosis.  Clinicians would have to utilize clinical acumen, EKG, Troponin and serial changes to determine if it is an Acute Myocardial Infarction or myocardial injury due to an underlying chronic condition.       Results may be falsely decreased if patient taking Biotin.      Protime-INR [[id number]]  (Abnormal) Collected: 07/09/22 0638    Specimen: Blood Updated: 07/09/22 0707     Protime 16.9 Seconds      INR 1.39    Lactic Acid, Plasma [[id number]]  (Normal) Collected: 07/09/22 0638    Specimen: Blood Updated: 07/09/22 0721     Lactate 1.9 mmol/L     CBC Auto Differential [[id number]]  (Abnormal) Collected: 07/09/22 0638    Specimen: Blood Updated: 07/09/22 0724     WBC 1.43 10*3/mm3      RBC 2.81 10*6/mm3      Hemoglobin 9.4 g/dL      Hematocrit 28.4 %      .1 fL      MCH 33.5 pg      MCHC 33.1 g/dL      RDW 19.3 %      RDW-SD 72.1 fl      MPV 9.2 fL      Platelets 149 10*3/mm3     Manual Differential [[id number]]  (Abnormal) Collected: 07/09/22 0638    Specimen: Blood Updated: 07/09/22 0832     Neutrophil % 62.0 %      Lymphocyte % 20.0 %      Monocyte % 9.0 %      Eosinophil % 1.0 %      Metamyelocyte % 4.0 %      Myelocyte % 2.0 %      Promyelocyte % 1.0 %      Atypical Lymphocyte % 1.0 %      Neutrophils Absolute 0.89 10*3/mm3      Lymphocytes Absolute 0.30 10*3/mm3      Monocytes Absolute 0.13 10*3/mm3      Eosinophils Absolute 0.01 10*3/mm3      nRBC 10.0 /100 WBC      RBC Morphology Normal     WBC Morphology Normal     Platelet Morphology Normal    Magnesium [[id number]]  (Normal) Collected: 07/09/22 0638    Specimen: Blood Updated: 07/09/22 1412     Magnesium 2.0 mg/dL     Phosphorus [[id number]]  (Abnormal) Collected: 07/09/22 0638    Specimen: Blood Updated: 07/09/22 1412     Phosphorus 2.1 mg/dL     Procalcitonin [[id number]]  (Abnormal) Collected: 07/09/22 0638    Specimen: Blood Updated: 07/09/22 1423     Procalcitonin 4.55 ng/mL     Narrative:      As a Marker for Sepsis  "(Non-Neonates):    1. <0.5 ng/mL represents a low risk of severe sepsis and/or septic shock.  2. >2 ng/mL represents a high risk of severe sepsis and/or septic shock.    As a Marker for Lower Respiratory Tract Infections that require antibiotic therapy:    PCT on Admission    Antibiotic Therapy       6-12 Hrs later    >0.5                Strongly Recommended  >0.25 - <0.5        Recommended   0.1 - 0.25          Discouraged              Remeasure/reassess PCT  <0.1                Strongly Discouraged     Remeasure/reassess PCT    As 28 day mortality risk marker: \"Change in Procalcitonin Result\" (>80% or <=80%) if Day 0 (or Day 1) and Day 4 values are available. Refer to http://[email or web address]-calculator.com    Change in PCT <=80%  A decrease of PCT levels below or equal to 80% defines a positive change in PCT test result representing a higher risk for 28-day all-cause mortality of patients diagnosed with severe sepsis for septic shock.    Change in PCT >80%  A decrease of PCT levels of more than 80% defines a negative change in PCT result representing a lower risk for 28-day all-cause mortality of patients diagnosed with severe sepsis or septic shock.       CBC & Differential [[id number]]  (Abnormal) Collected: 07/09/22 0907    Specimen: Blood Updated: 07/09/22 1008    Narrative:      The following orders were created for panel order CBC & Differential.  Procedure                               Abnormality         Status                     ---------                               -----------         ------                     CBC Auto Differential[[id number]]        Abnormal            Final result                 Please view results for these tests on the individual orders.    Blood Culture - Blood, Blood, Arterial Line [[id number]] Collected: 07/09/22 0907    Specimen: Blood, Arterial Line Updated: 07/09/22 0914    Blood Culture - Blood, Arm, Left [[id number]] Collected: 07/09/22 0907    Specimen: Blood from Arm, Left " Updated: 07/09/22 0915    Lactic Acid, Plasma [[id number]]  (Normal) Collected: 07/09/22 0907    Specimen: Blood Updated: 07/09/22 1001     Lactate 1.7 mmol/L     CBC Auto Differential [[id number]]  (Abnormal) Collected: 07/09/22 0907    Specimen: Blood Updated: 07/09/22 1008     WBC 1.18 10*3/mm3      RBC 2.59 10*6/mm3      Hemoglobin 8.8 g/dL      Hematocrit 26.5 %      .3 fL      MCH 34.0 pg      MCHC 33.2 g/dL      RDW 19.4 %      RDW-SD 71.7 fl      MPV 9.5 fL      Platelets 155 10*3/mm3      nRBC 17.8 /100 WBC     Comprehensive Metabolic Panel [[id number]]  (Abnormal) Collected: 07/09/22 1038    Specimen: Blood from Hand, Left Updated: 07/09/22 1138     Glucose 77 mg/dL      BUN 44 mg/dL      Creatinine 1.87 mg/dL      Sodium 139 mmol/L      Potassium 2.9 mmol/L      Chloride 106 mmol/L      CO2 17.7 mmol/L      Calcium 7.9 mg/dL      Total Protein 4.4 g/dL      Albumin 2.80 g/dL      ALT (SGPT) 32 U/L      AST (SGOT) 15 U/L      Alkaline Phosphatase 110 U/L      Total Bilirubin 0.5 mg/dL      Globulin 1.6 gm/dL      A/G Ratio 1.8 g/dL      BUN/Creatinine Ratio 23.5     Anion Gap 15.3 mmol/L      eGFR 27.3 mL/min/1.73      Comment: National Kidney Foundation and American Society of Nephrology (ASN) Task Force recommended calculation based on the Chronic Kidney Disease Epidemiology Collaboration (CKD-EPI) equation refit without adjustment for race.       Narrative:      GFR Normal >60  Chronic Kidney Disease <60  Kidney Failure <15            I ordered the above labs and reviewed the results    RADIOLOGY  XR Chest 1 View   Final Result   Persistent minimal pulmonary vascular engorgement with left   pleural effusion. Bibasilar atelectasis. No pneumothorax. Rib series   yesterday showed anterior right ninth and 10th rib fractures, only one   of which is evident on this x-ray.       This report was finalized on 7/9/2022 7:26 AM by Dr. Theodore Luevano M.D.               I ordered the above noted radiological  studies. Interpreted by radiologist. Viewed by me in PACS.       PROCEDURES  Procedures      PROGRESS AND CONSULTS  ED Course as of 07/09/22 1638   Sat Jul 09, 2022   1140 Ultrasound tech reports patient with vascular ultrasound study negative for DVT [TO]   1225 Discussed with Dr. Tyrell Gan, on-call for [facility] who is aware of patient's presentation, cellulitis, plan for antibiotics.  However given patient's noted low blood pressure at this time, [TO]   1254 Discussed with Dr. Haley Roman, on-call for [facility] who is familiar with patient, aware of patient's presentation, hypotension, complains of shortness of air with neutropenia, cellulitis and agrees to see in consult for further testing, treatment as needed [TO]   1317 Discussed with Theodore Watt, on-call for [facility]-onc who is aware of patient's presentation, imaging, labs and agrees to see in consult for further testing, treatment as needed.  He reports Dr. Cornejo notes recently report the patient had a shift in her CBC differential on her labs and was concerned about some possible underlying bone marrow abnormality. [TO]   1338 Discussed with Dr. Khalif Villagran on-call for ICU who is aware of patient's presentation, imaging, labs, hypotension, neutropenia, in setting of cellulitis with sepsis. On chronic steriods with stress dose steroids given in the ED. [TO]   1401 Patient seen and reexamined, decreased breath sounds bilateral bases,, sats 90% on 2 L, patient complaining of increasing shortness of air, there is concern for worsening fluid overloaded.  We will start pressors and hold further IV fluid bolus at this time [TO]   1423 Discussed with Dr. Montemayor, on-call for Dr. Mario Cornejo he was seen and examined the patient's, agrees with concern for fluid overload, need for pressors, antibiotic choice, plan for ICU admit. [TO]   1427 With low Phos, low calcium, discussed with [facility] pharmacist who will put in electrolyte repletion protocol based on  patient's renal function [TO]   1431 Dr. Hernandez accepts to ICU [TO]      ED Course User Index  [TO] Hillary Peterson MD     EKG          EKG time: 06 40  Rhythm/Rate: A. fib, rate in the 100s  P waves and MI: Not well appreciated  QRS, axis: Unremarkable  ST and T waves: Nonspecific ST/T wave findings diffuse    Interpreted Contemporaneously by me, independently viewed  changed compared to prior 11/6/2018, now in A. fib with RVR, nonspecific ST/T wave findings    Patient did not receive the recommended 30ml/kg fluid bolus for sepsis because it would be harmful or detrimental to the patient. The patient has concern for fluid overload.   The patient was ordered 750cc of fluids.      MEDICAL DECISION MAKING  Results were reviewed/discussed with the patient and they were also made aware of online access. Pt also made aware that some labs, such as cultures, will not be resulted during ER visit and followup with PMD is necessary.       MDM       DIAGNOSIS  Final diagnoses:   Cellulitis of right lower extremity   Hypokalemia   Hypotension, unspecified hypotension type   Neutropenia, unspecified type (HCC)   Sepsis, due to unspecified organism, unspecified whether acute organ dysfunction present (HCC)       DISPOSITION  ADMISSION    Discussed treatment plan and reason for admission with pt/family and admitting physician.  Pt/family voiced understanding of the plan for admission for further testing/treatment as needed.         Latest Documented Vital Signs:  As of 14:28 EDT  BP- (!) 88/40 HR- 89 Temp- 97.8 °F (36.6 °C) O2 sat- 96%    --  Patient was wearing facemask when I entered the room and throughout our encounter. Full protective equipment was worn throughout this patient encounter including a face mask, eye protection and gloves. Hand hygiene was performed before donning protective equipment and after removal when leaving the room.      Hillary Peterson MD  07/09/22 9947       Hillary Peterson MD  07/09/22 7803

## 2022-07-09 NOTE — CONSULTS
Subjective     REASON FOR CONSULTATION: Anemia and leukopenia  Provide an opinion on any further workup or treatment                             REQUESTING PHYSICIAN: Hillary Peterson MD    RECORDS OBTAINED:  Records of the patients history including those obtained from the referring provider were reviewed and summarized in detail.    HISTORY OF PRESENT ILLNESS:  The patient is a 77 y.o. year old female who is here for an opinion about the above issue.    History of Present Illness patient is a 77-year-old female followed by   in our office for remote history of breast cancer and more recently for anemia which is poorly understood but felt to be a combination of renal failure /chronic disease from temporal arteritis and probably immune suppression or destruction based on normal bone marrow . She is on on high-dose  steroid therapy for temporal arteritis since March of this year and just tapered down to 20 mg daily yesterday    Recent bone marrow showed no obvious myelodysplasia and she has been getting Procrit in our office for her anemia renal failure    She is now admitted with pain and swelling in her right leg making it hard for her to bear weight for the last 3 days she did have a fall;the patient reports no fever chills but progressive deterioration clinically and more shortness of breath prompting visit to the ER.    Patient is found to be in atrial fibrillation and is developing hypotension while in the ER despite fluid bolus.  She is also becoming more hypoxic and is requiring oxygen.  She is also found to be leukopenic with a white count of 1100 which is a new finding for her.  Hemoglobin stable at 8.8 platelets are normal at 1 55,000 -Doppler shows no DVT in the right leg.  Her procalcitonin is elevated but surprising her lactate is normal  She is developing mottling of her lower extremities and some cyanotic changes in her feet with ongoing signs of sepsis and evolving septic shock    She has no  diarrhea or GI complaints    She has been on amiodarone for atrial fibrillation for a while and this can cause a lot of cytopenias and I wonder if this should be discontinued.    She continues on anticoagulation for atrial fibrillation      Past Medical History:   Diagnosis Date   • Anemia     Of chronic renal insufficiency   • Atrial fibrillation (HCC)    • Mcguire's esophagus    • Breast cancer (HCC)    • Chronic renal insufficiency     Stage 3   • MCCRAY (dyspnea on exertion)    • Dyspnea    • GERD (gastroesophageal reflux disease)    • H/O electrocardiogram    • History of staph infection    • History of stroke     right PICA noted on MRI 02/2022- old not acute    • Hx of being hospitalized     [facility] in 09/2010 and 10/2010 for atrial fibrillation, Dr. Perla Hurtado   • Hyperlipidemia    • Hypertension    • Hypoxia    • IBS (irritable bowel syndrome)    • Kidney dysfunction    • Left carotid artery stenosis     mild proximal and moderate distally February 2022   • Lower extremity edema    • Lump or mass in breast    • Malignant neoplasm of breast (HCC)    • Osteoarthritis    • Osteopenia    • Osteoporosis    • Paroxysmal atrial fibrillation (HCC)    • Seizure (HCC)     AS A TEENAGER, NOT CURRENTLY   • Sinusitis    • SOB (shortness of breath)         Past Surgical History:   Procedure Laterality Date   • BREAST LUMPECTOMY  1990    For LCIS   • CARDIAC CATHETERIZATION Left 4/27/2016    Procedure: Cardiac catheterization;  Surgeon: Kevin Guillen MD;  Location: [facility] INVAS[facility] LOC[facility];  Service:    • FOOT SURGERY     • HAND ARTHROPLASTY Right    • KNEE ARTHROSCOPY     • OTHER SURGICAL HISTORY      LYMPHATIC SURGERY   • TOTAL HIP ARTHROPLASTY Right 7/12/2017    Procedure: TOTAL HIP ARTHROPLASTY ANTERIOR WITH HANA TABLE;  Surgeon: Van Mcguire MD;  Location: [facility] OR;  Service:    • TOTAL KNEE ARTHROPLASTY Right 11/19/2018    Procedure: TOTAL KNEE ARTHROPLASTY;  Surgeon: Van Mcguire  MD ALO;  Location: [facility] MAIN OR;  Service: Orthopedics        Current Facility-Administered Medications on File Prior to Encounter   Medication Dose Route Frequency Provider Last Rate Last Admin   • mupirocin (BACTROBAN) 2 % nasal ointment   Nasal BID Van Mcguire MD         Current Outpatient Medications on File Prior to Encounter   Medication Sig Dispense Refill   • alendronate (FOSAMAX) 70 MG tablet Take 70 mg by mouth Every 7 (Seven) Days. sunday 12   • alosetron (LOTRONEX) 0.5 MG tablet Take 0.05 mg by mouth Daily As Needed.     • ALPRAZolam (XANAX) 1 MG tablet Take 1 mg by mouth every night.     • amiodarone (PACERONE) 200 MG tablet Take  by mouth.     • Anucort-HC 25 MG suppository INSERT 1 SUPPOSITORY RECTALLY ONCE DAILY FOR 6 DAYS     • apixaban (ELIQUIS) 2.5 MG tablet tablet Take 1 tablet by mouth 2 (Two) Times a Day. 180 tablet 3   • bumetanide (BUMEX) 1 MG tablet Take 1 mg by mouth Daily.     • cephalexin (KEFLEX) 500 MG capsule TAKE 4 CAPSULES BY MOUTH 1 HOUR BEFORE DENTAL PROCEDURE 4 capsule 5   • cetirizine (ZyrTEC) 10 MG tablet Take 10 mg by mouth Daily.     • clobetasol (OLUX) 0.05 % topical foam As Needed.     • cycloSPORINE (RESTASIS) 0.05 % ophthalmic emulsion Administer 1 drop to both eyes 2 (Two) Times a Day.     • diltiaZEM (CARDIZEM) 60 MG tablet Take 60 mg by mouth 2 (Two) Times a Day. PATIENT TAKES ONE TABLET AT 5 PM AND ONE TAB HS     • diphenoxylate-atropine (LOMOTIL) 2.5-0.025 MG per tablet Take 1 tablet by mouth 4 (Four) Times a Day As Needed for Diarrhea.     • esomeprazole (NexIUM) 40 MG capsule Take 40 mg by mouth 2 (two) times a day.     • fluorometholone (FML) 0.1 % ophthalmic suspension      • Glucosamine 500 MG capsule Take  by mouth.     • hydrALAZINE (APRESOLINE) 25 MG tablet hydralazine 25 mg tablet   TAKE 1 TABLET BY MOUTH TWICE DAILY     • hydrochlorothiazide (HYDRODIURIL) 12.5 MG tablet Take 12.5 mg by mouth Daily.     • ketoconazole (NIZORAL) 2 % shampoo ketoconazole 2  % shampoo   APPLY EXTERNALLY TO THE SCALP 3 TIMES A WEEK. LEAVE IN 5 MINUTES BEFORE WASHING OUT     • metoprolol tartrate (LOPRESSOR) 25 MG tablet Take 0.625 mg by mouth As Needed.     • montelukast (SINGULAIR) 10 MG tablet Take 10 mg by mouth Daily.     • Mucinex D Max Strength 120-1200 MG tablet sustained-release 12 hour Take 1 tablet by mouth Every 12 (Twelve) Hours.     • Multiple Vitamin (MULTIVITAMIN) capsule      • Multiple Vitamin (MULTIVITAMINS PO)      • predniSONE (DELTASONE) 10 MG tablet Take 1 tablet by mouth Daily.     • predniSONE (DELTASONE) 20 MG tablet Take 30 mg by mouth 3 (Three) Times a Day. For two weeks     • Salicylic Acid 6 % shampoo salicylic acid 6 % shampoo   APPLY EXTERNALLY TO THE SCALP 3 TIMES A WEEK. LEAVE IN 5 MINUTES BEFORE WASHING OUT     • simvastatin (ZOCOR) 40 MG tablet Take 40 mg by mouth Daily.  3   • Tocilizumab (Actemra ACTPen) 162 MG/0.9ML solution auto-injector injection Inject 162 mg under the skin into the appropriate area as directed.          ALLERGIES:    Allergies   Allergen Reactions   • Penicillins Anaphylaxis     Reactions: syncope and seizures  Tolerates cephalosporins   • Adhesive Tape Hives     Tears skin   • Lidocaine-Epinephrine Unknown - Low Severity   • Pb-Hyoscy-Atropine-Scopolamine Unknown - Low Severity   • Atropine Hives   • Epinephrine Palpitations     Patient states will go into A-Fib   • Sulfa Antibiotics Nausea Only     STATES SHE DOES FINE WITH CERTAIN SULFA DRUGS        Social History     Socioeconomic History   • Marital status:      Spouse name: Cuate   • Number of children: 1   • Years of education: 1 year of college   Tobacco Use   • Smoking status: Former Smoker     Quit date: 1975     Years since quittin.1   • Smokeless tobacco: Never Used   • Tobacco comment: QUIT SMOKING IN LATE    Vaping Use   • Vaping Use: Never used   Substance and Sexual Activity   • Alcohol use: Yes     Comment: social drinker   • Drug use: No    • Sexual activity: Defer        Family History   Problem Relation Age of Onset   • Heart disease Other    • Stroke Other    • Stroke Mother    • Heart disease Mother    • Hypertension Mother    • Leukemia Father         Mid 60s   • Hypertension Brother    • Cancer Paternal Aunt    • Cancer Paternal Grandfather    • Malig Hyperthermia Neg Hx         Review of Systems   Constitutional: Positive for activity change and appetite change. Negative for chills and fever.   Respiratory: Positive for shortness of breath.    Gastrointestinal: Positive for abdominal distention.   Musculoskeletal: Positive for gait problem (Pain in the right leg unable to bear weight).   Skin: Positive for color change (Right leg).   Neurological: Positive for weakness.        Objective     Vitals:    07/09/22 1222 07/09/22 1247 07/09/22 1305 07/09/22 1325   BP: (!) 85/30 (!) 81/51 90/62 (!) 86/74   Pulse: 87 85 89 90   Resp:       Temp:       SpO2: 91% 99% (!) 88% 90%     Current Status 6/24/2022   ECOG score 1       Physical Exam    CONSTITUTIONAL:  Vital signs reviewed.    Patient looks very ill has tachypnea and air hunger hypertension is developing  EYES:  Conjunctiva and lids unremarkable.  PERRLA  EARS,NOSE,MOUTH,THROAT:  Ears and nose appear unremarkable.  Lips, teeth, gums appear unremarkable.  RESPIRATORY:  Normal respiratory effort.  Lungs clear to auscultation bilaterally.  Decreased breath sounds at the bases  CARDIOVASCULAR:   Atrial fibrillation.  No murmurs rubs or gallops.  1=2+ lower extremity edema.  Mottling and livedo reticularis in the entire left leg-cyanotic areas without blanching in both heels and multiple toes with the upper extremities being spared  GASTROINTESTINAL: Abdome mildly distended and tender.  No hepatomegaly.  No splenomegaly.  LYMPHATIC:  No cervical, supraclavicular, axillary lymphadenopathy.  SKIN:  Warm.  Significant redness below the knee the right leg-Livido reticularis in the left leg  PSYCHIATRIC:   Normal judgment and insight.  Oriented into 3    RECENT LABS:  Hematology WBC   Date Value Ref Range Status   07/09/2022 1.18 (C) 3.40 - 10.80 10*3/mm3 Final     RBC   Date Value Ref Range Status   07/09/2022 2.59 (L) 3.77 - 5.28 10*6/mm3 Final     Hemoglobin   Date Value Ref Range Status   07/09/2022 8.8 (L) 12.0 - 15.9 g/dL Final     Hematocrit   Date Value Ref Range Status   07/09/2022 26.5 (L) 34.0 - 46.6 % Final     Platelets   Date Value Ref Range Status   07/09/2022 155 140 - 450 10*3/mm3 Final     Lab Results   Component Value Date    NEUTROABS 0.89 (L) 07/09/2022     Lab Results   Component Value Date    GLUCOSE 77 07/09/2022    BUN 44 (H) 07/09/2022    CREATININE 1.87 (H) 07/09/2022    EGFRIFNONA 31 (L) 02/02/2022    EGFRIFAFRI  09/15/2016      Comment:      <15 Indicative of kidney failure.    BCR 23.5 07/09/2022    K 2.9 (L) 07/09/2022    CO2 17.7 (L) 07/09/2022    CALCIUM 7.9 (L) 07/09/2022    ALBUMIN 2.80 (L) 07/09/2022    AST 15 07/09/2022    ALT 32 07/09/2022                  Assessment & Plan      *Probable strep sepsis with evolving septic shock and DIC in immunocompromised patient on high-dose steroids  · Cellulitis right leg    *Mottling of lower extremities with areas of cyanosis concerning for DIC    *Stage IB ([id number]) left breast cancer. Grade 2, 1.9 cm, 1 mm micrometastasis in 1 out of 2 sentinel nodes. ER positive, HER-2 negative. OncoType DX: low risk category.   Tamoxifen 10/11/2013 through November 2013. Just a few days or so of Aromasin in January 2014. Could not tolerate hormonal therapy due to vaginal dryness and irritation and declined any more hormonal therapy.   On Fosamax and Evista through Dr. Perla Garza.  · 3/18/2022: Concerned for possible recurrence based on significant drop in Hb with no other explanation and low back pain and right shoulder pain with a hormone positive breast cancer which can recur years later.  Check scans.  ? Bone scan 3/21/2022: Negative  ? CT  3/21/2022: Right pulmonary nodule stable from 12/2/2016.  No evidence of metastatic disease.  Therefore, no evidence of recurrence of breast cancer.     * Atrial fibrillation, on Eliquis through Dr. Haley Roman.   No bleeding problems.     *Anemia of stage 3 chronic renal insufficiency (Dr. Rell Shannon is her nephrologist whom she follows up with). (Evaluation negative for B12 or folate deficiency, hemolysis or multiple myeloma. She has not had a bone marrow biopsy. She responds well to Procrit).   · She received Procrit 12/2014 and 1 dose 06/2015 and 1 dose 09/2015 and 1 dose on 10/28/2015 and some weekly doses of Procrit after hip replacement July 2017.  · She responds to Procrit.   · 3/18/2022: Hb down to 9.3.  Restart Procrit again  · 3/25/2022: Hb 11 (better after Procrit)  · 4/26/2022: Hb 11.8  · 5/12/2022 (rheumatology): Hb 11.6  · 5/24/2022: Hb 10.3  · 6/24/2022: Hb down to 8.3.  Restarted Procrit.     * Iron deficiency anemia. does not tolerate oral iron due to significant abdominal pain and diarrhea. She refuses any further Benadryl as a pre-medicine because it made her feel very sleepy for over 24-hours.  Today's iron labs normal.  · 2/2/2022: Ferritin 41, 10% saturation, Hb 12.6.  1 dose Injectafer given.  · 3/17/2022: Ferritin 1013, 15% saturation, Hb 9.3.  No need for IV iron.  · 3/25/2022: Hb 11  · 4/26/2022: Ferritin 1135, 41% saturation, Hb 11.8  · 6/24/2022: Ferritin 811, 40% saturation, Hb 8.3     *Anemia, for reasons other than iron deficiency and anemia of stage III chronic kidney disease.  · Hb 9.3 on 3/17/2022, lowest value we have since 2017.  Hb was normal, 12.6, 2/2/2022.  · 3/18/2022, unremarkable: B12, RBC folate, bilirubin, LDH, haptoglobin, direct Bay  · 3/25/2022: Hb 11  · 4/26/2022: Hb 11.8  · 5/24/2022: Hb 10.3  · 6/24/2022, unremarkable: B12, iron studies   · 6/24/2022: Hb trended down to 8.3     *Possible hemolysis  · Bone marrow 6/6/2022: Normal trilineage hematopoiesis  (suggesting production intact)  · Reticulocyte 6/24/2022: 7% (suggesting production intact  · NRBC 2%, 6/24/2022, trending upward, suggesting increased production from marrow  · However, haptoglobin elevated at 247, bilirubin only 0.5, and direct Bay negative.  Spleen unremarkable on CT March 2022.     *Perhaps destruction of WBCs, which could be the reason for the young circulating WBCs?     *Leukocytosis  · WBC became elevated, 2/2/2022, at 11 (states she had 2 rounds of steroids in January for sinus infection)  · Predominance of mature segmented neutrophils.  Therefore, appears reactive  · 3/25/2022: WBC 22.9 (currently on steroids through PCP for TMJ pain and face swelling)  · WBC 12.1  · 5/12/2022 (rheumatology labs): WBC 13.5.  1% promyelocytes, 1% myelocytes, 1% metamyelocytes.  · 5/24/2022: WBC 11.8.  · WBC down to normal, 8.8, 16/24/22  · Marked leukopenia on 7/9/2022 due to sepsis     *Circulating promyelocytes, myelocytes, metamyelocytes, NRBC  · Seen on rheumatology labs 5/12/2022 and 6/13/2022  · Bone marrow biopsy 6/6/2022: Normal trilineage hematopoiesis.  FISH for BCR ABL negative.  Although biopsy showed no monoclonal population, bone marrow flow showed 8.2% of total events monoclonal B cells.  Pathologist stated this may be due to peripheral blood contamination.     *Thrombocytopenia  · B12 and folate unremarkable.  · On Eliquis.  · New issue on 4/26/2022,   · 5/12/2022 (rheumatology labs): PLT 98  · 5/24/2022: PLT 93.  IPF 3%.  · 6/13/2022 (rheumatology labs): , but may be higher as platelets clumped.  ? I asked for sodium citrate tube to eliminate possible PLT clumping on the 6/24/2022 lab  ? 6/24/2022: , normal.  IPF normal at 2.2%     *Monoclonal B-cell lymphocytosis, non-- CLL type  · Initially found on bone marrow flow, 6/6/2022, 8.2% of total events, but not found in bone marrow biopsy.  Therefore, pathologist felt this was due to peripheral blood  contamination  · 6/24/2022: Peripheral blood flow pending     *Etiology of cytopenias  · Bone marrow 6/6/2022 (due to worsened cytopenias): Normocellular, 20%, with trilineage hematopoiesis.  Flow: 8.2% of total events monoclonal B cells without the typical immunophenotype of CLL, HCL, or mantle cell (monoclonal B-cell lymphocytosis, non-- CLL type.  Not found on bone marrow biopsy.  Therefore, pathologist stated this could be due to peripheral blood contamination normal cytogenetics.  FISH for BCR ABL negative.  · The timing of the circulating young WBCs, NRBCs, and drop in Hb, seems to correlate with the temporal cell arteritis, and the bone marrow with normal trilineage hematopoiesis, suggests a destructive problem with the marrow sending unusually younger cells into the blood to try to compensate.  Therefore, I would lean toward an autoimmune peripheral destruction, but direct Bay is negative.  Furthermore, haptoglobin is elevated.  Bilirubin is normal, but I have seen bilirubin normal with obvious hemolysis in other patients.  Furthermore, she has been on large doses of steroids for temporal arteritis for several weeks with no improvement in her CBC.  I would like her to have another opinion at the [facility] with Dr. Posadas.     *Fibrocystic changes in both breasts.      *Dyspnea on exertion  · Previously complained of hypoxia with exertion, 1/18/2017.  · Follows with Dr. Nguyen.   · Dr. Nguyen noted 11/1/2021: Spirometry normal that day, but 5 years prior showed some obstruction with reversibility.  11/1/2021: Started an inhaler  · Due to anemia, he referred her back to me again.  Hb that day, 11/1/2021, 9.6.  He noted she is on oral anticoagulation through cardiology.  However, after that visit, on 11/29/2021, Hb 12     *Flashing lights and blurry vision when going from a dark room to a bright room and awoke with TMJ pain all beginning the night of 3/21/2022 (the day of CT and bone scans).   (Left more so than right)  · 3/25/2022: States she has seen her ophthalmologist and no abnormalities were found.  Plans to follow-up with him again.  Plans to see her dentist for the TMJ discomfort.  I told her I suspect she was under more stress due to imaging to look for recurrence of breast cancer.  She states she has found herself clenching her teeth in the past.     *Temporal arteritis, causing loss of vision left eye 3/23/2022.  · On prednisone 60 mg daily since 3/24/2022.  Follows with Dr. Mina Muro, ophthalmology.  · 5/24/2022: Patient reports Dr. PARADISE Lyn, rheumatology, is planning Actemra.  Patient notes Actemra is associated with thrombocytopenia 1 to 4% of the time.     PLAN:   1.  Antibiotics for sepsis -after blood and urine cultures  2..DIC work-up   3.  IV steroids to prevent steroid withdrawal   4.Procrit if Hb <10  · Received Procrit 7/1/2022 for Hb of 8.7.  Will give in a.m.  5.  Hold Eliquis if platelet less than 50,000  6.  Follow CBC and coags closely  7.  We will hold off on G-CSF for today and see how she does tomorrow  8.?  Stop amiodarone which has been known to cause anemia and leukopenia although the infection may be aggravating both of these      Discussed with Dr. Peterson in the ER and  in ICU.  Discussed with her  and brother that she is extremely ill and they are aware.    Will follow thank you for consult

## 2022-07-10 NOTE — DISCHARGE SUMMARY
"   DISCHARGE SUMMARY    Patient Name: Mary Kay Saab  Age/Sex: 77 y.o. female  : 1945  MRN: [id number]  Patient Care Team:  Jann Rojas MD as PCP - General (Internal Medicine)  Perla Hurtado MD as Consulting Physician (Cardiology)  Diego Cornejo II, MD as Consulting Physician (Hematology and Oncology)  Rell Shannon MD as Consulting Physician (Nephrology)  Gian Stewart MD as Consulting Physician (Ophthalmology)  Van Mcguire MD as Referring Physician (Orthopedic Surgery)       Date of Admit: 2022  Date of Discharge:  2022    Date of Death:  2022    Disposition: [facility]     Final Diagnoses:   Septic shock   Severe Cellulitis  Acute metabolic encephalopathy  Severe metabolic acidosis  Acute hypoxic respiratory failure  Pulmonary edema  JORDIN on CKD 3  Severe hypokalemia  Severe neutropenia  DIC  Recent temporal arteritis-on prednisone/ Actemra  Anemia-multifactorial  Atrial fibrillation on Eliquis  Previous breast cancer    History of Present Illness Patient is 77-year-old white female with a past medical history significant for atrial fibrillation on anticoagulation, CKD 3, cytopenias with ongoing evaluation by Dr. Ludwig, recent diagnosis of temporal arteritis on steroids and Actemra, previous breast cancer who was reportedly doing well until around a week back when she started noticing some skin breakdown and oozing of clear liquid for a few days but this started to get worse over the last 24 hours significantly and \"blew up\" and she decided to come to the ER and patient reports painful leg mainly in the right.  Does not have any subjective fevers or chills does have some shortness of breath.  She is using her anticoagulation for A. fib.  Has been on chronic prednisone.  Reportedly has been on outpatient antibiotic Keflex from primary care.     Patient was noted to have concerns for sepsis and cellulitis and was started on IV fluid hydration but " progressively declined in the ER not responding to IV fluids and having worsening respiratory failure chest x-ray changes concerning for pulmonary edema and eventually patient was started on pressors and we have been asked admit the patient to ICU.  Patient was evaluated and noted to have persistent pressor needs.  Does seem to be dehydrated even though his third spaced but exam is difficult due to adrenal facies and diffuse erythema as well as edema in the lower extremities.  Patient was started on broad-spectrum antibiotics.  Repeat chest x-ray was ordered showing worsening infiltrates in the bases bilaterally and antibiotics were broadened and patient was volume resuscitated with colloid and also started on stress dose steroids.  Patient lactic acid initially was negative but repeat lactic acid is ordered stat and pending.  VBG shows evidence of severe metabolic acidosis with partial compensation and patient was placed on noninvasive ventilator to help with work of breathing.  Patient confirmed to be DNR/DNI by herself as well as  who understand that she is not doing well.  I discussed the case with ER physician as well as Dr. Montemayor was very concerned about her current clinical status.      Hospital Course Patient was admitted to the ICU and unfortunately progressive decline requiring multiple pressors and worsening respiratory acidosis and lactic acidosis.  Nephrology was consulted as well and they expressed great concern regarding overall prognosis as well.  Oncology evaluation and input appreciated.  ID was consulted and pending.  Unfortunately patient passed away overnight with progressive septic shock.     Procedures Performed    07/09 1726 Insert Central Line At Bedside    Consults:   Consults     Date and Time Order Name Status Description    7/9/2022 12:40 PM Hematology and Oncology (on-call MD unless specified) Completed           Pertinent Test Results:   Results from last 7 days   Lab Units  07/09/22  1801 07/09/22  1038 07/09/22  0638   SODIUM mmol/L 136 139 138   POTASSIUM mmol/L 5.3* 2.9* 2.7*   CHLORIDE mmol/L 104 106 103   CO2 mmol/L 8.0* 17.7* 17.6*   BUN mg/dL 47* 44* 42*   CREATININE mg/dL 2.34* 1.87* 1.60*   GLUCOSE mg/dL 45* 77 108*   CALCIUM mg/dL 7.1* 7.9* 7.9*   AST (SGOT) U/L  --  15 23   ALT (SGPT) U/L  --  32 40*     Results from last 7 days   Lab Units 07/09/22  0638   TROPONIN T ng/mL <0.010     Results from last 7 days   Lab Units 07/09/22 0907 07/09/22 0638   WBC 10*3/mm3 1.18* 1.43*   HEMOGLOBIN g/dL 8.8* 9.4*   HEMATOCRIT % 26.5* 28.4*   PLATELETS 10*3/mm3 155 149   MCV fL 102.3* 101.1*   MCH pg 34.0* 33.5*   MCHC g/dL 33.2 33.1   RDW % 19.4* 19.3*   RDW-SD fl 71.7* 72.1*   MPV fL 9.5 9.2   NEUTROS ABS 10*3/mm3  --  0.89*   EOS ABS 10*3/mm3  --  0.01   NRBC /100 WBC 17.8* 10.0*     Results from last 7 days   Lab Units 07/09/22  1801 07/09/22 0638   INR  2.45* 1.39*   APTT seconds 30.1  --      Results from last 7 days   Lab Units 07/09/22  0638   MAGNESIUM mg/dL 2.0           Invalid input(s): LDLCALC  Results from last 7 days   Lab Units 07/09/22  0638   PROBNP pg/mL 8,594.0*         Results from last 7 days   Lab Units 07/09/22  1624   PH, ARTERIAL pH units 7.165*   PO2 ART mm Hg 28.8*   PCO2, ARTERIAL mm Hg 30.9*   HCO3 ART mmol/L 11.1*     Results from last 7 days   Lab Units 07/09/22  1801 07/09/22  0907 07/09/22  0638   PROCALCITONIN ng/mL  --   --  4.55*   LACTATE mmol/L 8.2* 1.7 1.9                     Results from last 7 days   Lab Units 07/09/22  1654   ADENOVIRUS DETECTION BY PCR  Not Detected   CORONAVIRUS 229E  Not Detected   CORONAVIRUS HKU1  Not Detected   CORONAVIRUS NL63  Not Detected   CORONAVIRUS OC43  Not Detected   HUMAN METAPNEUMOVIRUS  Not Detected   HUMAN RHINOVIRUS/ENTEROVIRUS  Not Detected   INFLUENZA B PCR  Not Detected   PARAINFLUENZA 1  Not Detected   PARAINFLUENZA VIRUS 2  Not Detected   PARAINFLUENZA VIRUS 3  Not Detected   PARAINFLUENZA VIRUS 4   Not Detected   BORDETELLA PERTUSSIS PCR  Not Detected   CHLAMYDOPHILA PNEUMONIAE PCR  Not Detected   MYCOPLAMA PNEUMO PCR  Not Detected   INFLUENZA A PCR  Not Detected   RSV, PCR  Not Detected           Imaging Results:  Imaging Results (All)     Procedure Component Value Units Date/Time    XR Chest 1 View [[id number]] Collected: 07/09/22 1901     Updated: 07/09/22 1905    Narrative:      SINGLE VIEW OF THE CHEST     HISTORY: Respiratory failure     COMPARISON: 07/09/2022     FINDINGS:  Cardiomegaly is present. Some vascular congestion may be present. There  is bibasilar consolidation. There are bilateral pleural effusions. No  pneumothorax is seen. The patient has known right-sided rib fractures.  These are poorly visualized on this exam.       Impression:      No significant interval change.     This report was finalized on 7/9/2022 7:02 PM by Dr. Niki Garcia M.D.       XR Chest 1 View [[id number]] Collected: 07/09/22 0715     Updated: 07/09/22 0730    Narrative:      PORTABLE CHEST X-RAY     HISTORY: Shortness of breath and hypertension.     TECHNIQUE: Portable chest x-ray is correlated with chest x-ray from  yesterday morning and another from June 20.     FINDINGS: The cardiomediastinal silhouette is normal. Small left pleural  effusion is noted and there is mild basilar atelectasis. Central  pulmonary vasculature may be mildly engorged comparison with prior exam  from June 20 and December 11, 2020. No pneumothorax. Right rib fractures  seen on x-ray yesterday only partially visualized on this series.       Impression:      Persistent minimal pulmonary vascular engorgement with left  pleural effusion. Bibasilar atelectasis. No pneumothorax. Rib series  yesterday showed anterior right ninth and 10th rib fractures, only one  of which is evident on this x-ray.     This report was finalized on 7/9/2022 7:26 AM by Dr. Theodore Luevano M.D.             Khalif Rangel MD  07/10/22  07:36 EDT

## 2022-07-11 ENCOUNTER — TELEPHONE (OUTPATIENT)
Dept: ONCOLOGY | Facility: CLINIC | Age: 77
End: 2022-07-11

## 2022-07-11 NOTE — TELEPHONE ENCOUNTER
"    “Please be informed that patient has passed. Patient has been marked  in the system. The date of death is: 2022\".    Caller: Cuate Saab    Relationship: Emergency Contact    Best call back number: [phone]    "

## 2022-07-12 LAB
AT III PPP CHRO-ACNC: 106 % (ref 90–134)
BACTERIA SPEC AEROBE CULT: ABNORMAL
CARDIOLIPIN IGG SER IA-ACNC: <9 GPL U/ML (ref 0–14)
CARDIOLIPIN IGM SER IA-ACNC: <9 MPL U/ML (ref 0–12)
FACT VIII ACT/NOR PPP: >400 % ACTIVITY (ref 60–150)
GRAM STN SPEC: ABNORMAL
ISOLATED FROM: ABNORMAL
Lab: ABNORMAL

## 2022-07-13 LAB
ALBUMIN SERPL ELPH-MCNC: 3.1 G/DL (ref 2.9–4.4)
ALBUMIN/GLOB SERPL: 2 {RATIO} (ref 0.7–1.7)
ALPHA1 GLOB SERPL ELPH-MCNC: 0.2 G/DL (ref 0–0.4)
ALPHA2 GLOB SERPL ELPH-MCNC: 0.8 G/DL (ref 0.4–1)
APTT SCREEN TO CONFIRM RATIO: 1.08 RATIO (ref 0–1.34)
B-GLOBULIN SERPL ELPH-MCNC: 0.5 G/DL (ref 0.7–1.3)
CONFIRM APTT/NORMAL: 48.6 SEC (ref 0–47.6)
DRVVT SCREEN TO CONFIRM RATIO: 1.2 RATIO (ref 0.8–1.2)
GAMMA GLOB SERPL ELPH-MCNC: 0.1 G/DL (ref 0.4–1.8)
GLOBULIN SER-MCNC: 1.6 G/DL (ref 2.2–3.9)
IGA SERPL-MCNC: 16 MG/DL (ref 64–422)
IGG SERPL-MCNC: 100 MG/DL (ref 586–1602)
IGM SERPL-MCNC: 14 MG/DL (ref 26–217)
INTERPRETATION SERPL IEP-IMP: ABNORMAL
KAPPA LC FREE SER-MCNC: 12.4 MG/L (ref 3.3–19.4)
KAPPA LC FREE/LAMBDA FREE SER: 1.53 {RATIO} (ref 0.26–1.65)
LA 2 SCREEN W REFLEX-IMP: ABNORMAL
LABORATORY COMMENT REPORT: ABNORMAL
LAMBDA LC FREE SERPL-MCNC: 8.1 MG/L (ref 5.7–26.3)
M PROTEIN SERPL ELPH-MCNC: ABNORMAL G/DL
MIXING DRVVT: 58.7 SEC (ref 0–40.4)
PROT SERPL-MCNC: 4.7 G/DL (ref 6–8.5)
SCREEN APTT: 33.4 SEC (ref 0–51.9)
SCREEN DRVVT: 142.5 SEC (ref 0–47)
THROMBIN TIME: 17.5 SEC (ref 0–23)

## 2022-07-14 LAB
B2 GLYCOPROT1 IGA SER-ACNC: <9 GPI IGA UNITS (ref 0–25)
B2 GLYCOPROT1 IGG SER-ACNC: <9 GPI IGG UNITS (ref 0–20)
B2 GLYCOPROT1 IGM SER-ACNC: <9 GPI IGM UNITS (ref 0–32)
BACTERIA SPEC AEROBE CULT: NORMAL

## 2022-07-15 ENCOUNTER — APPOINTMENT (OUTPATIENT)
Dept: OTHER | Facility: HOSPITAL | Age: 77
End: 2022-07-15

## 2022-07-15 ENCOUNTER — APPOINTMENT (OUTPATIENT)
Dept: ONCOLOGY | Facility: HOSPITAL | Age: 77
End: 2022-07-15

## 2022-07-15 LAB — CRYOGLOB SER QL 1D COLD INC: NORMAL

## 2022-08-05 LAB
CYTO UR: NORMAL
DX PRELIMINARY: NORMAL
LAB AP CASE REPORT: NORMAL
LAB AP CLINICAL INFORMATION: NORMAL
LAB AP FLOW CYTOMETRY SUMMARY: NORMAL
LAB AP SPECIAL STAINS: NORMAL
Lab: NORMAL
PATH REPORT.ADDENDUM SPEC: NORMAL
PATH REPORT.FINAL DX SPEC: NORMAL
PATH REPORT.GROSS SPEC: NORMAL

## 2022-08-23 NOTE — TELEPHONE ENCOUNTER
Patient called - is doing OP PT for R Knee & prior to that did home PT for knee. 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 Goes to [facility] for PT 3 times per week and does at-home exercises alternating days (so doing PT 7 days per week). Patient asking what she should / can do? Please advise. Thanks. /srh   Statement Selected

## 2022-10-27 NOTE — PROGRESS NOTES
Phone report was given to GOPAL Horner [facility]:    Consult received, chart reviewed.  Patient has followed with Dr. Young in the office.  Will direct consult to her service for continuity.    Pavan Boucher M.D.  [facility]

## 2024-06-08 NOTE — PROGRESS NOTES
Re-Assessment / Re-Certification        Patient: Mary Kay Saab   : 1945  Diagnosis/ICD-10 Code:  Achilles tendonitis, bilateral [M76.61, M76.62]  Referring practitioner: Baylee Zhao MD  Date of Initial Visit: Type: THERAPY  Noted: 2020  Today's Date: 2020  Patient seen for 5 sessions      Subjective:   Mary Kay Saab reports: 3/10 in B feet at best and 8/10 at worst   Subjective Questionnaire: LEFS: 44 (41 on initial evaluation)  Clinical Progress: improved  Home Program Compliance: Yes  Treatment has included: manual therapy, electrical stimulation and ultrasound    Subjective   Objective   Goals  Plan Goals: SHORT TERM GOALS: Time for Goal Achievement: 6 visits    1.  Patient to be compliant with HEP. Met  2.  Pt able to ascend/descend steps and ambulatte with less B foot pain < 5/10 Progressing  3.Pt. to exhibit increased LE endurance/strength and decreased pain  to allow for increased ease  standing/walking > 30 minutes Progressing     LONG TERM GOALS: Time for Goal Achievement: 12 visits  1.  Pt to score 45 or greater on LEFS Progressing   2.  Patient able to ascend/descend steps and prolonged standing & cooking with pain < 2/10 Progressing  3.  Pt to exhibit full B ankle  AROM to allow for kneeling, bending squatting as is necessary for ADL's, IADL's and household activities.  Progressing   4.  Pt to demonstrate increased stability of the ankle to balance on foam as needed to traverse uneven terrain .  Progressing   Assessment/Plan  Progress toward previous goals: Not Met          Recommendations: Continue as planned  Timeframe: 1 month  Prognosis to achieve goals: good    PT Signature: Angelia Gomez PT      Based upon review of the patient's progress and continued therapy plan, it is my medical opinion that Mary Kay Saab should continue physical therapy treatment at [facility]  [address]  [address]  [phone].    Signature: __________________________________  Baylee Zhao MD    Manual Therapy:     10    mins  14481;  Therapeutic Exercise:         mins  68248;     Neuromuscular Lenny:        mins  30725;    Therapeutic Activity:          mins  20389;     Gait Training:           mins  16720;     Ultrasound:     10/10     mins  08292;    Electrical Stimulation:    15     mins  13649 ( );  Dry Needling          mins self-pay    Timed Treatment:   10   mins   Total Treatment:    45    mins                         Warm

## (undated) DEVICE — Device

## (undated) DEVICE — SPNG LAP 18X18IN LF STRL PK/5

## (undated) DEVICE — GLV SURG SENSICARE MICRO PF LF 7 STRL

## (undated) DEVICE — NDL SPINE 22G 31/2IN BLK

## (undated) DEVICE — APPL DURAPREP IODOPHOR APL 26ML

## (undated) DEVICE — SUT PDS 1 CT1 36IN Z347H

## (undated) DEVICE — BIPOLAR SEALER 23-112-1 AQM 6.0: Brand: AQUAMANTYS™

## (undated) DEVICE — UNDERCAST PADDING: Brand: DEROYAL

## (undated) DEVICE — MEDI-VAC YANKAUER SUCTION HANDLE W/BULBOUS TIP: Brand: CARDINAL HEALTH

## (undated) DEVICE — DRSNG SURESITE WNDW 4X4.5

## (undated) DEVICE — PREP SOL POVIDONE/IODINE BT 4OZ

## (undated) DEVICE — GLV SURG SENSICARE GREEN W/ALOE PF LF 7 STRL

## (undated) DEVICE — ENCORE® LATEX ORTHO SIZE 7.5, STERILE LATEX POWDER-FREE SURGICAL GLOVE: Brand: ENCORE

## (undated) DEVICE — 3M™ IOBAN™ 2 ANTIMICROBIAL INCISE DRAPE 6640EZ: Brand: IOBAN™ 2

## (undated) DEVICE — GLV SURG SENSICARE W/ALOE PF LF 8 STRL

## (undated) DEVICE — PK KN TOTL 40

## (undated) DEVICE — GLV SURG SENSICARE W/ALOE PF LF 7.5 STRL

## (undated) DEVICE — SUT VIC 0 CT1 36IN J946H

## (undated) DEVICE — GLV SURG TRIUMPH CLASSIC PF LTX 7.5 STRL

## (undated) DEVICE — BNDG ELAS ELITE V/CLOSE 6IN 5YD LF STRL

## (undated) DEVICE — ANTIBACTERIAL UNDYED BRAIDED (POLYGLACTIN 910), SYNTHETIC ABSORBABLE SUTURE: Brand: COATED VICRYL

## (undated) DEVICE — GLV SURG SENSICARE PI PF LF 7 GRN STRL

## (undated) DEVICE — MAT FLR ABSORBENT LG 4FT 10 2.5FT

## (undated) DEVICE — PREMIUM WET SKIN PREP TRAY: Brand: MEDLINE INDUSTRIES, INC.

## (undated) DEVICE — DUAL CUT SAGITTAL BLADE

## (undated) DEVICE — 6617 IOBAN II PATIENT ISOLATION DRAPE 5/BX,4BX/CS: Brand: STERI-DRAPE™ IOBAN™ 2

## (undated) DEVICE — SUT VICRYL 1 CT1 27IN  JJ40G

## (undated) DEVICE — SOL NACL 0.9PCT 100ML SGL

## (undated) DEVICE — STPLR SKIN VISISTAT WD 35CT

## (undated) DEVICE — SUT VIC 1 CT1 36IN J947H

## (undated) DEVICE — PK ANT HIP 40

## (undated) DEVICE — DRSNG BRDR MEPILEX P/OP SIL 4X8IN

## (undated) DEVICE — OCCLUSIVE GAUZE STRIP,3% BISMUTH TRIBROMOPHENATE IN PETROLATUM BLEND: Brand: XEROFORM